# Patient Record
Sex: MALE | Race: BLACK OR AFRICAN AMERICAN | NOT HISPANIC OR LATINO | URBAN - METROPOLITAN AREA
[De-identification: names, ages, dates, MRNs, and addresses within clinical notes are randomized per-mention and may not be internally consistent; named-entity substitution may affect disease eponyms.]

---

## 2017-02-27 ENCOUNTER — EMERGENCY (EMERGENCY)
Facility: HOSPITAL | Age: 72
LOS: 0 days | Discharge: HOME | End: 2017-02-27
Attending: EMERGENCY MEDICINE
Payer: COMMERCIAL

## 2017-02-27 VITALS
HEART RATE: 69 BPM | RESPIRATION RATE: 18 BRPM | DIASTOLIC BLOOD PRESSURE: 95 MMHG | OXYGEN SATURATION: 99 % | TEMPERATURE: 98 F | SYSTOLIC BLOOD PRESSURE: 157 MMHG

## 2017-02-27 VITALS
OXYGEN SATURATION: 98 % | HEART RATE: 58 BPM | RESPIRATION RATE: 20 BRPM | DIASTOLIC BLOOD PRESSURE: 76 MMHG | TEMPERATURE: 98 F | SYSTOLIC BLOOD PRESSURE: 143 MMHG

## 2017-02-27 DIAGNOSIS — M54.2 CERVICALGIA: ICD-10-CM
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
## 2017-02-27 DIAGNOSIS — S16.1XXA STRAIN OF MUSCLE, FASCIA AND TENDON AT NECK LEVEL, INITIAL ENCOUNTER: ICD-10-CM
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
## 2017-02-27 PROCEDURE — 99284 EMERGENCY DEPT VISIT MOD MDM: CPT

## 2017-02-27 PROCEDURE — 72050 X-RAY EXAM NECK SPINE 4/5VWS: CPT | Mod: 26

## 2017-02-27 RX ORDER — ACETAMINOPHEN 500 MG
650 TABLET ORAL ONCE
Qty: 0 | Refills: 0 | Status: COMPLETED | OUTPATIENT
Start: 2017-02-27 | End: 2017-02-27

## 2017-02-27 RX ADMIN — Medication 650 MILLIGRAM(S): at 11:13
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
## 2017-02-27 NOTE — ED ADULT NURSE NOTE - CAS EDN DISCHARGE ASSESSMENT
Alert and oriented to person, place and time/Awake/No adverse reaction to first time med in ED/Dressing clean and dry

## 2017-02-27 NOTE — ED ADULT NURSE NOTE - CAS TRG GENERAL NORM CIRC DET
Strong peripheral pulses/Capillary refill less/equal to 2 seconds/No visible significant external bleeding/Bounding peripheral pulses

## 2018-01-01 ENCOUNTER — OUTPATIENT (OUTPATIENT)
Dept: OUTPATIENT SERVICES | Facility: HOSPITAL | Age: 73
LOS: 1 days | End: 2018-01-01
Payer: MEDICAID

## 2018-01-25 ENCOUNTER — INPATIENT (INPATIENT)
Facility: HOSPITAL | Age: 73
LOS: 5 days | Discharge: NURSING, REHAB OR LONG-TERM CARE | End: 2018-01-31
Attending: INTERNAL MEDICINE | Admitting: INTERNAL MEDICINE
Payer: MEDICARE

## 2018-01-25 VITALS
SYSTOLIC BLOOD PRESSURE: 169 MMHG | DIASTOLIC BLOOD PRESSURE: 102 MMHG | RESPIRATION RATE: 16 BRPM | OXYGEN SATURATION: 100 % | TEMPERATURE: 98 F | HEART RATE: 64 BPM

## 2018-01-25 LAB
ALBUMIN SERPL ELPH-MCNC: 4.1 G/DL — SIGNIFICANT CHANGE UP (ref 3.3–5)
ALP SERPL-CCNC: 71 U/L — SIGNIFICANT CHANGE UP (ref 40–120)
ALT FLD-CCNC: 15 U/L — SIGNIFICANT CHANGE UP (ref 4–41)
APTT BLD: 28.7 SEC — SIGNIFICANT CHANGE UP (ref 27.5–37.4)
AST SERPL-CCNC: 24 U/L — SIGNIFICANT CHANGE UP (ref 4–40)
BASOPHILS # BLD AUTO: 0.06 K/UL — SIGNIFICANT CHANGE UP (ref 0–0.2)
BASOPHILS NFR BLD AUTO: 0.4 % — SIGNIFICANT CHANGE UP (ref 0–2)
BASOPHILS NFR SPEC: 0 % — SIGNIFICANT CHANGE UP (ref 0–2)
BILIRUB SERPL-MCNC: 0.3 MG/DL — SIGNIFICANT CHANGE UP (ref 0.2–1.2)
BUN SERPL-MCNC: 12 MG/DL — SIGNIFICANT CHANGE UP (ref 7–23)
CALCIUM SERPL-MCNC: 9.6 MG/DL — SIGNIFICANT CHANGE UP (ref 8.4–10.5)
CHLORIDE SERPL-SCNC: 101 MMOL/L — SIGNIFICANT CHANGE UP (ref 98–107)
CO2 SERPL-SCNC: 26 MMOL/L — SIGNIFICANT CHANGE UP (ref 22–31)
CREAT SERPL-MCNC: 1.16 MG/DL — SIGNIFICANT CHANGE UP (ref 0.5–1.3)
EOSINOPHIL # BLD AUTO: 0.03 K/UL — SIGNIFICANT CHANGE UP (ref 0–0.5)
EOSINOPHIL NFR BLD AUTO: 0.2 % — SIGNIFICANT CHANGE UP (ref 0–6)
EOSINOPHIL NFR FLD: 0 % — SIGNIFICANT CHANGE UP (ref 0–6)
GIANT PLATELETS BLD QL SMEAR: PRESENT — SIGNIFICANT CHANGE UP
GLUCOSE SERPL-MCNC: 101 MG/DL — HIGH (ref 70–99)
HCT VFR BLD CALC: 45.9 % — SIGNIFICANT CHANGE UP (ref 39–50)
HGB BLD-MCNC: 15 G/DL — SIGNIFICANT CHANGE UP (ref 13–17)
IMM GRANULOCYTES # BLD AUTO: 0.05 # — SIGNIFICANT CHANGE UP
IMM GRANULOCYTES NFR BLD AUTO: 0.3 % — SIGNIFICANT CHANGE UP (ref 0–1.5)
INR BLD: 0.95 — SIGNIFICANT CHANGE UP (ref 0.88–1.17)
LYMPHOCYTES # BLD AUTO: 60.3 % — HIGH (ref 13–44)
LYMPHOCYTES # BLD AUTO: 9.49 K/UL — HIGH (ref 1–3.3)
LYMPHOCYTES NFR SPEC AUTO: 44.6 % — HIGH (ref 13–44)
MANUAL SMEAR VERIFICATION: SIGNIFICANT CHANGE UP
MCHC RBC-ENTMCNC: 30.3 PG — SIGNIFICANT CHANGE UP (ref 27–34)
MCHC RBC-ENTMCNC: 32.7 % — SIGNIFICANT CHANGE UP (ref 32–36)
MCV RBC AUTO: 92.7 FL — SIGNIFICANT CHANGE UP (ref 80–100)
MONOCYTES # BLD AUTO: 0.43 K/UL — SIGNIFICANT CHANGE UP (ref 0–0.9)
MONOCYTES NFR BLD AUTO: 2.7 % — SIGNIFICANT CHANGE UP (ref 2–14)
MONOCYTES NFR BLD: 1.8 % — LOW (ref 2–9)
MORPHOLOGY BLD-IMP: NORMAL — SIGNIFICANT CHANGE UP
NEUTROPHIL AB SER-ACNC: 40.2 % — LOW (ref 43–77)
NEUTROPHILS # BLD AUTO: 5.69 K/UL — SIGNIFICANT CHANGE UP (ref 1.8–7.4)
NEUTROPHILS NFR BLD AUTO: 36.1 % — LOW (ref 43–77)
NRBC # FLD: 0 — SIGNIFICANT CHANGE UP
PLATELET # BLD AUTO: 328 K/UL — SIGNIFICANT CHANGE UP (ref 150–400)
PLATELET COUNT - ESTIMATE: NORMAL — SIGNIFICANT CHANGE UP
PMV BLD: 10.2 FL — SIGNIFICANT CHANGE UP (ref 7–13)
POTASSIUM SERPL-MCNC: 3.9 MMOL/L — SIGNIFICANT CHANGE UP (ref 3.5–5.3)
POTASSIUM SERPL-SCNC: 3.9 MMOL/L — SIGNIFICANT CHANGE UP (ref 3.5–5.3)
PROT SERPL-MCNC: 7.7 G/DL — SIGNIFICANT CHANGE UP (ref 6–8.3)
PROTHROM AB SERPL-ACNC: 10.9 SEC — SIGNIFICANT CHANGE UP (ref 9.8–13.1)
RBC # BLD: 4.95 M/UL — SIGNIFICANT CHANGE UP (ref 4.2–5.8)
RBC # FLD: 13.9 % — SIGNIFICANT CHANGE UP (ref 10.3–14.5)
SMUDGE CELLS # BLD: PRESENT — SIGNIFICANT CHANGE UP
SODIUM SERPL-SCNC: 143 MMOL/L — SIGNIFICANT CHANGE UP (ref 135–145)
VARIANT LYMPHS # BLD: 13.4 % — SIGNIFICANT CHANGE UP
WBC # BLD: 15.75 K/UL — HIGH (ref 3.8–10.5)
WBC # FLD AUTO: 15.75 K/UL — HIGH (ref 3.8–10.5)

## 2018-01-25 PROCEDURE — 72170 X-RAY EXAM OF PELVIS: CPT | Mod: 26

## 2018-01-25 PROCEDURE — 70450 CT HEAD/BRAIN W/O DYE: CPT | Mod: 26

## 2018-01-25 PROCEDURE — 71045 X-RAY EXAM CHEST 1 VIEW: CPT | Mod: 26

## 2018-01-25 NOTE — ED ADULT TRIAGE NOTE - CHIEF COMPLAINT QUOTE
Pt creole speaking,  As per friend who lives with patient, patient has had multiple falls, 3 being today, as per friend pt has been falling and complaining of knee pain, pt at baseline mental status, talks slightly slow for many years, no change in mental status, denies blood thinner use, PMH htn, high chol, denies chest pain, sob, blurred vision, dizziness, headaches, or weakness

## 2018-01-25 NOTE — ED PROVIDER NOTE - OBJECTIVE STATEMENT
72M, pmhx HTN HLD presents to ED with chief complaint of weakness and multiple falls today. Creole speaking,  # [id number] used for interaction. Patient lives at home with a male friend. Hx obtained from patient and friend. Patient has had weakness and decreased appetite since yesterday, and had 3 falls at home today. Patient states that he fell each time because of weakness, and hit his head during one of the falls. Denies any preceding symptoms, and denies any LOC, dizziness, blurry vision, fevers or chills, nausea or vomiting, headache, chest pain, shortness of breath, abdominal pain, diarrhea or constipation, dysuria, neck or back pain, joint pain. No prior hx of falls. Endorses few days of cough. Per friends, mental status at baseline, normal speech at baseline. Patient is A+Ox4. Patient non ambulatory currently due to weakness. No meds, allergies, tobacco, ethanol, or drug use. 72M, pmhx HTN HLD presents to ED with chief complaint of weakness and multiple falls today. Creole speaking,  # [id number] used for interaction. Patient lives at home with a male friend. Hx obtained from patient and friend. Patient has had weakness and decreased appetite since yesterday, and had 3 falls at home today. Patient states that he fell each time because of weakness, and hit his head during one of the falls. Denies any preceding symptoms, and denies any LOC, dizziness, blurry vision, fevers or chills, nausea or vomiting, headache, chest pain, shortness of breath, abdominal pain, diarrhea or constipation, dysuria, neck or back pain, joint pain. No prior hx of falls. Endorses few days of cough. Per friends, mental status at baseline, normal speech at baseline. Patient is A+Ox4. Patient non ambulatory currently due to weakness. No meds, allergies, tobacco, ethanol, or drug use.  Niece (health proxy) Tejal - [phone]

## 2018-01-25 NOTE — ED PROVIDER NOTE - PROGRESS NOTE DETAILS
Neuro paged x2 regarding patient, awaiting callback  Roverto Day MD, PGY1 Neurology to see patient  Roverto Day MD, PGY1

## 2018-01-25 NOTE — ED ADULT NURSE NOTE - ED STAT RN HANDOFF DETAILS
pt endorsed to joanne alexandra. nad noted. attempting to reach tele pa regarding bp, no answer orders yet. nsr on monitor. safety maintained

## 2018-01-25 NOTE — ED ADULT NURSE NOTE - OBJECTIVE STATEMENT
Alert and oriented x 4. Pt speaks Creole.  used. # [id number]. Pt states: " I feel weak and I fell 3 times today. I don't know why." Pt friend states: ": The first time he fell I helped him up the next time I found him on the floor." Pt denies LOC. Pt did hit head on glass side table. Pt denies chest pain, shortness of breath, nausea, vomiting or dizziness. IV 20g to right AC. Labs drawn. VSS except BP. MD at bedside and aware. Upon assessment pt has right arm and leg weakness and  is slighter less then left side. Pt usually walks. Friends at bedside. Will continue to monitor. RN Facilitator.

## 2018-01-25 NOTE — ED PROVIDER NOTE - ATTENDING CONTRIBUTION TO CARE
72M, pmhx HTN HLD presents to ED with chief complaint of weakness and multiple falls today. Creole speaking,  # [id number] used for interaction. Patient lives at home with a male friend. Hx obtained from patient and friend. Patient has had weakness and decreased appetite since yesterday, and had 3 falls at home today. Patient states that he fell each time because of weakness, and hit his head during one of the falls. Denies any preceding symptoms, and denies any LOC, dizziness, blurry vision, fevers or chills, nausea or vomiting, headache, chest pain, shortness of breath, abdominal pain, diarrhea or constipation, dysuria, neck or back pain, joint pain. No prior hx of falls. Endorses few days of cough. Per friends, mental status at baseline, normal speech at baseline. Patient is A+Ox4. Patient non ambulatory currently due to weakness. No meds, allergies, tobacco, ethanol, or drug use. On exam: VSS, afebrile, lungs clear heart sounds normal, abdo soft non tender, of note: R arm and leg weakness 4/5, left side normal. Likely recent CVA. Plan: labs ua ekg CXR CT head and medical admission

## 2018-01-25 NOTE — ED PROVIDER NOTE - PHYSICAL EXAMINATION
A+Ox4  CN 2-12 grossly intact, no c-spine, spinal tenderness  Weakness to right upper and lower extremity: decreased  strength RUE, decreased RUE flex/extension. RUE drop.  RLE: decreased strength, no flex/extension at hip, unable to lift leg off bed, +leg drop

## 2018-01-26 DIAGNOSIS — I10 ESSENTIAL (PRIMARY) HYPERTENSION: ICD-10-CM

## 2018-01-26 DIAGNOSIS — R53.1 WEAKNESS: ICD-10-CM

## 2018-01-26 DIAGNOSIS — E78.5 HYPERLIPIDEMIA, UNSPECIFIED: ICD-10-CM

## 2018-01-26 DIAGNOSIS — Z98.49 CATARACT EXTRACTION STATUS, UNSPECIFIED EYE: Chronic | ICD-10-CM

## 2018-01-26 DIAGNOSIS — I63.9 CEREBRAL INFARCTION, UNSPECIFIED: ICD-10-CM

## 2018-01-26 LAB
ALBUMIN SERPL ELPH-MCNC: 4.1 G/DL — SIGNIFICANT CHANGE UP (ref 3.3–5)
ALP SERPL-CCNC: 74 U/L — SIGNIFICANT CHANGE UP (ref 40–120)
ALT FLD-CCNC: 13 U/L — SIGNIFICANT CHANGE UP (ref 4–41)
APPEARANCE UR: CLEAR — SIGNIFICANT CHANGE UP
AST SERPL-CCNC: 23 U/L — SIGNIFICANT CHANGE UP (ref 4–40)
BACTERIA # UR AUTO: SIGNIFICANT CHANGE UP
BASOPHILS # BLD AUTO: 0.05 K/UL — SIGNIFICANT CHANGE UP (ref 0–0.2)
BASOPHILS NFR BLD AUTO: 0.4 % — SIGNIFICANT CHANGE UP (ref 0–2)
BILIRUB SERPL-MCNC: 0.4 MG/DL — SIGNIFICANT CHANGE UP (ref 0.2–1.2)
BILIRUB UR-MCNC: NEGATIVE — SIGNIFICANT CHANGE UP
BLOOD UR QL VISUAL: HIGH
BUN SERPL-MCNC: 14 MG/DL — SIGNIFICANT CHANGE UP (ref 7–23)
CALCIUM SERPL-MCNC: 9.3 MG/DL — SIGNIFICANT CHANGE UP (ref 8.4–10.5)
CHLORIDE SERPL-SCNC: 100 MMOL/L — SIGNIFICANT CHANGE UP (ref 98–107)
CHOLEST SERPL-MCNC: 232 MG/DL — HIGH (ref 120–199)
CO2 SERPL-SCNC: 25 MMOL/L — SIGNIFICANT CHANGE UP (ref 22–31)
COLOR SPEC: YELLOW — SIGNIFICANT CHANGE UP
CREAT SERPL-MCNC: 1.07 MG/DL — SIGNIFICANT CHANGE UP (ref 0.5–1.3)
EOSINOPHIL # BLD AUTO: 0.02 K/UL — SIGNIFICANT CHANGE UP (ref 0–0.5)
EOSINOPHIL NFR BLD AUTO: 0.1 % — SIGNIFICANT CHANGE UP (ref 0–6)
GLUCOSE SERPL-MCNC: 87 MG/DL — SIGNIFICANT CHANGE UP (ref 70–99)
GLUCOSE UR-MCNC: NEGATIVE — SIGNIFICANT CHANGE UP
HBA1C BLD-MCNC: 6 % — HIGH (ref 4–5.6)
HCT VFR BLD CALC: 47.1 % — SIGNIFICANT CHANGE UP (ref 39–50)
HDLC SERPL-MCNC: 53 MG/DL — SIGNIFICANT CHANGE UP (ref 35–55)
HGB BLD-MCNC: 15.7 G/DL — SIGNIFICANT CHANGE UP (ref 13–17)
IMM GRANULOCYTES # BLD AUTO: 0.04 # — SIGNIFICANT CHANGE UP
IMM GRANULOCYTES NFR BLD AUTO: 0.3 % — SIGNIFICANT CHANGE UP (ref 0–1.5)
KETONES UR-MCNC: HIGH
LEUKOCYTE ESTERASE UR-ACNC: SIGNIFICANT CHANGE UP
LIPID PNL WITH DIRECT LDL SERPL: 172 MG/DL — SIGNIFICANT CHANGE UP
LYMPHOCYTES # BLD AUTO: 52 % — HIGH (ref 13–44)
LYMPHOCYTES # BLD AUTO: 7.25 K/UL — HIGH (ref 1–3.3)
MAGNESIUM SERPL-MCNC: 2.2 MG/DL — SIGNIFICANT CHANGE UP (ref 1.6–2.6)
MCHC RBC-ENTMCNC: 31.1 PG — SIGNIFICANT CHANGE UP (ref 27–34)
MCHC RBC-ENTMCNC: 33.3 % — SIGNIFICANT CHANGE UP (ref 32–36)
MCV RBC AUTO: 93.3 FL — SIGNIFICANT CHANGE UP (ref 80–100)
MONOCYTES # BLD AUTO: 0.52 K/UL — SIGNIFICANT CHANGE UP (ref 0–0.9)
MONOCYTES NFR BLD AUTO: 3.7 % — SIGNIFICANT CHANGE UP (ref 2–14)
MUCOUS THREADS # UR AUTO: SIGNIFICANT CHANGE UP
NEUTROPHILS # BLD AUTO: 6.07 K/UL — SIGNIFICANT CHANGE UP (ref 1.8–7.4)
NEUTROPHILS NFR BLD AUTO: 43.5 % — SIGNIFICANT CHANGE UP (ref 43–77)
NITRITE UR-MCNC: NEGATIVE — SIGNIFICANT CHANGE UP
NON-SQ EPI CELLS # UR AUTO: <1 — SIGNIFICANT CHANGE UP
NRBC # FLD: 0 — SIGNIFICANT CHANGE UP
PH UR: 6.5 — SIGNIFICANT CHANGE UP (ref 4.6–8)
PHOSPHATE SERPL-MCNC: 3.2 MG/DL — SIGNIFICANT CHANGE UP (ref 2.5–4.5)
PLATELET # BLD AUTO: 304 K/UL — SIGNIFICANT CHANGE UP (ref 150–400)
PMV BLD: 10.2 FL — SIGNIFICANT CHANGE UP (ref 7–13)
POTASSIUM SERPL-MCNC: 3.7 MMOL/L — SIGNIFICANT CHANGE UP (ref 3.5–5.3)
POTASSIUM SERPL-SCNC: 3.7 MMOL/L — SIGNIFICANT CHANGE UP (ref 3.5–5.3)
PROT SERPL-MCNC: 7.8 G/DL — SIGNIFICANT CHANGE UP (ref 6–8.3)
PROT UR-MCNC: 30 MG/DL — HIGH
RBC # BLD: 5.05 M/UL — SIGNIFICANT CHANGE UP (ref 4.2–5.8)
RBC # FLD: 13.8 % — SIGNIFICANT CHANGE UP (ref 10.3–14.5)
RBC CASTS # UR COMP ASSIST: >50 — HIGH (ref 0–?)
SODIUM SERPL-SCNC: 142 MMOL/L — SIGNIFICANT CHANGE UP (ref 135–145)
SP GR SPEC: 1.03 — SIGNIFICANT CHANGE UP (ref 1–1.04)
SQUAMOUS # UR AUTO: SIGNIFICANT CHANGE UP
TRIGL SERPL-MCNC: 78 MG/DL — SIGNIFICANT CHANGE UP (ref 10–149)
TSH SERPL-MCNC: 0.61 UIU/ML — SIGNIFICANT CHANGE UP (ref 0.27–4.2)
UROBILINOGEN FLD QL: 1 MG/DL — SIGNIFICANT CHANGE UP
WBC # BLD: 13.95 K/UL — HIGH (ref 3.8–10.5)
WBC # FLD AUTO: 13.95 K/UL — HIGH (ref 3.8–10.5)
WBC UR QL: HIGH (ref 0–?)

## 2018-01-26 PROCEDURE — 73610 X-RAY EXAM OF ANKLE: CPT | Mod: 26,RT

## 2018-01-26 PROCEDURE — 99223 1ST HOSP IP/OBS HIGH 75: CPT | Mod: GC

## 2018-01-26 PROCEDURE — 70544 MR ANGIOGRAPHY HEAD W/O DYE: CPT | Mod: 26,59

## 2018-01-26 PROCEDURE — 99223 1ST HOSP IP/OBS HIGH 75: CPT | Mod: AI

## 2018-01-26 PROCEDURE — 73630 X-RAY EXAM OF FOOT: CPT | Mod: 26,RT

## 2018-01-26 PROCEDURE — 73600 X-RAY EXAM OF ANKLE: CPT | Mod: 26,RT

## 2018-01-26 PROCEDURE — 70549 MR ANGIOGRAPH NECK W/O&W/DYE: CPT | Mod: 26

## 2018-01-26 PROCEDURE — 70553 MRI BRAIN STEM W/O & W/DYE: CPT | Mod: 26

## 2018-01-26 RX ORDER — ASPIRIN/CALCIUM CARB/MAGNESIUM 324 MG
81 TABLET ORAL DAILY
Qty: 0 | Refills: 0 | Status: DISCONTINUED | OUTPATIENT
Start: 2018-01-26 | End: 2018-01-31

## 2018-01-26 RX ORDER — ATORVASTATIN CALCIUM 80 MG/1
80 TABLET, FILM COATED ORAL AT BEDTIME
Qty: 0 | Refills: 0 | Status: DISCONTINUED | OUTPATIENT
Start: 2018-01-26 | End: 2018-01-31

## 2018-01-26 RX ADMIN — ATORVASTATIN CALCIUM 80 MILLIGRAM(S): 80 TABLET, FILM COATED ORAL at 22:11

## 2018-01-26 RX ADMIN — Medication 81 MILLIGRAM(S): at 13:00

## 2018-01-26 NOTE — PHYSICAL THERAPY INITIAL EVALUATION ADULT - LEVEL OF CONSCIOUSNESS, REHAB EVAL
alert Purse String (Simple) Text: Given the location of the defect and the characteristics of the surrounding skin a pursestring closure was deemed most appropriate.  Undermining was performed circumfirentially around the surgical defect.  A purstring suture was then placed and tightened.

## 2018-01-26 NOTE — OCCUPATIONAL THERAPY INITIAL EVALUATION ADULT - RANGE OF MOTION EXAMINATION, UPPER EXTREMITY
Left UE Active ROM was WFL (within functional limits)/Right UE Active Assistive ROM was WFL  (within functional limits)

## 2018-01-26 NOTE — CONSULT NOTE ADULT - ATTENDING COMMENTS
Patient seen and examined with neurology team and above note reviewed and I agree with assessment and plan as outlined. Patient admitted for acute onset of right sided weakness and poor balance and multiple falls as a result. His exam shows right leg> arm weakness and he cannot stand up unassisted. His MRI brain revealed an acute left NURA infarct and MRA showed mild bilateral MCA bifurcation stenosis and M2 segment as well otherwise normal. He will be placed on ASA 81 mg daily and high dose statin and coQ 10 100mg daily. He is awaiting LEATHA with bubble study and telemonitoring. Continue to optimize vascular risk factors, Check HBA1c and lipid profile.   All questions answered with brother at bedside. Continue medical management and supportive care.

## 2018-01-26 NOTE — H&P ADULT - MUSCULOSKELETAL
details… detailed exam no joint swelling/no joint erythema/no joint warmth/no calf tenderness/decreased ROM

## 2018-01-26 NOTE — H&P ADULT - PROBLEM SELECTOR PLAN 2
Xray of the R ankle/foot to r/o fracture  Ortho consult to r/o possible ligament tear  PT consult for gait/balance exercises Xray of the R ankle/foot to r/o fracture, Check Orthostatics, PT consult for gait assessment

## 2018-01-26 NOTE — CONSULT NOTE ADULT - SUBJECTIVE AND OBJECTIVE BOX
HPI: 71 yo male who presents to ED with falls. This morning he became weak on his right side and had multiple falls. No numbness. No visual changes. No speech issues. His righ arm and leg are much weaker now than this morning. NIHSS 6 MRS0          MEDICATIONS  (STANDING):    MEDICATIONS  (PRN):    PAST MEDICAL & SURGICAL HISTORY:  No pertinent past medical history  No significant past surgical history    FAMILY HISTORY:    Allergies    No Known Allergies    Intolerances        SHx - No smoking, No ETOH, No drug abuse      Review of Systems:  CONSTITUTIONAL:  No weight loss, fever, chills, weakness or fatigue.  HEENT:  Eyes:  No visual loss, blurred vision, double vision or yellow sclerae. Ears, Nose, Throat:  No hearing loss, sneezing, congestion, runny nose or sore throat.  SKIN:  No rash or itching.  CARDIOVASCULAR:  No chest pain, chest pressure or chest discomfort. No palpitations or edema.  RESPIRATORY:  No shortness of breath, cough or sputum.  GASTROINTESTINAL:  No anorexia, nausea, vomiting or diarrhea. No abdominal pain or blood.  GENITOURINARY:  NO Burning on urination.   NEUROLOGICAL: See HPI  MUSCULOSKELETAL:  No muscle, back pain, joint pain or stiffness.  HEMATOLOGIC:  No anemia, bleeding or bruising.  LYMPHATICS:  No enlarged nodes. No history of splenectomy.  PSYCHIATRIC:  No history of depression or anxiety.  ENDOCRINOLOGIC:  No reports of sweating, cold or heat intolerance. No polyuria or polydipsia.  ALLERGIES:  No history of asthma, hives, eczema or rhinitis.        Vital Signs Last 24 Hrs  T(C): 37.1 (26 Jan 2018 02:19), Max: 37.1 (26 Jan 2018 02:19)  T(F): 98.7 (26 Jan 2018 02:19), Max: 98.7 (26 Jan 2018 02:19)  HR: 66 (26 Jan 2018 02:19) (64 - 78)  BP: 155/91 (26 Jan 2018 02:19) (155/91 - 176/100)  BP(mean): --  RR: 15 (26 Jan 2018 02:19) (15 - 18)  SpO2: 96% (26 Jan 2018 02:19) (96% - 100%)    General Exam:   General appearance: No acute distress                   Neurological Exam:  Mental Status: Orientated to self, date and place.  Attention intact.  No dysarthria, aphasia or neglect.  Knowledge intact.  Registration intact.  Short and long term memory grossly intact.      Cranial Nerves: CN I - not tested.  PERRL, EOMI, VFF, no nystagmus or diplopia.  No APD.  Fundi not visualized bilaterally.  CN V1-3 intact to light touch and pinprick.  Right facial droop.  Hearing intact to finger rub bilaterally.  Tongue, uvula and palate midline.  Sternocleidomastoid and Trapezius intact bilaterally.    Motor:   Tone: normal.                  Strength:     [] Upper extremity                      Delt       Bicep    Tricep                                                  R         3/5        3/5        4/5       4/5                                               L          5/5 5/5 5/5 5/5  [] Lower extremity                       HF          KE          KF        DF         PF                                               R        3/5        3/5        4/5       5/5       5/5                                               L         5/5 5/5 5/5 5/5 5/5                  Dysmetria: None to finger-nose-finger or heel-shin-heel  No truncal ataxia.    Tremor: No resting, postural or action tremor.  No myoclonus.    Sensation: intact to light touch, pinprick, vibration and proprioception    Deep Tendon Reflexes: 1+ bilateral biceps, triceps, brachioradialis, knee and ankle  Toes flexor bilaterally    Gait: normal and stable.      Other:    01-25    143  |  101  |  12  ----------------------------<  101<H>  3.9   |  26  |  1.16    Ca    9.6      25 Jan 2018 21:30    TPro  7.7  /  Alb  4.1  /  TBili  0.3  /  DBili  x   /  AST  24  /  ALT  15  /  AlkPhos  71 01-25 01-25    143  |  101  |  12  ----------------------------<  101<H>  3.9   |  26  |  1.16    Ca    9.6      25 Jan 2018 21:30    TPro  7.7  /  Alb  4.1  /  TBili  0.3  /  DBili  x   /  AST  24  /  ALT  15  /  AlkPhos  71  01-25                          15.0   15.75 )-----------( 328      ( 25 Jan 2018 21:30 )             45.9       Radiology    CT    < from: CT Head No Cont (01.25.18 @ 23:22) >  No CT evidence of acute intracranial hemorrhage, brain edema, mass effect   or acute territorial infarct. Follow-up MRI can be obtained as clinically   warranted.     < end of copied text >

## 2018-01-26 NOTE — PHYSICAL THERAPY INITIAL EVALUATION ADULT - CRITERIA FOR SKILLED THERAPEUTIC INTERVENTIONS
anticipated discharge recommendation/predicted duration of therapy intervention/risk reduction/prevention/rehab potential/therapy frequency/impairments found/functional limitations in following categories

## 2018-01-26 NOTE — H&P ADULT - NEGATIVE ENMT SYMPTOMS
no sinus symptoms/no hearing difficulty/no nasal congestion/no throat pain/no nose bleeds/no ear pain/no tinnitus

## 2018-01-26 NOTE — H&P ADULT - NSHPSOCIALHISTORY_GEN_ALL_CORE
71 yo M retired, never had a job, lives alone in own residence. Pt denies using a cane/walker and denies smoking, alcohol, or drug use. 73 yo M, never had a job, lives alone in own residence per . Pt denies using a cane/walker and denies smoking, alcohol, or drug use.

## 2018-01-26 NOTE — PHYSICAL THERAPY INITIAL EVALUATION ADULT - ADDITIONAL COMMENTS
Pt. was left supine in bed, NAD, call bell within reach, friend present. RN made aware of pt. status.

## 2018-01-26 NOTE — H&P ADULT - ASSESSMENT
3 yo Male with a PMHx of HTN, HLD presents to the ED complaining of R foot weakness that began a day ago admitted to telemetry to r/o stroke. 71 yo Male with a PMHx of HTN, HLD presents to the ED complaining of R foot weakness that began a day ago, sustained 4 falls, 2 with head trauma, admitted to telemetry to r/o stroke.

## 2018-01-26 NOTE — OCCUPATIONAL THERAPY INITIAL EVALUATION ADULT - PERTINENT HX OF CURRENT PROBLEM, REHAB EVAL
71 yo Male with a PMHx of HTN, HLD presents to the ED complaining of R foot weakness that began a day ago, sustained 4 falls, 2 with head trauma, admitted to telemetry to r/o stroke.

## 2018-01-26 NOTE — H&P ADULT - NEGATIVE MUSCULOSKELETAL SYMPTOMS
no back pain/no leg pain R/no stiffness/no neck pain/no arm pain L/no arm pain R/no leg pain L/no arthritis/no joint swelling

## 2018-01-26 NOTE — H&P ADULT - PROBLEM SELECTOR PLAN 1
admit patient to telemetry   monitor vital signs, serial EKGS, cardiac enzymes, echocardiogram  CT head no contrast, MRI of the brain, neuro c/s  begin patient on aspirin and plavix Admit patient to telemetry, monitor for arrhythmia, monitor vital signs, neuro checks, check echocardiogram, MRI/A of the brain & neck, begin patient on aspirin and atorvastatin, F/U MD note

## 2018-01-26 NOTE — H&P ADULT - NEGATIVE GASTROINTESTINAL SYMPTOMS
no vomiting/no nausea/no diarrhea/no melena/no hematochezia/no change in bowel habits/no constipation/no abdominal pain

## 2018-01-26 NOTE — H&P ADULT - HISTORY OF PRESENT ILLNESS
73 yo Male with a PMHx of HTN, HLD presents to the ED complaining of R foot/ankle weakness that began a day ago. Pt is a poor historian and speaks limited english with a native language in Creole. An  was used (#[id number]) to speak with patient and friend to gain collateral information. Pt explained that beginning yesterday, he had right foot/ankle pain that prevented him from walking his usual 2-3 blocks daily due to difficulty walking on his R foot because it felt "painful." Pt mentioned that the R foot "is so weak," he couldn't stand up. In addition, pt mentioned that he fell 4 times and only 2/4 times he fell and hit his head on a "mirror." Pt reported that after he fell those two times, he could not get up himself. Pt denied any other pain/weakness around his body besides his right foot/ankle. Pt  mentioned he had no appetite to eat anything in addition to the weakness he felt in his right foot/ankle that began yesterday. Pt denied taking any medication to relieve the right foot/ankle weakness or radiation of weakness. Lastly, pt mentioned he feels "out of breath" upon exertion when he has to do chores around the house or during his 2-3 block daily walk. Pt mentioned that this "has been going on for a while," and the SOB goes away at rest. As per collateral information from friend, he mentioned that the patient had this weakness around the whole body and not just on the R ankle/foot. He also mentioned that the weakness has been occurring for a "few" days know and not just starting yesterday. He denied seeing the patient have any LOC, syncope, or generalized tremors when he saw him fall. He also mentioned that when the pt fell, the pt would be unable to get up on his own, and he would have to pull him up in order for him to get up from the floor. Pt denies: fever, chills, cough, wheezing, changes in weight, changes in bowl movements, diaphoresis, palpitations, syncope, chest pain, headaches, lightheadedness/dizziness, LOC, abdominal pain, N/V/D, constipation, hematuria, hematochezia, melena, night sweats, paroxysmal nocturnal orthopnea, LE edema, tremors, changes in vision, blurry vision, diplopia, nasal congestion, sore throat, tinnitus, muscle/joint pain besides foot/ankle pain. 73 yo Male with a PMHx of HTN, HLD presents to the ED complaining of R foot/ankle weakness that began a day ago. Pt is a poor historian and speaks limited english with a native language in Creole. An  was used (#[id number]) to speak with patient and friend to gain collateral information. Pt explained that beginning yesterday, he had right foot/ankle pain that prevented him from walking his usual 2-3 blocks daily due to difficulty walking on his R foot because it felt "painful." Pt mentioned that the R foot "is so weak," he couldn't stand up. In addition, pt mentioned that he fell 4 times and only 2/4 times he fell and hit his head on a "mirror." Pt reported that after he fell those two times, he could not get up himself. Pt denied any other pain/weakness around his body besides his right foot/ankle. Pt  mentioned he had no appetite to eat anything in addition to the weakness he felt in his right foot/ankle that began yesterday. Pt denied taking any medication to relieve the right foot/ankle pain or radiation of weakness. Lastly, pt mentioned he feels "out of breath" upon exertion when he has to do chores around the house or during his 2-3 block daily walk. Pt mentioned that this "has been going on for a while," and the SOB goes away at rest. As per collateral information from friend, he mentioned that the patient had this weakness around the whole body and not just on the R ankle/foot. He also mentioned that the weakness has been occurring for a "few" days know and not just starting yesterday. He denied seeing the patient have any LOC, syncope, or generalized tremors when he saw him fall. He also mentioned that when the pt fell, the pt would be unable to get up on his own, and he would have to pull him up in order for him to get up from the floor. Pt denies: fever, chills, cough, wheezing, changes in weight, changes in bowl movements, diaphoresis, palpitations, syncope, chest pain, headaches, lightheadedness/dizziness, LOC, abdominal pain, N/V/D, constipation, hematuria, hematochezia, melena, night sweats, paroxysmal nocturnal orthopnea, LE edema, tremors, changes in vision, blurry vision, diplopia, nasal congestion, sore throat, tinnitus, muscle/joint pain besides foot/ankle pain.

## 2018-01-26 NOTE — H&P ADULT - ATTENDING COMMENTS
72 y.o. Creole speaking Male w/ hx HTN, hyperlipidemia not on meds p/w 1 day of RLE weakness c/b multiple Falls to be admitted for CVA w/u. Will check MRI/MRA brain and neck, TTE, and monitor on tele. Start ASA/atorvastatin. Permissive HTN for the first 24 hours. Neurology consult. PT/OT consult. Check XR of ankle to r/o fractures. Patient also with unexplained leukocytosis; UA, CXR negative for infection. Will trend WBCs.

## 2018-01-26 NOTE — H&P ADULT - NEUROLOGICAL DETAILS
R upper/lower extremities/responds to verbal commands/sensation intact/alert and oriented x 3/strength decreased/no spontaneous movement

## 2018-01-26 NOTE — OCCUPATIONAL THERAPY INITIAL EVALUATION ADULT - PLANNED THERAPY INTERVENTIONS, OT EVAL
neuromuscular re-education/cognitive, visual perceptual/ADL retraining/bed mobility training/strengthening/transfer training/balance training/motor coordination training

## 2018-01-26 NOTE — H&P ADULT - PROBLEM SELECTOR PLAN 3
begin patient on antihypertensive medication and follow up with pcp once discharged consider beginning patient on antihypertensive medication and follow up with pcp once discharged

## 2018-01-26 NOTE — H&P ADULT - RS GEN PE MLT RESP DETAILS PC
breath sounds equal/clear to auscultation bilaterally/respirations non-labored/good air movement/airway patent

## 2018-01-26 NOTE — H&P ADULT - NEGATIVE OPHTHALMOLOGIC SYMPTOMS
no irritation R/no pain R/no irritation L/no loss of vision L/no loss of vision R/no diplopia/no pain L

## 2018-01-26 NOTE — PHYSICAL THERAPY INITIAL EVALUATION ADULT - PERTINENT HX OF CURRENT PROBLEM, REHAB EVAL
Pt. admitted for right foot/ankle weakness. Also had multiple falls secondary to weakness. PMH of HTN, HLD. X-rays (-) for fractures. MR brain revealed left NURA territory acute vs subacute infarction.

## 2018-01-26 NOTE — PHYSICAL THERAPY INITIAL EVALUATION ADULT - GENERAL OBSERVATIONS, REHAB EVAL
Patient received supine in bed, NAD, +tele, friend present. Patient agreed to EVALUATION from Physical Therapist.

## 2018-01-27 DIAGNOSIS — Z29.9 ENCOUNTER FOR PROPHYLACTIC MEASURES, UNSPECIFIED: ICD-10-CM

## 2018-01-27 DIAGNOSIS — D72.829 ELEVATED WHITE BLOOD CELL COUNT, UNSPECIFIED: ICD-10-CM

## 2018-01-27 DIAGNOSIS — I63.9 CEREBRAL INFARCTION, UNSPECIFIED: ICD-10-CM

## 2018-01-27 DIAGNOSIS — I10 ESSENTIAL (PRIMARY) HYPERTENSION: ICD-10-CM

## 2018-01-27 LAB
BACTERIA UR CULT: SIGNIFICANT CHANGE UP
BUN SERPL-MCNC: 20 MG/DL — SIGNIFICANT CHANGE UP (ref 7–23)
CALCIUM SERPL-MCNC: 9.1 MG/DL — SIGNIFICANT CHANGE UP (ref 8.4–10.5)
CHLORIDE SERPL-SCNC: 97 MMOL/L — LOW (ref 98–107)
CO2 SERPL-SCNC: 25 MMOL/L — SIGNIFICANT CHANGE UP (ref 22–31)
CREAT SERPL-MCNC: 1.06 MG/DL — SIGNIFICANT CHANGE UP (ref 0.5–1.3)
GLUCOSE SERPL-MCNC: 100 MG/DL — HIGH (ref 70–99)
HCT VFR BLD CALC: 46.1 % — SIGNIFICANT CHANGE UP (ref 39–50)
HGB BLD-MCNC: 15.4 G/DL — SIGNIFICANT CHANGE UP (ref 13–17)
MAGNESIUM SERPL-MCNC: 2.1 MG/DL — SIGNIFICANT CHANGE UP (ref 1.6–2.6)
MCHC RBC-ENTMCNC: 31.2 PG — SIGNIFICANT CHANGE UP (ref 27–34)
MCHC RBC-ENTMCNC: 33.4 % — SIGNIFICANT CHANGE UP (ref 32–36)
MCV RBC AUTO: 93.5 FL — SIGNIFICANT CHANGE UP (ref 80–100)
NRBC # FLD: 0 — SIGNIFICANT CHANGE UP
PLATELET # BLD AUTO: 285 K/UL — SIGNIFICANT CHANGE UP (ref 150–400)
PMV BLD: 10.5 FL — SIGNIFICANT CHANGE UP (ref 7–13)
POTASSIUM SERPL-MCNC: 3.8 MMOL/L — SIGNIFICANT CHANGE UP (ref 3.5–5.3)
POTASSIUM SERPL-SCNC: 3.8 MMOL/L — SIGNIFICANT CHANGE UP (ref 3.5–5.3)
RBC # BLD: 4.93 M/UL — SIGNIFICANT CHANGE UP (ref 4.2–5.8)
RBC # FLD: 14 % — SIGNIFICANT CHANGE UP (ref 10.3–14.5)
SODIUM SERPL-SCNC: 137 MMOL/L — SIGNIFICANT CHANGE UP (ref 135–145)
SPECIMEN SOURCE: SIGNIFICANT CHANGE UP
WBC # BLD: 17.8 K/UL — HIGH (ref 3.8–10.5)
WBC # FLD AUTO: 17.8 K/UL — HIGH (ref 3.8–10.5)

## 2018-01-27 PROCEDURE — 70450 CT HEAD/BRAIN W/O DYE: CPT | Mod: 26

## 2018-01-27 PROCEDURE — 99233 SBSQ HOSP IP/OBS HIGH 50: CPT

## 2018-01-27 RX ADMIN — Medication 81 MILLIGRAM(S): at 12:55

## 2018-01-27 RX ADMIN — ATORVASTATIN CALCIUM 80 MILLIGRAM(S): 80 TABLET, FILM COATED ORAL at 21:28

## 2018-01-27 NOTE — SWALLOW BEDSIDE ASSESSMENT ADULT - PHARYNGEAL PHASE
Within functional limits Delayed pharyngeal swallow/Throat clear post oral intake/Wet vocal quality post oral intake

## 2018-01-27 NOTE — SWALLOW BEDSIDE ASSESSMENT ADULT - ORAL PHASE
Within functional limits Delayed AP transport; Lingual residue (cleared with liquid wash) Decreased bolus control

## 2018-01-27 NOTE — PROGRESS NOTE ADULT - SUBJECTIVE AND OBJECTIVE BOX
Patient is a 72y old  Male who presents with a chief complaint of R foot weakness x 1 day (2018 08:31)      SUBJECTIVE / OVERNIGHT EVENTS: Pt denies CP or SOB. + Weakness of right side. Poor historian given expressive aphasia. Per nursing, pt has worsening Right sided hemiparesis    MEDICATIONS  (STANDING):  aspirin enteric coated 81 milliGRAM(s) Oral daily  atorvastatin 80 milliGRAM(s) Oral at bedtime    MEDICATIONS  (PRN):      T(C): 36.2 (18 @ 06:30), Max: 36.8 (18 @ 22:04)  HR: 59 (18 @ 06:30) (59 - 68)  BP: 150/92 (18 @ 06:30) (150/92 - 181/105)  RR: 18 (18 @ 06:30) (18 - 18)  SpO2: 99% (18 @ 06:30) (98% - 99%)  CAPILLARY BLOOD GLUCOSE        I&O's Summary      PHYSICAL EXAM:  GENERAL: NAD,   HEAD:  Normocephalic  EYES: EOMI,  conjunctiva and sclera clear  NECK: Supple,   CHEST/LUNG: Clear to auscultation bilaterally; No wheeze  HEART:  s1 s2 + Regular rate and rhythm;   ABDOMEN: Soft, Nontender, Nondistended; Bowel sounds present  EXTREMITIES:   No clubbing, cyanosis  NEURO: AAOx3, 1/5 RUE and RLE      LABS:                        15.4   17.80 )-----------( 285      ( 2018 06:30 )             46.1         137  |  97<L>  |  20  ----------------------------<  100<H>  3.8   |  25  |  1.06    Ca    9.1      2018 06:30  Phos  3.2       Mg     2.1         TPro  7.8  /  Alb  4.1  /  TBili  0.4  /  DBili  x   /  AST  23  /  ALT  13  /  AlkPhos  74      PT/INR - ( 2018 21:30 )   PT: 10.9 SEC;   INR: 0.95          PTT - ( 2018 21:30 )  PTT:28.7 SEC      Urinalysis Basic - ( 2018 01:02 )    Color: YELLOW / Appearance: CLEAR / S.027 / pH: 6.5  Gluc: NEGATIVE / Ketone: MODERATE  / Bili: NEGATIVE / Urobili: 1 mg/dL   Blood: MODERATE / Protein: 30 mg/dL / Nitrite: NEGATIVE   Leuk Esterase: TRACE / RBC: >50 / WBC 5-10   Sq Epi: OCC / Non Sq Epi: x / Bacteria: FEW        Consultant(s) Notes Reviewed:  Neurology

## 2018-01-27 NOTE — PROGRESS NOTE ADULT - ASSESSMENT
73 yo Male with a PMHx of HTN, HLD presents to the ED complaining of R foot weakness that began a day ago, sustained 4 falls, 2 with head trauma, admitted to telemetry to r/o stroke.

## 2018-01-27 NOTE — SWALLOW BEDSIDE ASSESSMENT ADULT - SWALLOW EVAL: DIAGNOSIS
The patient demonstrates a mild oropharyngeal dysphagia characterized by reduced labial and lingual strength resulting in anterior loss of bolus from right side of mouth with thin liquid, slow mastication and delayed ap transport for regular solids. Adequate oral containment, bolus collection, and ap transport noted for puree, mechanical soft, and nectar-thick liquids. Pharyngeal stage is characterized by suspected delay in pharyngeal trigger with palpable hyolaryngeal excursion; wet vocal quality and throat clear noted post swallow for thin liquids suggesting laryngeal penetration vs aspiration. No overt clinical s/s noted for puree, mechanical soft, regular solid and nectar-thick liquid.

## 2018-01-27 NOTE — PROGRESS NOTE ADULT - PROBLEM SELECTOR PLAN 1
acute L NURA CVA  Pt with worsening Right sided hemiparesis, per Neuro note 1/27, pt was 3/5 RLE/RUE. Pt is currently unable to move arm, will order CT head to r/o hemorrhagic conversion  c/w statin   f/u TTE and PT  Hba1c is 6

## 2018-01-27 NOTE — SWALLOW BEDSIDE ASSESSMENT ADULT - ASR SWALLOW ASPIRATION MONITOR
position upright (90Y)/fever/throat clearing/change of breathing pattern/gurgly voice/oral hygiene/pneumonia/cough/upper respiratory infection

## 2018-01-27 NOTE — SWALLOW BEDSIDE ASSESSMENT ADULT - SWALLOW EVAL: RECOMMENDED FEEDING/EATING TECHNIQUES
maintain upright posture during/after eating for 30 mins/oral hygiene/allow for swallow between intakes/crush medication (when feasible)/no straws/small sips/bites/position upright (90 degrees)

## 2018-01-27 NOTE — SWALLOW BEDSIDE ASSESSMENT ADULT - COMMENTS
Patient is a 73 y/o Creole speaking male who presented with right side weakness and multiple falls; admitted to telemetry for r/o CVA.     CT chest 1/25/18 - Clear lungs  MRI Brain 1/26/18 - Left NURA territory acute versus acute/subacute infarction involving the cingulate gyrus and corpus callosum.     The patient was received awake, alert and sitting upright in bed; able to follow gestural cues to perform simple commands for the purpose of this assessment. Low vocal quality perceived upon sparse speech output. Occasional throat clear noted at baseline.

## 2018-01-28 LAB
BUN SERPL-MCNC: 15 MG/DL — SIGNIFICANT CHANGE UP (ref 7–23)
CALCIUM SERPL-MCNC: 8.8 MG/DL — SIGNIFICANT CHANGE UP (ref 8.4–10.5)
CHLORIDE SERPL-SCNC: 99 MMOL/L — SIGNIFICANT CHANGE UP (ref 98–107)
CO2 SERPL-SCNC: 26 MMOL/L — SIGNIFICANT CHANGE UP (ref 22–31)
CREAT SERPL-MCNC: 1.01 MG/DL — SIGNIFICANT CHANGE UP (ref 0.5–1.3)
GLUCOSE SERPL-MCNC: 100 MG/DL — HIGH (ref 70–99)
HCT VFR BLD CALC: 45.5 % — SIGNIFICANT CHANGE UP (ref 39–50)
HGB BLD-MCNC: 14.8 G/DL — SIGNIFICANT CHANGE UP (ref 13–17)
MAGNESIUM SERPL-MCNC: 2.1 MG/DL — SIGNIFICANT CHANGE UP (ref 1.6–2.6)
MCHC RBC-ENTMCNC: 30.5 PG — SIGNIFICANT CHANGE UP (ref 27–34)
MCHC RBC-ENTMCNC: 32.5 % — SIGNIFICANT CHANGE UP (ref 32–36)
MCV RBC AUTO: 93.8 FL — SIGNIFICANT CHANGE UP (ref 80–100)
NRBC # FLD: 0 — SIGNIFICANT CHANGE UP
PLATELET # BLD AUTO: 243 K/UL — SIGNIFICANT CHANGE UP (ref 150–400)
PMV BLD: 10.6 FL — SIGNIFICANT CHANGE UP (ref 7–13)
POTASSIUM SERPL-MCNC: 3.9 MMOL/L — SIGNIFICANT CHANGE UP (ref 3.5–5.3)
POTASSIUM SERPL-SCNC: 3.9 MMOL/L — SIGNIFICANT CHANGE UP (ref 3.5–5.3)
RBC # BLD: 4.85 M/UL — SIGNIFICANT CHANGE UP (ref 4.2–5.8)
RBC # FLD: 14.3 % — SIGNIFICANT CHANGE UP (ref 10.3–14.5)
SODIUM SERPL-SCNC: 139 MMOL/L — SIGNIFICANT CHANGE UP (ref 135–145)
SPECIMEN SOURCE: SIGNIFICANT CHANGE UP
SPECIMEN SOURCE: SIGNIFICANT CHANGE UP
WBC # BLD: 17.73 K/UL — HIGH (ref 3.8–10.5)
WBC # FLD AUTO: 17.73 K/UL — HIGH (ref 3.8–10.5)

## 2018-01-28 PROCEDURE — 71045 X-RAY EXAM CHEST 1 VIEW: CPT | Mod: 26

## 2018-01-28 PROCEDURE — 99233 SBSQ HOSP IP/OBS HIGH 50: CPT

## 2018-01-28 RX ORDER — LISINOPRIL 2.5 MG/1
5 TABLET ORAL DAILY
Qty: 0 | Refills: 0 | Status: DISCONTINUED | OUTPATIENT
Start: 2018-01-28 | End: 2018-01-29

## 2018-01-28 RX ORDER — ENOXAPARIN SODIUM 100 MG/ML
40 INJECTION SUBCUTANEOUS DAILY
Qty: 0 | Refills: 0 | Status: DISCONTINUED | OUTPATIENT
Start: 2018-01-28 | End: 2018-01-31

## 2018-01-28 RX ADMIN — LISINOPRIL 5 MILLIGRAM(S): 2.5 TABLET ORAL at 17:42

## 2018-01-28 RX ADMIN — ATORVASTATIN CALCIUM 80 MILLIGRAM(S): 80 TABLET, FILM COATED ORAL at 21:49

## 2018-01-28 RX ADMIN — Medication 81 MILLIGRAM(S): at 13:17

## 2018-01-28 RX ADMIN — ENOXAPARIN SODIUM 40 MILLIGRAM(S): 100 INJECTION SUBCUTANEOUS at 17:43

## 2018-01-28 NOTE — PROGRESS NOTE ADULT - SUBJECTIVE AND OBJECTIVE BOX
Patient is a 72y old  Male who presents with a chief complaint of R foot weakness x 1 day (26 Jan 2018 08:31)    SUBJECTIVE / OVERNIGHT EVENTS: Patient seen and examined. Unable to obtain ROS due to dysarthria associated with stroke however family member at bedside states no new complaints.     MEDICATIONS  (STANDING):  aspirin enteric coated 81 milliGRAM(s) Oral daily  atorvastatin 80 milliGRAM(s) Oral at bedtime  enoxaparin Injectable 40 milliGRAM(s) SubCutaneous daily  lisinopril 5 milliGRAM(s) Oral daily    PHYSICAL EXAM:  Vital Signs Last 24 Hrs  T(C): 36.8 (28 Jan 2018 06:27), Max: 36.8 (28 Jan 2018 06:27)  T(F): 98.3 (28 Jan 2018 06:27), Max: 98.3 (28 Jan 2018 06:27)  HR: 66 (28 Jan 2018 06:27) (63 - 71)  BP: 144/65 (28 Jan 2018 06:27) (125/82 - 144/65)  BP(mean): --  RR: 18 (28 Jan 2018 06:27) (17 - 18)  SpO2: 97% (28 Jan 2018 06:27) (96% - 97%)    GENERAL: NAD, well-developed  NECK: Supple, No JVD  CHEST/LUNG: Clear to auscultation bilaterally; No wheeze  HEART: Regular rate and rhythm; No murmurs, rubs, or gallops  ABDOMEN: Soft, Nontender, Nondistended; Bowel sounds present  EXTREMITIES:  2+ Peripheral Pulses, No clubbing, cyanosis, or edema  PSYCH: AAOx3  NEUROLOGY: RUE and RLL with 0/5 strength, sensation to light touch intact  SKIN: No rashes or lesions    LABS:  CAPILLARY BLOOD GLUCOSE                          14.8   17.73 )-----------( 243      ( 28 Jan 2018 05:32 )             45.5     01-28    139  |  99  |  15  ----------------------------<  100<H>  3.9   |  26  |  1.01    Ca    8.8      28 Jan 2018 05:32  Mg     2.1     01-28    RADIOLOGY & ADDITIONAL TESTS:    Imaging Personally Reviewed:  MRI- left NURA stroke     Consultant(s) Notes Reviewed:      Care Discussed with Consultants/Other Providers: Neuro

## 2018-01-28 NOTE — PROGRESS NOTE ADULT - PROBLEM SELECTOR PLAN 1
Due to acute L NURA CVA; Repeat CT head reviewed, no hemorrhagic conversion of stroke  - c/w statin and ASA  - f/u TTE and PT eval  - Hba1c is 6  - Speech and swallow eval appreciated on dysphagia diet

## 2018-01-28 NOTE — PROGRESS NOTE ADULT - ASSESSMENT
71 yo Male with a PMHx of HTN, HLD presents to the ED complaining of R foot weakness that began a day ago, sustained 4 falls, 2 with head trauma, admitted to telemetry with acute left NURA CVA.

## 2018-01-29 ENCOUNTER — TRANSCRIPTION ENCOUNTER (OUTPATIENT)
Age: 73
End: 2018-01-29

## 2018-01-29 DIAGNOSIS — R69 ILLNESS, UNSPECIFIED: ICD-10-CM

## 2018-01-29 LAB
BUN SERPL-MCNC: 20 MG/DL — SIGNIFICANT CHANGE UP (ref 7–23)
CALCIUM SERPL-MCNC: 8.9 MG/DL — SIGNIFICANT CHANGE UP (ref 8.4–10.5)
CHLORIDE SERPL-SCNC: 101 MMOL/L — SIGNIFICANT CHANGE UP (ref 98–107)
CO2 SERPL-SCNC: 25 MMOL/L — SIGNIFICANT CHANGE UP (ref 22–31)
CREAT SERPL-MCNC: 1.03 MG/DL — SIGNIFICANT CHANGE UP (ref 0.5–1.3)
GLUCOSE SERPL-MCNC: 101 MG/DL — HIGH (ref 70–99)
HCT VFR BLD CALC: 45.3 % — SIGNIFICANT CHANGE UP (ref 39–50)
HGB BLD-MCNC: 15 G/DL — SIGNIFICANT CHANGE UP (ref 13–17)
MAGNESIUM SERPL-MCNC: 2.2 MG/DL — SIGNIFICANT CHANGE UP (ref 1.6–2.6)
MCHC RBC-ENTMCNC: 31 PG — SIGNIFICANT CHANGE UP (ref 27–34)
MCHC RBC-ENTMCNC: 33.1 % — SIGNIFICANT CHANGE UP (ref 32–36)
MCV RBC AUTO: 93.6 FL — SIGNIFICANT CHANGE UP (ref 80–100)
NRBC # FLD: 0 — SIGNIFICANT CHANGE UP
PLATELET # BLD AUTO: 226 K/UL — SIGNIFICANT CHANGE UP (ref 150–400)
PMV BLD: 10.5 FL — SIGNIFICANT CHANGE UP (ref 7–13)
POTASSIUM SERPL-MCNC: 4.2 MMOL/L — SIGNIFICANT CHANGE UP (ref 3.5–5.3)
POTASSIUM SERPL-SCNC: 4.2 MMOL/L — SIGNIFICANT CHANGE UP (ref 3.5–5.3)
RBC # BLD: 4.84 M/UL — SIGNIFICANT CHANGE UP (ref 4.2–5.8)
RBC # FLD: 14.1 % — SIGNIFICANT CHANGE UP (ref 10.3–14.5)
SODIUM SERPL-SCNC: 139 MMOL/L — SIGNIFICANT CHANGE UP (ref 135–145)
WBC # BLD: 16.59 K/UL — HIGH (ref 3.8–10.5)
WBC # FLD AUTO: 16.59 K/UL — HIGH (ref 3.8–10.5)

## 2018-01-29 PROCEDURE — 93306 TTE W/DOPPLER COMPLETE: CPT | Mod: 26

## 2018-01-29 PROCEDURE — 99233 SBSQ HOSP IP/OBS HIGH 50: CPT

## 2018-01-29 PROCEDURE — 99222 1ST HOSP IP/OBS MODERATE 55: CPT | Mod: GC

## 2018-01-29 RX ORDER — CLOPIDOGREL BISULFATE 75 MG/1
75 TABLET, FILM COATED ORAL DAILY
Qty: 0 | Refills: 0 | Status: DISCONTINUED | OUTPATIENT
Start: 2018-01-29 | End: 2018-01-31

## 2018-01-29 RX ORDER — SODIUM CHLORIDE 9 MG/ML
1000 INJECTION INTRAMUSCULAR; INTRAVENOUS; SUBCUTANEOUS
Qty: 0 | Refills: 0 | Status: DISCONTINUED | OUTPATIENT
Start: 2018-01-29 | End: 2018-01-31

## 2018-01-29 RX ADMIN — ENOXAPARIN SODIUM 40 MILLIGRAM(S): 100 INJECTION SUBCUTANEOUS at 17:34

## 2018-01-29 RX ADMIN — Medication 81 MILLIGRAM(S): at 17:39

## 2018-01-29 RX ADMIN — ATORVASTATIN CALCIUM 80 MILLIGRAM(S): 80 TABLET, FILM COATED ORAL at 21:11

## 2018-01-29 RX ADMIN — LISINOPRIL 5 MILLIGRAM(S): 2.5 TABLET ORAL at 05:05

## 2018-01-29 RX ADMIN — CLOPIDOGREL BISULFATE 75 MILLIGRAM(S): 75 TABLET, FILM COATED ORAL at 17:39

## 2018-01-29 RX ADMIN — SODIUM CHLORIDE 50 MILLILITER(S): 9 INJECTION INTRAMUSCULAR; INTRAVENOUS; SUBCUTANEOUS at 17:33

## 2018-01-29 NOTE — DISCHARGE NOTE ADULT - COMMUNITY RESOURCES
[facility] [address] [place] [phone] ([phone])  1:00pm  via [facility], trip# [id number] ([phone])

## 2018-01-29 NOTE — DISCHARGE NOTE ADULT - ADDITIONAL INSTRUCTIONS
Follow up with Hematologist, Neurologist  and PMD within one week of discharge. Call for appointment. Return to ED for any concerning symptoms. Continue medications as prescribed. Low salt, low fat, low cholesterol diet. Followup with Hematology clinic - [phone] or [phone] call for appointment.

## 2018-01-29 NOTE — PROGRESS NOTE ADULT - PROBLEM SELECTOR PLAN 1
Due to acute L NURA CVA; Repeat CT head reviewed, no hemorrhagic conversion of stroke  - c/w statin and ASA  - f/u TTE  - PT eval-rehab  - Hba1c is 6 LDL-172 on statin  - Speech and swallow eval appreciated mechanical soft with nectar thick

## 2018-01-29 NOTE — PROGRESS NOTE ADULT - ASSESSMENT
71 yo male with right hemiplegia found to have left NRUA territory infarct s/t intracranial atherosclerosis of the proximal A2 segment. with worsening of exam possibly s/t over-management of blood pressure.    Start plavix 75mg daily in addition to aspirin for 3 months per DUSTIN.   Stop lisinopril and allow hypertension treating above 170mmHg SBP.  Start IVF at 75mh/hr  Continue Lipitor 80mg qhs  TTE  Appreciate PT recc's 71 yo male with right hemiplegia found to have left NURA territory infarct s/t intracranial atherosclerosis of the proximal A2 segment. with worsening of exam-reason unclear    Start plavix 75mg daily in addition to aspirin for 3 months per AGNES.   Stop lisinopril and allow hypertension treating above 180mmHg SBP.  Start IVF at 75mh/hr  Continue Lipitor 80mg qhs  TTE  Appreciate PT recc's

## 2018-01-29 NOTE — DISCHARGE NOTE ADULT - HOSPITAL COURSE
71 yo Male with a PMHx of HTN, HLD presents to the ED complaining of R foot weakness that began a day ago, sustained 4 falls, 2 with head trauma, admitted to telemetry with acute left NURA CVA. MRI+acute/subacute NURA CVA MRA- B/L M2 stenosis  Acute CVA (cerebrovascular accident).  Due to acute L NURA CVA; Repeat CT head reviewed, no hemorrhagic conversion of stroke due to ICA  - c/w statin and ASA  - TTE-normal EF no obvious source of embolus  - PT eval-rehab  - Hba1c is 6 LDL-172 on statin  - Speech and swallow eval appreciated mechanical soft with nectar thick  -PMR-LIBERTAD. Uncontrolled hypertension.   -due to worsening on exam 's off antihypertensive and IVF.    Leukocytosis, unspecified type. Likely lymphocytosis on pheripheral smear w/ smudge cells concern for CLL heme consult-reviewed Peripheral smear reviewed: lymphocytosis, mature-normal looking red cells, platelets. No blasts, immature cells. Could be reactive secondary to stroke vs smoking. 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 Asymptomatic from leukocytosis, can be monitored and result to be followed up outpatient.  - UA neg, CXR neg  - BCx NTD  -off ABx.   Followup with Hematology clinic - [phone] or [phone] call for appointment  1/31- As per attending, patient stable for discharge.

## 2018-01-29 NOTE — CONSULT NOTE ADULT - SUBJECTIVE AND OBJECTIVE BOX
HPI:  71 yo Male with a PMHx of HTN, HLD presents to the ED complaining of R foot/ankle weakness that began a day ago. Pt is a poor historian and speaks limited english with a native language in Creole. Pt explained that beginning yesterday, he had right foot/ankle pain that prevented him from walking his usual 2-3 blocks daily due to difficulty walking on his R foot because it felt "painful." Pt mentioned that the R foot "is so weak," he couldn't stand up. In addition, pt mentioned that he fell 4 times and only 2/4 times he fell and hit his head on a "mirror." Pt reported that after he fell those two times, he could not get up himself. MRI- Left NURA territory acute versus acute/subacute infarction involving the cingulate gyrus and corpus callosum. Mildmicrovascular ischemic change.  Exam worsened while in hospital. + permissive HTN as per Neurology. On Asa/Statin.     REVIEW OF SYSTEMS: No chest pain, shortness of breath, nausea, vomiting or diarhea.      PAST MEDICAL & SURGICAL HISTORY  Hyperlipidemia  Hypertension  No pertinent past medical history  History of cataract surgery  No significant past surgical history      SOCIAL HISTORY  Smoking - Denied, EtOH - Denied, Drugs - Denied    FUNCTIONAL HISTORY:   Lives with spouse   Independent PTA with AD    CURRENT FUNCTIONAL STATUS: mod a       FAMILY HISTORY   No pertinent family history in first degree relatives      RECENT LABS/IMAGING  CBC Full  -  ( 29 Jan 2018 06:10 )  WBC Count : 16.59 K/uL  Hemoglobin : 15.0 g/dL  Hematocrit : 45.3 %  Platelet Count - Automated : 226 K/uL  Mean Cell Volume : 93.6 fL  Mean Cell Hemoglobin : 31.0 pg  Mean Cell Hemoglobin Concentration : 33.1 %  Auto Neutrophil # : x  Auto Lymphocyte # : x  Auto Monocyte # : x  Auto Eosinophil # : x  Auto Basophil # : x  Auto Neutrophil % : x  Auto Lymphocyte % : x  Auto Monocyte % : x  Auto Eosinophil % : x  Auto Basophil % : x    01-29    139  |  101  |  20  ----------------------------<  101<H>  4.2   |  25  |  1.03    Ca    8.9      29 Jan 2018 06:10  Mg     2.2     01-29          VITALS  T(C): 36.9 (01-29-18 @ 05:04), Max: 36.9 (01-29-18 @ 05:04)  HR: 74 (01-29-18 @ 05:04) (74 - 84)  BP: 134/92 (01-29-18 @ 05:04) (120/81 - 134/92)  RR: 16 (01-29-18 @ 05:04) (16 - 18)  SpO2: 100% (01-29-18 @ 05:04) (96% - 100%)  Wt(kg): --    ALLERGIES  No Known Allergies      MEDICATIONS   aspirin enteric coated 81 milliGRAM(s) Oral daily  atorvastatin 80 milliGRAM(s) Oral at bedtime  clopidogrel Tablet 75 milliGRAM(s) Oral daily  enoxaparin Injectable 40 milliGRAM(s) SubCutaneous daily  sodium chloride 0.9%. 1000 milliLiter(s) IV Continuous <Continuous>      ----------------------------------------------------------------------------------------  PHYSICAL EXAM  Constitutional - NAD, Comfortable  HEENT - NCAT, EOMI  Neck - Supple, No limited ROM  Chest - CTA bilaterally, No wheeze, No rhonchi, No crackles  Cardiovascular - RRR, S1S2, No murmurs  Abdomen - BS+, Soft, NTND  Extremities - No C/C/E, No calf tenderness   Neurologic Exam -                    Cognitive - Awake, Alert, AAO to self, place, date, year, situation     Communication - Fluent, No dysarthria, no aphasia     Cranial Nerves - CN 2-12 intact     Motor - No focal deficits                       Sensory - Intact to LT     Reflexes - DTR Intact, No primitive reflexive     Balance - WNL Static  Psychiatric - Mood stable, Affect WNL HPI:  73 yo Male with a PMHx of HTN, HLD presents to the ED complaining of R foot/ankle weakness that began a day ago. Pt is a poor historian and speaks limited english with a native language in Creole. Pt explained that beginning yesterday, he had right foot/ankle pain that prevented him from walking his usual 2-3 blocks daily due to difficulty walking on his R foot because it felt "painful." Pt mentioned that the R foot "is so weak," he couldn't stand up. In addition, pt mentioned that he fell 4 times and only 2/4 times he fell and hit his head on a "mirror." Pt reported that after he fell those two times, he could not get up himself. MRI- Left NURA territory acute versus acute/subacute infarction involving the cingulate gyrus and corpus callosum. Mildmicrovascular ischemic change.  Exam worsened while in hospital. + permissive HTN as per Neurology. On Asa/Statin.     REVIEW OF SYSTEMS: No chest pain, shortness of breath, nausea, vomiting or diarhea.      PAST MEDICAL & SURGICAL HISTORY  Hyperlipidemia  Hypertension  No pertinent past medical history  History of cataract surgery  No significant past surgical history      SOCIAL HISTORY  Smoking - Denied, EtOH - Denied, Drugs - Denied    FUNCTIONAL HISTORY:   Lives with spouse   Independent PTA with AD    CURRENT FUNCTIONAL STATUS: mod a       FAMILY HISTORY   No pertinent family history in first degree relatives      RECENT LABS/IMAGING  CBC Full  -  ( 29 Jan 2018 06:10 )  WBC Count : 16.59 K/uL  Hemoglobin : 15.0 g/dL  Hematocrit : 45.3 %  Platelet Count - Automated : 226 K/uL  Mean Cell Volume : 93.6 fL  Mean Cell Hemoglobin : 31.0 pg  Mean Cell Hemoglobin Concentration : 33.1 %  Auto Neutrophil # : x  Auto Lymphocyte # : x  Auto Monocyte # : x  Auto Eosinophil # : x  Auto Basophil # : x  Auto Neutrophil % : x  Auto Lymphocyte % : x  Auto Monocyte % : x  Auto Eosinophil % : x  Auto Basophil % : x    01-29    139  |  101  |  20  ----------------------------<  101<H>  4.2   |  25  |  1.03    Ca    8.9      29 Jan 2018 06:10  Mg     2.2     01-29          VITALS  T(C): 36.9 (01-29-18 @ 05:04), Max: 36.9 (01-29-18 @ 05:04)  HR: 74 (01-29-18 @ 05:04) (74 - 84)  BP: 134/92 (01-29-18 @ 05:04) (120/81 - 134/92)  RR: 16 (01-29-18 @ 05:04) (16 - 18)  SpO2: 100% (01-29-18 @ 05:04) (96% - 100%)  Wt(kg): --    ALLERGIES  No Known Allergies      MEDICATIONS   aspirin enteric coated 81 milliGRAM(s) Oral daily  atorvastatin 80 milliGRAM(s) Oral at bedtime  clopidogrel Tablet 75 milliGRAM(s) Oral daily  enoxaparin Injectable 40 milliGRAM(s) SubCutaneous daily  sodium chloride 0.9%. 1000 milliLiter(s) IV Continuous <Continuous>      ----------------------------------------------------------------------------------------  PHYSICAL EXAM  Constitutional - NAD, Comfortable  HEENT - NCAT, EOMI  Neck - Supple, No limited ROM  Chest - CTA bilaterally, No wheeze, No rhonchi, No crackles  Cardiovascular - RRR, S1S2, No murmurs  Abdomen - BS+, Soft, NTND  Extremities - No C/C/E, No calf tenderness   Neurologic Exam -                       Communication - expressive  aphasia     Cranial Nerves - CN 2-12 intact     Motor - RUE 0/5, moves all else antigravity but not cooperative with exam.       Sensory - Intact to LT     Reflexes - DTR Intact, No primitive reflexive     Balance - poor balance   Psychiatric - lethargic

## 2018-01-29 NOTE — PROGRESS NOTE ADULT - ASSESSMENT
73 yo Male with a PMHx of HTN, HLD presents to the ED complaining of R foot weakness that began a day ago, sustained 4 falls, 2 with head trauma, admitted to telemetry with acute left NURA CVA. MRI+acute/subacute NURA CVA MRA- B/L M2 stenosis

## 2018-01-29 NOTE — PROGRESS NOTE ADULT - SUBJECTIVE AND OBJECTIVE BOX
Patient is a 72y old  Male who presents with a chief complaint of R foot weakness x 1 day (29 Jan 2018 08:27)      SUBJECTIVE / OVERNIGHT EVENTS: Pt in NAD tele SR no acute events. afebrile review of systems difficulty to obtain due to expressive aphasia    MEDICATIONS  (STANDING):  aspirin enteric coated 81 milliGRAM(s) Oral daily  atorvastatin 80 milliGRAM(s) Oral at bedtime  enoxaparin Injectable 40 milliGRAM(s) SubCutaneous daily  lisinopril 5 milliGRAM(s) Oral daily    MEDICATIONS  (PRN):        CAPILLARY BLOOD GLUCOSE        I&O's Summary      T(C): 36.9 (01-29-18 @ 05:04), Max: 36.9 (01-29-18 @ 05:04)  HR: 74 (01-29-18 @ 05:04) (74 - 84)  BP: 134/92 (01-29-18 @ 05:04) (120/81 - 134/92)  RR: 16 (01-29-18 @ 05:04) (16 - 18)  SpO2: 100% (01-29-18 @ 05:04) (96% - 100%)    PHYSICAL EXAM:  GENERAL: NAD, well-developed  HEAD:  Atraumatic, Normocephalic  EYES: conjunctiva and sclera clear  NECK: Supple, No JVD  CHEST/LUNG: Clear to auscultation bilaterally; No wheeze  HEART: Regular rate and rhythm; No murmurs, rubs, or gallops  ABDOMEN: Soft, Nontender, Nondistended; Bowel sounds present  EXTREMITIES:  2+ Peripheral Pulses, No clubbing, cyanosis, or edema  PSYCH: AOX1 himself   NEUROLOGY: Rt hemeiparesis Arm 0/5 >Leg 2/5 expressive aphasia/dsyarthria  SKIN: No rashes or lesions    LABS:                        15.0   16.59 )-----------( 226      ( 29 Jan 2018 06:10 )             45.3     01-29    139  |  101  |  20  ----------------------------<  101<H>  4.2   |  25  |  1.03    Ca    8.9      29 Jan 2018 06:10  Mg     2.2     01-29                  RADIOLOGY & ADDITIONAL TESTS:    Imaging Personally Reviewed:< from: Xray Chest 1 View AP- PORTABLE-Urgent (01.28.18 @ 11:24) >  FINDINGS:     Cardiac silhouette normal in size. Lungs are clear. No pleural effusion   or pneumothorax.    IMPRESSION:   Clear lungs.    < end of copied text >      Consultant(s) Notes Reviewed:      Care Discussed with Consultants/Other Providers:

## 2018-01-29 NOTE — DISCHARGE NOTE ADULT - PATIENT PORTAL LINK FT
“You can access the Follow[employer] Patient Portal, offered by [facility], by registering with the following website: [email or web address]”

## 2018-01-29 NOTE — DISCHARGE NOTE ADULT - PLAN OF CARE
Followup with PMD and take all medications prescribed. Followup with PMD and take all medications prescribed. Low salt, low fat, low cholesterol diet Followup with PMD and take all medications prescribed. Low salt, low fat, low cholesterol diet   Followup with Hematology clinic - [phone] or [phone] call for appointment

## 2018-01-29 NOTE — DISCHARGE NOTE ADULT - CARE PLAN
Principal Discharge DX:	Acute CVA (cerebrovascular accident)  Goal:	Followup with PMD and take all medications prescribed.  Assessment and plan of treatment:	Followup with PMD and take all medications prescribed. Low salt, low fat, low cholesterol diet  Secondary Diagnosis:	Leukocytosis, unspecified type  Goal:	Followup with PMD and take all medications prescribed.  Assessment and plan of treatment:	Followup with PMD and take all medications prescribed. Low salt, low fat, low cholesterol diet   Followup with Hematology clinic - [phone] or [phone] call for appointment  Secondary Diagnosis:	Essential hypertension  Goal:	Followup with PMD and take all medications prescribed.  Assessment and plan of treatment:	Followup with PMD and take all medications prescribed. Low salt, low fat, low cholesterol diet

## 2018-01-29 NOTE — DISCHARGE NOTE ADULT - MEDICATION SUMMARY - MEDICATIONS TO TAKE
I will START or STAY ON the medications listed below when I get home from the hospital:    aspirin 81 mg oral delayed release tablet  -- 1 tab(s) by mouth once a day  -- Indication: For CVA    atorvastatin 80 mg oral tablet  -- 1 tab(s) by mouth once a day (at bedtime)  -- Indication: For HLD    clopidogrel 75 mg oral tablet  -- 1 tab(s) by mouth once a day stop after 90 days.  -- Indication: For CVA

## 2018-01-29 NOTE — PROGRESS NOTE ADULT - SUBJECTIVE AND OBJECTIVE BOX
Neurology Progress Note:  Subjective: States right side with slight improvement.     MEDICATIONS  (STANDING):  aspirin enteric coated 81 milliGRAM(s) Oral daily  atorvastatin 80 milliGRAM(s) Oral at bedtime  enoxaparin Injectable 40 milliGRAM(s) SubCutaneous daily  lisinopril 5 milliGRAM(s) Oral daily    MEDICATIONS  (PRN):        Allergies    No Known Allergies    Intolerances      Vital Signs Last 24 Hrs  T(C): 36.9 (29 Jan 2018 05:04), Max: 36.9 (29 Jan 2018 05:04)  T(F): 98.4 (29 Jan 2018 05:04), Max: 98.4 (29 Jan 2018 05:04)  HR: 74 (29 Jan 2018 05:04) (74 - 84)  BP: 134/92 (29 Jan 2018 05:04) (120/81 - 134/92)  BP(mean): --  RR: 16 (29 Jan 2018 05:04) (16 - 18)  SpO2: 100% (29 Jan 2018 05:04) (96% - 100%)    General Exam:   General appearance: No acute distress                   Neurological Exam:  Mental Status: Patient with echolalia with questions asked by examiner, but was initially responding to questions, [facility], month, year.    Cranial Nerves: EOMI, blink to threat bilaterally mild to moderate right facial droop.  Sternocleidomastoid and Trapezius intact bilaterally.    Motor:   Tone: normal.                  Strength:     [] Upper extremity                      Delt       Bicep    Tricep                                                  R         no movement                                                L          5/5        5/5        5/5       5/5  [] Lower extremity                       HF          KE          KF        DF         PF                                               R        no movement                                               L         5/5        5/5       5/5       5/5        5/5                  Dysmetria: None   No truncal ataxia.    Tremor: No resting, postural or action tremor.  No myoclonus.    Sensation: unclear, not grimacing to noxious     Deep Tendon Reflexes: 1+ bilateral biceps, triceps, brachioradialis, knee and ankle  Toes flexor bilaterally    Gait: normal and stable.      Other:    01-29    139  |  101  |  20  ----------------------------<  101<H>  4.2   |  25  |  1.03    Ca    8.9      29 Jan 2018 06:10  Mg     2.2     01-29                          15.0   16.59 )-----------( 226      ( 29 Jan 2018 06:10 )             45.3       Radiology    < from: MR Head No Cont (01.26.18 @ 12:16) >  Left NURA territory acute versus acute/subacute infarction involving the   cingulate gyrus and corpus callosum.  Mildmicrovascular ischemic change.  Intracranial MR angiography demonstrates mild narrowing along the   bilateral MCA bifurcation and proximal M2 segments.  Extracranial MR angiography demonstrates no flow-limiting stenosis by   NASCET criteria.    < end of copied text >        CT    < from: CT Head No Cont (01.25.18 @ 23:22) >  No CT evidence of acute intracranial hemorrhage, brain edema, mass effect   or acute territorial infarct. Follow-up MRI can be obtained as clinically   warranted.     < end of copied text >

## 2018-01-30 LAB
ANISOCYTOSIS BLD QL: SLIGHT — SIGNIFICANT CHANGE UP
ANISOCYTOSIS BLD QL: SLIGHT — SIGNIFICANT CHANGE UP
BASOPHILS # BLD AUTO: 0.13 K/UL — SIGNIFICANT CHANGE UP (ref 0–0.2)
BASOPHILS NFR BLD AUTO: 0.7 % — SIGNIFICANT CHANGE UP (ref 0–2)
BASOPHILS NFR SPEC: 0.9 % — SIGNIFICANT CHANGE UP (ref 0–2)
BASOPHILS NFR SPEC: 0.9 % — SIGNIFICANT CHANGE UP (ref 0–2)
BUN SERPL-MCNC: 18 MG/DL — SIGNIFICANT CHANGE UP (ref 7–23)
CALCIUM SERPL-MCNC: 8.4 MG/DL — SIGNIFICANT CHANGE UP (ref 8.4–10.5)
CHLORIDE SERPL-SCNC: 101 MMOL/L — SIGNIFICANT CHANGE UP (ref 98–107)
CO2 SERPL-SCNC: 26 MMOL/L — SIGNIFICANT CHANGE UP (ref 22–31)
CREAT SERPL-MCNC: 0.91 MG/DL — SIGNIFICANT CHANGE UP (ref 0.5–1.3)
EOSINOPHIL # BLD AUTO: 0.31 K/UL — SIGNIFICANT CHANGE UP (ref 0–0.5)
EOSINOPHIL NFR BLD AUTO: 1.7 % — SIGNIFICANT CHANGE UP (ref 0–6)
EOSINOPHIL NFR FLD: 0.9 % — SIGNIFICANT CHANGE UP (ref 0–6)
EOSINOPHIL NFR FLD: 0.9 % — SIGNIFICANT CHANGE UP (ref 0–6)
GLUCOSE SERPL-MCNC: 91 MG/DL — SIGNIFICANT CHANGE UP (ref 70–99)
HCT VFR BLD CALC: 42.7 % — SIGNIFICANT CHANGE UP (ref 39–50)
HCT VFR BLD CALC: 42.7 % — SIGNIFICANT CHANGE UP (ref 39–50)
HGB BLD-MCNC: 14.4 G/DL — SIGNIFICANT CHANGE UP (ref 13–17)
HGB BLD-MCNC: 14.4 G/DL — SIGNIFICANT CHANGE UP (ref 13–17)
IMM GRANULOCYTES # BLD AUTO: 0.05 # — SIGNIFICANT CHANGE UP
IMM GRANULOCYTES NFR BLD AUTO: 0.3 % — SIGNIFICANT CHANGE UP (ref 0–1.5)
LYMPHOCYTES # BLD AUTO: 11.83 K/UL — HIGH (ref 1–3.3)
LYMPHOCYTES # BLD AUTO: 66 % — HIGH (ref 13–44)
LYMPHOCYTES NFR SPEC AUTO: 64.5 % — HIGH (ref 13–44)
LYMPHOCYTES NFR SPEC AUTO: 64.5 % — HIGH (ref 13–44)
MACROCYTES BLD QL: SLIGHT — SIGNIFICANT CHANGE UP
MACROCYTES BLD QL: SLIGHT — SIGNIFICANT CHANGE UP
MCHC RBC-ENTMCNC: 31.6 PG — SIGNIFICANT CHANGE UP (ref 27–34)
MCHC RBC-ENTMCNC: 31.6 PG — SIGNIFICANT CHANGE UP (ref 27–34)
MCHC RBC-ENTMCNC: 33.7 % — SIGNIFICANT CHANGE UP (ref 32–36)
MCHC RBC-ENTMCNC: 33.7 % — SIGNIFICANT CHANGE UP (ref 32–36)
MCV RBC AUTO: 93.8 FL — SIGNIFICANT CHANGE UP (ref 80–100)
MCV RBC AUTO: 93.8 FL — SIGNIFICANT CHANGE UP (ref 80–100)
MONOCYTES # BLD AUTO: 0.45 K/UL — SIGNIFICANT CHANGE UP (ref 0–0.9)
MONOCYTES NFR BLD AUTO: 2.5 % — SIGNIFICANT CHANGE UP (ref 2–14)
MONOCYTES NFR BLD: 2.8 % — SIGNIFICANT CHANGE UP (ref 2–9)
MONOCYTES NFR BLD: 2.8 % — SIGNIFICANT CHANGE UP (ref 2–9)
NEUTROPHIL AB SER-ACNC: 27.1 % — LOW (ref 43–77)
NEUTROPHIL AB SER-ACNC: 27.1 % — LOW (ref 43–77)
NEUTROPHILS # BLD AUTO: 5.16 K/UL — SIGNIFICANT CHANGE UP (ref 1.8–7.4)
NEUTROPHILS NFR BLD AUTO: 28.8 % — LOW (ref 43–77)
NRBC # FLD: 0.02 — SIGNIFICANT CHANGE UP
NRBC # FLD: 0.02 — SIGNIFICANT CHANGE UP
PLATELET # BLD AUTO: 208 K/UL — SIGNIFICANT CHANGE UP (ref 150–400)
PLATELET # BLD AUTO: 208 K/UL — SIGNIFICANT CHANGE UP (ref 150–400)
PLATELET COUNT - ESTIMATE: NORMAL — SIGNIFICANT CHANGE UP
PLATELET COUNT - ESTIMATE: NORMAL — SIGNIFICANT CHANGE UP
PMV BLD: 10.4 FL — SIGNIFICANT CHANGE UP (ref 7–13)
PMV BLD: 10.4 FL — SIGNIFICANT CHANGE UP (ref 7–13)
POTASSIUM SERPL-MCNC: 4.1 MMOL/L — SIGNIFICANT CHANGE UP (ref 3.5–5.3)
POTASSIUM SERPL-SCNC: 4.1 MMOL/L — SIGNIFICANT CHANGE UP (ref 3.5–5.3)
RBC # BLD: 4.55 M/UL — SIGNIFICANT CHANGE UP (ref 4.2–5.8)
RBC # BLD: 4.55 M/UL — SIGNIFICANT CHANGE UP (ref 4.2–5.8)
RBC # FLD: 14.4 % — SIGNIFICANT CHANGE UP (ref 10.3–14.5)
RBC # FLD: 14.4 % — SIGNIFICANT CHANGE UP (ref 10.3–14.5)
SMUDGE CELLS # BLD: PRESENT — SIGNIFICANT CHANGE UP
SMUDGE CELLS # BLD: PRESENT — SIGNIFICANT CHANGE UP
SODIUM SERPL-SCNC: 139 MMOL/L — SIGNIFICANT CHANGE UP (ref 135–145)
VARIANT LYMPHS # BLD: 3.8 % — SIGNIFICANT CHANGE UP
VARIANT LYMPHS # BLD: 3.8 % — SIGNIFICANT CHANGE UP
WBC # BLD: 17.6 K/UL — HIGH (ref 3.8–10.5)
WBC # BLD: 17.6 K/UL — HIGH (ref 3.8–10.5)
WBC # FLD AUTO: 17.6 K/UL — HIGH (ref 3.8–10.5)
WBC # FLD AUTO: 17.6 K/UL — HIGH (ref 3.8–10.5)

## 2018-01-30 PROCEDURE — 99232 SBSQ HOSP IP/OBS MODERATE 35: CPT | Mod: GC

## 2018-01-30 PROCEDURE — 99233 SBSQ HOSP IP/OBS HIGH 50: CPT

## 2018-01-30 PROCEDURE — 99222 1ST HOSP IP/OBS MODERATE 55: CPT | Mod: GC

## 2018-01-30 RX ADMIN — SODIUM CHLORIDE 50 MILLILITER(S): 9 INJECTION INTRAMUSCULAR; INTRAVENOUS; SUBCUTANEOUS at 14:09

## 2018-01-30 RX ADMIN — SODIUM CHLORIDE 50 MILLILITER(S): 9 INJECTION INTRAMUSCULAR; INTRAVENOUS; SUBCUTANEOUS at 21:19

## 2018-01-30 RX ADMIN — CLOPIDOGREL BISULFATE 75 MILLIGRAM(S): 75 TABLET, FILM COATED ORAL at 14:10

## 2018-01-30 RX ADMIN — Medication 81 MILLIGRAM(S): at 14:09

## 2018-01-30 RX ADMIN — ENOXAPARIN SODIUM 40 MILLIGRAM(S): 100 INJECTION SUBCUTANEOUS at 14:10

## 2018-01-30 RX ADMIN — ATORVASTATIN CALCIUM 80 MILLIGRAM(S): 80 TABLET, FILM COATED ORAL at 21:19

## 2018-01-30 NOTE — PROGRESS NOTE ADULT - ASSESSMENT
Hemiplegia, aphasia  1. PT- bed mobility,transfers, gait and balance training  2. OT- ADL'S  3. CVA-Asa/Statin  4. Patient would benefit from subacute rehab

## 2018-01-30 NOTE — CONSULT NOTE ADULT - PROBLEM SELECTOR RECOMMENDATION 9
Peripheral smear reviewed:  Unclear about the baseline  A today  No lymphadenopathy, organomegaly on examination.  Could be reactive secondary to stroke vs smoking. 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  Will recc sending for peripheral blood flow cytometry and LDH.  Will follow the result. Asymptomatic from leukocytosis, can be monitored and result to be followed up outpatient.    D/W team  Please page at [phone] with questions or concerns. Peripheral smear reviewed: lymphocytosis, mature-normal looking red cells, platelets. No blasts, immature cells.  Unclear about the baseline  A today  No lymphadenopathy, organomegaly on examination.  Could be reactive secondary to stroke vs smoking. 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  Will recc sending for peripheral blood flow cytometry and LDH.  Will follow the result. Asymptomatic from leukocytosis, can be monitored and result to be followed up outpatient.    D/W team  Please page at [phone] with questions or concerns.

## 2018-01-30 NOTE — PROGRESS NOTE ADULT - PROBLEM SELECTOR PLAN 1
Due to acute L NURA CVA; Repeat CT head reviewed, no hemorrhagic conversion of stroke  - c/w statin and ASA  - TTE-normal EF no obvious source of embolus  - PT eval-rehab  - Hba1c is 6 LDL-172 on statin  - Speech and swallow eval appreciated mechanical soft with nectar thick  -neuro f/u

## 2018-01-30 NOTE — CONSULT NOTE ADULT - ASSESSMENT
73 yo male with right sided weakness in setting of no cortical deficits. 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    Permissive HTN x 24hrs goal /110  MRI brain w/o cont  MRA brain w/o cont   MRA neck w/ cont  HgA1c  Lipid profile   ASA 81mg,   Lipitor 80mg qhs  TTE  Telemetry monitoring   Aggresive risk factor control
Mr. Hollis, is a 71 y/o M, admitted with right foot numbness, noted to have left infarct,  Hematology consulted for incidental finding of leukocytosis with lymphocytosis. No baseline labs available.
Hemiplegia, aphasia  1. PT- bed mobility,transfers, gait and balance training  2. OT- ADL'S  3. CVA-Asa/Statin  4. Patient would benefit from acute rehab, needs a multidisciplinary team including PT, OT and speech. Can tolerate 3 hours of therapy a day.  Will follow.

## 2018-01-30 NOTE — PROGRESS NOTE ADULT - SUBJECTIVE AND OBJECTIVE BOX
HPI:  71 yo Male with a PMHx of HTN, HLD presents to the ED complaining of R foot/ankle weakness that began a day ago. Pt is a poor historian and speaks limited english with a native language in Creole. Pt explained that beginning yesterday, he had right foot/ankle pain that prevented him from walking his usual 2-3 blocks daily due to difficulty walking on his R foot because it felt "painful." Pt mentioned that the R foot "is so weak," he couldn't stand up. In addition, pt mentioned that he fell 4 times and only 2/4 times he fell and hit his head on a "mirror." Pt reported that after he fell those two times, he could not get up himself. MRI- Left NURA territory acute versus acute/subacute infarction involving the cingulate gyrus and corpus callosum. Mildmicrovascular ischemic change.  Exam worsened while in hospital. + permissive HTN as per Neurology. On Asa/Statin. Repeat CT negative for bleed. Heme consulted for w/u of leukocytosis,     REVIEW OF SYSTEMS: No chest pain, shortness of breath, nausea, vomiting or diarhea.    Function: max a *2 for bed mobility. PArticipates with PT                            14.4   17.60 )-----------( 208      ( 30 Jan 2018 06:15 )             42.7   ICU Vital Signs Last 24 Hrs  T(C): 36.9 (30 Jan 2018 13:42), Max: 36.9 (30 Jan 2018 13:42)  T(F): 98.5 (30 Jan 2018 13:42), Max: 98.5 (30 Jan 2018 13:42)  HR: 80 (30 Jan 2018 13:42) (64 - 80)  BP: 121/82 (30 Jan 2018 13:42) (121/82 - 133/82)  BP(mean): --  ABP: --  ABP(mean): --  RR: 16 (30 Jan 2018 13:42) (16 - 17)  SpO2: 96% (30 Jan 2018 13:42) (96% - 97%)      ----------------------------------------------------------------------------------------  PHYSICAL EXAM  Constitutional - NAD, Comfortable  HEENT - NCAT, EOMI  Neck - Supple, No limited ROM  Chest - CTA bilaterally, No wheeze, No rhonchi, No crackles  Cardiovascular - RRR, S1S2, No murmurs  Abdomen - BS+, Soft, NTND  Extremities - No C/C/E, No calf tenderness   Neurologic Exam -                       Communication - expressive  aphasia     Cranial Nerves - CN 2-12 intact     Motor - RUE 0/5, moves all else antigravity but not cooperative with exam.       Sensory - Intact to LT     Reflexes - DTR Intact, No primitive reflexive     Balance - poor balance   Psychiatric - stable

## 2018-01-30 NOTE — PROGRESS NOTE ADULT - SUBJECTIVE AND OBJECTIVE BOX
Patient is a 72y old  Male who presents with a chief complaint of R foot weakness x 1 day (29 Jan 2018 08:27)      SUBJECTIVE / OVERNIGHT EVENTS: Pt antihypertensive regimen stopped due to worsening exam and started on IVF as per neuro. review of systems unobtainable. Tele no acute events    MEDICATIONS  (STANDING):  aspirin enteric coated 81 milliGRAM(s) Oral daily  atorvastatin 80 milliGRAM(s) Oral at bedtime  clopidogrel Tablet 75 milliGRAM(s) Oral daily  enoxaparin Injectable 40 milliGRAM(s) SubCutaneous daily  sodium chloride 0.9%. 1000 milliLiter(s) (50 mL/Hr) IV Continuous <Continuous>    MEDICATIONS  (PRN):        CAPILLARY BLOOD GLUCOSE        I&O's Summary      T(C): 36.5 (01-30-18 @ 05:23), Max: 36.8 (01-29-18 @ 21:11)  HR: 74 (01-30-18 @ 05:23) (64 - 74)  BP: 132/81 (01-30-18 @ 05:23) (132/81 - 133/82)  RR: 17 (01-30-18 @ 05:23) (17 - 17)  SpO2: 97% (01-30-18 @ 05:23) (97% - 97%)    PHYSICAL EXAM:  GENERAL: NAD, well-developed  HEAD:  Atraumatic, Normocephalic  EYES: conjunctiva and sclera clear  NECK: Supple, No JVD  CHEST/LUNG: Clear to auscultation bilaterally; No wheeze  HEART: Regular rate and rhythm; No murmurs, rubs, or gallops  ABDOMEN: Soft, Nontender, Nondistended; Bowel sounds present  EXTREMITIES:  2+ Peripheral Pulses, No clubbing, cyanosis, or edema  PSYCH: AOX1 himself   NEUROLOGY: Rt hemeiparesis Arm/leg 0/5 expressive aphasia/dsyarthria  SKIN: No rashes or lesions  LABS:                        14.4   17.60 )-----------( 208      ( 30 Jan 2018 06:15 )             42.7     01-30    139  |  101  |  18  ----------------------------<  91  4.1   |  26  |  0.91  < from: TTE with Doppler (w/Cont) (01.29.18 @ 13:49) >  PROCEDURE: Transthoracic echocardiogram with 2-D, M-Mode  and complete spectral and color flow Doppler.  Verbal consent was obtained for injection of echo contrast.  Following  intravenous injection of contrast, harmonic  imaging was performed.lot [id number]  INDICATION: Transient cerebral ischemic attack, unspecified  (G45.9)  ------------------------------------------------------------------------  OBSERVATIONS:  Mitral Valve: Mitral annular calcification, otherwise  normal mitral valve. Minimal mitral regurgitation.  Aortic Root: Normal aortic root.  Aortic Valve: Aortic valve leaflet morphology not well  visualized.  Left Atrium: Normal left atrium.  Left Ventricle: Endocardium not well visualized; grossly  normal left ventricular systolic function.  Endocardial  visualization enhanced with intravenous injection of echo  contrast (Definity).  Right Heart: Normal right atrium. Unable to accurately  evaluate right ventricular size or systolic function.  Tricuspid valve not well visualized. Pulmonic valve not  well visualized.  Pericardium/PleuraNormal pericardium with no pericardial  effusion.  ------------------------------------------------------------------------  CONCLUSIONS:  1. Mitral annular calcification, otherwise normal mitral  valve. Minimal mitral regurgitation.  2. Endocardium not well visualized; grossly normal left  ventricular systolic function.  Endocardial visualization  enhanced with intravenous injection of echo contrast  (Definity).  3. Unable to accurately evaluate right ventricular size or  systolic function.  No obvious cardiac source of embolus was identified on this  transthoracic study.  If clinical suspicion is high,  consider LEATHA for further evaluation.    < end of copied text >    Ca    8.4      30 Jan 2018 06:15  Mg     2.2     01-29                  RADIOLOGY & ADDITIONAL TESTS:    Imaging Personally Reviewed:    Consultant(s) Notes Reviewed:      Care Discussed with Consultants/Other Providers:

## 2018-01-30 NOTE — CONSULT NOTE ADULT - SUBJECTIVE AND OBJECTIVE BOX
HPI:    71 yo Male with a PMHx of HTN, HLD presents to the ED complaining of R foot/ankle weakness that began a day ago. Pt explained that beginning yesterday, he had right foot/ankle pain that prevented him from walking his usual 2-3 blocks daily due to difficulty walking on his R foot because it felt "painful." Pt mentioned that the R foot "is so weak," he couldn't stand up. In addition, pt mentioned that he fell 4 times and only 2/4 times he fell and hit his head on a "mirror." Pt reported that after he fell those two times, he could not get up himself. Pt denied any other pain/weakness around his body besides his right foot/ankle. Pt  mentioned he had no appetite to eat anything in addition to the weakness he felt in his right foot/ankle that began yesterday. Pt denied taking any medication to relieve the right foot/ankle pain or radiation of weakness. Lastly, pt mentioned he feels "out of breath" upon exertion when he has to do chores around the house or during his 2-3 block daily walk. Pt mentioned that this "has been going on for a while," and the SOB goes away at rest. As per collateral information from friend, he mentioned that the patient had this weakness around the whole body and not just on the R ankle/foot. He also mentioned that the weakness has been occurring for a "few" days know and not just starting yesterday. He denied seeing the patient have any LOC, syncope, or generalized tremors when he saw him fall. He also mentioned that when the pt fell, the pt would be unable to get up on his own, and he would have to pull him up in order for him to get up from the floor. Pt denies: fever, chills, cough, wheezing, changes in weight, changes in bowl movements, diaphoresis, palpitations, syncope, chest pain, headaches, lightheadedness/dizziness, LOC, abdominal pain, N/V/D, constipation, hematuria, hematochezia, melena, night sweats, paroxysmal nocturnal orthopnea, LE edema, tremors, changes in vision, blurry vision, diplopia, nasal congestion, sore throat, tinnitus, muscle/joint pain besides foot/ankle pain.     patient was noted to have left sided infarct for which neurology is following. Heme consulted for incidental finding of leukocytosis/ lymphocytosis in CBC.   Patient denies lymphadenopathy, fever, chills, chest pain, sob, abdominal pain, palpitation. Bowel and bladder habits are normal.  No easy bleeding or bruising.      PAST MEDICAL & SURGICAL HISTORY:  Hyperlipidemia  Hypertension  History of cataract surgery    FAMILY HISTORY:  No pertinent family history in first degree relatives    Social History  MEDICATIONS  (STANDING):  aspirin enteric coated 81 milliGRAM(s) Oral daily  atorvastatin 80 milliGRAM(s) Oral at bedtime  clopidogrel Tablet 75 milliGRAM(s) Oral daily  enoxaparin Injectable 40 milliGRAM(s) SubCutaneous daily  sodium chloride 0.9%. 1000 milliLiter(s) (50 mL/Hr) IV Continuous <Continuous>    MEDICATIONS  (PRN):    No Known Allergies    REVIEW OF SYSTEMS    10 points ROS is negative except for the ones in HPI.    Vital Signs Last 24 Hrs  T(C): 36.5 (30 Jan 2018 05:23), Max: 36.8 (29 Jan 2018 21:11)  T(F): 97.7 (30 Jan 2018 05:23), Max: 98.2 (29 Jan 2018 21:11)  HR: 74 (30 Jan 2018 05:23) (64 - 74)  BP: 132/81 (30 Jan 2018 05:23) (132/81 - 133/82)  BP(mean): --  RR: 17 (30 Jan 2018 05:23) (17 - 17)  SpO2: 97% (30 Jan 2018 05:23) (97% - 97%)    Physical Examination  Constitutional: Alert, oriented X3  Eyes: Normal  ENMT: normal  Neck: No lymphadneopathy  Respiratory: b/l clear to auscultation  Cardiovascular: S1,S2,M0  Abdomen: Soft, non tender, BS present  Gastrointestinal: soft, non tender, BS present  Extremities: soft, no edema  Neurological: intact  Skin: no petechiae  Musculoskeletal: normal  Psychiatric: normal                            14.4   17.60 )-----------( 208      ( 30 Jan 2018 06:15 )             42.7     01-30    139  |  101  |  18  ----------------------------<  91  4.1   |  26  |  0.91    Ca    8.4      30 Jan 2018 06:15  Mg     2.2     01-29        Radiology  Assessment and Plan

## 2018-01-31 VITALS
SYSTOLIC BLOOD PRESSURE: 125 MMHG | OXYGEN SATURATION: 99 % | RESPIRATION RATE: 16 BRPM | TEMPERATURE: 98 F | DIASTOLIC BLOOD PRESSURE: 85 MMHG | HEART RATE: 75 BPM

## 2018-01-31 LAB
BASOPHILS # BLD AUTO: 0.09 K/UL — SIGNIFICANT CHANGE UP (ref 0–0.2)
BASOPHILS NFR BLD AUTO: 0.5 % — SIGNIFICANT CHANGE UP (ref 0–2)
BUN SERPL-MCNC: 24 MG/DL — HIGH (ref 7–23)
CALCIUM SERPL-MCNC: 8.4 MG/DL — SIGNIFICANT CHANGE UP (ref 8.4–10.5)
CHLORIDE SERPL-SCNC: 106 MMOL/L — SIGNIFICANT CHANGE UP (ref 98–107)
CO2 SERPL-SCNC: 21 MMOL/L — LOW (ref 22–31)
CREAT SERPL-MCNC: 0.93 MG/DL — SIGNIFICANT CHANGE UP (ref 0.5–1.3)
EOSINOPHIL # BLD AUTO: 0.28 K/UL — SIGNIFICANT CHANGE UP (ref 0–0.5)
EOSINOPHIL NFR BLD AUTO: 1.7 % — SIGNIFICANT CHANGE UP (ref 0–6)
GLUCOSE SERPL-MCNC: 90 MG/DL — SIGNIFICANT CHANGE UP (ref 70–99)
HCT VFR BLD CALC: 45.3 % — SIGNIFICANT CHANGE UP (ref 39–50)
HGB BLD-MCNC: 14.5 G/DL — SIGNIFICANT CHANGE UP (ref 13–17)
IMM GRANULOCYTES # BLD AUTO: 0.07 # — SIGNIFICANT CHANGE UP
IMM GRANULOCYTES NFR BLD AUTO: 0.4 % — SIGNIFICANT CHANGE UP (ref 0–1.5)
LDH SERPL L TO P-CCNC: 266 U/L — HIGH (ref 135–225)
LYMPHOCYTES # BLD AUTO: 10.51 K/UL — HIGH (ref 1–3.3)
LYMPHOCYTES # BLD AUTO: 63.3 % — HIGH (ref 13–44)
MAGNESIUM SERPL-MCNC: 2.2 MG/DL — SIGNIFICANT CHANGE UP (ref 1.6–2.6)
MCHC RBC-ENTMCNC: 30.3 PG — SIGNIFICANT CHANGE UP (ref 27–34)
MCHC RBC-ENTMCNC: 32 % — SIGNIFICANT CHANGE UP (ref 32–36)
MCV RBC AUTO: 94.6 FL — SIGNIFICANT CHANGE UP (ref 80–100)
MONOCYTES # BLD AUTO: 0.49 K/UL — SIGNIFICANT CHANGE UP (ref 0–0.9)
MONOCYTES NFR BLD AUTO: 3 % — SIGNIFICANT CHANGE UP (ref 2–14)
NEUTROPHILS # BLD AUTO: 5.17 K/UL — SIGNIFICANT CHANGE UP (ref 1.8–7.4)
NEUTROPHILS NFR BLD AUTO: 31.1 % — LOW (ref 43–77)
NRBC # FLD: 0 — SIGNIFICANT CHANGE UP
PHOSPHATE SERPL-MCNC: 2.8 MG/DL — SIGNIFICANT CHANGE UP (ref 2.5–4.5)
PLATELET # BLD AUTO: 221 K/UL — SIGNIFICANT CHANGE UP (ref 150–400)
PMV BLD: 10.3 FL — SIGNIFICANT CHANGE UP (ref 7–13)
POTASSIUM SERPL-MCNC: 4.7 MMOL/L — SIGNIFICANT CHANGE UP (ref 3.5–5.3)
POTASSIUM SERPL-SCNC: 4.7 MMOL/L — SIGNIFICANT CHANGE UP (ref 3.5–5.3)
RBC # BLD: 4.79 M/UL — SIGNIFICANT CHANGE UP (ref 4.2–5.8)
RBC # FLD: 14.4 % — SIGNIFICANT CHANGE UP (ref 10.3–14.5)
SODIUM SERPL-SCNC: 139 MMOL/L — SIGNIFICANT CHANGE UP (ref 135–145)
WBC # BLD: 16.61 K/UL — HIGH (ref 3.8–10.5)
WBC # FLD AUTO: 16.61 K/UL — HIGH (ref 3.8–10.5)

## 2018-01-31 PROCEDURE — 99232 SBSQ HOSP IP/OBS MODERATE 35: CPT | Mod: GC

## 2018-01-31 PROCEDURE — 88189 FLOWCYTOMETRY/READ 16 & >: CPT

## 2018-01-31 PROCEDURE — 99239 HOSP IP/OBS DSCHRG MGMT >30: CPT

## 2018-01-31 RX ORDER — ASPIRIN/CALCIUM CARB/MAGNESIUM 324 MG
1 TABLET ORAL
Qty: 0 | Refills: 0 | COMMUNITY
Start: 2018-01-31

## 2018-01-31 RX ORDER — ATORVASTATIN CALCIUM 80 MG/1
1 TABLET, FILM COATED ORAL
Qty: 0 | Refills: 0 | DISCHARGE
Start: 2018-01-31

## 2018-01-31 RX ORDER — CLOPIDOGREL BISULFATE 75 MG/1
1 TABLET, FILM COATED ORAL
Qty: 0 | Refills: 0 | COMMUNITY
Start: 2018-01-31

## 2018-01-31 RX ADMIN — ENOXAPARIN SODIUM 40 MILLIGRAM(S): 100 INJECTION SUBCUTANEOUS at 12:49

## 2018-01-31 RX ADMIN — CLOPIDOGREL BISULFATE 75 MILLIGRAM(S): 75 TABLET, FILM COATED ORAL at 12:49

## 2018-01-31 RX ADMIN — Medication 81 MILLIGRAM(S): at 12:49

## 2018-01-31 NOTE — PROGRESS NOTE ADULT - PROBLEM SELECTOR PROBLEM 2
Uncontrolled hypertension

## 2018-01-31 NOTE — PROGRESS NOTE ADULT - PROBLEM SELECTOR PLAN 1
Due to acute L NURA CVA; Repeat CT head reviewed, no hemorrhagic conversion of stroke  - c/w statin and ASA  - TTE-normal EF no obvious source of embolus  - PT eval-rehab  - Hba1c is 6 LDL-172 on statin  - Speech and swallow eval appreciated mechanical soft with nectar thick  -neuro f/u  -PMR-LIBERTAD Due to acute L NURA CVA; Repeat CT head reviewed, no hemorrhagic conversion of strokem due to ICA  - c/w statin and ASA  - TTE-normal EF no obvious source of embolus  - PT eval-rehab  - Hba1c is 6 LDL-172 on statin  - Speech and swallow eval appreciated mechanical soft with nectar thick  -PMR-LIBERTAD

## 2018-01-31 NOTE — PROGRESS NOTE ADULT - PROBLEM SELECTOR PROBLEM 1
Acute CVA (cerebrovascular accident)

## 2018-01-31 NOTE — PROGRESS NOTE ADULT - PROVIDER SPECIALTY LIST ADULT
Hospitalist
Internal Medicine
Internal Medicine
Neurology
Rehab Medicine
Rehab Medicine
Hospitalist
Internal Medicine

## 2018-01-31 NOTE — PROGRESS NOTE ADULT - PROBLEM SELECTOR PLAN 2
-due to worsening on exam 's off antihypertensive and IVF
--140 on lisinopril  -monitor BP
-due to worsening on exam 's off antihypertensive and IVF
BP elevated, permissive HTN till AM  consider CCB In the AM  c/w BP trending
 this AM; now > 48 hours since stroke onset. Spoke with neuro, goal -160  - Will start Lisinopril 5mg daily today  - Can consider adding Amlodipine 5mg as well

## 2018-01-31 NOTE — PROGRESS NOTE ADULT - SUBJECTIVE AND OBJECTIVE BOX
Pt seen at 11am     HPI:  71 yo Male with a PMHx of HTN, HLD presents to the ED complaining of R foot/ankle weakness that began a day ago. Pt is a poor historian and speaks limited english with a native language in Creole. Pt explained that beginning yesterday, he had right foot/ankle pain that prevented him from walking his usual 2-3 blocks daily due to difficulty walking on his R foot because it felt "painful." Pt mentioned that the R foot "is so weak," he couldn't stand up. In addition, pt mentioned that he fell 4 times and only 2/4 times he fell and hit his head on a "mirror." Pt reported that after he fell those two times, he could not get up himself. MRI- Left NURA territory acute versus acute/subacute infarction involving the cingulate gyrus and corpus callosum. Mildmicrovascular ischemic change.  Exam worsened while in hospital. + permissive HTN as per Neurology. On Asa/Statin. Repeat CT negative for bleed. Heme consulted for w/u of leukocytosis.   Family would like to know why reccommendation changed to LIBERTAD.      REVIEW OF SYSTEMS: No chest pain, shortness of breath, nausea, vomiting or diarhea.    Function: max a *2 for bed mobility.                MEDICATIONS  (STANDING):  aspirin enteric coated 81 milliGRAM(s) Oral daily  atorvastatin 80 milliGRAM(s) Oral at bedtime  clopidogrel Tablet 75 milliGRAM(s) Oral daily  enoxaparin Injectable 40 milliGRAM(s) SubCutaneous daily                          14.5   16.61 )-----------( 221      ( 31 Jan 2018 05:30 )             45.3     Vital Signs Last 24 Hrs  T(C): 36.7 (31 Jan 2018 14:12), Max: 36.8 (30 Jan 2018 21:17)  T(F): 98 (31 Jan 2018 14:12), Max: 98.2 (30 Jan 2018 21:17)  HR: 75 (31 Jan 2018 14:12) (74 - 85)  BP: 125/85 (31 Jan 2018 14:12) (123/77 - 129/86)  BP(mean): --  RR: 16 (31 Jan 2018 14:12) (16 - 17)  SpO2: 99% (31 Jan 2018 14:12) (98% - 99%)    ----------------------------------------------------------------------------------------  PHYSICAL EXAM  Constitutional - NAD, Comfortable  HEENT - NCAT, EOMI  Neck - Supple, No limited ROM  Extremities - No C/C/E, No calf tenderness   Neurologic Exam -                       Communication - expressive  aphasia     Cranial Nerves - CN 2-12 intact     Motor - RUE 0/5, moves all else antigravity but not cooperative with exam.       Sensory - Intact to LT     Reflexes - DTR Intact, No primitive reflexive     Balance - poor balance   Psychiatric - stable

## 2018-01-31 NOTE — PROGRESS NOTE ADULT - PROBLEM SELECTOR PLAN 3
Likely related to stress and fall  - UA neg, CXR neg  - BCx in lab  - Observe off Abx, given normal BP and no fever  - If clinical decompensation, empiric Abx  - Monitor CBC
Likely lymphocytosis on pheripheral smear w/ smudge cells concern for CLL heme consult  - UA neg, CXR neg  - BCx NTD  -off ABx
Likely lymphocytosis on pheripheral smear w/ smudge cells concern for CLL heme consult-reviewed Peripheral smear reviewed: lymphocytosis, mature-normal looking red cells, platelets. No blasts, immature cells. Could be reactive secondary to stroke vs smoking. 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 Asymptomatic from leukocytosis, can be monitored and result to be followed up outpatient.  - UA neg, CXR neg  - BCx NTD  -off ABx
Likely related to stress and fall  - UA neg, CXR neg  - BCx NTD  -off ABx
Unclear etiology,  UA neg, CXR neg  Will order BCx   Observe off Abx, given normal BP and no fever   if clinical decompensation, empiric Abx

## 2018-01-31 NOTE — PROGRESS NOTE ADULT - SUBJECTIVE AND OBJECTIVE BOX
Patient is a 72y old  Male who presents with a chief complaint of R foot weakness x 1 day (29 Jan 2018 08:27)      SUBJECTIVE / OVERNIGHT EVENTS:    MEDICATIONS  (STANDING):  aspirin enteric coated 81 milliGRAM(s) Oral daily  atorvastatin 80 milliGRAM(s) Oral at bedtime  clopidogrel Tablet 75 milliGRAM(s) Oral daily  enoxaparin Injectable 40 milliGRAM(s) SubCutaneous daily  sodium chloride 0.9%. 1000 milliLiter(s) (50 mL/Hr) IV Continuous <Continuous>    MEDICATIONS  (PRN):        CAPILLARY BLOOD GLUCOSE        I&O's Summary      T(C): 36.6 (01-31-18 @ 05:14), Max: 36.9 (01-30-18 @ 13:42)  HR: 74 (01-31-18 @ 05:14) (74 - 85)  BP: 129/86 (01-31-18 @ 05:14) (121/82 - 129/86)  RR: 16 (01-31-18 @ 05:14) (16 - 17)  SpO2: 98% (01-31-18 @ 05:14) (96% - 99%)    PHYSICAL EXAM:  GENERAL: NAD, well-developed  HEAD:  Atraumatic, Normocephalic  EYES: conjunctiva and sclera clear  NECK: Supple, No JVD  CHEST/LUNG: Clear to auscultation bilaterally; No wheeze  HEART: Regular rate and rhythm; No murmurs, rubs, or gallops  ABDOMEN: Soft, Nontender, Nondistended; Bowel sounds present  EXTREMITIES:  2+ Peripheral Pulses, No clubbing, cyanosis, or edema  PSYCH: AOX1 himself   NEUROLOGY: Rt hemeiparesis Arm/leg 0/5 expressive aphasia/dsyarthria    LABS:                        14.5   16.61 )-----------( 221      ( 31 Jan 2018 05:30 )             45.3     01-31    139  |  106  |  24<H>  ----------------------------<  90  4.7   |  21<L>  |  0.93    Ca    8.4      31 Jan 2018 05:30  Phos  2.8     01-31  Mg     2.2     01-31                  RADIOLOGY & ADDITIONAL TESTS:    Imaging Personally Reviewed:    Consultant(s) Notes Reviewed:      Care Discussed with Consultants/Other Providers: Patient is a 72y old  Male who presents with a chief complaint of R foot weakness x 1 day (29 Jan 2018 08:27)      SUBJECTIVE / OVERNIGHT EVENTS: Pt in NAD tele no acute events    MEDICATIONS  (STANDING):  aspirin enteric coated 81 milliGRAM(s) Oral daily  atorvastatin 80 milliGRAM(s) Oral at bedtime  clopidogrel Tablet 75 milliGRAM(s) Oral daily  enoxaparin Injectable 40 milliGRAM(s) SubCutaneous daily  sodium chloride 0.9%. 1000 milliLiter(s) (50 mL/Hr) IV Continuous <Continuous>    MEDICATIONS  (PRN):        CAPILLARY BLOOD GLUCOSE        I&O's Summary      T(C): 36.6 (01-31-18 @ 05:14), Max: 36.9 (01-30-18 @ 13:42)  HR: 74 (01-31-18 @ 05:14) (74 - 85)  BP: 129/86 (01-31-18 @ 05:14) (121/82 - 129/86)  RR: 16 (01-31-18 @ 05:14) (16 - 17)  SpO2: 98% (01-31-18 @ 05:14) (96% - 99%)    PHYSICAL EXAM:  GENERAL: NAD, well-developed  HEAD:  Atraumatic, Normocephalic  EYES: conjunctiva and sclera clear  NECK: Supple, No JVD  CHEST/LUNG: Clear to auscultation bilaterally; No wheeze  HEART: Regular rate and rhythm; No murmurs, rubs, or gallops  ABDOMEN: Soft, Nontender, Nondistended; Bowel sounds present  EXTREMITIES:  2+ Peripheral Pulses, No clubbing, cyanosis, or edema  PSYCH: AOX1 himself   NEUROLOGY: Rt hemeiparesis Arm/leg 0/5 expressive aphasia/dsyarthria    LABS:                        14.5   16.61 )-----------( 221      ( 31 Jan 2018 05:30 )             45.3     01-31    139  |  106  |  24<H>  ----------------------------<  90  4.7   |  21<L>  |  0.93    Ca    8.4      31 Jan 2018 05:30  Phos  2.8     01-31  Mg     2.2     01-31                  RADIOLOGY & ADDITIONAL TESTS:    Imaging Personally Reviewed:    Consultant(s) Notes Reviewed:      Care Discussed with Consultants/Other Providers:

## 2018-01-31 NOTE — PROGRESS NOTE ADULT - ASSESSMENT
Hemiplegia, aphasia  1. PT- bed mobility,transfers, gait and balance training  2. OT- ADL'S  3. CVA-Asa/Statin  4. Patient would benefit from subacute rehab, explained to family yhat this is due to lethargy/having extensive needs and requiring a longer rehab course than acute rehab

## 2018-01-31 NOTE — PROGRESS NOTE ADULT - PROBLEM SELECTOR PLAN 4
DVT ppx- Lovenox
Hold Heparinoids given worsening neuro Sx  SCD for DVT PPx

## 2018-02-01 LAB
BACTERIA BLD CULT: SIGNIFICANT CHANGE UP
BACTERIA BLD CULT: SIGNIFICANT CHANGE UP

## 2018-02-01 PROCEDURE — G9001: CPT

## 2018-02-02 PROBLEM — Z00.00 ENCOUNTER FOR PREVENTIVE HEALTH EXAMINATION: Status: ACTIVE | Noted: 2018-02-02

## 2018-02-06 ENCOUNTER — OUTPATIENT (OUTPATIENT)
Dept: OUTPATIENT SERVICES | Facility: HOSPITAL | Age: 73
LOS: 1 days | Discharge: HOME | End: 2018-02-06

## 2018-02-06 DIAGNOSIS — D72.829 ELEVATED WHITE BLOOD CELL COUNT, UNSPECIFIED: ICD-10-CM

## 2018-02-06 DIAGNOSIS — Z98.49 CATARACT EXTRACTION STATUS, UNSPECIFIED EYE: Chronic | ICD-10-CM

## 2018-02-12 ENCOUNTER — RESULT REVIEW (OUTPATIENT)
Age: 73
End: 2018-02-12

## 2018-02-12 ENCOUNTER — APPOINTMENT (OUTPATIENT)
Dept: HEMATOLOGY ONCOLOGY | Facility: CLINIC | Age: 73
End: 2018-02-12
Payer: MEDICARE

## 2018-02-12 VITALS
OXYGEN SATURATION: 97 % | HEART RATE: 73 BPM | RESPIRATION RATE: 16 BRPM | DIASTOLIC BLOOD PRESSURE: 80 MMHG | TEMPERATURE: 97.9 F | SYSTOLIC BLOOD PRESSURE: 125 MMHG

## 2018-02-12 LAB
BASOPHILS # BLD AUTO: 0.2 K/UL — SIGNIFICANT CHANGE UP (ref 0–0.2)
EOSINOPHIL # BLD AUTO: 0.5 K/UL — SIGNIFICANT CHANGE UP (ref 0–0.5)
EOSINOPHIL NFR BLD AUTO: 3 % — SIGNIFICANT CHANGE UP (ref 0–6)
HCT VFR BLD CALC: 44.2 % — SIGNIFICANT CHANGE UP (ref 39–50)
HGB BLD-MCNC: 15.1 G/DL — SIGNIFICANT CHANGE UP (ref 13–17)
LYMPHOCYTES # BLD AUTO: 53 % — HIGH (ref 13–44)
LYMPHOCYTES # BLD AUTO: 7.6 K/UL — HIGH (ref 1–3.3)
MCHC RBC-ENTMCNC: 31.9 PG — SIGNIFICANT CHANGE UP (ref 27–34)
MCHC RBC-ENTMCNC: 34.1 G/DL — SIGNIFICANT CHANGE UP (ref 32–36)
MCV RBC AUTO: 93.6 FL — SIGNIFICANT CHANGE UP (ref 80–100)
MONOCYTES # BLD AUTO: 0.4 K/UL — SIGNIFICANT CHANGE UP (ref 0–0.9)
MONOCYTES NFR BLD AUTO: 5 % — SIGNIFICANT CHANGE UP (ref 2–14)
NEUTROPHILS # BLD AUTO: 5.6 K/UL — SIGNIFICANT CHANGE UP (ref 1.8–7.4)
NEUTROPHILS NFR BLD AUTO: 36 % — LOW (ref 43–77)
PLAT MORPH BLD: NORMAL — SIGNIFICANT CHANGE UP
PLATELET # BLD AUTO: 403 K/UL — HIGH (ref 150–400)
RBC # BLD: 4.72 M/UL — SIGNIFICANT CHANGE UP (ref 4.2–5.8)
RBC # FLD: 12.6 % — SIGNIFICANT CHANGE UP (ref 10.3–14.5)
RBC BLD AUTO: SIGNIFICANT CHANGE UP
SMUDGE CELLS # BLD: PRESENT — SIGNIFICANT CHANGE UP
VARIANT LYMPHS # BLD: 3 % — SIGNIFICANT CHANGE UP (ref 0–6)
WBC # BLD: 14.4 K/UL — HIGH (ref 3.8–10.5)
WBC # FLD AUTO: 14.4 K/UL — HIGH (ref 3.8–10.5)

## 2018-02-12 PROCEDURE — 99215 OFFICE O/P EST HI 40 MIN: CPT

## 2018-02-12 RX ORDER — ATORVASTATIN CALCIUM 80 MG/1
80 TABLET, FILM COATED ORAL
Refills: 0 | Status: ACTIVE | COMMUNITY

## 2018-02-14 ENCOUNTER — APPOINTMENT (OUTPATIENT)
Dept: NEUROLOGY | Facility: CLINIC | Age: 73
End: 2018-02-14
Payer: MEDICARE

## 2018-02-14 VITALS — HEART RATE: 76 BPM | SYSTOLIC BLOOD PRESSURE: 129 MMHG | DIASTOLIC BLOOD PRESSURE: 77 MMHG

## 2018-02-14 PROCEDURE — 99215 OFFICE O/P EST HI 40 MIN: CPT

## 2018-02-20 ENCOUNTER — FORM ENCOUNTER (OUTPATIENT)
Age: 73
End: 2018-02-20

## 2018-02-21 ENCOUNTER — APPOINTMENT (OUTPATIENT)
Dept: CT IMAGING | Facility: IMAGING CENTER | Age: 73
End: 2018-02-21
Payer: MEDICARE

## 2018-02-21 ENCOUNTER — OUTPATIENT (OUTPATIENT)
Dept: OUTPATIENT SERVICES | Facility: HOSPITAL | Age: 73
LOS: 1 days | End: 2018-02-21
Payer: MEDICARE

## 2018-02-21 DIAGNOSIS — D72.829 ELEVATED WHITE BLOOD CELL COUNT, UNSPECIFIED: ICD-10-CM

## 2018-02-21 DIAGNOSIS — Z98.49 CATARACT EXTRACTION STATUS, UNSPECIFIED EYE: Chronic | ICD-10-CM

## 2018-02-21 PROCEDURE — 71260 CT THORAX DX C+: CPT | Mod: 26

## 2018-02-21 PROCEDURE — 74177 CT ABD & PELVIS W/CONTRAST: CPT

## 2018-02-21 PROCEDURE — 74177 CT ABD & PELVIS W/CONTRAST: CPT | Mod: 26

## 2018-02-21 PROCEDURE — 71260 CT THORAX DX C+: CPT

## 2018-02-21 PROCEDURE — 82565 ASSAY OF CREATININE: CPT

## 2018-02-22 ENCOUNTER — INPATIENT (INPATIENT)
Facility: HOSPITAL | Age: 73
LOS: 4 days | Discharge: NURSING, REHAB OR LONG-TERM CARE | End: 2018-02-27
Attending: INTERNAL MEDICINE | Admitting: INTERNAL MEDICINE
Payer: MEDICARE

## 2018-02-22 VITALS
WEIGHT: 149.91 LBS | RESPIRATION RATE: 15 BRPM | SYSTOLIC BLOOD PRESSURE: 140 MMHG | OXYGEN SATURATION: 98 % | HEART RATE: 74 BPM | DIASTOLIC BLOOD PRESSURE: 70 MMHG | TEMPERATURE: 98 F

## 2018-02-22 DIAGNOSIS — Z98.49 CATARACT EXTRACTION STATUS, UNSPECIFIED EYE: Chronic | ICD-10-CM

## 2018-02-22 LAB
ALBUMIN SERPL ELPH-MCNC: 3.6 G/DL — SIGNIFICANT CHANGE UP (ref 3.3–5)
ALP SERPL-CCNC: 76 U/L — SIGNIFICANT CHANGE UP (ref 40–120)
ALT FLD-CCNC: 13 U/L — SIGNIFICANT CHANGE UP (ref 4–41)
APTT BLD: 29.6 SEC — SIGNIFICANT CHANGE UP (ref 27.5–37.4)
AST SERPL-CCNC: 18 U/L — SIGNIFICANT CHANGE UP (ref 4–40)
BASOPHILS # BLD AUTO: 0.09 K/UL — SIGNIFICANT CHANGE UP (ref 0–0.2)
BASOPHILS NFR BLD AUTO: 0.6 % — SIGNIFICANT CHANGE UP (ref 0–2)
BASOPHILS NFR SPEC: 0 % — SIGNIFICANT CHANGE UP (ref 0–2)
BILIRUB SERPL-MCNC: 0.3 MG/DL — SIGNIFICANT CHANGE UP (ref 0.2–1.2)
BUN SERPL-MCNC: 19 MG/DL — SIGNIFICANT CHANGE UP (ref 7–23)
CALCIUM SERPL-MCNC: 9.1 MG/DL — SIGNIFICANT CHANGE UP (ref 8.4–10.5)
CHLORIDE SERPL-SCNC: 101 MMOL/L — SIGNIFICANT CHANGE UP (ref 98–107)
CK MB BLD-MCNC: 1 NG/ML — SIGNIFICANT CHANGE UP (ref 1–6.6)
CK SERPL-CCNC: 45 U/L — SIGNIFICANT CHANGE UP (ref 30–200)
CO2 SERPL-SCNC: 27 MMOL/L — SIGNIFICANT CHANGE UP (ref 22–31)
CREAT SERPL-MCNC: 0.88 MG/DL — SIGNIFICANT CHANGE UP (ref 0.5–1.3)
EOSINOPHIL # BLD AUTO: 0.17 K/UL — SIGNIFICANT CHANGE UP (ref 0–0.5)
EOSINOPHIL NFR BLD AUTO: 1.2 % — SIGNIFICANT CHANGE UP (ref 0–6)
EOSINOPHIL NFR FLD: 1.8 % — SIGNIFICANT CHANGE UP (ref 0–6)
GIANT PLATELETS BLD QL SMEAR: PRESENT — SIGNIFICANT CHANGE UP
GLUCOSE SERPL-MCNC: 114 MG/DL — HIGH (ref 70–99)
HCT VFR BLD CALC: 43.9 % — SIGNIFICANT CHANGE UP (ref 39–50)
HGB BLD-MCNC: 14.7 G/DL — SIGNIFICANT CHANGE UP (ref 13–17)
IMM GRANULOCYTES # BLD AUTO: 0.06 # — SIGNIFICANT CHANGE UP
IMM GRANULOCYTES NFR BLD AUTO: 0.4 % — SIGNIFICANT CHANGE UP (ref 0–1.5)
INR BLD: 1 — SIGNIFICANT CHANGE UP (ref 0.88–1.17)
LYMPHOCYTES # BLD AUTO: 57.1 % — HIGH (ref 13–44)
LYMPHOCYTES # BLD AUTO: 8.19 K/UL — HIGH (ref 1–3.3)
LYMPHOCYTES NFR SPEC AUTO: 50.4 % — HIGH (ref 13–44)
MACROCYTES BLD QL: SLIGHT — SIGNIFICANT CHANGE UP
MCHC RBC-ENTMCNC: 31.1 PG — SIGNIFICANT CHANGE UP (ref 27–34)
MCHC RBC-ENTMCNC: 33.5 % — SIGNIFICANT CHANGE UP (ref 32–36)
MCV RBC AUTO: 93 FL — SIGNIFICANT CHANGE UP (ref 80–100)
MONOCYTES # BLD AUTO: 0.55 K/UL — SIGNIFICANT CHANGE UP (ref 0–0.9)
MONOCYTES NFR BLD AUTO: 3.8 % — SIGNIFICANT CHANGE UP (ref 2–14)
MONOCYTES NFR BLD: 0.9 % — LOW (ref 2–9)
NEUTROPHIL AB SER-ACNC: 40.7 % — LOW (ref 43–77)
NEUTROPHILS # BLD AUTO: 5.29 K/UL — SIGNIFICANT CHANGE UP (ref 1.8–7.4)
NEUTROPHILS NFR BLD AUTO: 36.9 % — LOW (ref 43–77)
NRBC # FLD: 0 — SIGNIFICANT CHANGE UP
NT-PROBNP SERPL-SCNC: 19.48 PG/ML — SIGNIFICANT CHANGE UP
PLATELET # BLD AUTO: 350 K/UL — SIGNIFICANT CHANGE UP (ref 150–400)
PLATELET COUNT - ESTIMATE: NORMAL — SIGNIFICANT CHANGE UP
PMV BLD: 9.4 FL — SIGNIFICANT CHANGE UP (ref 7–13)
POTASSIUM SERPL-MCNC: 4.3 MMOL/L — SIGNIFICANT CHANGE UP (ref 3.5–5.3)
POTASSIUM SERPL-SCNC: 4.3 MMOL/L — SIGNIFICANT CHANGE UP (ref 3.5–5.3)
PROT SERPL-MCNC: 7.4 G/DL — SIGNIFICANT CHANGE UP (ref 6–8.3)
PROTHROM AB SERPL-ACNC: 11.5 SEC — SIGNIFICANT CHANGE UP (ref 9.8–13.1)
RBC # BLD: 4.72 M/UL — SIGNIFICANT CHANGE UP (ref 4.2–5.8)
RBC # FLD: 13.8 % — SIGNIFICANT CHANGE UP (ref 10.3–14.5)
SMUDGE CELLS # BLD: PRESENT — SIGNIFICANT CHANGE UP
SODIUM SERPL-SCNC: 141 MMOL/L — SIGNIFICANT CHANGE UP (ref 135–145)
TROPONIN T SERPL-MCNC: < 0.06 NG/ML — SIGNIFICANT CHANGE UP (ref 0–0.06)
VARIANT LYMPHS # BLD: 6.2 % — SIGNIFICANT CHANGE UP
WBC # BLD: 14.35 K/UL — HIGH (ref 3.8–10.5)
WBC # FLD AUTO: 14.35 K/UL — HIGH (ref 3.8–10.5)

## 2018-02-22 PROCEDURE — 93970 EXTREMITY STUDY: CPT | Mod: 26

## 2018-02-22 PROCEDURE — 71275 CT ANGIOGRAPHY CHEST: CPT | Mod: 26

## 2018-02-22 RX ORDER — SODIUM CHLORIDE 9 MG/ML
1000 INJECTION INTRAMUSCULAR; INTRAVENOUS; SUBCUTANEOUS ONCE
Qty: 0 | Refills: 0 | Status: COMPLETED | OUTPATIENT
Start: 2018-02-22 | End: 2018-02-22

## 2018-02-22 RX ADMIN — SODIUM CHLORIDE 2000 MILLILITER(S): 9 INJECTION INTRAMUSCULAR; INTRAVENOUS; SUBCUTANEOUS at 18:55

## 2018-02-22 NOTE — ED ADULT NURSE NOTE - OBJECTIVE STATEMENT
Pt. A&Ox2, brought in by EMS from [facility] for abnormal CT scan indicating PE. Pt. asymptomatic- no sob, cp or dizziness. h/o CVA with right sided hemiparesis. #20g IV placed in right FA, labs sent. Skin intact. MD at bedside for eval. VS done as charted, breathing well on RA. Will continue to monitor.

## 2018-02-22 NOTE — ED PROVIDER NOTE - ATTENDING CONTRIBUTION TO CARE
[place]  pt sent in for ?PE  pt had CT yesterday  suspicious for b/l LL PE but radiology feels CTA needed to be definite   pt s/p CVA  so history limited  IS awake and alert in no visible distress  clear lungs  card S12S2 abd soft  no LE edema  weakness RA RL s/P CVA   mild edema Rt hand  not extending into arm  Plan LE duplex if negative  will CTA

## 2018-02-22 NOTE — ED PROVIDER NOTE - OBJECTIVE STATEMENT
72M PMH HTN, HLD, CVA 1/2018 72M PMH HTN, HLD, CVA 1/2018 with residual expressive aphasia in rehab presenting for abnormal CT scan. Pt had CT C/A/P yesterday for evaluation of leukocytosis and was found to have concern for b/l lower lobe PEs. Pt was sent to ED for further evaluation. Pt is unable to speak, but appears comfortable

## 2018-02-22 NOTE — ED PROVIDER NOTE - MEDICAL DECISION MAKING DETAILS
72M s/p stroke with residual expressive aphasia, RUE RLE weakness presenting from [facility] with concern for b/l lower lobe PEs on CT with IV contrast yesterday. I had an extensive discussion with radiology and after review of CT from yesterday that though the scan is highly suggestive, they cannot definitively diagnose pulmonary emboli. Will check LE dopplers and if negative pursue CT-A. Pt appears asymptomatic presently. Will check CBC CMP Cardiac enzymes and EKG.

## 2018-02-22 NOTE — ED ADULT TRIAGE NOTE - CHIEF COMPLAINT QUOTE
Sent by [facility] for abnormal CT scan indicating Pulmonary Embolism.  Patient is asymptomatic, denies any SOB or CP. PMH: HLD, CVA right side hemiparesis, HTN,

## 2018-02-22 NOTE — ED ADULT NURSE NOTE - CHIEF COMPLAINT QUOTE
Sent by [facility] for abnormal CT scan indicating Pulmonary Embolism.  Patient is asymptomatic, denies any SOB or CP. PMH: HLD, CVA right side hemiparesis, HTN,

## 2018-02-23 DIAGNOSIS — I26.99 OTHER PULMONARY EMBOLISM WITHOUT ACUTE COR PULMONALE: ICD-10-CM

## 2018-02-23 DIAGNOSIS — I10 ESSENTIAL (PRIMARY) HYPERTENSION: ICD-10-CM

## 2018-02-23 DIAGNOSIS — D47.9 NEOPLASM OF UNCERTAIN BEHAVIOR OF LYMPHOID, HEMATOPOIETIC AND RELATED TISSUE, UNSPECIFIED: ICD-10-CM

## 2018-02-23 DIAGNOSIS — E78.5 HYPERLIPIDEMIA, UNSPECIFIED: ICD-10-CM

## 2018-02-23 PROCEDURE — 99223 1ST HOSP IP/OBS HIGH 75: CPT

## 2018-02-23 RX ORDER — ENOXAPARIN SODIUM 100 MG/ML
70 INJECTION SUBCUTANEOUS EVERY 12 HOURS
Qty: 0 | Refills: 0 | Status: DISCONTINUED | OUTPATIENT
Start: 2018-02-23 | End: 2018-02-27

## 2018-02-23 RX ORDER — ENOXAPARIN SODIUM 100 MG/ML
68 INJECTION SUBCUTANEOUS ONCE
Qty: 0 | Refills: 0 | Status: DISCONTINUED | OUTPATIENT
Start: 2018-02-23 | End: 2018-02-23

## 2018-02-23 RX ORDER — ENOXAPARIN SODIUM 100 MG/ML
40 INJECTION SUBCUTANEOUS EVERY 24 HOURS
Qty: 0 | Refills: 0 | Status: DISCONTINUED | OUTPATIENT
Start: 2018-02-23 | End: 2018-02-23

## 2018-02-23 RX ORDER — AMLODIPINE BESYLATE 2.5 MG/1
2.5 TABLET ORAL DAILY
Qty: 0 | Refills: 0 | Status: DISCONTINUED | OUTPATIENT
Start: 2018-02-23 | End: 2018-02-23

## 2018-02-23 RX ORDER — CLOPIDOGREL BISULFATE 75 MG/1
75 TABLET, FILM COATED ORAL DAILY
Qty: 0 | Refills: 0 | Status: DISCONTINUED | OUTPATIENT
Start: 2018-02-23 | End: 2018-02-23

## 2018-02-23 RX ORDER — ENOXAPARIN SODIUM 100 MG/ML
70 INJECTION SUBCUTANEOUS ONCE
Qty: 0 | Refills: 0 | Status: COMPLETED | OUTPATIENT
Start: 2018-02-23 | End: 2018-02-23

## 2018-02-23 RX ORDER — ATORVASTATIN CALCIUM 80 MG/1
80 TABLET, FILM COATED ORAL AT BEDTIME
Qty: 0 | Refills: 0 | Status: DISCONTINUED | OUTPATIENT
Start: 2018-02-23 | End: 2018-02-23

## 2018-02-23 RX ORDER — ASPIRIN/CALCIUM CARB/MAGNESIUM 324 MG
81 TABLET ORAL DAILY
Qty: 0 | Refills: 0 | Status: DISCONTINUED | OUTPATIENT
Start: 2018-02-23 | End: 2018-02-23

## 2018-02-23 RX ORDER — ATORVASTATIN CALCIUM 80 MG/1
80 TABLET, FILM COATED ORAL AT BEDTIME
Qty: 0 | Refills: 0 | Status: DISCONTINUED | OUTPATIENT
Start: 2018-02-23 | End: 2018-02-27

## 2018-02-23 RX ADMIN — ATORVASTATIN CALCIUM 80 MILLIGRAM(S): 80 TABLET, FILM COATED ORAL at 22:55

## 2018-02-23 RX ADMIN — Medication 81 MILLIGRAM(S): at 13:47

## 2018-02-23 RX ADMIN — ENOXAPARIN SODIUM 70 MILLIGRAM(S): 100 INJECTION SUBCUTANEOUS at 13:48

## 2018-02-23 RX ADMIN — CLOPIDOGREL BISULFATE 75 MILLIGRAM(S): 75 TABLET, FILM COATED ORAL at 13:48

## 2018-02-23 RX ADMIN — ENOXAPARIN SODIUM 70 MILLIGRAM(S): 100 INJECTION SUBCUTANEOUS at 01:52

## 2018-02-23 NOTE — CHART NOTE - NSCHARTNOTEFT_GEN_A_CORE
Spoke with neurology Re: need for DAPT while on full AC.  There is NO NEED for ASA/Plavix if patient is on full dose anticoagulation as per neurology.  Will follow up further hematology recommendations.

## 2018-02-23 NOTE — H&P ADULT - ASSESSMENT
72 M with h/o HTN, HLD, CVA with R hemiplegia and expressive aphasia, sent to ED for out pt CT concerning for PE.

## 2018-02-23 NOTE — CONSULT NOTE ADULT - SUBJECTIVE AND OBJECTIVE BOX
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 Patient currently not hypoxic, tachycardic. Patient has expressive aphasia and is unable to offer any history but denies complaints when questioned. Patient went for CTA here which showed b/l PE, and LE US which showed no DVT.     ED VS:  142/90  81  16  97% on RA  Pt given lovenox in ED (23 Feb 2018 05:27)    Allergies    No Known Allergies    Intolerances        MEDICATIONS  (STANDING):  aspirin  chewable 81 milliGRAM(s) Oral daily  atorvastatin 80 milliGRAM(s) Oral at bedtime  clopidogrel Tablet 75 milliGRAM(s) Oral daily  enoxaparin Injectable 70 milliGRAM(s) SubCutaneous every 12 hours    MEDICATIONS  (PRN):      PAST MEDICAL & SURGICAL HISTORY:  Hyperlipidemia  Hypertension  History of cataract surgery      FAMILY HISTORY:  No pertinent family history in first degree relatives      SOCIAL HISTORY: No EtOH, no tobacco    REVIEW OF SYSTEMS: unable to reliably obtain    Weight (kg): 68 (02-23 @ 00:41)    T(F): 98.3 (02-23-18 @ 05:21), Max: 98.9 (02-23-18 @ 03:08)  HR: 63 (02-23-18 @ 05:21)  BP: 145/88 (02-23-18 @ 05:21)  RR: 18 (02-23-18 @ 05:21)  SpO2: 96% (02-23-18 @ 05:21)  Wt(kg): --    GENERAL: NAD, well-developed,   HEAD:  Atraumatic, Normocephalic, loss of right nasolabial fold  EYES: EOMI, PERRLA, conjunctiva and sclera clear  NECK: Supple, No JVD  CHEST/LUNG: Clear to auscultation bilaterally; No wheeze  HEART: Regular rate and rhythm; No murmurs, rubs, or gallops  ABDOMEN: Soft, Nontender, Nondistended; Bowel sounds present  EXTREMITIES:  2+ Peripheral Pulses, No clubbing, cyanosis, or edema  NEUROLOGY: right-sided paresis, 4/5 strenght LUE/LLE  SKIN: No rashes or lesions                          14.7   14.35 )-----------( 350      ( 22 Feb 2018 16:50 )             43.9       02-22    141  |  101  |  19  ----------------------------<  114<H>  4.3   |  27  |  0.88    Ca    9.1      22 Feb 2018 16:50    TPro  7.4  /  Alb  3.6  /  TBili  0.3  /  DBili  x   /  AST  18  /  ALT  13  /  AlkPhos  76  02-22

## 2018-02-23 NOTE — CONSULT NOTE ADULT - PROBLEM SELECTOR RECOMMENDATION 2
- c/w lovenox 1mg/kg q12h  - likely provoked given patients immobility after CVA, but does have newly diagnosed B-cell disorder which may be contributing to a hypercoagulable state - c/w lovenox 1mg/kg q12h  - likely provoked given patients immobility after CVA, but does have newly diagnosed B-cell disorder which may be contributing to a hypercoagulable state  - please consult neurology to discuss with DAPT is necessary while patient is on therapeutic anticoagulation and if it is okay to discontinue one antiplatelet agent to reduce bleeding risk

## 2018-02-23 NOTE — CONSULT NOTE ADULT - PROBLEM SELECTOR RECOMMENDATION 9
- CT chest/abdomen/pelvis with presents of subcentimeter axillary nodes but no other lymphadenopathy  - additional workup as outpatient, likely no indication for treatment at this time given no cytopenias, lymphadenopathy, or symptoms

## 2018-02-23 NOTE — CHART NOTE - NSCHARTNOTEFT_GEN_A_CORE
Spoke with neurology resident who states patient can be on therapeutic lovenox and dual antiplatelet can be stopped.

## 2018-02-23 NOTE — H&P ADULT - NSHPPHYSICALEXAM_GEN_ALL_CORE
T(C): 36.8 (02-23-18 @ 05:21), Max: 37.2 (02-23-18 @ 03:08)  HR: 63 (02-23-18 @ 05:21) (63 - 81)  BP: 145/88 (02-23-18 @ 05:21) (127/85 - 150/82)  RR: 18 (02-23-18 @ 05:21) (15 - 18)  SpO2: 96% (02-23-18 @ 05:21) (96% - 98%)    GENERAL: No acute distress, well-developed  HEAD:  Atraumatic, Normocephalic  ENT: EOMI, PERRLA, conjunctiva and sclera clear, Neck supple, No JVD, moist mucosa, no pharyngeal erythema, no tonsillar enlargement or exudate  CHEST/LUNG: Clear to auscultation bilaterally; No wheeze, equal breath sounds bilaterally   HEART: Regular rate and rhythm; No murmurs, rubs, or gallops  ABDOMEN: Soft, Nontender, Nondistended; Bowel sounds present, no organomegaly  EXTREMITIES:  2+ Peripheral Pulses, No clubbing, cyanosis, or edema  PSYCH: Pt  NEUROLOGY: R side hemiplegia, expressive aphasia, cranial nerves intact  SKIN: Normal color, No rashes or lesions

## 2018-02-23 NOTE — CONSULT NOTE ADULT - ASSESSMENT
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

## 2018-02-23 NOTE — H&P ADULT - NSHPLABSRESULTS_GEN_ALL_CORE
.  LABS:                         14.7   14.35 )-----------( 350      ( 22 Feb 2018 16:50 )             43.9     02-22    141  |  101  |  19  ----------------------------<  114<H>  4.3   |  27  |  0.88    Ca    9.1      22 Feb 2018 16:50    TPro  7.4  /  Alb  3.6  /  TBili  0.3  /  DBili  x   /  AST  18  /  ALT  13  /  AlkPhos  76  02-22    PT/INR - ( 22 Feb 2018 16:30 )   PT: 11.5 SEC;   INR: 1.00          PTT - ( 22 Feb 2018 16:30 )  PTT:29.6 SEC    CARDIAC MARKERS ( 22 Feb 2018 16:50 )  x     / < 0.06 ng/mL / 45 u/L / 1.00 ng/mL / x          EKG reviewed personally: Normal sinus rhythm 75 bpm       RADIOLOGY, EKG & ADDITIONAL TESTS: Reviewed.

## 2018-02-23 NOTE — H&P ADULT - HISTORY OF PRESENT ILLNESS
72 M with h/o HTN, HLD, CVA with R hemiplegia and expressive aphasia, sent to ED for out pt CT concerning for PE. Pt had CT done as part of workup for leukocytosis. CT incidentally showed b/l lower lobe PEs but results were inconclusive so pt sent to the ED for CT chest. Pt unable to speak but shakes head no when asked if any CP, has no complaints.     ED VS:  142/90  81  16  97% on RA  Pt given lovenox in ED

## 2018-02-23 NOTE — CHART NOTE - NSCHARTNOTEFT_GEN_A_CORE
Primary discussed that patient was recently diagnosed with a PE on CTA chest and that patient would be placed on Coumadin.     Pt was previously treated with Asa/Plavix for cerebrovascular lesions. If Mr. Hollis were to be started on full dose Lovenox or Coumadin for PE therapy, then please discontinue Asa/Plavix medical management.     Discussed with primary team and Vascular Neurologist Dr. R. Libman.

## 2018-02-24 LAB
BUN SERPL-MCNC: 12 MG/DL — SIGNIFICANT CHANGE UP (ref 7–23)
CALCIUM SERPL-MCNC: 8.9 MG/DL — SIGNIFICANT CHANGE UP (ref 8.4–10.5)
CHLORIDE SERPL-SCNC: 102 MMOL/L — SIGNIFICANT CHANGE UP (ref 98–107)
CO2 SERPL-SCNC: 26 MMOL/L — SIGNIFICANT CHANGE UP (ref 22–31)
CREAT SERPL-MCNC: 0.75 MG/DL — SIGNIFICANT CHANGE UP (ref 0.5–1.3)
GLUCOSE SERPL-MCNC: 91 MG/DL — SIGNIFICANT CHANGE UP (ref 70–99)
HCT VFR BLD CALC: 41.3 % — SIGNIFICANT CHANGE UP (ref 39–50)
HGB BLD-MCNC: 14 G/DL — SIGNIFICANT CHANGE UP (ref 13–17)
MAGNESIUM SERPL-MCNC: 2.1 MG/DL — SIGNIFICANT CHANGE UP (ref 1.6–2.6)
MCHC RBC-ENTMCNC: 31.3 PG — SIGNIFICANT CHANGE UP (ref 27–34)
MCHC RBC-ENTMCNC: 33.9 % — SIGNIFICANT CHANGE UP (ref 32–36)
MCV RBC AUTO: 92.4 FL — SIGNIFICANT CHANGE UP (ref 80–100)
NRBC # FLD: 0 — SIGNIFICANT CHANGE UP
PHOSPHATE SERPL-MCNC: 3.2 MG/DL — SIGNIFICANT CHANGE UP (ref 2.5–4.5)
PLATELET # BLD AUTO: 339 K/UL — SIGNIFICANT CHANGE UP (ref 150–400)
PMV BLD: 9.5 FL — SIGNIFICANT CHANGE UP (ref 7–13)
POTASSIUM SERPL-MCNC: 3.8 MMOL/L — SIGNIFICANT CHANGE UP (ref 3.5–5.3)
POTASSIUM SERPL-SCNC: 3.8 MMOL/L — SIGNIFICANT CHANGE UP (ref 3.5–5.3)
RBC # BLD: 4.47 M/UL — SIGNIFICANT CHANGE UP (ref 4.2–5.8)
RBC # FLD: 13.5 % — SIGNIFICANT CHANGE UP (ref 10.3–14.5)
SODIUM SERPL-SCNC: 141 MMOL/L — SIGNIFICANT CHANGE UP (ref 135–145)
WBC # BLD: 14.14 K/UL — HIGH (ref 3.8–10.5)
WBC # FLD AUTO: 14.14 K/UL — HIGH (ref 3.8–10.5)

## 2018-02-24 RX ADMIN — ENOXAPARIN SODIUM 70 MILLIGRAM(S): 100 INJECTION SUBCUTANEOUS at 12:05

## 2018-02-24 RX ADMIN — ENOXAPARIN SODIUM 70 MILLIGRAM(S): 100 INJECTION SUBCUTANEOUS at 01:07

## 2018-02-24 RX ADMIN — ATORVASTATIN CALCIUM 80 MILLIGRAM(S): 80 TABLET, FILM COATED ORAL at 22:40

## 2018-02-25 LAB
BUN SERPL-MCNC: 16 MG/DL — SIGNIFICANT CHANGE UP (ref 7–23)
CALCIUM SERPL-MCNC: 8.9 MG/DL — SIGNIFICANT CHANGE UP (ref 8.4–10.5)
CHLORIDE SERPL-SCNC: 96 MMOL/L — LOW (ref 98–107)
CO2 SERPL-SCNC: 28 MMOL/L — SIGNIFICANT CHANGE UP (ref 22–31)
CREAT SERPL-MCNC: 0.84 MG/DL — SIGNIFICANT CHANGE UP (ref 0.5–1.3)
GLUCOSE SERPL-MCNC: 91 MG/DL — SIGNIFICANT CHANGE UP (ref 70–99)
HCT VFR BLD CALC: 43.4 % — SIGNIFICANT CHANGE UP (ref 39–50)
HGB BLD-MCNC: 13.9 G/DL — SIGNIFICANT CHANGE UP (ref 13–17)
MAGNESIUM SERPL-MCNC: 2 MG/DL — SIGNIFICANT CHANGE UP (ref 1.6–2.6)
MCHC RBC-ENTMCNC: 29.7 PG — SIGNIFICANT CHANGE UP (ref 27–34)
MCHC RBC-ENTMCNC: 32 % — SIGNIFICANT CHANGE UP (ref 32–36)
MCV RBC AUTO: 92.7 FL — SIGNIFICANT CHANGE UP (ref 80–100)
NRBC # FLD: 0 — SIGNIFICANT CHANGE UP
PHOSPHATE SERPL-MCNC: 3.6 MG/DL — SIGNIFICANT CHANGE UP (ref 2.5–4.5)
PLATELET # BLD AUTO: 331 K/UL — SIGNIFICANT CHANGE UP (ref 150–400)
PMV BLD: 9.6 FL — SIGNIFICANT CHANGE UP (ref 7–13)
POTASSIUM SERPL-MCNC: 4.1 MMOL/L — SIGNIFICANT CHANGE UP (ref 3.5–5.3)
POTASSIUM SERPL-SCNC: 4.1 MMOL/L — SIGNIFICANT CHANGE UP (ref 3.5–5.3)
RBC # BLD: 4.68 M/UL — SIGNIFICANT CHANGE UP (ref 4.2–5.8)
RBC # FLD: 13.6 % — SIGNIFICANT CHANGE UP (ref 10.3–14.5)
SODIUM SERPL-SCNC: 137 MMOL/L — SIGNIFICANT CHANGE UP (ref 135–145)
WBC # BLD: 14.83 K/UL — HIGH (ref 3.8–10.5)
WBC # FLD AUTO: 14.83 K/UL — HIGH (ref 3.8–10.5)

## 2018-02-25 RX ADMIN — ENOXAPARIN SODIUM 70 MILLIGRAM(S): 100 INJECTION SUBCUTANEOUS at 15:44

## 2018-02-25 RX ADMIN — ENOXAPARIN SODIUM 70 MILLIGRAM(S): 100 INJECTION SUBCUTANEOUS at 01:34

## 2018-02-26 LAB
BUN SERPL-MCNC: 16 MG/DL — SIGNIFICANT CHANGE UP (ref 7–23)
CALCIUM SERPL-MCNC: 9.2 MG/DL — SIGNIFICANT CHANGE UP (ref 8.4–10.5)
CHLORIDE SERPL-SCNC: 101 MMOL/L — SIGNIFICANT CHANGE UP (ref 98–107)
CO2 SERPL-SCNC: 27 MMOL/L — SIGNIFICANT CHANGE UP (ref 22–31)
CREAT SERPL-MCNC: 0.82 MG/DL — SIGNIFICANT CHANGE UP (ref 0.5–1.3)
GLUCOSE SERPL-MCNC: 89 MG/DL — SIGNIFICANT CHANGE UP (ref 70–99)
HCT VFR BLD CALC: 45 % — SIGNIFICANT CHANGE UP (ref 39–50)
HGB BLD-MCNC: 14.7 G/DL — SIGNIFICANT CHANGE UP (ref 13–17)
MAGNESIUM SERPL-MCNC: 3.5 MG/DL — HIGH (ref 1.6–2.6)
MCHC RBC-ENTMCNC: 30.2 PG — SIGNIFICANT CHANGE UP (ref 27–34)
MCHC RBC-ENTMCNC: 32.7 % — SIGNIFICANT CHANGE UP (ref 32–36)
MCV RBC AUTO: 92.4 FL — SIGNIFICANT CHANGE UP (ref 80–100)
NRBC # FLD: 0 — SIGNIFICANT CHANGE UP
PHOSPHATE SERPL-MCNC: 3.7 MG/DL — SIGNIFICANT CHANGE UP (ref 2.5–4.5)
PLATELET # BLD AUTO: 351 K/UL — SIGNIFICANT CHANGE UP (ref 150–400)
PMV BLD: 9.8 FL — SIGNIFICANT CHANGE UP (ref 7–13)
POTASSIUM SERPL-MCNC: 3.7 MMOL/L — SIGNIFICANT CHANGE UP (ref 3.5–5.3)
POTASSIUM SERPL-SCNC: 3.7 MMOL/L — SIGNIFICANT CHANGE UP (ref 3.5–5.3)
RBC # BLD: 4.87 M/UL — SIGNIFICANT CHANGE UP (ref 4.2–5.8)
RBC # FLD: 13.5 % — SIGNIFICANT CHANGE UP (ref 10.3–14.5)
SODIUM SERPL-SCNC: 141 MMOL/L — SIGNIFICANT CHANGE UP (ref 135–145)
WBC # BLD: 15.33 K/UL — HIGH (ref 3.8–10.5)
WBC # FLD AUTO: 15.33 K/UL — HIGH (ref 3.8–10.5)

## 2018-02-26 PROCEDURE — 99233 SBSQ HOSP IP/OBS HIGH 50: CPT

## 2018-02-26 RX ADMIN — ENOXAPARIN SODIUM 70 MILLIGRAM(S): 100 INJECTION SUBCUTANEOUS at 12:57

## 2018-02-26 RX ADMIN — ENOXAPARIN SODIUM 70 MILLIGRAM(S): 100 INJECTION SUBCUTANEOUS at 00:24

## 2018-02-26 RX ADMIN — ATORVASTATIN CALCIUM 80 MILLIGRAM(S): 80 TABLET, FILM COATED ORAL at 21:38

## 2018-02-26 RX ADMIN — ATORVASTATIN CALCIUM 80 MILLIGRAM(S): 80 TABLET, FILM COATED ORAL at 00:24

## 2018-02-27 ENCOUNTER — TRANSCRIPTION ENCOUNTER (OUTPATIENT)
Age: 73
End: 2018-02-27

## 2018-02-27 VITALS
DIASTOLIC BLOOD PRESSURE: 98 MMHG | RESPIRATION RATE: 18 BRPM | HEART RATE: 81 BPM | TEMPERATURE: 97 F | OXYGEN SATURATION: 99 % | SYSTOLIC BLOOD PRESSURE: 146 MMHG

## 2018-02-27 LAB
BUN SERPL-MCNC: 16 MG/DL — SIGNIFICANT CHANGE UP (ref 7–23)
CALCIUM SERPL-MCNC: 9.5 MG/DL — SIGNIFICANT CHANGE UP (ref 8.4–10.5)
CHLORIDE SERPL-SCNC: 105 MMOL/L — SIGNIFICANT CHANGE UP (ref 98–107)
CO2 SERPL-SCNC: 26 MMOL/L — SIGNIFICANT CHANGE UP (ref 22–31)
CREAT SERPL-MCNC: 0.81 MG/DL — SIGNIFICANT CHANGE UP (ref 0.5–1.3)
GLUCOSE SERPL-MCNC: 105 MG/DL — HIGH (ref 70–99)
HCT VFR BLD CALC: 44.5 % — SIGNIFICANT CHANGE UP (ref 39–50)
HGB BLD-MCNC: 14.7 G/DL — SIGNIFICANT CHANGE UP (ref 13–17)
MAGNESIUM SERPL-MCNC: 2.1 MG/DL — SIGNIFICANT CHANGE UP (ref 1.6–2.6)
MCHC RBC-ENTMCNC: 30.2 PG — SIGNIFICANT CHANGE UP (ref 27–34)
MCHC RBC-ENTMCNC: 33 % — SIGNIFICANT CHANGE UP (ref 32–36)
MCV RBC AUTO: 91.6 FL — SIGNIFICANT CHANGE UP (ref 80–100)
NRBC # FLD: 0 — SIGNIFICANT CHANGE UP
PHOSPHATE SERPL-MCNC: 3.3 MG/DL — SIGNIFICANT CHANGE UP (ref 2.5–4.5)
PLATELET # BLD AUTO: 334 K/UL — SIGNIFICANT CHANGE UP (ref 150–400)
PMV BLD: 9.5 FL — SIGNIFICANT CHANGE UP (ref 7–13)
POTASSIUM SERPL-MCNC: 4.3 MMOL/L — SIGNIFICANT CHANGE UP (ref 3.5–5.3)
POTASSIUM SERPL-SCNC: 4.3 MMOL/L — SIGNIFICANT CHANGE UP (ref 3.5–5.3)
RBC # BLD: 4.86 M/UL — SIGNIFICANT CHANGE UP (ref 4.2–5.8)
RBC # FLD: 13.5 % — SIGNIFICANT CHANGE UP (ref 10.3–14.5)
SODIUM SERPL-SCNC: 144 MMOL/L — SIGNIFICANT CHANGE UP (ref 135–145)
WBC # BLD: 16.55 K/UL — HIGH (ref 3.8–10.5)
WBC # FLD AUTO: 16.55 K/UL — HIGH (ref 3.8–10.5)

## 2018-02-27 RX ORDER — ENOXAPARIN SODIUM 100 MG/ML
70 INJECTION SUBCUTANEOUS
Qty: 0 | Refills: 0 | COMMUNITY
Start: 2018-02-27

## 2018-02-27 RX ADMIN — ENOXAPARIN SODIUM 70 MILLIGRAM(S): 100 INJECTION SUBCUTANEOUS at 12:53

## 2018-02-27 RX ADMIN — ENOXAPARIN SODIUM 70 MILLIGRAM(S): 100 INJECTION SUBCUTANEOUS at 01:19

## 2018-02-27 NOTE — DISCHARGE NOTE ADULT - MEDICATION SUMMARY - MEDICATIONS TO TAKE
I will START or STAY ON the medications listed below when I get home from the hospital:    enoxaparin  -- 70 milligram(s) subcutaneous every 12 hours  -- Indication: For Other pulmonary embolism without acute cor pulmonale    atorvastatin 80 mg oral tablet  -- 1 tab(s) by mouth once a day (at bedtime)  -- Indication: For Hyperlipidemia, unspecified hyperlipidemia type

## 2018-02-27 NOTE — PROGRESS NOTE ADULT - PROVIDER SPECIALTY LIST ADULT
Internal Medicine
Pulmonology
Internal Medicine
Pulmonology

## 2018-02-27 NOTE — DISCHARGE NOTE ADULT - MEDICATION SUMMARY - MEDICATIONS TO STOP TAKING
I will STOP taking the medications listed below when I get home from the hospital:    aspirin 81 mg oral delayed release tablet  -- 1 tab(s) by mouth once a day    clopidogrel 75 mg oral tablet  -- 1 tab(s) by mouth once a day stop after 90 days.

## 2018-02-27 NOTE — PROGRESS NOTE ADULT - ASSESSMENT
72 M with h/o HTN, HLD, CVA with R hemiplegia and expressive aphasia, sent to ED for out pt CT concerning for PE.      Problem/Plan - 1:  ·   acute pulmonary embolism without acute cor pulmonale.  Plan: - Duplex negative   - Continue lovenox  -long erm a/c as per heme     Problem/Plan - 2:  ·  Essential hypertension. monitor      Problem/Plan - 3:   Hyperlipidemia, unspecified hyperlipidemia type.    Plan: - Continue statin    Hx of CVA - Neuro appreciated, antiplt stopped due to starting AC    Lymphoproliferative disease - per Onc, outpt w/u
Impression :   Pulmonary emboli  - hemodynamically stable  S/P CVA      Recommend :   Full dose Lovenox  Can place or oral agent now  Length of therapy may be life long if he remains sedentary and if there are any other risk factors for DVT/PE      Joon Thomas MD, [facility]  [facility]  Pulmonary Medicine  
72 M with h/o HTN, HLD, CVA with R hemiplegia and expressive aphasia, sent to ED for out pt CT concerning for PE.      Problem/Plan -   ·   acute pulmonary embolism without acute cor pulmonale.  Plan: - Duplex negative   - Continue lovenox  -long term a/c as per heme  change to PO (possibly NOAC if ok w/Heme) for d/c planning     Problem/Plan -   ·  Essential hypertension. monitor      Problem/Plan -    Hyperlipidemia, unspecified hyperlipidemia type.    Plan: - Continue statin    Hx of CVA - Neuro appreciated, antiplt stopped due to starting AC    Lymphoproliferative disease - per Onc, outpt w/u
72 M with h/o HTN, HLD, CVA with R hemiplegia and expressive aphasia, sent to ED for out pt CT concerning for PE.      Problem/Plan -   ·   acute pulmonary embolism without acute cor pulmonale.  Plan: - Duplex negative   - Continue lovenox  -long term a/c as per heme  likely change to po      Problem/Plan -   ·  Essential hypertension. monitor      Problem/Plan -    Hyperlipidemia, unspecified hyperlipidemia type.    Plan: - Continue statin    Hx of CVA - Neuro appreciated, antiplt stopped due to starting AC    Lymphoproliferative disease - per Onc, outpt w/u
72 M with h/o HTN, HLD, CVA with R hemiplegia and expressive aphasia, sent to ED for out pt CT concerning for PE.      Problem/Plan -   ·   acute pulmonary embolism without acute cor pulmonale.  Plan: - Duplex negative   - Continue lovenox upon discharge  -long term a/c as per heme     Problem/Plan -   ·  Essential hypertension. monitor      Problem/Plan -    Hyperlipidemia, unspecified hyperlipidemia type.    Plan: - Continue statin    Hx of CVA - Neuro appreciated, antiplt stopped due to starting AC    Lymphoproliferative disease - per Onc, outpt w/u     PT eval for d/c planning [facility]
72 M with h/o HTN, HLD, CVA with R hemiplegia and expressive aphasia, sent to ED for out pt CT concerning for PE.      Problem/Plan - 1:  ·  Problem: Other acute pulmonary embolism without acute cor pulmonale.  Plan: - Duplex negative   - Continue lovenox  - NOAC vs Coumadin per Heme/insurance     Problem/Plan - 2:  ·  Problem: Essential hypertension.  Plan: - BP controlled off meds     Problem/Plan - 3:  ·  Problem: Hyperlipidemia, unspecified hyperlipidemia type.  Plan: - Continue statin    Hx of CVA - Neuro appreciated, antiplt stopped due to starting AC    Lymphoproliferative disease - per Onc, outpt w/u

## 2018-02-27 NOTE — PROGRESS NOTE ADULT - SUBJECTIVE AND OBJECTIVE BOX
.  Pulmonary Medicine       Asked to see Mr. Hollis in consultation.     He is a 73 y/o man was living at home until 1/26/18 when he had a stroke (left sided CVA with a RHP). Was here then went to [facility] for Rehab. Apparently had an out patient CT which was concerning for a PE. History as per pt and his sister. No c/o chest pain or shortness of breath. No prior h/o DVT or PE. Denies h/o malignancy. No history of pulmonary disease and he never smoked.       Vital Signs Last 24 Hrs  T(C): 36.5 (24 Feb 2018 05:44), Max: 36.8 (23 Feb 2018 21:25)  T(F): 97.7 (24 Feb 2018 05:44), Max: 98.2 (23 Feb 2018 21:25)  HR: 61 (24 Feb 2018 05:44) (61 - 63)  BP: 149/86 (24 Feb 2018 05:44) (149/86 - 154/85)  BP(mean): --  RR: 18 (24 Feb 2018 05:44) (18 - 18)  SpO2: 96% (24 Feb 2018 05:44) (96% - 97%)      Physical examination   No evident distress   No jugular venous distension   Heart sounds were regular   Clear breath sounds   The abdomen was soft and not tender   No cyanosis   No dependent edema   No calf tenderness                             14.0   14.14 )-----------( 339      ( 24 Feb 2018 06:30 )             41.3       02-24    141  |  102  |  12  ----------------------------<  91  3.8   |  26  |  0.75    Ca    8.9      24 Feb 2018 06:30  Phos  3.2     02-24  Mg     2.1     02-24    TPro  7.4  /  Alb  3.6  /  TBili  0.3  /  DBili  x   /  AST  18  /  ALT  13  /  AlkPhos  76  02-22    CTPA showed segmental and subsegmental PE in both lower lobes    No DVT on U/S
CHIEF COMPLAINT:Patient is a 72y old  Male who presents with a chief complaint of PE (23 Feb 2018 05:27)    	        PAST MEDICAL & SURGICAL HISTORY:  Hyperlipidemia  Hypertension  History of cataract surgery          REVIEW OF SYSTEMS:  CONSTITUTIONAL: No fever, weight loss, or fatigue  EYES: No eye pain, visual disturbances, or discharge  NECK: No pain or stiffness  RESPIRATORY: No cough, wheezing, chills or hemoptysis; No Shortness of Breath  CARDIOVASCULAR: No chest pain, palpitations, passing out, dizziness, or leg swelling  GASTROINTESTINAL: No abdominal or epigastric pain. No nausea, vomiting, or hematemesis; No diarrhea or constipation. No melena or hematochezia.  GENITOURINARY: No dysuria, frequency, hematuria, or incontinence  NEUROLOGICAL: No headaches,  MUSCULOSKELETAL: No joint pain or swelling; No muscle, back, or extremity pain    Medications:  MEDICATIONS  (STANDING):  atorvastatin 80 milliGRAM(s) Oral at bedtime  enoxaparin Injectable 70 milliGRAM(s) SubCutaneous every 12 hours    MEDICATIONS  (PRN):    	    PHYSICAL EXAM:  T(C): 36.5 (02-24-18 @ 05:44), Max: 36.8 (02-23-18 @ 21:25)  HR: 61 (02-24-18 @ 05:44) (61 - 72)  BP: 149/86 (02-24-18 @ 05:44) (149/86 - 154/85)  RR: 18 (02-24-18 @ 05:44) (16 - 18)  SpO2: 96% (02-24-18 @ 05:44) (96% - 98%)  Wt(kg): --  I&O's Summary      Appearance: Normal	  HEENT:   Normal oral mucosa, PERRL, EOMI	  Lymphatic: No lymphadenopathy  Cardiovascular: Normal S1 S2, No JVD, No murmurs, No edema  Respiratory: Lungs clear to auscultation	  Psychiatry: A & O   Gastrointestinal:  Soft, Non-tender, + BS	  Skin: No rashes, No ecchymoses, No cyanosis	  Neurologic: Non-focal  Extremities: Normal range of motion, No clubbing, cyanosis or edema  Vascular: Peripheral pulses palpable     TELEMETRY: 	    ECG:  	  RADIOLOGY:  OTHER: 	  	  LABS:	 	    CARDIAC MARKERS:  CARDIAC MARKERS ( 22 Feb 2018 16:50 )  x     / < 0.06 ng/mL / 45 u/L / 1.00 ng/mL / x                                    14.0   14.14 )-----------( 339      ( 24 Feb 2018 06:30 )             41.3     02-24    141  |  102  |  12  ----------------------------<  91  3.8   |  26  |  0.75    Ca    8.9      24 Feb 2018 06:30  Phos  3.2     02-24  Mg     2.1     02-24    TPro  7.4  /  Alb  3.6  /  TBili  0.3  /  DBili  x   /  AST  18  /  ALT  13  /  AlkPhos  76  02-22    proBNP:   Lipid Profile:   HgA1c:   TSH:
CHIEF COMPLAINT:Patient is a 72y old  Male who presents with a chief complaint of PE (23 Feb 2018 05:27)    	        PAST MEDICAL & SURGICAL HISTORY:  Hyperlipidemia  Hypertension  History of cataract surgery          REVIEW OF SYSTEMS:  CONSTITUTIONAL: No fever, weight loss, or fatigue  EYES: No eye pain, visual disturbances, or discharge  NECK: No pain or stiffness  RESPIRATORY: No cough, wheezing, chills or hemoptysis; No Shortness of Breath  CARDIOVASCULAR: No chest pain, palpitations, passing out, dizziness, or leg swelling  GASTROINTESTINAL: No abdominal or epigastric pain. No nausea, vomiting, or hematemesis; No diarrhea or constipation. No melena or hematochezia.  GENITOURINARY: No dysuria, frequency, hematuria, or incontinence  NEUROLOGICAL: No headaches, memory loss, loss of strength, numbness, or tremors  SKIN: No itching, burning, rashes, or lesions   LYMPH Nodes: No enlarged glands  ENDOCRINE: No heat or cold intolerance; No hair loss  MUSCULOSKELETAL: No joint pain or swelling; No muscle, back, or extremity pain    Medications:  MEDICATIONS  (STANDING):  atorvastatin 80 milliGRAM(s) Oral at bedtime  enoxaparin Injectable 70 milliGRAM(s) SubCutaneous every 12 hours    MEDICATIONS  (PRN):    	    PHYSICAL EXAM:  T(C): 36.9 (02-25-18 @ 06:20), Max: 36.9 (02-24-18 @ 21:42)  HR: 71 (02-25-18 @ 06:20) (60 - 77)  BP: 123/86 (02-25-18 @ 06:20) (123/86 - 154/87)  RR: 18 (02-25-18 @ 06:20) (17 - 18)  SpO2: 97% (02-25-18 @ 06:20) (97% - 100%)  Wt(kg): --  I&O's Summary      Appearance: Normal	  HEENT:   Normal oral mucosa, PERRL, EOMI	  Lymphatic: No lymphadenopathy  Cardiovascular: Normal S1 S2, No JVD, No murmurs, No edema  Respiratory: Lungs clear to auscultation	  Psychiatry: A & O x 3, Mood & affect appropriate  Gastrointestinal:  Soft, Non-tender, + BS	  Skin: No rashes, No ecchymoses, No cyanosis	  Neurologic: Non-focal  Extremities: Normal range of motion, No clubbing, cyanosis or edema  Vascular: Peripheral pulses palpable 2+ bilaterally    TELEMETRY: 	    ECG:  	  RADIOLOGY:  OTHER: 	  	  LABS:	 	    CARDIAC MARKERS:                                14.0   14.14 )-----------( 339      ( 24 Feb 2018 06:30 )             41.3     02-24    141  |  102  |  12  ----------------------------<  91  3.8   |  26  |  0.75    Ca    8.9      24 Feb 2018 06:30  Phos  3.2     02-24  Mg     2.1     02-24      proBNP:   Lipid Profile:   HgA1c:   TSH:
CHIEF COMPLAINT:Patient is a 72y old  Male who presents with a chief complaint of PE (23 Feb 2018 05:27)    Allergies:  No Known Allergies      PAST MEDICAL & SURGICAL HISTORY:  Hyperlipidemia  Hypertension  History of cataract surgery      FAMILY HISTORY:  No pertinent family history in first degree relatives      REVIEW OF SYSTEMS:  UTO, appears comfortable    Medications:  MEDICATIONS  (STANDING):  atorvastatin 80 milliGRAM(s) Oral at bedtime  enoxaparin Injectable 70 milliGRAM(s) SubCutaneous every 12 hours    MEDICATIONS  (PRN):    	    PHYSICAL EXAM:  T(C): 36.4 (02-23-18 @ 13:35), Max: 37.2 (02-23-18 @ 03:08)  HR: 72 (02-23-18 @ 13:35) (63 - 81)  BP: 153/85 (02-23-18 @ 13:35) (127/85 - 153/85)  RR: 16 (02-23-18 @ 13:35) (16 - 18)  SpO2: 98% (02-23-18 @ 13:35) (96% - 98%)  Wt(kg): --  I&O's Summary      Appearance: Normal	  HEENT:   NCAT, PERRL, EOMI	  Lymphatic: No lymphadenopathy  Cardiovascular: Normal S1 S2, RRR  Respiratory: Lungs clear to auscultation BL  Psychiatry: A & O x 3, Mood & affect appropriate  Gastrointestinal:  Soft, Non-tender, + BS  Skin: No rashes, No ecchymoses, No cyanosis	  Neurologic: R side hemiplegia, expressive aphasia, cranial nerves intact  Extremities: Normal range of motion, No clubbing, cyanosis or edema    	  LABS:	 	    CARDIAC MARKERS:  CARDIAC MARKERS ( 22 Feb 2018 16:50 )  x     / < 0.06 ng/mL / 45 u/L / 1.00 ng/mL / x                                    14.7   14.35 )-----------( 350      ( 22 Feb 2018 16:50 )             43.9     02-22    141  |  101  |  19  ----------------------------<  114<H>  4.3   |  27  |  0.88    Ca    9.1      22 Feb 2018 16:50    TPro  7.4  /  Alb  3.6  /  TBili  0.3  /  DBili  x   /  AST  18  /  ALT  13  /  AlkPhos  76  02-22    proBNP:   Lipid Profile:   HgA1c:   TSH:
CHIEF COMPLAINT:Patient is a 72y old  Male who presents with a chief complaint of PE (23 Feb 2018 05:27)    no new complaints    PAST MEDICAL & SURGICAL HISTORY:  Hyperlipidemia  Hypertension  History of cataract surgery          REVIEW OF SYSTEMS:  CONSTITUTIONAL: No fever, weight loss, or fatigue  EYES: No eye pain, visual disturbances, or discharge  NECK: No pain or stiffness  RESPIRATORY: No cough, wheezing, chills or hemoptysis; No Shortness of Breath  CARDIOVASCULAR: No chest pain, palpitations, passing out, dizziness, or leg swelling  GASTROINTESTINAL: No abdominal or epigastric pain. No nausea, vomiting, or hematemesis; No diarrhea or constipation. No melena or hematochezia.  GENITOURINARY: No dysuria, frequency, hematuria, or incontinence  NEUROLOGICAL: No headaches, memory loss, loss of strength, numbness, or tremors  SKIN: No itching, burning, rashes, or lesions   LYMPH Nodes: No enlarged glands  ENDOCRINE: No heat or cold intolerance; No hair loss  MUSCULOSKELETAL: No joint pain or swelling; No muscle, back, or extremity pain      Medications:  MEDICATIONS  (STANDING):  atorvastatin 80 milliGRAM(s) Oral at bedtime  enoxaparin Injectable 70 milliGRAM(s) SubCutaneous every 12 hours    MEDICATIONS  (PRN):    	    PHYSICAL EXAM:  T(C): 36.3 (02-27-18 @ 12:27), Max: 36.7 (02-26-18 @ 21:10)  HR: 82 (02-27-18 @ 12:27) (77 - 92)  BP: 140/94 (02-27-18 @ 12:27) (130/96 - 140/94)  RR: 18 (02-27-18 @ 12:27) (17 - 18)  SpO2: 95% (02-27-18 @ 12:27) (95% - 98%)  Wt(kg): --  I&O's Summary  Appearance: Normal	  HEENT:   Normal oral mucosa, PERRL, EOMI	  Lymphatic: No lymphadenopathy  Cardiovascular: Normal S1 S2, No JVD, No murmurs, No edema  Respiratory: Lungs clear to auscultation	  Psychiatry: A & O x 3, Mood & affect appropriate  Gastrointestinal:  Soft, Non-tender, + BS	  Skin: No rashes, No ecchymoses, No cyanosis	  Neurologic: R side hemiplegia, expressive aphasia, cranial nerves intact  Extremities: Normal range of motion, No clubbing, cyanosis or edema  Vascular: Peripheral pulses palpable 2+ bilaterally    LABS:	 	    CARDIAC MARKERS:                                14.7   16.55 )-----------( 334      ( 27 Feb 2018 05:25 )             44.5     02-27    144  |  105  |  16  ----------------------------<  105<H>  4.3   |  26  |  0.81    Ca    9.5      27 Feb 2018 05:25  Phos  3.3     02-27  Mg     2.1     02-27      proBNP:   Lipid Profile:   HgA1c:   TSH:
CHIEF COMPLAINT:Patient is a 72y old  Male who presents with a chief complaint of PE (23 Feb 2018 05:27)    no new complaints    PAST MEDICAL & SURGICAL HISTORY:  Hyperlipidemia  Hypertension  History of cataract surgery          REVIEW OF SYSTEMS:  CONSTITUTIONAL: No fever, weight loss, or fatigue  EYES: No eye pain, visual disturbances, or discharge  NECK: No pain or stiffness  RESPIRATORY: No cough, wheezing, chills or hemoptysis; No Shortness of Breath  CARDIOVASCULAR: No chest pain, palpitations, passing out, dizziness, or leg swelling  GASTROINTESTINAL: No abdominal or epigastric pain. No nausea, vomiting, or hematemesis; No diarrhea or constipation. No melena or hematochezia.  GENITOURINARY: No dysuria, frequency, hematuria, or incontinence  NEUROLOGICAL: No headaches, memory loss, loss of strength, numbness, or tremors  SKIN: No itching, burning, rashes, or lesions   LYMPH Nodes: No enlarged glands  ENDOCRINE: No heat or cold intolerance; No hair loss  MUSCULOSKELETAL: No joint pain or swelling; No muscle, back, or extremity pain      Medications:  MEDICATIONS  (STANDING):  atorvastatin 80 milliGRAM(s) Oral at bedtime  enoxaparin Injectable 70 milliGRAM(s) SubCutaneous every 12 hours    MEDICATIONS  (PRN):    	    PHYSICAL EXAM:  T(C): 36.8 (02-26-18 @ 06:00), Max: 37.1 (02-25-18 @ 13:31)  HR: 76 (02-26-18 @ 06:00) (75 - 86)  BP: 137/94 (02-26-18 @ 06:00) (123/85 - 137/94)  RR: 18 (02-26-18 @ 06:00) (17 - 18)  SpO2: 97% (02-26-18 @ 06:00) (95% - 97%)  Wt(kg): --  I&O's Summary    Appearance: Normal	  HEENT:   Normal oral mucosa, PERRL, EOMI	  Lymphatic: No lymphadenopathy  Cardiovascular: Normal S1 S2, No JVD, No murmurs, No edema  Respiratory: Lungs clear to auscultation	  Psychiatry: A & O x 3, Mood & affect appropriate  Gastrointestinal:  Soft, Non-tender, + BS	  Skin: No rashes, No ecchymoses, No cyanosis	  Neurologic: Non-focal  Extremities: Normal range of motion, No clubbing, cyanosis or edema  Vascular: Peripheral pulses palpable 2+ bilaterally    LABS:	 	    CARDIAC MARKERS:                                14.7   15.33 )-----------( 351      ( 26 Feb 2018 05:25 )             45.0     02-26    141  |  101  |  16  ----------------------------<  89  3.7   |  27  |  0.82    Ca    9.2      26 Feb 2018 05:35  Phos  3.7     02-26  Mg     3.5     02-26      proBNP:   Lipid Profile:   HgA1c:   TSH:
NAD. Awake      T(C): 36.8 (26 Feb 2018 06:00), Max: 37.1 (25 Feb 2018 13:31)  T(F): 98.3 (26 Feb 2018 06:00), Max: 98.7 (25 Feb 2018 13:31)  HR: 76 (26 Feb 2018 06:00) (75 - 86)  BP: 137/94 (26 Feb 2018 06:00) (123/85 - 137/94)  SpO2: 97% (26 Feb 2018 06:00)       Lungs- clear  Heart- Reg  Abd-  non tender  Ext- no edema  RR sided paresis with contracture    HOSPITAL MEDICATIONS:  MEDICATIONS  (STANDING):  atorvastatin 80 milliGRAM(s) Oral at bedtime  enoxaparin Injectable 70 milliGRAM(s) SubCutaneous every 12 hours    MEDICATIONS  (PRN):      LABS:                        14.7   15.33 )-----------( 351      ( 26 Feb 2018 05:25 )             45.0     Hgb Trend: 14.7<--, 13.9<--, 14.0<--, 14.7<--  02-26    141  |  101  |  16  ----------------------------<  89  3.7   |  27  |  0.82    Ca    9.2      26 Feb 2018 05:35  Phos  3.7     02-26  Mg     3.5     02-26      IMP;  PE in sedentary pt post CVA; hemodynamically stable    PLAN: Initiate NOAC  tx (eliquis, xarelto)  F/u WBC #    Jonathan Matos MD [facility]  [phone]            MICROBIOLOGY:     RADIOLOGY:  [ ] Reviewed and interpreted by me    PULMONARY FUNCTION TESTS:    EKG:

## 2018-02-27 NOTE — DISCHARGE NOTE ADULT - PATIENT PORTAL LINK FT
You can access the [facility] Patient Portal, offered by [facility], by registering with the following website: [email or web address]

## 2018-02-27 NOTE — DISCHARGE NOTE ADULT - CARE PLAN
Principal Discharge DX:	Pulmonary embolism  Goal:	continue anticoagulation  Assessment and plan of treatment:	Please follow with your medical doctors upon arrival to rehab.  Please continue lovenox 70mg q 12 hours.  Secondary Diagnosis:	Lymphoproliferative disease  Goal:	remain stable  Assessment and plan of treatment:	- CT chest/abdomen/pelvis with presents of subcentimeter axillary nodes but no other lymphadenopathy  - additional workup as outpatient, likely no indication for treatment at this time given no cytopenias, lymphadenopathy, or symptoms. Please follow up at [facility] [phone].  Please call to make an appointment  Secondary Diagnosis:	Hyperlipidemia, unspecified hyperlipidemia type  Goal:	continue current regimen  Assessment and plan of treatment:	Please follow up with the medical doctors upon arrival to rehab  Secondary Diagnosis:	Essential hypertension  Goal:	maintain adequate blood pressure control  Assessment and plan of treatment:	Please check your blood pressure prior to taking your blood pressure medications.  Please continue current medications as prescribed.

## 2018-02-27 NOTE — DISCHARGE NOTE ADULT - PLAN OF CARE
continue anticoagulation Please follow with your medical doctors upon arrival to rehab.  Please continue lovenox 70mg q 12 hours. remain stable - CT chest/abdomen/pelvis with presents of subcentimeter axillary nodes but no other lymphadenopathy  - additional workup as outpatient, likely no indication for treatment at this time given no cytopenias, lymphadenopathy, or symptoms. Please follow up at [facility] [phone].  Please call to make an appointment continue current regimen Please follow up with the medical doctors upon arrival to rehab maintain adequate blood pressure control Please check your blood pressure prior to taking your blood pressure medications.  Please continue current medications as prescribed.

## 2018-02-27 NOTE — DISCHARGE NOTE ADULT - HOSPITAL COURSE
72 M with h/o HTN, HLD, CVA with R hemiplegia and expressive aphasia, sent to ED for out pt CT concerning for PE.       Other pulmonary embolism without acute cor pulmonale  -CTA:Pulmonary thromboembolism involving the segmental and   subsegmental branches of both lower lobes.   - c/w lovenox 1mg/kg q12h  - likely provoked given patients immobility after CVA, but does have newly diagnosed B-cell disorder which may be contributing to a hypercoagulable state  -B/L LE US: neg for DVT   -Neurology following Pt was previously treated with Asa/Plavix for cerebrovascular lesions. If Mr. Hollis were to be started on full dose Lovenox or Coumadin for PE therapy, then please discontinue Asa/Plavix medical management.     Lymphoproliferative disease.  -Heme/Onc following  - CT chest/abdomen/pelvis with presents of subcentimeter axillary nodes but no other lymphadenopathy  - additional workup as outpatient, likely no indication for treatment at this time given no cytopenias, lymphadenopathy, or symptoms.    Essential hypertension.   - Monitor BP  - Continue home meds in am if needed.     Hyperlipidemia   - Continue statin.     Dispo: Rehab

## 2018-03-04 NOTE — DISCHARGE NOTE ADULT - MEDICATION SUMMARY - MEDICATIONS TO CHANGE
Patient presents with blood in the urine for the past week. Denies dysuria, frequency.    I will SWITCH the dose or number of times a day I take the medications listed below when I get home from the hospital:  None

## 2018-04-16 ENCOUNTER — APPOINTMENT (OUTPATIENT)
Dept: NEUROLOGY | Facility: CLINIC | Age: 73
End: 2018-04-16
Payer: MEDICARE

## 2018-04-16 VITALS
HEART RATE: 87 BPM | BODY MASS INDEX: 27.31 KG/M2 | DIASTOLIC BLOOD PRESSURE: 80 MMHG | HEIGHT: 64 IN | WEIGHT: 160 LBS | SYSTOLIC BLOOD PRESSURE: 154 MMHG

## 2018-04-16 PROCEDURE — 99215 OFFICE O/P EST HI 40 MIN: CPT

## 2018-04-16 RX ORDER — ASPIRIN 81 MG/1
81 TABLET ORAL
Refills: 0 | Status: DISCONTINUED | COMMUNITY
End: 2018-04-16

## 2018-04-16 RX ORDER — CLOPIDOGREL 75 MG/1
75 TABLET, FILM COATED ORAL
Refills: 0 | Status: DISCONTINUED | COMMUNITY
End: 2018-04-16

## 2018-05-08 ENCOUNTER — OUTPATIENT (OUTPATIENT)
Dept: OUTPATIENT SERVICES | Facility: HOSPITAL | Age: 73
LOS: 1 days | Discharge: HOME | End: 2018-05-08

## 2018-05-08 DIAGNOSIS — D72.829 ELEVATED WHITE BLOOD CELL COUNT, UNSPECIFIED: ICD-10-CM

## 2018-05-08 DIAGNOSIS — Z98.49 CATARACT EXTRACTION STATUS, UNSPECIFIED EYE: Chronic | ICD-10-CM

## 2018-05-14 ENCOUNTER — RESULT REVIEW (OUTPATIENT)
Age: 73
End: 2018-05-14

## 2018-05-14 ENCOUNTER — APPOINTMENT (OUTPATIENT)
Dept: HEMATOLOGY ONCOLOGY | Facility: CLINIC | Age: 73
End: 2018-05-14
Payer: MEDICARE

## 2018-05-14 VITALS
DIASTOLIC BLOOD PRESSURE: 88 MMHG | BODY MASS INDEX: 24.25 KG/M2 | SYSTOLIC BLOOD PRESSURE: 136 MMHG | WEIGHT: 141.3 LBS | OXYGEN SATURATION: 99 % | TEMPERATURE: 97.8 F | RESPIRATION RATE: 16 BRPM | HEART RATE: 87 BPM

## 2018-05-14 LAB
ANISOCYTOSIS BLD QL: SLIGHT — SIGNIFICANT CHANGE UP
BASOPHILS # BLD AUTO: 0.2 K/UL — SIGNIFICANT CHANGE UP (ref 0–0.2)
EOSINOPHIL # BLD AUTO: 0.1 K/UL — SIGNIFICANT CHANGE UP (ref 0–0.5)
HCT VFR BLD CALC: 44.1 % — SIGNIFICANT CHANGE UP (ref 39–50)
HGB BLD-MCNC: 14.6 G/DL — SIGNIFICANT CHANGE UP (ref 13–17)
LYMPHOCYTES # BLD AUTO: 48 % — HIGH (ref 13–44)
LYMPHOCYTES # BLD AUTO: 7.7 K/UL — HIGH (ref 1–3.3)
MACROCYTES BLD QL: SLIGHT — SIGNIFICANT CHANGE UP
MCHC RBC-ENTMCNC: 31.3 PG — SIGNIFICANT CHANGE UP (ref 27–34)
MCHC RBC-ENTMCNC: 33.2 G/DL — SIGNIFICANT CHANGE UP (ref 32–36)
MCV RBC AUTO: 94.3 FL — SIGNIFICANT CHANGE UP (ref 80–100)
MICROCYTES BLD QL: SLIGHT — SIGNIFICANT CHANGE UP
MONOCYTES # BLD AUTO: 0.4 K/UL — SIGNIFICANT CHANGE UP (ref 0–0.9)
MONOCYTES NFR BLD AUTO: 7 % — SIGNIFICANT CHANGE UP (ref 2–14)
NEUTROPHILS # BLD AUTO: 4.9 K/UL — SIGNIFICANT CHANGE UP (ref 1.8–7.4)
NEUTROPHILS NFR BLD AUTO: 45 % — SIGNIFICANT CHANGE UP (ref 43–77)
PLAT MORPH BLD: NORMAL — SIGNIFICANT CHANGE UP
PLATELET # BLD AUTO: 342 K/UL — SIGNIFICANT CHANGE UP (ref 150–400)
POIKILOCYTOSIS BLD QL AUTO: SLIGHT — SIGNIFICANT CHANGE UP
RBC # BLD: 4.67 M/UL — SIGNIFICANT CHANGE UP (ref 4.2–5.8)
RBC # FLD: 14.2 % — SIGNIFICANT CHANGE UP (ref 10.3–14.5)
RBC BLD AUTO: SIGNIFICANT CHANGE UP
WBC # BLD: 13.4 K/UL — HIGH (ref 3.8–10.5)
WBC # FLD AUTO: 13.4 K/UL — HIGH (ref 3.8–10.5)

## 2018-05-14 PROCEDURE — 99214 OFFICE O/P EST MOD 30 MIN: CPT

## 2018-05-15 LAB
ALBUMIN SERPL ELPH-MCNC: 4.4 G/DL
ALP BLD-CCNC: 72 U/L
ALT SERPL-CCNC: 22 U/L
ANION GAP SERPL CALC-SCNC: 14 MMOL/L
AST SERPL-CCNC: 18 U/L
BILIRUB SERPL-MCNC: 0.3 MG/DL
BUN SERPL-MCNC: 17 MG/DL
CALCIUM SERPL-MCNC: 10.2 MG/DL
CHLORIDE SERPL-SCNC: 101 MMOL/L
CO2 SERPL-SCNC: 27 MMOL/L
CREAT SERPL-MCNC: 0.83 MG/DL
GLUCOSE SERPL-MCNC: 92 MG/DL
POTASSIUM SERPL-SCNC: 4.7 MMOL/L
PROT SERPL-MCNC: 7.6 G/DL
SODIUM SERPL-SCNC: 142 MMOL/L

## 2018-05-23 ENCOUNTER — APPOINTMENT (OUTPATIENT)
Dept: NEUROLOGY | Facility: CLINIC | Age: 73
End: 2018-05-23

## 2018-06-06 ENCOUNTER — APPOINTMENT (OUTPATIENT)
Dept: NEUROLOGY | Facility: CLINIC | Age: 73
End: 2018-06-06
Payer: MEDICARE

## 2018-06-06 VITALS
SYSTOLIC BLOOD PRESSURE: 127 MMHG | WEIGHT: 141 LBS | DIASTOLIC BLOOD PRESSURE: 77 MMHG | HEART RATE: 96 BPM | HEIGHT: 64 IN | BODY MASS INDEX: 24.07 KG/M2

## 2018-06-06 PROCEDURE — 99215 OFFICE O/P EST HI 40 MIN: CPT

## 2018-07-03 NOTE — ED ADULT TRIAGE NOTE - HEART RATE (BEATS/MIN)
72 therapy frequency/predicted duration of therapy intervention/anticipated discharge recommendation

## 2018-07-09 ENCOUNTER — APPOINTMENT (OUTPATIENT)
Dept: NEUROLOGY | Facility: CLINIC | Age: 73
End: 2018-07-09

## 2018-07-23 PROBLEM — I10 ESSENTIAL (PRIMARY) HYPERTENSION: Chronic | Status: ACTIVE | Noted: 2018-01-26

## 2018-07-23 PROBLEM — E78.5 HYPERLIPIDEMIA, UNSPECIFIED: Chronic | Status: ACTIVE | Noted: 2018-01-26

## 2018-08-02 ENCOUNTER — OUTPATIENT (OUTPATIENT)
Dept: OUTPATIENT SERVICES | Facility: HOSPITAL | Age: 73
LOS: 1 days | Discharge: HOME | End: 2018-08-02

## 2018-08-02 DIAGNOSIS — Z98.49 CATARACT EXTRACTION STATUS, UNSPECIFIED EYE: Chronic | ICD-10-CM

## 2018-08-02 DIAGNOSIS — D72.829 ELEVATED WHITE BLOOD CELL COUNT, UNSPECIFIED: ICD-10-CM

## 2018-08-13 ENCOUNTER — APPOINTMENT (OUTPATIENT)
Dept: HEMATOLOGY ONCOLOGY | Facility: CLINIC | Age: 73
End: 2018-08-13
Payer: MEDICARE

## 2018-08-20 ENCOUNTER — RESULT REVIEW (OUTPATIENT)
Age: 73
End: 2018-08-20

## 2018-08-20 ENCOUNTER — APPOINTMENT (OUTPATIENT)
Dept: HEMATOLOGY ONCOLOGY | Facility: CLINIC | Age: 73
End: 2018-08-20
Payer: MEDICARE

## 2018-08-20 VITALS
SYSTOLIC BLOOD PRESSURE: 164 MMHG | RESPIRATION RATE: 16 BRPM | TEMPERATURE: 97.5 F | BODY MASS INDEX: 25.92 KG/M2 | OXYGEN SATURATION: 97 % | DIASTOLIC BLOOD PRESSURE: 98 MMHG | HEART RATE: 87 BPM | WEIGHT: 151 LBS

## 2018-08-20 LAB
ANISOCYTOSIS BLD QL: SLIGHT — SIGNIFICANT CHANGE UP
BASOPHILS # BLD AUTO: 0.2 K/UL — SIGNIFICANT CHANGE UP (ref 0–0.2)
EOSINOPHIL # BLD AUTO: 0.1 K/UL — SIGNIFICANT CHANGE UP (ref 0–0.5)
EOSINOPHIL NFR BLD AUTO: 1 % — SIGNIFICANT CHANGE UP (ref 0–6)
HCT VFR BLD CALC: 43.1 % — SIGNIFICANT CHANGE UP (ref 39–50)
HGB BLD-MCNC: 14.7 G/DL — SIGNIFICANT CHANGE UP (ref 13–17)
LG PLATELETS BLD QL AUTO: SLIGHT — SIGNIFICANT CHANGE UP
LYMPHOCYTES # BLD AUTO: 60 % — HIGH (ref 13–44)
LYMPHOCYTES # BLD AUTO: 9.5 K/UL — HIGH (ref 1–3.3)
MACROCYTES BLD QL: SLIGHT — SIGNIFICANT CHANGE UP
MCHC RBC-ENTMCNC: 31.8 PG — SIGNIFICANT CHANGE UP (ref 27–34)
MCHC RBC-ENTMCNC: 34.1 G/DL — SIGNIFICANT CHANGE UP (ref 32–36)
MCV RBC AUTO: 93.4 FL — SIGNIFICANT CHANGE UP (ref 80–100)
MONOCYTES # BLD AUTO: 0.3 K/UL — SIGNIFICANT CHANGE UP (ref 0–0.9)
MONOCYTES NFR BLD AUTO: 10 % — SIGNIFICANT CHANGE UP (ref 2–14)
NEUTROPHILS # BLD AUTO: 4.5 K/UL — SIGNIFICANT CHANGE UP (ref 1.8–7.4)
NEUTROPHILS NFR BLD AUTO: 29 % — LOW (ref 43–77)
PLAT MORPH BLD: NORMAL — SIGNIFICANT CHANGE UP
PLATELET # BLD AUTO: 303 K/UL — SIGNIFICANT CHANGE UP (ref 150–400)
POIKILOCYTOSIS BLD QL AUTO: SLIGHT — SIGNIFICANT CHANGE UP
RBC # BLD: 4.62 M/UL — SIGNIFICANT CHANGE UP (ref 4.2–5.8)
RBC # FLD: 13.4 % — SIGNIFICANT CHANGE UP (ref 10.3–14.5)
RBC BLD AUTO: SIGNIFICANT CHANGE UP
SMUDGE CELLS # BLD: PRESENT — SIGNIFICANT CHANGE UP
WBC # BLD: 14.6 K/UL — HIGH (ref 3.8–10.5)
WBC # FLD AUTO: 14.6 K/UL — HIGH (ref 3.8–10.5)

## 2018-08-20 PROCEDURE — 99214 OFFICE O/P EST MOD 30 MIN: CPT

## 2018-08-21 LAB
ALBUMIN SERPL ELPH-MCNC: 4.7 G/DL
ALP BLD-CCNC: 79 U/L
ALT SERPL-CCNC: 19 U/L
ANION GAP SERPL CALC-SCNC: 13 MMOL/L
AST SERPL-CCNC: 19 U/L
BILIRUB SERPL-MCNC: 0.3 MG/DL
BUN SERPL-MCNC: 19 MG/DL
CALCIUM SERPL-MCNC: 9.9 MG/DL
CHLORIDE SERPL-SCNC: 99 MMOL/L
CO2 SERPL-SCNC: 27 MMOL/L
CREAT SERPL-MCNC: 1.1 MG/DL
GLUCOSE SERPL-MCNC: 137 MG/DL
POTASSIUM SERPL-SCNC: 4 MMOL/L
PROT SERPL-MCNC: 7.6 G/DL
SODIUM SERPL-SCNC: 139 MMOL/L

## 2018-09-24 ENCOUNTER — APPOINTMENT (OUTPATIENT)
Dept: NEUROLOGY | Facility: CLINIC | Age: 73
End: 2018-09-24
Payer: MEDICARE

## 2018-09-24 VITALS
HEART RATE: 80 BPM | HEIGHT: 64 IN | DIASTOLIC BLOOD PRESSURE: 81 MMHG | WEIGHT: 151 LBS | SYSTOLIC BLOOD PRESSURE: 142 MMHG | BODY MASS INDEX: 25.78 KG/M2

## 2018-09-24 DIAGNOSIS — G57.93 UNSPECIFIED MONONEUROPATHY OF BILATERAL LOWER LIMBS: ICD-10-CM

## 2018-09-24 DIAGNOSIS — Z86.73 PERSONAL HISTORY OF TRANSIENT ISCHEMIC ATTACK (TIA), AND CEREBRAL INFARCTION W/OUT RESIDUAL DEFICITS: ICD-10-CM

## 2018-09-24 DIAGNOSIS — F99 MENTAL DISORDER, NOT OTHERWISE SPECIFIED: ICD-10-CM

## 2018-09-24 PROCEDURE — 99215 OFFICE O/P EST HI 40 MIN: CPT

## 2018-09-24 RX ORDER — ASPIRIN 81 MG
81 TABLET, DELAYED RELEASE (ENTERIC COATED) ORAL
Refills: 0 | Status: ACTIVE | COMMUNITY

## 2018-09-24 RX ORDER — RIVAROXABAN 20 MG/1
20 TABLET, FILM COATED ORAL
Qty: 30 | Refills: 2 | Status: DISCONTINUED | COMMUNITY
Start: 2018-05-14 | End: 2018-09-24

## 2018-10-04 ENCOUNTER — APPOINTMENT (OUTPATIENT)
Dept: NEUROLOGY | Facility: CLINIC | Age: 73
End: 2018-10-04

## 2018-10-09 ENCOUNTER — RX RENEWAL (OUTPATIENT)
Age: 73
End: 2018-10-09

## 2018-10-09 RX ORDER — GABAPENTIN 300 MG/1
300 CAPSULE ORAL
Qty: 1 | Refills: 3 | Status: ACTIVE | COMMUNITY
Start: 2018-06-06 | End: 1900-01-01

## 2018-11-08 ENCOUNTER — OUTPATIENT (OUTPATIENT)
Dept: OUTPATIENT SERVICES | Facility: HOSPITAL | Age: 73
LOS: 1 days | Discharge: HOME | End: 2018-11-08

## 2018-11-08 DIAGNOSIS — Z98.49 CATARACT EXTRACTION STATUS, UNSPECIFIED EYE: Chronic | ICD-10-CM

## 2018-11-08 DIAGNOSIS — D72.829 ELEVATED WHITE BLOOD CELL COUNT, UNSPECIFIED: ICD-10-CM

## 2018-11-19 ENCOUNTER — APPOINTMENT (OUTPATIENT)
Dept: HEMATOLOGY ONCOLOGY | Facility: CLINIC | Age: 73
End: 2018-11-19
Payer: MEDICARE

## 2018-11-19 ENCOUNTER — RESULT REVIEW (OUTPATIENT)
Age: 73
End: 2018-11-19

## 2018-11-19 VITALS
OXYGEN SATURATION: 96 % | BODY MASS INDEX: 26.38 KG/M2 | WEIGHT: 153.66 LBS | TEMPERATURE: 98.5 F | DIASTOLIC BLOOD PRESSURE: 77 MMHG | RESPIRATION RATE: 16 BRPM | SYSTOLIC BLOOD PRESSURE: 132 MMHG | HEART RATE: 82 BPM

## 2018-11-19 LAB
BASOPHILS # BLD AUTO: 0.2 K/UL — SIGNIFICANT CHANGE UP (ref 0–0.2)
EOSINOPHIL # BLD AUTO: 0.1 K/UL — SIGNIFICANT CHANGE UP (ref 0–0.5)
EOSINOPHIL NFR BLD AUTO: 1 % — SIGNIFICANT CHANGE UP (ref 0–6)
HCT VFR BLD CALC: 44.4 % — SIGNIFICANT CHANGE UP (ref 39–50)
HGB BLD-MCNC: 14.1 G/DL — SIGNIFICANT CHANGE UP (ref 13–17)
LYMPHOCYTES # BLD AUTO: 66 % — HIGH (ref 13–44)
LYMPHOCYTES # BLD AUTO: 7.7 K/UL — HIGH (ref 1–3.3)
MCHC RBC-ENTMCNC: 29.6 PG — SIGNIFICANT CHANGE UP (ref 27–34)
MCHC RBC-ENTMCNC: 31.8 G/DL — LOW (ref 32–36)
MCV RBC AUTO: 93 FL — SIGNIFICANT CHANGE UP (ref 80–100)
MONOCYTES # BLD AUTO: 0.6 K/UL — SIGNIFICANT CHANGE UP (ref 0–0.9)
MONOCYTES NFR BLD AUTO: 3 % — SIGNIFICANT CHANGE UP (ref 2–14)
NEUTROPHILS # BLD AUTO: 4.1 K/UL — SIGNIFICANT CHANGE UP (ref 1.8–7.4)
NEUTROPHILS NFR BLD AUTO: 30 % — LOW (ref 43–77)
PLAT MORPH BLD: NORMAL — SIGNIFICANT CHANGE UP
PLATELET # BLD AUTO: 324 K/UL — SIGNIFICANT CHANGE UP (ref 150–400)
RBC # BLD: 4.78 M/UL — SIGNIFICANT CHANGE UP (ref 4.2–5.8)
RBC # FLD: 13.5 % — SIGNIFICANT CHANGE UP (ref 10.3–14.5)
RBC BLD AUTO: NORMAL — SIGNIFICANT CHANGE UP
WBC # BLD: 12.8 K/UL — HIGH (ref 3.8–10.5)
WBC # FLD AUTO: 12.8 K/UL — HIGH (ref 3.8–10.5)

## 2018-11-19 PROCEDURE — 99213 OFFICE O/P EST LOW 20 MIN: CPT

## 2018-11-19 NOTE — PHYSICAL EXAM
[Ambulatory and capable of all self care but unable to carry out any work activities] : Status 2- Ambulatory and capable of all self care but unable to carry out any work activities. Up and about more than 50% of waking hours [Normal] : affect appropriate [de-identified] : ambulating w/ rollator [de-identified] : R sided weakness

## 2018-11-19 NOTE — HISTORY OF PRESENT ILLNESS
[de-identified] : 73yo M w/ HTN, HLD, recent CVA here for f/u of leukocytosis. \par \par He was admitted to [facility] complaining of R foot weakness that began a day ago, sustained 4 falls, 2 with head trauma, admitted to telemetry with acute left NURA CVA. MRI showed Left NURA territory acute versus acute/subacute infarction involving the cingulate gyrus and corpus callosum. Mild microvascular ischemic change. Intracranial MR angiography demonstrates mild narrowing along the bilateral MCA bifurcation and proximal M2 segments. Extracranial MR angiography demonstrates no flow-limiting stenosis by NASCET criteria.\par Hematology was consulted for leukocytosis, predominantly lymphocytes. 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 \par Dr. Cerda [facility] and [facility] in [place]. PMD for last 10 years. Sister said he was never told about a leukocytosis. \par He denies any pain. Eating well per sister.  [de-identified] : Pt was admitted on 2/23/18 after finding b/l PE on CT chest. CTA showed pulmonary thromboembolism involving the segmental and subsegmental branches of both lower lobes, started on lovenox 1mg/kg q12h. B/L LE duplex neg for DVT. Neurology following - pt was previously treated with Asa/Plavix for cerebrovascular lesions, now on hold since he is on AC. Thought likely secondary to immobility after CVA. \par He is now out of rehab. They switched him over to Xarelto on discharge. No bleeding. He completed 6 months of Xarelto.\par He is able to walk short distances with walker. He has a HHA for 12 hours. \par He is on ASA 81mg now.

## 2018-11-19 NOTE — ASSESSMENT
[FreeTextEntry1] : 74yo M w/ HTN, HLD, recent CVA here for f/u of leukocytosis. 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 He was found to have b/l PE on CT scans on 2/22/18, asymptomatic at the time, thought possibly secondary to immobility from CVA. s/p Xarelto for 6 months. Now on ASA\par WBC 12.8 today, stable, cont monitoring\par he has f/u w/ neurology\par cont ASA\par RTC 3 mo

## 2018-11-20 LAB
ALBUMIN SERPL ELPH-MCNC: 4.2 G/DL
ALP BLD-CCNC: 85 U/L
ALT SERPL-CCNC: 17 U/L
ANION GAP SERPL CALC-SCNC: 11 MMOL/L
AST SERPL-CCNC: 15 U/L
BILIRUB SERPL-MCNC: 0.3 MG/DL
BUN SERPL-MCNC: 13 MG/DL
CALCIUM SERPL-MCNC: 9.9 MG/DL
CHLORIDE SERPL-SCNC: 101 MMOL/L
CO2 SERPL-SCNC: 27 MMOL/L
CREAT SERPL-MCNC: 1.06 MG/DL
GLUCOSE SERPL-MCNC: 111 MG/DL
LDH SERPL-CCNC: 197 U/L
POTASSIUM SERPL-SCNC: 4.7 MMOL/L
PROT SERPL-MCNC: 7.6 G/DL
SODIUM SERPL-SCNC: 139 MMOL/L

## 2019-01-09 ENCOUNTER — APPOINTMENT (OUTPATIENT)
Dept: NEUROLOGY | Facility: CLINIC | Age: 74
End: 2019-01-09

## 2019-01-29 NOTE — H&P ADULT - NSHPOUTPATIENTPROVIDERS_GEN_ALL_CORE
Refill request recieved via interface from pharmacy.    Last office visit 10/11/18; pending appt none    Script refilled per protocol; e-prescribed  
Dr. Dinh Urbano - PCP

## 2019-02-21 ENCOUNTER — OUTPATIENT (OUTPATIENT)
Dept: OUTPATIENT SERVICES | Facility: HOSPITAL | Age: 74
LOS: 1 days | Discharge: HOME | End: 2019-02-21

## 2019-02-21 DIAGNOSIS — Z98.49 CATARACT EXTRACTION STATUS, UNSPECIFIED EYE: Chronic | ICD-10-CM

## 2019-02-21 DIAGNOSIS — D72.829 ELEVATED WHITE BLOOD CELL COUNT, UNSPECIFIED: ICD-10-CM

## 2019-03-06 ENCOUNTER — APPOINTMENT (OUTPATIENT)
Age: 74
End: 2019-03-06
Payer: MEDICARE

## 2019-03-06 ENCOUNTER — OTHER (OUTPATIENT)
Age: 74
End: 2019-03-06

## 2019-03-06 ENCOUNTER — APPOINTMENT (OUTPATIENT)
Dept: HEMATOLOGY ONCOLOGY | Facility: CLINIC | Age: 74
End: 2019-03-06
Payer: MEDICARE

## 2019-03-06 VITALS
HEIGHT: 64 IN | BODY MASS INDEX: 25.61 KG/M2 | DIASTOLIC BLOOD PRESSURE: 75 MMHG | SYSTOLIC BLOOD PRESSURE: 121 MMHG | HEART RATE: 91 BPM | WEIGHT: 150 LBS

## 2019-03-06 DIAGNOSIS — Z86.79 PERSONAL HISTORY OF OTHER DISEASES OF THE CIRCULATORY SYSTEM: ICD-10-CM

## 2019-03-06 DIAGNOSIS — Z78.9 OTHER SPECIFIED HEALTH STATUS: ICD-10-CM

## 2019-03-06 PROCEDURE — 99215 OFFICE O/P EST HI 40 MIN: CPT

## 2019-03-06 NOTE — PHYSICAL EXAM
[General Appearance - Alert] : alert [General Appearance - In No Acute Distress] : in no acute distress [Person] : oriented to person [Place] : oriented to place [Time] : oriented to time [Registration Intact] : recent registration memory intact [Concentration Intact] : normal concentrating ability [Visual Intact] : visual attention was ~T not ~L decreased [Naming Objects] : no difficulty naming common objects [Repeating Phrases] : no difficulty repeating a phrase [Reading] : reading intact [Past History] : adequate knowledge of personal past history [Cranial Nerves Optic (II)] : visual acuity intact bilaterally,  visual fields full to confrontation, pupils equal round and reactive to light [Cranial Nerves Oculomotor (III)] : extraocular motion intact [Cranial Nerves Trigeminal (V)] : facial sensation intact symmetrically [Cranial Nerves Vestibulocochlear (VIII)] : hearing was intact bilaterally [Cranial Nerves Glossopharyngeal (IX)] : tongue and palate midline [Cranial Nerves Accessory (XI - Cranial And Spinal)] : head turning and shoulder shrug symmetric [Cranial Nerves Hypoglossal (XII)] : there was no tongue deviation with protrusion [Cranial Nerves Facial Central - Right Only] : central 7th nerve weakness [No Muscle Atrophy] : normal bulk in all four extremities [Hemiparesis Of Right Side] : hemiparesis was present on the right [Motor Strength Upper Extremities Right] : there was weakness of the right upper extremity [Hand Weakness Right] : the hand  was weak [3] : ankle plantar flexion 3/5 [5] : ankle plantar flexion 5/5 [Sensation Tactile Decrease] : light touch was intact [Dysfluency] : dysfluency [Stuttering] : stuttering [Aphasia] : aphasia [4] : wrist flexion 4/5 [FreeTextEntry1] : unable to assess [Short Term Intact] : short term memory impaired [Fluency] : fluency not intact [Comprehension] : comprehension not intact [Past-pointing] : there was no past-pointing [Tremor] : no tremor present [Plantar Reflex Right Only] : normal on the right [Plantar Reflex Left Only] : normal on the left [FreeTextEntry4] : expressive aphasia, unable to follow commands off midline [FreeTextEntry8] : walks with major difficulty with a walker able to stand with support.

## 2019-03-06 NOTE — DISCUSSION/SUMMARY
[Intensive Blood Pressure Control] : intensive blood pressure control [Lipid Lowering Therapy] : lipid lowering therapy [Patient encouraged to discuss with Primary MD] : I encouraged the patient to discuss these important issues with ~his/her~ primary care doctor [Goals and Counseling] : I have reviewed the goals of stroke risk factor modification. I counseled the patient on measures to reduce stroke risk, including the importance of medication compliance, risk factor control, exercise, healthy diet and avoidance of smoking. I reviewed stroke warning signs and symptoms and appropriate actions to take if such occur. [Antithrombotic therapy with ___] : antithrombotic therapy with  [unfilled] [FreeTextEntry1] : Mr. Hollis is a [place] 74 yo R handed man with vascular risk factors of age, HTN, HLD who presented on Jan 26, 2018 complaining of R foot/ankle weakness.\par Upon evaluation at [facility] Brain MRI revealed acute ischemic infarction at the left NURA territory. \par Head MRA show bilateral MCA areas of stenosis at M2 branches otherwise no major intracranial or extracranial areas of stenosis or occlusion.\par TTE with no significant valvular disease neither evident source of intracardiac thrombus. \par Hospital course complicated with PE recommended Xarelto for 6mo.\par Today sister who accompanies him states has been stable still needs assistance with ADL's. Neurological exam pertinent for language dysfunction - mixed aphasia, but noticed improvement as compare to previous visit. Right hemiparesis lower extremity >>upper extremity. Stable as compare to previous evaluation.\par She states has noted improvement with diminished frequency of intermittent episodes of confusion. Also she is concern about some kidney findings regarding a big stone and wether he needs surgery or not. \par \par \par Impression:-\par Left NURA territory ischemic infarct ESUS.  Still will benefit from prolonged intracardiac monitoring to rule out paroxysmal A-fib. Hematologic disorder work up completed and no evidence of hypercoagulable state.\par \par Plan:\par 1. Continue Aspirin 81mg for secondary stroke prevention measures\par 2. LDL goal <100, continue atorvastatin 80mg daily and follow up with PCP lipid panel\par 3. BP on goal <140/90 on goal\par 4. Tight glycemic control HgA1C to be followed by PCP\par 5. Referral for speech, occupational and  physical therapy given\par 6. Cardiology referral for 30 day Holter monitoring vs LOOP recorder to evaluate for competing mechanism of stroke \par 7.Follow up June 2019 \par \par

## 2019-03-06 NOTE — REVIEW OF SYSTEMS
[Confused or Disoriented] : confusion [Abnormal Sensation] : an abnormal sensation [Difficulty Walking] : difficulty walking [Inability to Walk] : inability to walk [Incontinence] : incontinence [Negative] : Endocrine

## 2019-03-06 NOTE — DATA REVIEWED
[de-identified] : \par \par EXAM: MR ANGIO BRAIN \par \par EXAM: MR ANGIO NECK IC \par \par EXAM: MR BRAIN \par \par \par PROCEDURE DATE: Jan 26 2018 \par \par \par \par INTERPRETATION: HISTORY: Right-sided weakness. Falls. Stroke. \par \par Technique: Noncontrast brain MRI, MRA and neck MRA was performed. \par Additionally, brain MRI and neck MRA were performed with gadolinium enhanced \par technique. \par \par Through the brain, sagittal and axial T1, axial T2, FLAIR, contrast T1, \par diffusion weighted images and an ADC map were obtained. \par \par MR angiography of intracranial and extracranial circulation was performed \par with time of flight imaging technique. Additionally, approximately 7.5 cc of \par intravenous gadolinium was administered for contrast-enhanced MR angiography \par of the extracranial circulation. Maximal intensity projection images were \par reviewed in multiple planes. \par \par 3-D reformations were made of vasculature. 3-D reformations were reviewed \par and included in interpretation of the official report. \par \par 6 mls of Gadavist was administered intravenously without complication and 4 \par mls were discarded. \par \par COMPARISON: CT head dated 1/25/2018. \par \par FINDINGS: \par Brain MRI: \par Diffusion restriction along the left cingulate gyrus associated with ADC \par maps signal dropout involves the corpus callosum and is consistent with a \par left NURA territory acute versus acute/subacute infarction. There is no \par hyperintense T1 signal or susceptibility artifact to suggest hemorrhagic \par transformation. \par \par There are no additional areas of diffusion abnormality within the brain. \par Mild prominence of the sulci and ventricles are consistent with \par age-appropriate volume loss. There is no enhancement abnormality within the \par brain. There are foci of T2/FLAIR signal hyperintensity within the \par hemispheric white matter, which are nonspecific but likely related to \par sequelae of microvascular disease. There is no intraparenchymal hematoma, \par mass effect or midline shift. No abnormal extra-axial fluid collections are \par present. \par \par The calvarium is intact. Visualized intraorbital compartments, paranasal \par sinuses and tympanomastoid cavities appear free of acute disease. \par \par \par Brain MRA: \par Internal carotid arteries demonstrate normal antegrade flow related \par enhancement to the [place] bilaterally. There is mild narrowing \par along the right cavernous carotid artery. There is mild narrowing along the \par bilateral MCA bifurcation and proximal bilateral M2 segments. The anterior \par communicating artery is normal. There is no evidence of aneurysm, vascular \par malformation or occlusion. \par \par The vertebral arteries are normal to the vertebrobasilar confluence. Branch \par vasculature of the posterior circulation is within normal limits. \par \par \par Neck MRA: \par The aortic arch and origins of the great vessels are within normal limits. \par \par Right: The origin of the right common carotid artery is normal. The right \par common carotid artery is normal in course and caliber to the carotid \par bifurcation. There is no significant stenosis by NASCET criteria along \par origin of either internal or external carotid artery. The right internal \par carotid artery has normal course and caliber to the intracranial circulation. \par \par The origin of the right vertebral artery is normal. The right vertebral \par artery is normal in course and caliber to the intracranial circulation and \par vertebrobasilar confluence. \par \par Left: The origin of the left common carotid artery is normal. The left \par common carotid artery is normal in course and caliber to the carotid \par bifurcation. There is no significant stenosis by NASCET criteria along \par origin of either internal or external carotid artery. The left internal \par carotid artery has normal course and caliber to the intracranial circulation. \par \par The origin of the left vertebral artery is normal. The left vertebral artery \par is normal in course and caliber to the intracranial circulation and \par vertebrobasilar confluence. \par \par \par \par IMPRESSION: \par Left NURA territory acute versus acute/subacute infarction involving the \par cingulate gyrus and corpus callosum. \par Mild microvascular ischemic change. \par Intracranial MR angiography demonstrates mild narrowing along the bilateral \par MCA bifurcation and proximal M2 segments. \par Extracranial MR angiography demonstrates no flow-limiting stenosis by NASCET \par criteria. \par \par \par \par \par \par \par \par \par DANIELE SILVESTRE M.D., ATTENDING RADIOLOGIST \par This document has been electronically signed. Jan 26 2018 1:29PM  [de-identified] : ONCLUSIONS:\par 1. Mitral annular calcification, otherwise normal mitral\par valve. Minimal mitral regurgitation.\par 2. Endocardium not well visualized; grossly normal left\par ventricular systolic function.  Endocardial visualization\par enhanced with intravenous injection of echo contrast\par (Definity).\par 3. Unable to accurately evaluate right ventricular size or\par systolic function.\par No obvious cardiac source of embolus was identified on this\par transthoracic study.  If clinical suspicion is high,\par consider LEATHA for further evaluation.\par ------------------------------------------------------------------------\par Confirmed on  1/29/2018 - 15:30:31 by Marco Keller M.D.\par ------------------------------------------------------------------------

## 2019-03-07 ENCOUNTER — APPOINTMENT (OUTPATIENT)
Dept: NEUROLOGY | Facility: CLINIC | Age: 74
End: 2019-03-07

## 2019-03-12 PROBLEM — I26.99 PULMONARY EMBOLI: Status: ACTIVE | Noted: 2018-05-14

## 2019-03-13 ENCOUNTER — RESULT REVIEW (OUTPATIENT)
Age: 74
End: 2019-03-13

## 2019-03-13 ENCOUNTER — APPOINTMENT (OUTPATIENT)
Dept: HEMATOLOGY ONCOLOGY | Facility: CLINIC | Age: 74
End: 2019-03-13
Payer: MEDICARE

## 2019-03-13 VITALS
OXYGEN SATURATION: 98 % | HEART RATE: 92 BPM | BODY MASS INDEX: 25.92 KG/M2 | RESPIRATION RATE: 16 BRPM | WEIGHT: 151.01 LBS | TEMPERATURE: 98 F | SYSTOLIC BLOOD PRESSURE: 159 MMHG | DIASTOLIC BLOOD PRESSURE: 85 MMHG

## 2019-03-13 DIAGNOSIS — D72.829 ELEVATED WHITE BLOOD CELL COUNT, UNSPECIFIED: ICD-10-CM

## 2019-03-13 DIAGNOSIS — I26.99 OTHER PULMONARY EMBOLISM W/OUT ACUTE COR PULMONALE: ICD-10-CM

## 2019-03-13 LAB
BASOPHILS # BLD AUTO: 0.2 K/UL — SIGNIFICANT CHANGE UP (ref 0–0.2)
BASOPHILS NFR BLD AUTO: 2 % — SIGNIFICANT CHANGE UP (ref 0–2)
EOSINOPHIL # BLD AUTO: 0.2 K/UL — SIGNIFICANT CHANGE UP (ref 0–0.5)
EOSINOPHIL NFR BLD AUTO: 1 % — SIGNIFICANT CHANGE UP (ref 0–6)
HCT VFR BLD CALC: 40.6 % — SIGNIFICANT CHANGE UP (ref 39–50)
HGB BLD-MCNC: 13.7 G/DL — SIGNIFICANT CHANGE UP (ref 13–17)
LYMPHOCYTES # BLD AUTO: 67 % — HIGH (ref 13–44)
LYMPHOCYTES # BLD AUTO: 7.3 K/UL — HIGH (ref 1–3.3)
LYMPHOCYTES # SPEC AUTO: 1 % — HIGH (ref 0–0)
MCHC RBC-ENTMCNC: 30.4 PG — SIGNIFICANT CHANGE UP (ref 27–34)
MCHC RBC-ENTMCNC: 33.7 G/DL — SIGNIFICANT CHANGE UP (ref 32–36)
MCV RBC AUTO: 90 FL — SIGNIFICANT CHANGE UP (ref 80–100)
MONOCYTES # BLD AUTO: 0.6 K/UL — SIGNIFICANT CHANGE UP (ref 0–0.9)
MONOCYTES NFR BLD AUTO: 1 % — LOW (ref 2–14)
NEUTROPHILS # BLD AUTO: 4.3 K/UL — SIGNIFICANT CHANGE UP (ref 1.8–7.4)
NEUTROPHILS NFR BLD AUTO: 28 % — LOW (ref 43–77)
PLAT MORPH BLD: NORMAL — SIGNIFICANT CHANGE UP
PLATELET # BLD AUTO: 311 K/UL — SIGNIFICANT CHANGE UP (ref 150–400)
RBC # BLD: 4.51 M/UL — SIGNIFICANT CHANGE UP (ref 4.2–5.8)
RBC # FLD: 13.4 % — SIGNIFICANT CHANGE UP (ref 10.3–14.5)
RBC BLD AUTO: NORMAL — SIGNIFICANT CHANGE UP
WBC # BLD: 12.6 K/UL — HIGH (ref 3.8–10.5)
WBC # FLD AUTO: 12.6 K/UL — HIGH (ref 3.8–10.5)

## 2019-03-13 PROCEDURE — 99213 OFFICE O/P EST LOW 20 MIN: CPT

## 2019-03-13 NOTE — PHYSICAL EXAM
[Ambulatory and capable of all self care but unable to carry out any work activities] : Status 2- Ambulatory and capable of all self care but unable to carry out any work activities. Up and about more than 50% of waking hours [Normal] : normal spine exam without palpable tenderness, no kyphosis or scoliosis [de-identified] : ambulating w/ rollator [de-identified] : R sided weakness

## 2019-03-13 NOTE — ASSESSMENT
[FreeTextEntry1] : 74yo M w/ HTN, HLD, recent CVA here for f/u of leukocytosis. 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 He was found to have b/l PE on CT scans on 2/22/18, asymptomatic at the time, thought possibly secondary to immobility from CVA. s/p Xarelto for 6 months. Now on ASA\par WBC 12.6 today, stable, cont monitoring\par he has f/u w/ neurology\par cont ASA\par RTC 4-6 mo

## 2019-03-13 NOTE — HISTORY OF PRESENT ILLNESS
[de-identified] : 71yo M w/ HTN, HLD, recent CVA here for f/u of leukocytosis. \par \par He was admitted to [facility] complaining of R foot weakness that began a day ago, sustained 4 falls, 2 with head trauma, admitted to telemetry with acute left NURA CVA. MRI showed Left NURA territory acute versus acute/subacute infarction involving the cingulate gyrus and corpus callosum. Mild microvascular ischemic change. Intracranial MR angiography demonstrates mild narrowing along the bilateral MCA bifurcation and proximal M2 segments. Extracranial MR angiography demonstrates no flow-limiting stenosis by NASCET criteria.\par Hematology was consulted for leukocytosis, predominantly lymphocytes. 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 \par Dr. Cerda [facility] [place] in [place]. PMD for last 10 years. Sister said he was never told about a leukocytosis. \par He denies any pain. Eating well per sister.  [de-identified] : Pt was admitted on 2/23/18 after finding b/l PE on CT chest. CTA showed pulmonary thromboembolism involving the segmental and subsegmental branches of both lower lobes, started on lovenox 1mg/kg q12h. B/L LE duplex neg for DVT. Neurology following - pt was previously treated with Asa/Plavix for cerebrovascular lesions, now on hold since he is on AC. Thought likely secondary to immobility after CVA. \par He is now out of rehab. They switched him over to Xarelto on discharge. No bleeding. He completed 6 months of Xarelto.\par He is able to walk short distances with walker. \par He is on ASA 81mg now.

## 2019-03-19 LAB
ALBUMIN SERPL ELPH-MCNC: 4.4 G/DL
ALP BLD-CCNC: 87 U/L
ALT SERPL-CCNC: 21 U/L
ANION GAP SERPL CALC-SCNC: 15 MMOL/L
AST SERPL-CCNC: 23 U/L
BILIRUB SERPL-MCNC: 0.2 MG/DL
BUN SERPL-MCNC: 8 MG/DL
CALCIUM SERPL-MCNC: 9.8 MG/DL
CHLORIDE SERPL-SCNC: 104 MMOL/L
CO2 SERPL-SCNC: 24 MMOL/L
CREAT SERPL-MCNC: 1.05 MG/DL
GLUCOSE SERPL-MCNC: 120 MG/DL
LDH SERPL-CCNC: 222 U/L
POTASSIUM SERPL-SCNC: 3.9 MMOL/L
PROT SERPL-MCNC: 7.7 G/DL
SODIUM SERPL-SCNC: 143 MMOL/L

## 2019-04-11 ENCOUNTER — EMERGENCY (EMERGENCY)
Facility: HOSPITAL | Age: 74
LOS: 1 days | Discharge: HOME | End: 2019-04-11
Attending: EMERGENCY MEDICINE | Admitting: EMERGENCY MEDICINE
Payer: MEDICARE

## 2019-04-11 VITALS
SYSTOLIC BLOOD PRESSURE: 134 MMHG | RESPIRATION RATE: 16 BRPM | DIASTOLIC BLOOD PRESSURE: 90 MMHG | HEART RATE: 76 BPM | TEMPERATURE: 99 F

## 2019-04-11 DIAGNOSIS — Z98.49 CATARACT EXTRACTION STATUS, UNSPECIFIED EYE: Chronic | ICD-10-CM

## 2019-04-11 PROCEDURE — 99283 EMERGENCY DEPT VISIT LOW MDM: CPT | Mod: GC

## 2019-04-11 RX ORDER — HYDROXYZINE HCL 10 MG
25 TABLET ORAL ONCE
Qty: 0 | Refills: 0 | Status: COMPLETED | OUTPATIENT
Start: 2019-04-11 | End: 2019-04-11

## 2019-04-11 RX ADMIN — Medication 40 MILLIGRAM(S): at 23:05

## 2019-04-11 RX ADMIN — Medication 25 MILLIGRAM(S): at 23:05

## 2019-04-11 NOTE — ED PROVIDER NOTE - LATERALITY
Anesthesia Post Evaluation    Patient: Ronald Campbell    Procedure(s) Performed: Procedure(s) (LRB):  CYSTOSCOPY, WITH BLADDER BIOPSY, WITH FULGURATION IF INDICATED (N/A)  TURP (TRANSURETHRAL RESECTION OF PROSTATE) (N/A)    Final Anesthesia Type: general  Patient location during evaluation: PACU  Patient participation: Yes- Able to Participate  Level of consciousness: awake and alert  Post-procedure vital signs: reviewed and stable  Pain management: adequate  Airway patency: patent  PONV status at discharge: No PONV  Anesthetic complications: no      Cardiovascular status: hemodynamically stable  Respiratory status: room air, spontaneous ventilation and unassisted  Hydration status: euvolemic  Follow-up not needed.        Visit Vitals  /73 (BP Location: Right arm, Patient Position: Lying)   Pulse 72   Temp 36.2 °C (97.1 °F) (Temporal)   Resp 16   Ht 6' (1.829 m)   Wt 79.4 kg (175 lb)   SpO2 100%   BMI 23.73 kg/m²       Pain/Annabel Score: No Data Recorded       middle

## 2019-04-11 NOTE — ED PROVIDER NOTE - ATTENDING CONTRIBUTION TO CARE
73 year old male c/o rash s/p taking Cipro. PE: NAD, CV RRR, 6 x 10 cm oval erythematous macule on superior mid chest. I&P: fixed drug reaction, antihistamine, corticosteroids, PMD follow up

## 2019-04-11 NOTE — ED ADULT TRIAGE NOTE - CHIEF COMPLAINT QUOTE
possible allergic reaction redness to upper chest and back and itching. Denies any trouble breathing. States finished course of levaquin x 1 week ago for kidney infection. Hx CVA w/ rt sided weakness.

## 2019-04-12 NOTE — ED POST DISCHARGE NOTE - RESULT SUMMARY
Sent prednisone 40mg once a day for 3 days and instructed her to have pt  antihistamine (Claritin). Pt stopped cipro and instructed if s/s increase to return to ED

## 2019-04-19 ENCOUNTER — EMERGENCY (EMERGENCY)
Facility: HOSPITAL | Age: 74
LOS: 1 days | Discharge: HOME | End: 2019-04-19
Attending: EMERGENCY MEDICINE | Admitting: EMERGENCY MEDICINE
Payer: MEDICARE

## 2019-04-19 VITALS
DIASTOLIC BLOOD PRESSURE: 82 MMHG | OXYGEN SATURATION: 97 % | SYSTOLIC BLOOD PRESSURE: 137 MMHG | RESPIRATION RATE: 18 BRPM | TEMPERATURE: 98 F

## 2019-04-19 VITALS
TEMPERATURE: 98 F | DIASTOLIC BLOOD PRESSURE: 85 MMHG | RESPIRATION RATE: 16 BRPM | OXYGEN SATURATION: 98 % | SYSTOLIC BLOOD PRESSURE: 152 MMHG | HEART RATE: 85 BPM

## 2019-04-19 DIAGNOSIS — Z98.49 CATARACT EXTRACTION STATUS, UNSPECIFIED EYE: Chronic | ICD-10-CM

## 2019-04-19 LAB
ALBUMIN SERPL ELPH-MCNC: 3.6 G/DL — SIGNIFICANT CHANGE UP (ref 3.3–5)
ALP SERPL-CCNC: 68 U/L — SIGNIFICANT CHANGE UP (ref 40–120)
ALT FLD-CCNC: 15 U/L — SIGNIFICANT CHANGE UP (ref 4–41)
ANION GAP SERPL CALC-SCNC: 11 MMO/L — SIGNIFICANT CHANGE UP (ref 7–14)
APPEARANCE UR: SIGNIFICANT CHANGE UP
AST SERPL-CCNC: 14 U/L — SIGNIFICANT CHANGE UP (ref 4–40)
BACTERIA # UR AUTO: SIGNIFICANT CHANGE UP
BASOPHILS # BLD AUTO: 0.07 K/UL — SIGNIFICANT CHANGE UP (ref 0–0.2)
BASOPHILS NFR BLD AUTO: 0.3 % — SIGNIFICANT CHANGE UP (ref 0–2)
BILIRUB SERPL-MCNC: 0.3 MG/DL — SIGNIFICANT CHANGE UP (ref 0.2–1.2)
BILIRUB UR-MCNC: NEGATIVE — SIGNIFICANT CHANGE UP
BLOOD UR QL VISUAL: HIGH
BUN SERPL-MCNC: 14 MG/DL — SIGNIFICANT CHANGE UP (ref 7–23)
CALCIUM SERPL-MCNC: 9.5 MG/DL — SIGNIFICANT CHANGE UP (ref 8.4–10.5)
CHLORIDE SERPL-SCNC: 99 MMOL/L — SIGNIFICANT CHANGE UP (ref 98–107)
CO2 SERPL-SCNC: 28 MMOL/L — SIGNIFICANT CHANGE UP (ref 22–31)
COLOR SPEC: SIGNIFICANT CHANGE UP
CREAT SERPL-MCNC: 1.07 MG/DL — SIGNIFICANT CHANGE UP (ref 0.5–1.3)
EOSINOPHIL # BLD AUTO: 0.07 K/UL — SIGNIFICANT CHANGE UP (ref 0–0.5)
EOSINOPHIL NFR BLD AUTO: 0.3 % — SIGNIFICANT CHANGE UP (ref 0–6)
GLUCOSE SERPL-MCNC: 90 MG/DL — SIGNIFICANT CHANGE UP (ref 70–99)
GLUCOSE UR-MCNC: NEGATIVE — SIGNIFICANT CHANGE UP
HCT VFR BLD CALC: 39.4 % — SIGNIFICANT CHANGE UP (ref 39–50)
HGB BLD-MCNC: 12.5 G/DL — LOW (ref 13–17)
IMM GRANULOCYTES NFR BLD AUTO: 0.5 % — SIGNIFICANT CHANGE UP (ref 0–1.5)
KETONES UR-MCNC: NEGATIVE — SIGNIFICANT CHANGE UP
LEUKOCYTE ESTERASE UR-ACNC: SIGNIFICANT CHANGE UP
LYMPHOCYTES # BLD AUTO: 37.4 % — SIGNIFICANT CHANGE UP (ref 13–44)
LYMPHOCYTES # BLD AUTO: 7.94 K/UL — HIGH (ref 1–3.3)
MCHC RBC-ENTMCNC: 28.9 PG — SIGNIFICANT CHANGE UP (ref 27–34)
MCHC RBC-ENTMCNC: 31.7 % — LOW (ref 32–36)
MCV RBC AUTO: 91.2 FL — SIGNIFICANT CHANGE UP (ref 80–100)
METHOD TYPE: SIGNIFICANT CHANGE UP
MONOCYTES # BLD AUTO: 0.87 K/UL — SIGNIFICANT CHANGE UP (ref 0–0.9)
MONOCYTES NFR BLD AUTO: 4.1 % — SIGNIFICANT CHANGE UP (ref 2–14)
NEUTROPHILS # BLD AUTO: 12.17 K/UL — HIGH (ref 1.8–7.4)
NEUTROPHILS NFR BLD AUTO: 57.4 % — SIGNIFICANT CHANGE UP (ref 43–77)
NITRITE UR-MCNC: NEGATIVE — SIGNIFICANT CHANGE UP
NRBC # FLD: 0 K/UL — SIGNIFICANT CHANGE UP (ref 0–0)
ORGANISM # SPEC MICROSCOPIC CNT: SIGNIFICANT CHANGE UP
ORGANISM # SPEC MICROSCOPIC CNT: SIGNIFICANT CHANGE UP
PH UR: 7 — SIGNIFICANT CHANGE UP (ref 5–8)
PLATELET # BLD AUTO: 332 K/UL — SIGNIFICANT CHANGE UP (ref 150–400)
PMV BLD: 9.5 FL — SIGNIFICANT CHANGE UP (ref 7–13)
POTASSIUM SERPL-MCNC: 3.6 MMOL/L — SIGNIFICANT CHANGE UP (ref 3.5–5.3)
POTASSIUM SERPL-SCNC: 3.6 MMOL/L — SIGNIFICANT CHANGE UP (ref 3.5–5.3)
PROT SERPL-MCNC: 7.1 G/DL — SIGNIFICANT CHANGE UP (ref 6–8.3)
PROT UR-MCNC: SIGNIFICANT CHANGE UP
RBC # BLD: 4.32 M/UL — SIGNIFICANT CHANGE UP (ref 4.2–5.8)
RBC # FLD: 15.1 % — HIGH (ref 10.3–14.5)
RBC CASTS # UR COMP ASSIST: >50 — HIGH (ref 0–?)
RENAL EPI CELLS # UR COMP ASSIST: SIGNIFICANT CHANGE UP
REVIEW TO FOLLOW: YES — SIGNIFICANT CHANGE UP
SODIUM SERPL-SCNC: 138 MMOL/L — SIGNIFICANT CHANGE UP (ref 135–145)
SP GR SPEC: 1.01 — SIGNIFICANT CHANGE UP (ref 1–1.04)
SPECIMEN SOURCE: SIGNIFICANT CHANGE UP
SPECIMEN SOURCE: SIGNIFICANT CHANGE UP
SQUAMOUS # UR AUTO: SIGNIFICANT CHANGE UP
UROBILINOGEN FLD QL: NORMAL — SIGNIFICANT CHANGE UP
WBC # BLD: 21.23 K/UL — HIGH (ref 3.8–10.5)
WBC # FLD AUTO: 21.23 K/UL — HIGH (ref 3.8–10.5)
WBC UR QL: >50 — HIGH (ref 0–?)

## 2019-04-19 PROCEDURE — 99284 EMERGENCY DEPT VISIT MOD MDM: CPT | Mod: 25

## 2019-04-19 PROCEDURE — 74176 CT ABD & PELVIS W/O CONTRAST: CPT | Mod: 26,GC

## 2019-04-19 RX ORDER — CEPHALEXIN 500 MG
1 CAPSULE ORAL
Qty: 14 | Refills: 0 | OUTPATIENT
Start: 2019-04-19 | End: 2019-04-25

## 2019-04-19 RX ORDER — CEFTRIAXONE 500 MG/1
1 INJECTION, POWDER, FOR SOLUTION INTRAMUSCULAR; INTRAVENOUS ONCE
Qty: 0 | Refills: 0 | Status: COMPLETED | OUTPATIENT
Start: 2019-04-19 | End: 2019-04-19

## 2019-04-19 RX ADMIN — CEFTRIAXONE 100 GRAM(S): 500 INJECTION, POWDER, FOR SOLUTION INTRAMUSCULAR; INTRAVENOUS at 07:31

## 2019-04-19 RX ADMIN — CEFTRIAXONE 1 GRAM(S): 500 INJECTION, POWDER, FOR SOLUTION INTRAMUSCULAR; INTRAVENOUS at 08:55

## 2019-04-19 NOTE — ED PROVIDER NOTE - OBJECTIVE STATEMENT
Pertinent PMH/PSH/FHx/SHx and Review of Systems contained within:  74yo M w/ pmh of htn, hld, CVA w/ R residual hemiparesis (ambulates w/ walker at baseline), and nephrolithiasis requiring lithotripsy 1.5months ago, presents c/o R flank pain since last night, "sharp like a knife", constant, feels like previous renal stone pain, associated w/ subjective feves. No chills. Pt's sister gave pt 650mg of tylenol approx 1 hr PTA and upon arrival at ED, pt's pain has resolved. States he has had hematuria since lithotripsy performed 1.5 months ago, but this has been gradually improving. Last BM was this morning, not sure of color as pt did not look. No chills, No photophobia/eye pain/changes in vision, No ear pain/sore throat/dysphagia, No chest pain/palpitations, no SOB/cough/wheeze/stridor, No abdominal pain/testicular pain, No V/D, no dysuria/frequency/discharge, No neck pain, no rash, no new focal neuro symptoms.

## 2019-04-19 NOTE — ED PROVIDER NOTE - ATTENDING CONTRIBUTION TO CARE
History and physical above obtained and documented (or dictated) by me (attending physician).  Resident involved in case for assistance with disposition and may document involvement as necessary via progress note(s).   -Dr. Elaine

## 2019-04-19 NOTE — ED PROVIDER NOTE - CLINICAL SUMMARY MEDICAL DECISION MAKING FREE TEXT BOX
72yo M w/ pmh as above here for L flank pain X 1day. Ddx includes nephrolithiasis vs pyelonephritis vs msk pain. No pain or nausea at this time. Labs, imaging, ua, reassess.

## 2019-04-19 NOTE — ED ADULT NURSE NOTE - NSIMPLEMENTINTERV_GEN_ALL_ED
Implemented All Fall Risk Interventions:  [place] to call system. Call bell, personal items and telephone within reach. Instruct patient to call for assistance. Room bathroom lighting operational. Non-slip footwear when patient is off stretcher. Physically safe environment: no spills, clutter or unnecessary equipment. Stretcher in lowest position, wheels locked, appropriate side rails in place. Provide visual cue, wrist band, yellow gown, etc. Monitor gait and stability. Monitor for mental status changes and reorient to person, place, and time. Review medications for side effects contributing to fall risk. Reinforce activity limits and safety measures with patient and family.

## 2019-04-19 NOTE — ED PROVIDER NOTE - PROGRESS NOTE DETAILS
Emily: Pt's currently pain free. Will get CT to r/o stone. AARON Dennis MD: Rec'd signout on pt with PMH kidney stones, 1.5 mo s/p lithotripsy who p/w c/o L flank pain, found to have UTI, pending CTAP to eval for possible kidney stone. AARON Dennis MD: CT without ureteral stones. Will d/c home with keflex for uti with return precautions. PT made aware that he still has intrarenal stones and knows to return if f/c, n/v/d, severe pain, inability to urinate, or other concerning sx.

## 2019-04-19 NOTE — ED ADULT TRIAGE NOTE - CHIEF COMPLAINT QUOTE
Pt creole speaking arrives by EMS w/ - sister translating, c/o L Flank pain, subjective fevers.  Pt appearing in no distress.  Denies abdominal pain, n/v, dysuria or hematuria.  Hx CVA R residual weakness, HTN, recent diagnosis of kidney stones.  Vitally stable.

## 2019-04-19 NOTE — ED ADULT NURSE NOTE - NS ED NURSE IV DC DT
Add 52 Modifier (Optional): no Consent: The patient's consent was obtained including but not limited to risks of crusting, scabbing, blistering, scarring, darker or lighter pigmentary change, recurrence, incomplete removal and infection. Post-Care Instructions: I reviewed with the patient in detail post-care instructions. Patient is to wear sunprotection, and avoid picking at any of the treated lesions. Pt may apply Vaseline to crusted or scabbing areas. Medical Necessity Clause: This procedure was medically necessary because the lesions that were treated were: Detail Level: Detailed Medical Necessity Information: It is in your best interest to select a reason for this procedure from the list below. All of these items fulfill various CMS LCD requirements except the new and changing color options. 19-Apr-2019 09:33

## 2019-04-19 NOTE — ED PROVIDER NOTE - PHYSICAL EXAMINATION
Gen: Alert, NAD  Head: NC, AT,  EOMI, normal lids/conjunctiva  ENT:  normal hearing, patent oropharynx without erythema/exudate  Neck: +supple, no tenderness/meningismus/JVD, +Trachea midline  Chest: no chest wall tenderness, equal chest rise  Pulm: Bilateral BS, normal resp effort, no wheeze/stridor/retractions  CV: RRR, no M/R/G, +dist pulses  Abd: +BS, soft, NT/ND  back: no cva ttp  Rectal: deferred  Mskel: no edema/erythema/cyanosis  Skin: no rash  Neuro: AAOx3

## 2019-04-19 NOTE — ED PROVIDER NOTE - NSFOLLOWUPINSTRUCTIONS_ED_ALL_ED_FT
1) You were here for a UTI.   2) Take keflex 500mg every 12 hours for 7 days.   3) Follow up with your primary doctor for further evaluation and to answer any questions you have.    4) Return to the emergency department if you experience worsening symptoms, pain, fever, chills, nausea, vomiting or other concerning symptoms.

## 2019-04-19 NOTE — ED POST DISCHARGE NOTE - DETAILS
Call placed to phone # in chart, patient's mailbox full so unable to leave VM. Also called PCP office #[phone] and left VM will ED callback number to try and get in touch with patient. Called 666 Line for Admin PA to continue to follow and get in touch with patient for them to return to ED.

## 2019-04-19 NOTE — ED ADULT NURSE REASSESSMENT NOTE - NS ED NURSE REASSESS COMMENT FT1
assumed care of pt from night RN. pt a&ox3. pt states he has feelings of improvement since he arrived. pt states pain is "not too bad". pt just went for CT and is currently awaiting results. pt will need arrangements made for transportation home. will contact  if decision is made to DC. VS stable and as noted. Pt has walker at bedside with wife.

## 2019-04-20 ENCOUNTER — INPATIENT (INPATIENT)
Facility: HOSPITAL | Age: 74
LOS: 6 days | Discharge: HOME HEALTH | End: 2019-04-27
Attending: INTERNAL MEDICINE | Admitting: INTERNAL MEDICINE
Payer: MEDICARE

## 2019-04-20 VITALS
RESPIRATION RATE: 18 BRPM | HEART RATE: 109 BPM | SYSTOLIC BLOOD PRESSURE: 125 MMHG | TEMPERATURE: 99 F | OXYGEN SATURATION: 99 % | DIASTOLIC BLOOD PRESSURE: 65 MMHG

## 2019-04-20 DIAGNOSIS — I10 ESSENTIAL (PRIMARY) HYPERTENSION: ICD-10-CM

## 2019-04-20 DIAGNOSIS — I63.9 CEREBRAL INFARCTION, UNSPECIFIED: ICD-10-CM

## 2019-04-20 DIAGNOSIS — I26.99 OTHER PULMONARY EMBOLISM WITHOUT ACUTE COR PULMONALE: ICD-10-CM

## 2019-04-20 DIAGNOSIS — A40.1 SEPSIS DUE TO STREPTOCOCCUS, GROUP B: ICD-10-CM

## 2019-04-20 DIAGNOSIS — D47.9 NEOPLASM OF UNCERTAIN BEHAVIOR OF LYMPHOID, HEMATOPOIETIC AND RELATED TISSUE, UNSPECIFIED: ICD-10-CM

## 2019-04-20 DIAGNOSIS — N17.9 ACUTE KIDNEY FAILURE, UNSPECIFIED: ICD-10-CM

## 2019-04-20 DIAGNOSIS — Z29.9 ENCOUNTER FOR PROPHYLACTIC MEASURES, UNSPECIFIED: ICD-10-CM

## 2019-04-20 DIAGNOSIS — R78.81 BACTEREMIA: ICD-10-CM

## 2019-04-20 DIAGNOSIS — I27.82 CHRONIC PULMONARY EMBOLISM: ICD-10-CM

## 2019-04-20 DIAGNOSIS — Z98.49 CATARACT EXTRACTION STATUS, UNSPECIFIED EYE: Chronic | ICD-10-CM

## 2019-04-20 DIAGNOSIS — R31.0 GROSS HEMATURIA: ICD-10-CM

## 2019-04-20 LAB
B PERT DNA SPEC QL NAA+PROBE: NOT DETECTED — SIGNIFICANT CHANGE UP
BACTERIA UR CULT: SIGNIFICANT CHANGE UP
C PNEUM DNA SPEC QL NAA+PROBE: NOT DETECTED — SIGNIFICANT CHANGE UP
FLUAV H1 2009 PAND RNA SPEC QL NAA+PROBE: NOT DETECTED — SIGNIFICANT CHANGE UP
FLUAV H1 RNA SPEC QL NAA+PROBE: NOT DETECTED — SIGNIFICANT CHANGE UP
FLUAV H3 RNA SPEC QL NAA+PROBE: NOT DETECTED — SIGNIFICANT CHANGE UP
FLUAV SUBTYP SPEC NAA+PROBE: NOT DETECTED — SIGNIFICANT CHANGE UP
FLUBV RNA SPEC QL NAA+PROBE: NOT DETECTED — SIGNIFICANT CHANGE UP
HADV DNA SPEC QL NAA+PROBE: NOT DETECTED — SIGNIFICANT CHANGE UP
HCOV PNL SPEC NAA+PROBE: SIGNIFICANT CHANGE UP
HMPV RNA SPEC QL NAA+PROBE: NOT DETECTED — SIGNIFICANT CHANGE UP
HPIV1 RNA SPEC QL NAA+PROBE: NOT DETECTED — SIGNIFICANT CHANGE UP
HPIV2 RNA SPEC QL NAA+PROBE: NOT DETECTED — SIGNIFICANT CHANGE UP
HPIV3 RNA SPEC QL NAA+PROBE: NOT DETECTED — SIGNIFICANT CHANGE UP
HPIV4 RNA SPEC QL NAA+PROBE: NOT DETECTED — SIGNIFICANT CHANGE UP
RSV RNA SPEC QL NAA+PROBE: NOT DETECTED — SIGNIFICANT CHANGE UP
RV+EV RNA SPEC QL NAA+PROBE: DETECTED — HIGH
SPECIMEN SOURCE: SIGNIFICANT CHANGE UP

## 2019-04-20 PROCEDURE — 99223 1ST HOSP IP/OBS HIGH 75: CPT

## 2019-04-20 PROCEDURE — 71045 X-RAY EXAM CHEST 1 VIEW: CPT | Mod: 26

## 2019-04-20 RX ORDER — ATORVASTATIN CALCIUM 80 MG/1
80 TABLET, FILM COATED ORAL AT BEDTIME
Qty: 0 | Refills: 0 | Status: DISCONTINUED | OUTPATIENT
Start: 2019-04-20 | End: 2019-04-27

## 2019-04-20 RX ORDER — GABAPENTIN 400 MG/1
300 CAPSULE ORAL DAILY
Qty: 0 | Refills: 0 | Status: DISCONTINUED | OUTPATIENT
Start: 2019-04-20 | End: 2019-04-27

## 2019-04-20 RX ORDER — ASPIRIN/CALCIUM CARB/MAGNESIUM 324 MG
81 TABLET ORAL DAILY
Qty: 0 | Refills: 0 | Status: DISCONTINUED | OUTPATIENT
Start: 2019-04-20 | End: 2019-04-27

## 2019-04-20 RX ORDER — FINASTERIDE 5 MG/1
5 TABLET, FILM COATED ORAL DAILY
Qty: 0 | Refills: 0 | Status: DISCONTINUED | OUTPATIENT
Start: 2019-04-20 | End: 2019-04-27

## 2019-04-20 RX ORDER — ACETAMINOPHEN 500 MG
650 TABLET ORAL EVERY 6 HOURS
Qty: 0 | Refills: 0 | Status: DISCONTINUED | OUTPATIENT
Start: 2019-04-20 | End: 2019-04-27

## 2019-04-20 RX ORDER — AMPICILLIN SODIUM AND SULBACTAM SODIUM 250; 125 MG/ML; MG/ML
3 INJECTION, POWDER, FOR SUSPENSION INTRAMUSCULAR; INTRAVENOUS ONCE
Qty: 0 | Refills: 0 | Status: COMPLETED | OUTPATIENT
Start: 2019-04-20 | End: 2019-04-20

## 2019-04-20 RX ORDER — AMPICILLIN TRIHYDRATE 250 MG
2 CAPSULE ORAL EVERY 6 HOURS
Qty: 0 | Refills: 0 | Status: DISCONTINUED | OUTPATIENT
Start: 2019-04-20 | End: 2019-04-27

## 2019-04-20 RX ORDER — LORATADINE 10 MG/1
10 TABLET ORAL DAILY
Qty: 0 | Refills: 0 | Status: DISCONTINUED | OUTPATIENT
Start: 2019-04-20 | End: 2019-04-27

## 2019-04-20 RX ADMIN — Medication 216 GRAM(S): at 17:23

## 2019-04-20 RX ADMIN — Medication 100 MILLIGRAM(S): at 19:34

## 2019-04-20 RX ADMIN — ATORVASTATIN CALCIUM 80 MILLIGRAM(S): 80 TABLET, FILM COATED ORAL at 21:42

## 2019-04-20 RX ADMIN — Medication 650 MILLIGRAM(S): at 21:46

## 2019-04-20 RX ADMIN — Medication 216 GRAM(S): at 23:25

## 2019-04-20 RX ADMIN — Medication 650 MILLIGRAM(S): at 22:16

## 2019-04-20 RX ADMIN — AMPICILLIN SODIUM AND SULBACTAM SODIUM 200 GRAM(S): 250; 125 INJECTION, POWDER, FOR SUSPENSION INTRAMUSCULAR; INTRAVENOUS at 13:08

## 2019-04-20 NOTE — H&P ADULT - HISTORY OF PRESENT ILLNESS
HPI:  74 y/o Male PMHx of HTN, HLD, 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 Marco Cedillo who initially presented to the ED yesterday c/o L flank pain for 1-2 days. Diagnosed with a UTI and discharged on keflex, but today urine and blood cultures returned positive for strept agalactiae for which he was called back to the hospital. His sister Nathalia reports that he had a fever Tmax 102 last night, continues to have left flank pain. She states that his urine is always bloody since his last lithotripsy 1.5 months ago, was recently seen by his Urologist 3 weeks ago. Patient also developed a cough since last week, sisters states his aide was recently sick with a URI. Denies SOB/CP.     In the ED, he was found to be tachycardic 109, febrile 100.3, labs notable for leukocytosis of 17.67<-21.23. CT from yesterday was only + for B/L non-obstructing renal calculi. Patient was given 1 dose of unasyn and admitted for further management.     PAST MEDICAL & SURGICAL HISTORY:  Hyperlipidemia  Hypertension  History of cataract surgery      Review of Systems:   CONSTITUTIONAL: No fever, weight loss, or fatigue  EYES: No eye pain, visual disturbances, or discharge  ENMT:  No difficulty hearing, tinnitus, vertigo; No sinus or throat pain  NECK: No pain or stiffness  BREASTS: No pain, masses, or nipple discharge  RESPIRATORY: No cough, wheezing, chills or hemoptysis; No shortness of breath  CARDIOVASCULAR: No chest pain, palpitations, dizziness, or leg swelling  GASTROINTESTINAL: No abdominal or epigastric pain. No nausea, vomiting, or hematemesis; No diarrhea or constipation. No melena or hematochezia.  GENITOURINARY: No dysuria, frequency, hematuria, or incontinence  NEUROLOGICAL: No headaches, memory loss, loss of strength, numbness, or tremors  SKIN: No itching, burning, rashes, or lesions   LYMPH NODES: No enlarged glands  ENDOCRINE: No heat or cold intolerance; No hair loss  MUSCULOSKELETAL: No joint pain or swelling; No muscle, back, or extremity pain  PSYCHIATRIC: No depression, anxiety, mood swings, or difficulty sleeping  HEME/LYMPH: No easy bruising, or bleeding gums  ALLERGY AND IMMUNOLOGIC: No hives or eczema    Allergies    Levaquin (Rash)    Intolerances        Social History:   Denies smoking or alcohol, lives with his sister.     FAMILY HISTORY:  No pertinent family history in first degree relatives      MEDICATIONS  (STANDING):  ampicillin  IVPB 2 Gram(s) IV Intermittent every 6 hours  aspirin enteric coated 81 milliGRAM(s) Oral daily  atorvastatin 80 milliGRAM(s) Oral at bedtime  finasteride 5 milliGRAM(s) Oral daily  gabapentin 300 milliGRAM(s) Oral daily  loratadine 10 milliGRAM(s) Oral daily    MEDICATIONS  (PRN):  acetaminophen   Tablet .. 650 milliGRAM(s) Oral every 6 hours PRN Temp greater or equal to 38C (100.4F), Mild Pain (1 - 3), Moderate Pain (4 - 6)      T(C): 37.9 (19 @ 16:09), Max: 37.9 (19 @ 16:09)  HR: 97 (19 @ 16:09) (95 - 109)  BP: 134/77 (19 @ 16:09) (116/79 - 140/77)  RR: 18 (19 @ 16:09) (18 - 20)  SpO2: 98% (19 @ 16:09) (98% - 99%)    CAPILLARY BLOOD GLUCOSE        I&O's Summary      PHYSICAL EXAM:  GENERAL: Elderly man laying in bed, appears in mild distress  HEAD:  Atraumatic, Normocephalic  EYES: EOMI, PERRLA, conjunctiva and sclera clear  NECK: Supple, No elevated JVD  CHEST/LUNG: Clear to auscultation bilaterally; No wheeze  HEART: Regular rate and rhythm; No murmurs, rubs, or gallops  ABDOMEN: Soft, Nontender, Nondistended; Bowel sounds present  EXTREMITIES:  2+ Peripheral Pulses, No clubbing, cyanosis, or edema  PSYCH: AAOx3  NEUROLOGY: CN II-XII grossly intact, moving all extremities  SKIN: No rashes or lesions    LABS:                        13.0   17.67 )-----------( 324      ( 2019 13:09 )             41.4     04-20    137  |  95<L>  |  16  ----------------------------<  97  4.0   |  27  |  1.18    Ca    9.3      2019 13:09    TPro  7.7  /  Alb  3.6  /  TBili  0.5  /  DBili  x   /  AST  18  /  ALT  16  /  AlkPhos  83  04-20    PT/INR - ( 2019 12:49 )   PT: 14.5 SEC;   INR: 1.26                Urinalysis Basic - ( 2019 12:49 )    Color: DARK BROWN / Appearance: TURBID / S.016 / pH: 6.5  Gluc: NEGATIVE / Ketone: NEGATIVE  / Bili: NEGATIVE / Urobili: NORMAL   Blood: LARGE / Protein: 300 / Nitrite: NEGATIVE   Leuk Esterase: LARGE / RBC: >50 / WBC >50   Sq Epi: OCC / Non Sq Epi: x / Bacteria: NEGATIVE          RADIOLOGY & ADDITIONAL TESTS:    ECG Personally Reviewed -     Imaging Personally Reviewed:    Consultant(s) Notes Reviewed:      Care Discussed with Consultants/Other Providers:

## 2019-04-20 NOTE — H&P ADULT - PROBLEM SELECTOR PROBLEM 6
Essential hypertension Chronic pulmonary embolism without acute cor pulmonale, unspecified pulmonary embolism type

## 2019-04-20 NOTE — H&P ADULT - PROBLEM SELECTOR PLAN 5
Outpatient, likely no indication for treatment at this time given no cytopenias, lymphadenopathy, or symptoms

## 2019-04-20 NOTE — ED PROVIDER NOTE - CHIEF COMPLAINT
The patient is a 73y Male complaining of The patient is a 73y Male presenting for positive blood cultures

## 2019-04-20 NOTE — H&P ADULT - ASSESSMENT
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 Marco Cedillo who initially presented to the ED yesterday c/o L flank pain for 1-2 days. Diagnosed with a UTI and discharged on keflex, but today urine and blood cultures returned positive for strept agalactiae for which he was called back to the hospital.

## 2019-04-20 NOTE — ED PROVIDER NOTE - ATTENDING CONTRIBUTION TO CARE
73M p/w pos blood cultures - strep agalactiae, pt was seen yest for R flank pain, similar to prev kidney stones.  Still having flank pain, had a fever last night.  PMHX HTN, HLD, CVA.  Plan rpt bl cx, check labs, rx Unasyn for <strep>, admit.   Feels OK here.    VS:  LG temp, tachycardia    GEN - NAD; well appearing; A+O x3   HEAD - NC/AT     ENT - PEERL, EOMI, mucous membranes  moist , no discharge      NECK: Neck supple, non-tender without lymphadenopathy, no masses, no JVD  PULM - CTA b/l,  symmetric breath sounds  COR -  normal heart sounds    ABD - , ND, NT, soft,  BACK - no CVA tenderness, nontender spine     EXTREMS - no edema, no deformity, warm and well perfused    SKIN - no rash or bruising      NEUROLOGIC - alert, CN 2-12 intact, sensation nl, motor no focal deficit.

## 2019-04-20 NOTE — ED ADULT NURSE NOTE - OBJECTIVE STATEMENT
pt is a 73 yr old male who was seen in ER for a urinary infection yesterday--pt was called back for postive blood cultures--pt accompained by his elderly wife  pt had #20 placed rt antecubital--labs drawn and sent. Blood cultures repeated.

## 2019-04-20 NOTE — ED PROVIDER NOTE - OBJECTIVE STATEMENT
73M hx HTN/HLD/CVA w/ R sided deficits who presented to this hospital yesterday c/o L flank pain for 1-2 days. Diagnosed with a UTI and discharged on keflex, but today urine and blood cultures returned positive for strept agalactiae for which he was called back to the hospital. Notes fever Tmax = 102 last night, otherwise no change in his symptoms.

## 2019-04-20 NOTE — ED PROVIDER NOTE - CLINICAL SUMMARY MEDICAL DECISION MAKING FREE TEXT BOX
Jonathan Weil, PGY2 - admit for strep agalactiae bacteremia on two blood cultures and urine culture obtained at prior ED visit. HD stable and well appearing at this time.

## 2019-04-20 NOTE — H&P ADULT - PROBLEM SELECTOR PLAN 3
Hx of nephrolithiasis followed by Dr. Marco Cedillo with recent lithotripsy, reported hematuria since procedure  CT with non-obstructing B/L renal calculi   - close F/U as OP Hx of nephrolithiasis followed by Dr. Marco Cedillo with recent lithotripsy, reported hematuria since procedure  CT with non-obstructing B/L renal calculi   - monitor CBC  - close F/U as OP

## 2019-04-20 NOTE — H&P ADULT - PROBLEM SELECTOR PLAN 6
BP stable  - monitor off of home amlodipine for now in setting of sepsis PE diagnosed in 2/2018 (no longer on lovenox)  - stable respiratory status, continue to monitor

## 2019-04-20 NOTE — H&P ADULT - PROBLEM SELECTOR PLAN 1
Meets SIRS criteria, tachycardic 109, febrile 100.3, labs notable for leukocytosis of 17.67<-21.23  Likely due to GBS bacteremia 2/2 GBS UTI  - continue IV ampicillin  - F/U repeat blood and urine cx  - F/U CXR to r/o infiltrate in setting of new cough Meets SIRS criteria, tachycardic 109, febrile 100.3, labs notable for leukocytosis of 17.67<-21.23  Likely due to GBS bacteremia 2/2 GBS UTI  - continue IV ampicillin  - F/U repeat blood and urine cx  - F/U RVP  - F/U CXR to r/o infiltrate in setting of new cough

## 2019-04-20 NOTE — H&P ADULT - PROBLEM SELECTOR PLAN 7
DVT PPx: ICD, will hold off on AC in setting of hematuria  Diet: DASH/TLC  Dispo: PT consult BP stable  - monitor off of home amlodipine for now in setting of sepsis

## 2019-04-20 NOTE — ED ADULT TRIAGE NOTE - CHIEF COMPLAINT QUOTE
Patient brought to ER by EMS for admission.  Pt was here yesterday with a fever and given antibiotics. Pt called back for positive blood cultures.

## 2019-04-20 NOTE — H&P ADULT - PROBLEM SELECTOR PLAN 2
Likely pre-renal, F/U urine lytes, bladder scan to r/o obstruction  - IV LR @ 100 cc/hr  - continue home finasteride   - monitor BMP, urine output

## 2019-04-21 LAB
ANION GAP SERPL CALC-SCNC: 15 MMO/L — HIGH (ref 7–14)
BUN SERPL-MCNC: 15 MG/DL — SIGNIFICANT CHANGE UP (ref 7–23)
CALCIUM SERPL-MCNC: 8.9 MG/DL — SIGNIFICANT CHANGE UP (ref 8.4–10.5)
CHLORIDE SERPL-SCNC: 101 MMOL/L — SIGNIFICANT CHANGE UP (ref 98–107)
CO2 SERPL-SCNC: 22 MMOL/L — SIGNIFICANT CHANGE UP (ref 22–31)
CREAT SERPL-MCNC: 0.98 MG/DL — SIGNIFICANT CHANGE UP (ref 0.5–1.3)
GLUCOSE SERPL-MCNC: 86 MG/DL — SIGNIFICANT CHANGE UP (ref 70–99)
HCT VFR BLD CALC: 40.1 % — SIGNIFICANT CHANGE UP (ref 39–50)
HGB BLD-MCNC: 12.9 G/DL — LOW (ref 13–17)
MCHC RBC-ENTMCNC: 29.4 PG — SIGNIFICANT CHANGE UP (ref 27–34)
MCHC RBC-ENTMCNC: 32.2 % — SIGNIFICANT CHANGE UP (ref 32–36)
MCV RBC AUTO: 91.3 FL — SIGNIFICANT CHANGE UP (ref 80–100)
NRBC # FLD: 0 K/UL — SIGNIFICANT CHANGE UP (ref 0–0)
ORGANISM # SPEC MICROSCOPIC CNT: SIGNIFICANT CHANGE UP
PLATELET # BLD AUTO: 285 K/UL — SIGNIFICANT CHANGE UP (ref 150–400)
PMV BLD: 10 FL — SIGNIFICANT CHANGE UP (ref 7–13)
POTASSIUM SERPL-MCNC: 4.3 MMOL/L — SIGNIFICANT CHANGE UP (ref 3.5–5.3)
POTASSIUM SERPL-SCNC: 4.3 MMOL/L — SIGNIFICANT CHANGE UP (ref 3.5–5.3)
RBC # BLD: 4.39 M/UL — SIGNIFICANT CHANGE UP (ref 4.2–5.8)
RBC # FLD: 15.5 % — HIGH (ref 10.3–14.5)
SODIUM SERPL-SCNC: 138 MMOL/L — SIGNIFICANT CHANGE UP (ref 135–145)
WBC # BLD: 14.29 K/UL — HIGH (ref 3.8–10.5)
WBC # FLD AUTO: 14.29 K/UL — HIGH (ref 3.8–10.5)

## 2019-04-21 RX ADMIN — Medication 216 GRAM(S): at 11:48

## 2019-04-21 RX ADMIN — Medication 81 MILLIGRAM(S): at 11:48

## 2019-04-21 RX ADMIN — LORATADINE 10 MILLIGRAM(S): 10 TABLET ORAL at 11:48

## 2019-04-21 RX ADMIN — Medication 216 GRAM(S): at 23:31

## 2019-04-21 RX ADMIN — Medication 216 GRAM(S): at 17:14

## 2019-04-21 RX ADMIN — Medication 100 MILLIGRAM(S): at 11:47

## 2019-04-21 RX ADMIN — FINASTERIDE 5 MILLIGRAM(S): 5 TABLET, FILM COATED ORAL at 11:48

## 2019-04-21 RX ADMIN — Medication 216 GRAM(S): at 05:50

## 2019-04-21 RX ADMIN — GABAPENTIN 300 MILLIGRAM(S): 400 CAPSULE ORAL at 11:48

## 2019-04-21 RX ADMIN — ATORVASTATIN CALCIUM 80 MILLIGRAM(S): 80 TABLET, FILM COATED ORAL at 21:40

## 2019-04-22 DIAGNOSIS — N10 ACUTE PYELONEPHRITIS: ICD-10-CM

## 2019-04-22 DIAGNOSIS — R78.81 BACTEREMIA: ICD-10-CM

## 2019-04-22 LAB
-  CEFTRIAXONE: SIGNIFICANT CHANGE UP
-  CLINDAMYCIN: SIGNIFICANT CHANGE UP
-  ERYTHROMYCIN: SIGNIFICANT CHANGE UP
-  LEVOFLOXACIN: SIGNIFICANT CHANGE UP
-  PENICILLIN BETA STREP: SIGNIFICANT CHANGE UP
-  VANCOMYCIN: SIGNIFICANT CHANGE UP
ANION GAP SERPL CALC-SCNC: 10 MMO/L — SIGNIFICANT CHANGE UP (ref 7–14)
BACTERIA BLD CULT: SIGNIFICANT CHANGE UP
BACTERIA BLD CULT: SIGNIFICANT CHANGE UP
BUN SERPL-MCNC: 16 MG/DL — SIGNIFICANT CHANGE UP (ref 7–23)
CALCIUM SERPL-MCNC: 9.5 MG/DL — SIGNIFICANT CHANGE UP (ref 8.4–10.5)
CHLORIDE SERPL-SCNC: 102 MMOL/L — SIGNIFICANT CHANGE UP (ref 98–107)
CO2 SERPL-SCNC: 28 MMOL/L — SIGNIFICANT CHANGE UP (ref 22–31)
CREAT SERPL-MCNC: 0.98 MG/DL — SIGNIFICANT CHANGE UP (ref 0.5–1.3)
GLUCOSE SERPL-MCNC: 109 MG/DL — HIGH (ref 70–99)
GP B STREP DNA BLD POS QL NAA+NON-PROBE: SIGNIFICANT CHANGE UP
HCT VFR BLD CALC: 38 % — LOW (ref 39–50)
HGB BLD-MCNC: 12.1 G/DL — LOW (ref 13–17)
MAGNESIUM SERPL-MCNC: 2.1 MG/DL — SIGNIFICANT CHANGE UP (ref 1.6–2.6)
MCHC RBC-ENTMCNC: 28.3 PG — SIGNIFICANT CHANGE UP (ref 27–34)
MCHC RBC-ENTMCNC: 31.8 % — LOW (ref 32–36)
MCV RBC AUTO: 89 FL — SIGNIFICANT CHANGE UP (ref 80–100)
METHOD TYPE: SIGNIFICANT CHANGE UP
NRBC # FLD: 0.02 K/UL — SIGNIFICANT CHANGE UP (ref 0–0)
PHOSPHATE SERPL-MCNC: 2.4 MG/DL — LOW (ref 2.5–4.5)
PLATELET # BLD AUTO: 309 K/UL — SIGNIFICANT CHANGE UP (ref 150–400)
PMV BLD: 9.8 FL — SIGNIFICANT CHANGE UP (ref 7–13)
POTASSIUM SERPL-MCNC: 3.8 MMOL/L — SIGNIFICANT CHANGE UP (ref 3.5–5.3)
POTASSIUM SERPL-SCNC: 3.8 MMOL/L — SIGNIFICANT CHANGE UP (ref 3.5–5.3)
RBC # BLD: 4.27 M/UL — SIGNIFICANT CHANGE UP (ref 4.2–5.8)
RBC # FLD: 15.2 % — HIGH (ref 10.3–14.5)
SODIUM SERPL-SCNC: 140 MMOL/L — SIGNIFICANT CHANGE UP (ref 135–145)
WBC # BLD: 13.35 K/UL — HIGH (ref 3.8–10.5)
WBC # FLD AUTO: 13.35 K/UL — HIGH (ref 3.8–10.5)

## 2019-04-22 RX ADMIN — GABAPENTIN 300 MILLIGRAM(S): 400 CAPSULE ORAL at 13:01

## 2019-04-22 RX ADMIN — Medication 100 MILLIGRAM(S): at 14:15

## 2019-04-22 RX ADMIN — ATORVASTATIN CALCIUM 80 MILLIGRAM(S): 80 TABLET, FILM COATED ORAL at 22:40

## 2019-04-22 RX ADMIN — Medication 81 MILLIGRAM(S): at 13:01

## 2019-04-22 RX ADMIN — FINASTERIDE 5 MILLIGRAM(S): 5 TABLET, FILM COATED ORAL at 13:01

## 2019-04-22 RX ADMIN — Medication 216 GRAM(S): at 18:02

## 2019-04-22 RX ADMIN — Medication 216 GRAM(S): at 06:08

## 2019-04-22 RX ADMIN — LORATADINE 10 MILLIGRAM(S): 10 TABLET ORAL at 13:01

## 2019-04-22 RX ADMIN — Medication 216 GRAM(S): at 13:01

## 2019-04-22 NOTE — PROGRESS NOTE ADULT - PROBLEM SELECTOR PLAN 5
2/2 pyelonephritis   clinically improving  c/w treatment of underlying infection Stable with residual R hemiplegia and expressive aphasia  - continue home ASA, statin

## 2019-04-22 NOTE — PROGRESS NOTE ADULT - PROBLEM SELECTOR PLAN 1
on admission, improving sepsis 2/2 GBS bacteremia 2/2 complicated acute pyelonephritis, and viral urti  - continue IV ampicillin  - repeat bcx negative to date on admission, improving sepsis 2/2 GBS bacteremia 2/2 complicated acute pyelonephritis, and viral urti  -> F/u ID Consult  - continue IV ampicillin. F/u ID for abx, can adjust to po.   - repeat bcx negative to date

## 2019-04-22 NOTE — PROGRESS NOTE ADULT - PROBLEM SELECTOR PLAN 8
- stable respiratory status, continue to monitor BP stable  monitor vitals, restart home rx norvasc as needed

## 2019-04-22 NOTE — PROGRESS NOTE ADULT - PROBLEM SELECTOR PLAN 4
improving  ivf, treat underlying infection 2/2 pyelonephritis   clinically improving  c/w treatment of underlying infection

## 2019-04-22 NOTE — PROGRESS NOTE ADULT - ASSESSMENT
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 Marco Cedillo who initially presented to the ED yesterday c/o L flank pain for 1-2 days. Diagnosed with a UTI and discharged on keflex, but today urine and blood cultures returned positive for strept agalactiae for which he was called back to the hospital.

## 2019-04-22 NOTE — PROGRESS NOTE ADULT - PROBLEM SELECTOR PROBLEM 8
Chronic pulmonary embolism without acute cor pulmonale, unspecified pulmonary embolism type Essential hypertension

## 2019-04-22 NOTE — PROGRESS NOTE ADULT - SUBJECTIVE AND OBJECTIVE BOX
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 Marco Cedillo who initially presented to the ED yesterday c/o L flank pain for 1-2 days. Diagnosed with a UTI and discharged on keflex, but today urine and blood cultures returned positive for strept agalactiae for which he was called back to the hospital. His sister Nathalia reports that he had a fever Tmax 102 last night, continues to have left flank pain. She states that his urine is always bloody since his last lithotripsy 1.5 months ago, was recently seen by his Urologist 3 weeks ago. Patient also developed a cough since last week, sisters states his aide was recently sick with a URI. Denies SOB/CP.       PAST MEDICAL & SURGICAL HISTORY:  Hyperlipidemia  Hypertension  History of cataract surgery      Levaquin (Rash)       MEDICATIONS  (STANDING):  ampicillin  IVPB 2 Gram(s) IV Intermittent every 6 hours  aspirin enteric coated 81 milliGRAM(s) Oral daily  atorvastatin 80 milliGRAM(s) Oral at bedtime  finasteride 5 milliGRAM(s) Oral daily  gabapentin 300 milliGRAM(s) Oral daily  loratadine 10 milliGRAM(s) Oral daily    MEDICATIONS  (PRN):  acetaminophen   Tablet .. 650 milliGRAM(s) Oral every 6 hours PRN Temp greater or equal to 38C (100.4F), Mild Pain (1 - 3), Moderate Pain (4 - 6)  guaiFENesin   Syrup  (Sugar-Free) 100 milliGRAM(s) Oral every 6 hours PRN Cough      REVIEW OF SYSTEMS:  CONSTITUTIONAL: (+) malaise. lethargy  EYES: No acute change in vision   ENT:  No tinnitus  NECK: No stiffness  RESPIRATORY: No hemoptysis  CARDIOVASCULAR: No chest pain, palpitations, syncope  GASTROINTESTINAL: No hematemesis, diarrhea, melena, or hematochezia.  GENITOURINARY: improving dysuria. No hematuria  NEUROLOGICAL: No headaches  LYMPH Nodes: No enlarged glands  ENDOCRINE: No heat or cold intolerance	    T(C): 36.9 (19 @ 21:45), Max: 37 (19 @ 14:16)  HR: 82 (19 @ 21:45) (58 - 82)  BP: 114/65 (19 @ 21:45) (114/65 - 133/76)  RR: 19 (19 @ 21:45) (19 - 19)  SpO2: 98% (19 @ 21:45) (98% - 98%)    PHYSICAL EXAMINATION:   Constitutional: WD, NAD  HEENT: NC, AT  Neck:  Supple  Respiratory:  Adequate airflow b/l. Not using accessory muscles of respiration.  Cardiovascular:  S1 & S2 intact, no R/G, 2+ radial pulses b/l  Gastrointestinal: Soft, NT, ND, normoactive b.s., no organomegaly/RT/rigidity  Extremities: WWP  Neurological:  Alert and awake.  No acute focal motor deficits. Crude sensation intact.     Labs and imaging reviewed    LABS:                        12.1   13.35 )-----------( 309      ( 2019 06:10 )             38.0         140  |  102  |  16  ----------------------------<  109<H>  3.8   |  28  |  0.98    Ca    9.5      2019 06:10  Phos  2.4       Mg     2.1         TPro  7.7  /  Alb  3.6  /  TBili  0.5  /  DBili  x   /  AST  18  /  ALT  16  /  AlkPhos  83  -        PT/INR - ( 2019 12:49 )   PT: 14.5 SEC;   INR: 1.26            Urinalysis Basic - ( 2019 12:49 )    Color: DARK BROWN / Appearance: TURBID / S.016 / pH: 6.5  Gluc: NEGATIVE / Ketone: NEGATIVE  / Bili: NEGATIVE / Urobili: NORMAL   Blood: LARGE / Protein: 300 / Nitrite: NEGATIVE   Leuk Esterase: LARGE / RBC: >50 / WBC >50   Sq Epi: OCC / Non Sq Epi: x / Bacteria: NEGATIVE      CAPILLARY BLOOD GLUCOSE            LIVER FUNCTIONS - ( 2019 13:09 )  Alb: 3.6 g/dL / Pro: 7.7 g/dL / ALK PHOS: 83 u/L / ALT: 16 u/L / AST: 18 u/L / GGT: x               RADIOLOGY & ADDITIONAL STUDIES:

## 2019-04-22 NOTE — CONSULT NOTE ADULT - SUBJECTIVE AND OBJECTIVE BOX
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 Marco Cedillo who initially presented to the ED yesterday c/o L flank pain for 1-2 days. Diagnosed with a UTI and discharged on keflex, but today urine and blood cultures returned positive for strept agalactiae for which he was called back to the hospital. His sister Nathalia reports that he had a fever Tmax 102 last night, continues to have left flank pain. She states that his urine is always bloody since his last lithotripsy 1.5 months ago, was recently seen by his Urologist 3 weeks ago. Patient also developed a cough since last week, sisters states his aide was recently sick with a URI. Denies SOB/CP.     In the ED, he was found to be tachycardic 109, febrile 100.3, labs notable for leukocytosis of 17.67<-21.23. CT from yesterday was only + for B/L non-obstructing renal calculi. Patient was given 1 dose of unasyn and admitted for further management.     PAST MEDICAL & SURGICAL HISTORY:  Hyperlipidemia  Hypertension  History of cataract surgery      Review of Systems:   CONSTITUTIONAL: No fever, weight loss, or fatigue  EYES: No eye pain, visual disturbances, or discharge  ENMT:  No difficulty hearing, tinnitus, vertigo; No sinus or throat pain  NECK: No pain or stiffness  BREASTS: No pain, masses, or nipple discharge  RESPIRATORY: No cough, wheezing, chills or hemoptysis; No shortness of breath  CARDIOVASCULAR: No chest pain, palpitations, dizziness, or leg swelling  GASTROINTESTINAL: No abdominal or epigastric pain. No nausea, vomiting, or hematemesis; No diarrhea or constipation. No melena or hematochezia.  GENITOURINARY: No dysuria, frequency, hematuria, or incontinence  NEUROLOGICAL: No headaches, memory loss, loss of strength, numbness, or tremors  SKIN: No itching, burning, rashes, or lesions   LYMPH NODES: No enlarged glands  ENDOCRINE: No heat or cold intolerance; No hair loss  MUSCULOSKELETAL: No joint pain or swelling; No muscle, back, or extremity pain  PSYCHIATRIC: No depression, anxiety, mood swings, or difficulty sleeping  HEME/LYMPH: No easy bruising, or bleeding gums  ALLERGY AND IMMUNOLOGIC: No hives or eczema    Allergies    Levaquin (Rash)    Intolerances        Social History:   Denies smoking or alcohol, lives with his sister.     FAMILY HISTORY:  No pertinent family history in first degree relatives      MEDICATIONS  (STANDING):  ampicillin  IVPB 2 Gram(s) IV Intermittent every 6 hours  aspirin enteric coated 81 milliGRAM(s) Oral daily  atorvastatin 80 milliGRAM(s) Oral at bedtime  finasteride 5 milliGRAM(s) Oral daily  gabapentin 300 milliGRAM(s) Oral daily  loratadine 10 milliGRAM(s) Oral daily    MEDICATIONS  (PRN):  acetaminophen   Tablet .. 650 milliGRAM(s) Oral every 6 hours PRN Temp greater or equal to 38C (100.4F), Mild Pain (1 - 3), Moderate Pain (4 - 6)      T(C): 37.9 (19 @ 16:09), Max: 37.9 (19 @ 16:09)  HR: 97 (19 @ 16:09) (95 - 109)  BP: 134/77 (19 @ 16:09) (116/79 - 140/77)  RR: 18 (19 @ 16:09) (18 - 20)  SpO2: 98% (19 @ 16:09) (98% - 99%)    CAPILLARY BLOOD GLUCOSE        I&O's Summary      PHYSICAL EXAM:  GENERAL: Elderly man laying in bed, appears in mild distress  HEAD:  Atraumatic, Normocephalic  EYES: EOMI, PERRLA, conjunctiva and sclera clear  NECK: Supple, No elevated JVD  CHEST/LUNG: Clear to auscultation bilaterally; No wheeze  HEART: Regular rate and rhythm; No murmurs, rubs, or gallops  ABDOMEN: Soft, Nontender, Nondistended; Bowel sounds present  EXTREMITIES:  2+ Peripheral Pulses, No clubbing, cyanosis, or edema  PSYCH: AAOx3  NEUROLOGY: CN II-XII grossly intact, moving all extremities  SKIN: No rashes or lesions    LABS:                        13.0   17.67 )-----------( 324      ( 2019 13:09 )             41.4     04-20    137  |  95<L>  |  16  ----------------------------<  97  4.0   |  27  |  1.18    Ca    9.3      2019 13:09    TPro  7.7  /  Alb  3.6  /  TBili  0.5  /  DBili  x   /  AST  18  /  ALT  16  /  AlkPhos  83  -    PT/INR - ( 2019 12:49 )   PT: 14.5 SEC;   INR: 1.26                Urinalysis Basic - ( 2019 12:49 )    Color: DARK BROWN / Appearance: TURBID / S.016 / pH: 6.5  Gluc: NEGATIVE / Ketone: NEGATIVE  / Bili: NEGATIVE / Urobili: NORMAL   Blood: LARGE / Protein: 300 / Nitrite: NEGATIVE   Leuk Esterase: LARGE / RBC: >50 / WBC >50   Sq Epi: OCC / Non Sq Epi: x / Bacteria: NEGATIVE          RADIOLOGY & ADDITIONAL TESTS:    ECG Personally Reviewed -     Imaging Personally Reviewed:    Consultant(s) Notes Reviewed:      Care Discussed with Consultants/Other Providers: (2019 16:35)      REVIEW OF SYSTEMS:    CONSTITUTIONAL: No fever, weight loss, or fatigue  EYES: No eye pain, visual disturbances, or discharge  ENMT:  No sore throat  NECK: No pain or stiffness  RESPIRATORY: No cough, wheezing, chills or hemoptysis; No shortness of breath  CARDIOVASCULAR: No chest pain, palpitations, dizziness, or leg swelling  GASTROINTESTINAL: No abdominal or epigastric pain. No nausea, vomiting, or hematemesis; No diarrhea or constipation. No melena or hematochezia.  GENITOURINARY: No dysuria, frequency, hematuria, or incontinence  NEUROLOGICAL: No headaches, memory loss, loss of strength, numbness, or tremors  SKIN: No itching, burning, rashes, or lesions   LYMPH NODES: No enlarged glands  MUSCULOSKELETAL: No joint pain or swelling; No muscle, back, or extremity pain      PAST MEDICAL & SURGICAL HISTORY:  Hyperlipidemia  Hypertension  History of cataract surgery      Allergies    Levaquin (Rash)    Intolerances        FAMILY HISTORY:  No pertinent family history in first degree relatives      SOCIAL HISTORY:        MEDICATIONS  (STANDING):  ampicillin  IVPB 2 Gram(s) IV Intermittent every 6 hours  aspirin enteric coated 81 milliGRAM(s) Oral daily  atorvastatin 80 milliGRAM(s) Oral at bedtime  finasteride 5 milliGRAM(s) Oral daily  gabapentin 300 milliGRAM(s) Oral daily  loratadine 10 milliGRAM(s) Oral daily    MEDICATIONS  (PRN):  acetaminophen   Tablet .. 650 milliGRAM(s) Oral every 6 hours PRN Temp greater or equal to 38C (100.4F), Mild Pain (1 - 3), Moderate Pain (4 - 6)  guaiFENesin   Syrup  (Sugar-Free) 100 milliGRAM(s) Oral every 6 hours PRN Cough      Vital Signs Last 24 Hrs  T(C): 36.9 (2019 21:45), Max: 37 (2019 14:16)  T(F): 98.4 (2019 21:45), Max: 98.6 (2019 14:16)  HR: 82 (2019 21:45) (58 - 82)  BP: 114/65 (2019 21:45) (114/65 - 133/76)  BP(mean): --  RR: 19 (2019 21:45) (19 - 19)  SpO2: 98% (2019 21:45) (98% - 98%)    PHYSICAL EXAM:    GENERAL: NAD, well-groomed  HEAD:  Atraumatic, Normocephalic  EYES: EOMI, PERRLA, conjunctiva and sclera clear  ENMT: No tonsillar erythema, exudates, or enlargement; Moist mucous membranes  NECK: Supple, No JVD  CHEST/LUNG: Clear to percussion bilaterally; No rales, rhonchi, wheezing, or rubs  HEART: Regular rate and rhythm; No murmurs, rubs, or gallops  ABDOMEN: Soft, Nontender, Nondistended; Bowel sounds present  EXTREMITIES:  2+ Peripheral Pulses, No clubbing, cyanosis, or edema  LYMPH: No lymphadenopathy noted  SKIN: No rashes or lesions    LABS:  CBC Full  -  ( 2019 06:10 )  WBC Count : 13.35 K/uL  RBC Count : 4.27 M/uL  Hemoglobin : 12.1 g/dL  Hematocrit : 38.0 %  Platelet Count - Automated : 309 K/uL  Mean Cell Volume : 89.0 fL  Mean Cell Hemoglobin : 28.3 pg  Mean Cell Hemoglobin Concentration : 31.8 %  Auto Neutrophil # : x  Auto Lymphocyte # : x  Auto Monocyte # : x  Auto Eosinophil # : x  Auto Basophil # : x  Auto Neutrophil % : x  Auto Lymphocyte % : x  Auto Monocyte % : x  Auto Eosinophil % : x  Auto Basophil % : x          140  |  102  |  16  ----------------------------<  109<H>  3.8   |  28  |  0.98    Ca    9.5      2019 06:10  Phos  2.4       Mg     2.1         TPro  7.7  /  Alb  3.6  /  TBili  0.5  /  DBili  x   /  AST  18  /  ALT  16  /  AlkPhos  83        LIVER FUNCTIONS - ( 2019 13:09 )  Alb: 3.6 g/dL / Pro: 7.7 g/dL / ALK PHOS: 83 u/L / ALT: 16 u/L / AST: 18 u/L / GGT: x                               MICROBIOLOGY:        Urinalysis Basic - ( 2019 12:49 )    Color: DARK BROWN / Appearance: TURBID / S.016 / pH: 6.5  Gluc: NEGATIVE / Ketone: NEGATIVE  / Bili: NEGATIVE / Urobili: NORMAL   Blood: LARGE / Protein: 300 / Nitrite: NEGATIVE   Leuk Esterase: LARGE / RBC: >50 / WBC >50   Sq Epi: OCC / Non Sq Epi: x / Bacteria: NEGATIVE                RADIOLOGY: Patient is a 73y old  Male who presents with a chief complaint of Bacteremia (2019 09:12)        HPI:  74 y/o Male PMHx of HTN, HLD, 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 Marco Cedillo who initially presented to the ED yesterday c/o L flank pain for 1-2 days. Diagnosed with a UTI and discharged on keflex, but today urine and blood cultures returned positive for strept agalactiae for which he was called back to the hospital. His sister Nathalia reports that he had a fever Tmax 102 last night, continues to have left flank pain. She states that his urine is always bloody since his last lithotripsy 1.5 months ago, was recently seen by his Urologist 3 weeks ago. Patient also developed a cough since last week, sisters states his aide was recently sick with a URI. Denies SOB/CP.     In the ED, he was found to be tachycardic 109, febrile 100.3, labs notable for leukocytosis of 17.67<-21.23. CT from yesterday was only + for B/L non-obstructing renal calculi. Patient was given 1 dose of unasyn and admitted for further management.     ER vitals:  T 99, P 109, /65.  WBC 21.2 --> 13.3.  UA (+) LE, pyuria, RBCs.  RVP (+) enterorhinovirus.   Ucx (+) GB strep >100K.  Bcx (+) GB strep.  CT renal stone hunt with b/l nonobstructing renal calculi.      Pt on ampicillin.  ID consult called for further abx managment.      PAST MEDICAL & SURGICAL HISTORY:  Hyperlipidemia  Hypertension  History of cataract surgery      Review of Systems:   CONSTITUTIONAL: No fever, weight loss, or fatigue  EYES: No eye pain, visual disturbances, or discharge  ENMT:  No difficulty hearing, tinnitus, vertigo; No sinus or throat pain  NECK: No pain or stiffness  BREASTS: No pain, masses, or nipple discharge  RESPIRATORY: No cough, wheezing, chills or hemoptysis; No shortness of breath  CARDIOVASCULAR: No chest pain, palpitations, dizziness, or leg swelling  GASTROINTESTINAL: No abdominal or epigastric pain. No nausea, vomiting, or hematemesis; No diarrhea or constipation. No melena or hematochezia.  GENITOURINARY: No dysuria, frequency, hematuria, or incontinence  NEUROLOGICAL: No headaches, memory loss, loss of strength, numbness, or tremors  SKIN: No itching, burning, rashes, or lesions   LYMPH NODES: No enlarged glands  ENDOCRINE: No heat or cold intolerance; No hair loss  MUSCULOSKELETAL: No joint pain or swelling; No muscle, back, or extremity pain  PSYCHIATRIC: No depression, anxiety, mood swings, or difficulty sleeping  HEME/LYMPH: No easy bruising, or bleeding gums  ALLERGY AND IMMUNOLOGIC: No hives or eczema    Allergies    Levaquin (Rash)    Intolerances        Social History:   Denies smoking or alcohol, lives with his sister.     FAMILY HISTORY:  No pertinent family history in first degree relatives      MEDICATIONS  (STANDING):  ampicillin  IVPB 2 Gram(s) IV Intermittent every 6 hours  aspirin enteric coated 81 milliGRAM(s) Oral daily  atorvastatin 80 milliGRAM(s) Oral at bedtime  finasteride 5 milliGRAM(s) Oral daily  gabapentin 300 milliGRAM(s) Oral daily  loratadine 10 milliGRAM(s) Oral daily    MEDICATIONS  (PRN):  acetaminophen   Tablet .. 650 milliGRAM(s) Oral every 6 hours PRN Temp greater or equal to 38C (100.4F), Mild Pain (1 - 3), Moderate Pain (4 - 6)      T(C): 37.9 (19 @ 16:09), Max: 37.9 (19 @ 16:09)  HR: 97 (19 @ 16:09) (95 - 109)  BP: 134/77 (19 @ 16:09) (116/79 - 140/77)  RR: 18 (19 @ 16:09) (18 - 20)  SpO2: 98% (19 @ 16:09) (98% - 99%)    CAPILLARY BLOOD GLUCOSE        I&O's Summary      PHYSICAL EXAM:  GENERAL: Elderly man laying in bed, appears in mild distress  HEAD:  Atraumatic, Normocephalic  EYES: EOMI, PERRLA, conjunctiva and sclera clear  NECK: Supple, No elevated JVD  CHEST/LUNG: Clear to auscultation bilaterally; No wheeze  HEART: Regular rate and rhythm; No murmurs, rubs, or gallops  ABDOMEN: Soft, Nontender, Nondistended; Bowel sounds present  EXTREMITIES:  2+ Peripheral Pulses, No clubbing, cyanosis, or edema  PSYCH: AAOx3  NEUROLOGY: CN II-XII grossly intact, moving all extremities  SKIN: No rashes or lesions    LABS:                        13.0   17.67 )-----------( 324      ( 2019 13:09 )             41.4     04-20    137  |  95<L>  |  16  ----------------------------<  97  4.0   |  27  |  1.18    Ca    9.3      2019 13:09    TPro  7.7  /  Alb  3.6  /  TBili  0.5  /  DBili  x   /  AST  18  /  ALT  16  /  AlkPhos  83  04-20    PT/INR - ( 2019 12:49 )   PT: 14.5 SEC;   INR: 1.26                Urinalysis Basic - ( 2019 12:49 )    Color: DARK BROWN / Appearance: TURBID / S.016 / pH: 6.5  Gluc: NEGATIVE / Ketone: NEGATIVE  / Bili: NEGATIVE / Urobili: NORMAL   Blood: LARGE / Protein: 300 / Nitrite: NEGATIVE   Leuk Esterase: LARGE / RBC: >50 / WBC >50   Sq Epi: OCC / Non Sq Epi: x / Bacteria: NEGATIVE              MEDICATIONS  (STANDING):  ampicillin  IVPB 2 Gram(s) IV Intermittent every 6 hours  aspirin enteric coated 81 milliGRAM(s) Oral daily  atorvastatin 80 milliGRAM(s) Oral at bedtime  finasteride 5 milliGRAM(s) Oral daily  gabapentin 300 milliGRAM(s) Oral daily  loratadine 10 milliGRAM(s) Oral daily    MEDICATIONS  (PRN):  acetaminophen   Tablet .. 650 milliGRAM(s) Oral every 6 hours PRN Temp greater or equal to 38C (100.4F), Mild Pain (1 - 3), Moderate Pain (4 - 6)  guaiFENesin   Syrup  (Sugar-Free) 100 milliGRAM(s) Oral every 6 hours PRN Cough      Vital Signs Last 24 Hrs  T(C): 36.9 (2019 21:45), Max: 37 (2019 14:16)  T(F): 98.4 (2019 21:45), Max: 98.6 (2019 14:16)  HR: 82 (2019 21:45) (58 - 82)  BP: 114/65 (2019 21:45) (114/65 - 133/76)  BP(mean): --  RR: 19 (2019 21:45) (19 - 19)  SpO2: 98% (2019 21:45) (98% - 98%)    PHYSICAL EXAM:    GENERAL: NAD, well-groomed  HEAD:  Atraumatic, Normocephalic  EYES: EOMI, PERRLA, conjunctiva and sclera clear  ENMT: No tonsillar erythema, exudates, or enlargement; Moist mucous membranes  NECK: Supple, No JVD  CHEST/LUNG: Clear to percussion bilaterally; No rales, rhonchi, wheezing, or rubs  HEART: Regular rate and rhythm; No murmurs, rubs, or gallops  ABDOMEN: Soft, Nontender, Nondistended; Bowel sounds present  EXTREMITIES:  2+ Peripheral Pulses, No clubbing, cyanosis, or edema  LYMPH: No lymphadenopathy noted  SKIN: No rashes or lesions    LABS:  CBC Full  -  ( 2019 06:10 )  WBC Count : 13.35 K/uL  RBC Count : 4.27 M/uL  Hemoglobin : 12.1 g/dL  Hematocrit : 38.0 %  Platelet Count - Automated : 309 K/uL  Mean Cell Volume : 89.0 fL  Mean Cell Hemoglobin : 28.3 pg  Mean Cell Hemoglobin Concentration : 31.8 %  Auto Neutrophil # : x  Auto Lymphocyte # : x  Auto Monocyte # : x  Auto Eosinophil # : x  Auto Basophil # : x  Auto Neutrophil % : x  Auto Lymphocyte % : x  Auto Monocyte % : x  Auto Eosinophil % : x  Auto Basophil % : x      -    140  |  102  |  16  ----------------------------<  109<H>  3.8   |  28  |  0.98    Ca    9.5      2019 06:10  Phos  2.4     -  Mg     2.1     -    TPro  7.7  /  Alb  3.6  /  TBili  0.5  /  DBili  x   /  AST  18  /  ALT  16  /  AlkPhos  83  04-20      LIVER FUNCTIONS - ( 2019 13:09 )  Alb: 3.6 g/dL / Pro: 7.7 g/dL / ALK PHOS: 83 u/L / ALT: 16 u/L / AST: 18 u/L / GGT: x                               MICROBIOLOGY:        Urinalysis Basic - ( 2019 12:49 )    Color: DARK BROWN / Appearance: TURBID / S.016 / pH: 6.5  Gluc: NEGATIVE / Ketone: NEGATIVE  / Bili: NEGATIVE / Urobili: NORMAL   Blood: LARGE / Protein: 300 / Nitrite: NEGATIVE   Leuk Esterase: LARGE / RBC: >50 / WBC >50   Sq Epi: OCC / Non Sq Epi: x / Bacteria: NEGATIVE      Culture - Urine (19 @ 16:31)    Culture - Urine:   NO GROWTH AT 24 HOURS    Specimen Source: URINE MIDSTREAM    Culture - Blood (19 @ 14:52)    Culture - Blood:   NO ORGANISMS ISOLATED  NO ORGANISMS ISOLATED AT 24 HOURS    Specimen Source: BLOOD VENOUS    Culture - Blood (19 @ 14:52)    Culture - Blood:   NO ORGANISMS ISOLATED  NO ORGANISMS ISOLATED AT 24 HOURS    Specimen Source: BLOOD    Culture - Blood (19 @ 07:44)    Culture - Blood:   SEE PREVIOUS CULTURE: [id number] COLLECTED 19 07  RECEIVED 19 0744 FOR ALEX  RESULT CALLED TO / READ BACK: RN MENDELSON,L./Y  DATE / TIME CALLED: 19  CALLED BY: MARISOL SEAY^Strep Beta Hemolytic Gr B (SS)    Specimen Source: BLOOD VENOUS    Gram Stain Blood:   ***** CRITICAL RESULT *****  PERSON CALLED / READ-BACK: BASHIR MILLER / Y  DATE / TIME CALLED: 19  CALLED BY: JAIDEN PARRISH  [facility]^Gram Pos Cocci in Chains  AFTER: 12 HOURS INCUBATION  BOTTLE: AEROBIC   ANAEROBIC BOTTLES      Culture - Blood (19 @ 07:44)    -  Ceftriaxone: S <=1 ALEX    -  Clindamycin: S 0.25 ALEX    -  Erythromycin: S <0.5 ALEX    -  Levofloxacin: S <=1 ALEX    -  Vancomycin: S <=0.5 ALEX    -  Penicillin Beta Strep: S 0.12 ALEX    -  Streptococcus agalactiae (Group B): + DETECT ALEX    Culture - Blood:   ***Blood Panel PCR results on this specimen are available  approximately 3 hours after the Gram stain result***  Gram stain, PCR, and/or culture results may not always  correspond due to difference in methodologies  ------------------------------------------------------------  This PCR assay was performed using [employer].  The  following targets are tested for:  Enterococcus, vancomycin  resistant enterococci, Listeria monocytogenes,  coagulase  negative staphylococci, S. aureus, methicillin resistant S.  aureus, Streptococcus agalactiae (Group B), S. pneumoniae,  S. pyogenes (Group A), Acinetobacter baumannii, Enterobacter  cloacae, E. coli, Klebsiella oxytoca, K. pneumoniae, Proteus  sp., Serratia marcescens, Haemophilus influenzae, Neisseria  meningitidis, Pseudomonas aeruginosa, Candida albicans, C.  glabrata, C. krusei, C. parapsilosis, C. tropicalis and the  KPC resistance gene.  **NOTE: Due to technical problems, Proteus sp. will NOT be  reported as part of the BCID paneluntil further notice.    Culture - Blood:   RESULT CALLED TO / READ BACK: RN MENDELSON,L./WILFREDO  DATE / TIME CALLED: 19 1332  CALLED BY: MARISOL SEAY    Specimen Source: BLOOD PERIPHERAL    Organism: Strep Beta Hemolytic Gr B (SS)    Organism: BLOOD CULTURE PCR  ***Blood Panel PCR results on this specimen are available  approximately 3 hours after the Gram stain result***  Gram stain, PCR, and/or culture results may not always  correspond due to difference in methodologies  ------------------------------------------------------------  This PCR assay was performed using [employer].  The  following targets are tested for:  Enterococcus, vancomycin  resistant enterococci, Listeria monocytogenes,  coagulase  negative staphylococci, S. aureus, methicillin resistant S.  Culture - Urine (19 @ 05:50)    Culture - Urine:   [facility]^Gram Pos Cocci to be IDed  COLONY COUNT: > = 100,000 CFU/ML    Culture - Urine:   Routine susceptibility testing not performed as  Streptococcus Grp A/B is susceptible to drug(s) of choice  (Penicillin and Ampicillin).  [facility]^Streptococcus agalactiae Gp B  COLONY COUNT: > = 100,000 CFU/ML    Specimen Source: URINE MIDSTREAM    aureus, Streptococcus agalactiae (Group B), S. pneumoniae,  S. pyogenes (Group A), Acinetobacter baumannii, Enterobacter  cloacae, E. coli, Klebsiella oxytoca, K. pneumoniae, Proteus  sp., Serratia marcescens, Haemophilus influenzae, Neisseria  meningitidis, Pseudomonas aeruginosa, Candida albicans, C.  glabrata, C. krusei, C. parapsilosis, C. tropicalis and the  KPC resistance gene.  **NOTE: Due to technical problems, Proteus sp. will NOT be  reported as part of the BCID panel until further notice.    Gram Stain Blood:   ***** CRITICAL RESULT *****  PERSON CALLED / READ-BACK: BASHIR ZELAYA/ WILFREDO  DATE / TIME CALLED: 19  CALLED BY: JAIDEN PARRISH  [facility]^Gram Pos Cocci in Chains  AFTER: 12 HOURS INCUBATION  BOTTLE: AEROBIC   ANAEROBIC BOTTLES    Organism Identification: BLOOD CULTURE PCR  Strep Beta Hemolytic Gr B (SS)    Method Type: PCR    Method Type: MANUAL ALEX              RADIOLOGY:        < from: Xray Chest 1 View AP/PA (19 @ 18:07) >  FINDINGS:  Both lungs are equally aerated and free of any focal abnormalities. The   heart is not enlarged and there is no effusion.        COMPARISON:  2018.        IMPRESSION:  No acute pulmonary disease.    < end of copied text >          < from: CT Renal Stone Hunt (19 @ 08:30) >  FINDINGS:    LOWER CHEST: Within normal limits.    LIVER: Subcentimeter hypodense lesionin the left hepatic lobe, too small   to characterize.  BILE DUCTS: Normal caliber.   GALLBLADDER: Within normal limits.  SPLEEN: Within normal limits.  PANCREAS: Within normal limits.  ADRENALS: Within normal limits.  KIDNEYS/URETERS: Bilateral ureteral stents. Nonobstructing bilateral   renal calculi, measuring up to 0.9 cm in the left lower pole. Mild   fullness of the right renal collecting system.    BLADDER: Within normal limits.  REPRODUCTIVE ORGANS: The prostate is enlarged.    BOWEL: No bowel obstruction. Normal appendix. Colonic diverticulosis.   PERITONEUM: No ascites.  VESSELS:  Atherosclerotic change of the abdominal aorta and its branches.  RETROPERITONEUM: No lymphadenopathy.    ABDOMINAL WALL: Within normal limits.  BONES: Degenerative changes of the spine.    IMPRESSION: Nonobstructing bilateral renal calculi.    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 Marco Cedillo who initially presented to the ED yesterday c/o L flank pain for 1-2 days. Diagnosed with a UTI and discharged on keflex, but today urine and blood cultures returned positive for strept agalactiae for which he was called back to the hospital. His sister Nathalia reports that he had a fever Tmax 102 last night, continues to have left flank pain. She states that his urine is always bloody since his last lithotripsy 1.5 months ago, was recently seen by his Urologist 3 weeks ago. Patient also developed a cough since last week, sisters states his aide was recently sick with a URI. Denies SOB/CP.     In the ED, he was found to be tachycardic 109, febrile 100.3, labs notable for leukocytosis of 17.67<-21.23. CT from yesterday was only + for B/L non-obstructing renal calculi. Patient was given 1 dose of unasyn and admitted for further management.     ER vitals:  T 99, P 109, /65.  WBC 21.2 --> 13.3.  UA (+) LE, pyuria, RBCs.  RVP (+) enterorhinovirus.   Ucx (+) GB strep >100K.  Bcx (+) GB strep.  CT renal stone hunt with b/l nonobstructing renal calculi.      Pt on ampicillin.  ID consult called for further abx managment.      PAST MEDICAL & SURGICAL HISTORY:  Hyperlipidemia  Hypertension  History of cataract surgery      Review of Systems:   CONSTITUTIONAL: No fever, weight loss, or fatigue  EYES: No eye pain, visual disturbances, or discharge  ENMT:  No difficulty hearing, tinnitus, vertigo; No sinus or throat pain  NECK: No pain or stiffness  BREASTS: No pain, masses, or nipple discharge  RESPIRATORY: No cough, wheezing, chills or hemoptysis; No shortness of breath  CARDIOVASCULAR: No chest pain, palpitations, dizziness, or leg swelling  GASTROINTESTINAL: No abdominal or epigastric pain. No nausea, vomiting, or hematemesis; No diarrhea or constipation. No melena or hematochezia.  GENITOURINARY: No dysuria, frequency, hematuria, or incontinence  NEUROLOGICAL: No headaches, memory loss, loss of strength, numbness, or tremors  SKIN: No itching, burning, rashes, or lesions   LYMPH NODES: No enlarged glands  ENDOCRINE: No heat or cold intolerance; No hair loss  MUSCULOSKELETAL: No joint pain or swelling; No muscle, back, or extremity pain  PSYCHIATRIC: No depression, anxiety, mood swings, or difficulty sleeping  HEME/LYMPH: No easy bruising, or bleeding gums  ALLERGY AND IMMUNOLOGIC: No hives or eczema    Allergies    Levaquin (Rash)    Intolerances        Social History:   Denies smoking or alcohol, lives with his sister.     FAMILY HISTORY:  No pertinent family history in first degree relatives      MEDICATIONS  (STANDING):  ampicillin  IVPB 2 Gram(s) IV Intermittent every 6 hours  aspirin enteric coated 81 milliGRAM(s) Oral daily  atorvastatin 80 milliGRAM(s) Oral at bedtime  finasteride 5 milliGRAM(s) Oral daily  gabapentin 300 milliGRAM(s) Oral daily  loratadine 10 milliGRAM(s) Oral daily    MEDICATIONS  (PRN):  acetaminophen   Tablet .. 650 milliGRAM(s) Oral every 6 hours PRN Temp greater or equal to 38C (100.4F), Mild Pain (1 - 3), Moderate Pain (4 - 6)      T(C): 37.9 (19 @ 16:09), Max: 37.9 (19 @ 16:09)  HR: 97 (19 @ 16:09) (95 - 109)  BP: 134/77 (19 @ 16:09) (116/79 - 140/77)  RR: 18 (19 @ 16:09) (18 - 20)  SpO2: 98% (19 @ 16:09) (98% - 99%)    CAPILLARY BLOOD GLUCOSE        I&O's Summary      PHYSICAL EXAM:  GENERAL: Elderly man laying in bed, appears in mild distress  HEAD:  Atraumatic, Normocephalic  EYES: EOMI, PERRLA, conjunctiva and sclera clear  NECK: Supple, No elevated JVD  CHEST/LUNG: Clear to auscultation bilaterally; No wheeze  HEART: Regular rate and rhythm; No murmurs, rubs, or gallops  ABDOMEN: Soft, Nontender, Nondistended; Bowel sounds present  EXTREMITIES:  2+ Peripheral Pulses, No clubbing, cyanosis, or edema  PSYCH: AAOx3  NEUROLOGY: CN II-XII grossly intact, moving all extremities  SKIN: No rashes or lesions    LABS:                        13.0   17.67 )-----------( 324      ( 2019 13:09 )             41.4     04-20    137  |  95<L>  |  16  ----------------------------<  97  4.0   |  27  |  1.18    Ca    9.3      2019 13:09    TPro  7.7  /  Alb  3.6  /  TBili  0.5  /  DBili  x   /  AST  18  /  ALT  16  /  AlkPhos  83  04-20    PT/INR - ( 2019 12:49 )   PT: 14.5 SEC;   INR: 1.26                Urinalysis Basic - ( 2019 12:49 )    Color: DARK BROWN / Appearance: TURBID / S.016 / pH: 6.5  Gluc: NEGATIVE / Ketone: NEGATIVE  / Bili: NEGATIVE / Urobili: NORMAL   Blood: LARGE / Protein: 300 / Nitrite: NEGATIVE   Leuk Esterase: LARGE / RBC: >50 / WBC >50   Sq Epi: OCC / Non Sq Epi: x / Bacteria: NEGATIVE              MEDICATIONS  (STANDING):  ampicillin  IVPB 2 Gram(s) IV Intermittent every 6 hours  aspirin enteric coated 81 milliGRAM(s) Oral daily  atorvastatin 80 milliGRAM(s) Oral at bedtime  finasteride 5 milliGRAM(s) Oral daily  gabapentin 300 milliGRAM(s) Oral daily  loratadine 10 milliGRAM(s) Oral daily    MEDICATIONS  (PRN):  acetaminophen   Tablet .. 650 milliGRAM(s) Oral every 6 hours PRN Temp greater or equal to 38C (100.4F), Mild Pain (1 - 3), Moderate Pain (4 - 6)  guaiFENesin   Syrup  (Sugar-Free) 100 milliGRAM(s) Oral every 6 hours PRN Cough      Vital Signs Last 24 Hrs  T(C): 36.9 (2019 21:45), Max: 37 (2019 14:16)  T(F): 98.4 (2019 21:45), Max: 98.6 (2019 14:16)  HR: 82 (2019 21:45) (58 - 82)  BP: 114/65 (2019 21:45) (114/65 - 133/76)  BP(mean): --  RR: 19 (2019 21:45) (19 - 19)  SpO2: 98% (2019 21:45) (98% - 98%)        LABS:  CBC Full  -  ( 2019 06:10 )  WBC Count : 13.35 K/uL  RBC Count : 4.27 M/uL  Hemoglobin : 12.1 g/dL  Hematocrit : 38.0 %  Platelet Count - Automated : 309 K/uL  Mean Cell Volume : 89.0 fL  Mean Cell Hemoglobin : 28.3 pg  Mean Cell Hemoglobin Concentration : 31.8 %  Auto Neutrophil # : x  Auto Lymphocyte # : x  Auto Monocyte # : x  Auto Eosinophil # : x  Auto Basophil # : x  Auto Neutrophil % : x  Auto Lymphocyte % : x  Auto Monocyte % : x  Auto Eosinophil % : x  Auto Basophil % : x      -    140  |  102  |  16  ----------------------------<  109<H>  3.8   |  28  |  0.98    Ca    9.5      2019 06:10  Phos  2.4     -  Mg     2.1     -    TPro  7.7  /  Alb  3.6  /  TBili  0.5  /  DBili  x   /  AST  18  /  ALT  16  /  AlkPhos  83  04-20      LIVER FUNCTIONS - ( 2019 13:09 )  Alb: 3.6 g/dL / Pro: 7.7 g/dL / ALK PHOS: 83 u/L / ALT: 16 u/L / AST: 18 u/L / GGT: x                               MICROBIOLOGY:        Urinalysis Basic - ( 2019 12:49 )    Color: DARK BROWN / Appearance: TURBID / S.016 / pH: 6.5  Gluc: NEGATIVE / Ketone: NEGATIVE  / Bili: NEGATIVE / Urobili: NORMAL   Blood: LARGE / Protein: 300 / Nitrite: NEGATIVE   Leuk Esterase: LARGE / RBC: >50 / WBC >50   Sq Epi: OCC / Non Sq Epi: x / Bacteria: NEGATIVE      Culture - Urine (19 @ 16:31)    Culture - Urine:   NO GROWTH AT 24 HOURS    Specimen Source: URINE MIDSTREAM    Culture - Blood (19 @ 14:52)    Culture - Blood:   NO ORGANISMS ISOLATED  NO ORGANISMS ISOLATED AT 24 HOURS    Specimen Source: BLOOD VENOUS    Culture - Blood (19 @ 14:52)    Culture - Blood:   NO ORGANISMS ISOLATED  NO ORGANISMS ISOLATED AT 24 HOURS    Specimen Source: BLOOD    Culture - Blood (19 @ 07:44)    Culture - Blood:   SEE PREVIOUS CULTURE: [id number] COLLECTED 19  RECEIVED 19 FOR ALEX  RESULT CALLED TO / READ BACK: RN MENDELSON,L./Y  DATE / TIME CALLED: 19  CALLED BY: MARISOL SEAY^Strep Beta Hemolytic Gr B (SS)    Specimen Source: BLOOD VENOUS    Gram Stain Blood:   ***** CRITICAL RESULT *****  PERSON CALLED / READ-BACK: BASHIR MILLER / Y  DATE / TIME CALLED: 19  CALLED BY: JAIDEN PARRISH  [facility]^Gram Pos Cocci in Chains  AFTER: 12 HOURS INCUBATION  BOTTLE: AEROBIC   ANAEROBIC BOTTLES      Culture - Blood (19 @ 07:44)    -  Ceftriaxone: S <=1 ALEX    -  Clindamycin: S 0.25 ALEX    -  Erythromycin: S <0.5 ALEX    -  Levofloxacin: S <=1 ALEX    -  Vancomycin: S <=0.5 ALEX    -  Penicillin Beta Strep: S 0.12 ALEX    -  Streptococcus agalactiae (Group B): + DETECT ALEX    Culture - Blood:   ***Blood Panel PCR results on this specimen are available  approximately 3 hours after the Gram stain result***  Gram stain, PCR, and/or culture results may not always  correspond due to difference in methodologies  ------------------------------------------------------------  This PCR assay was performed using [employer].  The  following targets are tested for:  Enterococcus, vancomycin  resistant enterococci, Listeria monocytogenes,  coagulase  negative staphylococci, S. aureus, methicillin resistant S.  aureus, Streptococcus agalactiae (Group B), S. pneumoniae,  S. pyogenes (Group A), Acinetobacter baumannii, Enterobacter  cloacae, E. coli, Klebsiella oxytoca, K. pneumoniae, Proteus  sp., Serratia marcescens, Haemophilus influenzae, Neisseria  meningitidis, Pseudomonas aeruginosa, Candida albicans, C.  glabrata, C. krusei, C. parapsilosis, C. tropicalis and the  KPC resistance gene.  **NOTE: Due to technical problems, Proteus sp. will NOT be  reported as part of the BCID paneluntil further notice.    Culture - Blood:   RESULT CALLED TO / READ BACK: RN MENDELSON,L./Y  DATE / TIME CALLED: 19 1332  CALLED BY: MARISOL SEAY    Specimen Source: BLOOD PERIPHERAL    Organism: Strep Beta Hemolytic Gr B (SS)    Organism: BLOOD CULTURE PCR  ***Blood Panel PCR results on this specimen are available  approximately 3 hours after the Gram stain result***  Gram stain, PCR, and/or culture results may not always  correspond due to difference in methodologies  ------------------------------------------------------------  This PCR assay was performed using [employer].  The  following targets are tested for:  Enterococcus, vancomycin  resistant enterococci, Listeria monocytogenes,  coagulase  negative staphylococci, S. aureus, methicillin resistant S.  Culture - Urine (19 @ 05:50)    Culture - Urine:   [facility]^Gram Pos Cocci to be IDed  COLONY COUNT: > = 100,000 CFU/ML    Culture - Urine:   Routine susceptibility testing not performed as  Streptococcus Grp A/B is susceptible to drug(s) of choice  (Penicillin and Ampicillin).  [facility]^Streptococcus agalactiae Gp B  COLONY COUNT: > = 100,000 CFU/ML    Specimen Source: URINE MIDSTREAM    aureus, Streptococcus agalactiae (Group B), S. pneumoniae,  S. pyogenes (Group A), Acinetobacter baumannii, Enterobacter  cloacae, E. coli, Klebsiella oxytoca, K. pneumoniae, Proteus  sp., Serratia marcescens, Haemophilus influenzae, Neisseria  meningitidis, Pseudomonas aeruginosa, Candida albicans, C.  glabrata, C. krusei, C. parapsilosis, C. tropicalis and the  KPC resistance gene.  **NOTE: Due to technical problems, Proteus sp. will NOT be  reported as part of the BCID panel until further notice.    Gram Stain Blood:   ***** CRITICAL RESULT *****  PERSON CALLED / READ-BACK: LEXI ZELAYA./ Y  DATE / TIME CALLED: 19  CALLED BY: JAIDEN PARRISH  [facility]^Gram Pos Cocci in Chains  AFTER: 12 HOURS INCUBATION  BOTTLE: AEROBIC   ANAEROBIC BOTTLES    Organism Identification: BLOOD CULTURE PCR  Strep Beta Hemolytic Gr B (SS)    Method Type: PCR    Method Type: MANUAL ALEX              RADIOLOGY:        < from: Xray Chest 1 View AP/PA (19 @ 18:07) >  FINDINGS:  Both lungs are equally aerated and free of any focal abnormalities. The   heart is not enlarged and there is no effusion.        COMPARISON:  2018.        IMPRESSION:  No acute pulmonary disease.    < end of copied text >          < from: CT Renal Stone Hunt (19 @ 08:30) >  FINDINGS:    LOWER CHEST: Within normal limits.    LIVER: Subcentimeter hypodense lesionin the left hepatic lobe, too small   to characterize.  BILE DUCTS: Normal caliber.   GALLBLADDER: Within normal limits.  SPLEEN: Within normal limits.  PANCREAS: Within normal limits.  ADRENALS: Within normal limits.  KIDNEYS/URETERS: Bilateral ureteral stents. Nonobstructing bilateral   renal calculi, measuring up to 0.9 cm in the left lower pole. Mild   fullness of the right renal collecting system.    BLADDER: Within normal limits.  REPRODUCTIVE ORGANS: The prostate is enlarged.    BOWEL: No bowel obstruction. Normal appendix. Colonic diverticulosis.   PERITONEUM: No ascites.  VESSELS:  Atherosclerotic change of the abdominal aorta and its branches.  RETROPERITONEUM: No lymphadenopathy.    ABDOMINAL WALL: Within normal limits.  BONES: Degenerative changes of the spine.    IMPRESSION: Nonobstructing bilateral renal calculi.    < end of copied text >

## 2019-04-22 NOTE — PROGRESS NOTE ADULT - PROBLEM SELECTOR PLAN 6
Stable with residual R hemiplegia and expressive aphasia  - continue home ASA, statin Outpatient f/u

## 2019-04-22 NOTE — PROGRESS NOTE ADULT - PROBLEM SELECTOR PROBLEM 7
Lymphoproliferative disease Chronic pulmonary embolism without acute cor pulmonale, unspecified pulmonary embolism type

## 2019-04-22 NOTE — PROGRESS NOTE ADULT - PROBLEM SELECTOR PLAN 9
BP stable  monitor vitals, restart home rx norvasc as needed DVT PPx: ICD, will hold off on AC in setting of hematuria  Diet: DASH/TLC  Dispo: PT

## 2019-04-23 LAB
HCT VFR BLD CALC: 35.8 % — LOW (ref 39–50)
HGB BLD-MCNC: 11.5 G/DL — LOW (ref 13–17)
MCHC RBC-ENTMCNC: 29.5 PG — SIGNIFICANT CHANGE UP (ref 27–34)
MCHC RBC-ENTMCNC: 32.1 % — SIGNIFICANT CHANGE UP (ref 32–36)
MCV RBC AUTO: 91.8 FL — SIGNIFICANT CHANGE UP (ref 80–100)
NRBC # FLD: 0 K/UL — SIGNIFICANT CHANGE UP (ref 0–0)
PLATELET # BLD AUTO: 299 K/UL — SIGNIFICANT CHANGE UP (ref 150–400)
PMV BLD: 9.6 FL — SIGNIFICANT CHANGE UP (ref 7–13)
RBC # BLD: 3.9 M/UL — LOW (ref 4.2–5.8)
RBC # FLD: 15 % — HIGH (ref 10.3–14.5)
WBC # BLD: 11.06 K/UL — HIGH (ref 3.8–10.5)
WBC # FLD AUTO: 11.06 K/UL — HIGH (ref 3.8–10.5)

## 2019-04-23 RX ADMIN — Medication 216 GRAM(S): at 12:34

## 2019-04-23 RX ADMIN — GABAPENTIN 300 MILLIGRAM(S): 400 CAPSULE ORAL at 12:34

## 2019-04-23 RX ADMIN — FINASTERIDE 5 MILLIGRAM(S): 5 TABLET, FILM COATED ORAL at 12:34

## 2019-04-23 RX ADMIN — Medication 216 GRAM(S): at 06:27

## 2019-04-23 RX ADMIN — LORATADINE 10 MILLIGRAM(S): 10 TABLET ORAL at 12:34

## 2019-04-23 RX ADMIN — Medication 216 GRAM(S): at 01:37

## 2019-04-23 RX ADMIN — Medication 216 GRAM(S): at 18:21

## 2019-04-23 RX ADMIN — ATORVASTATIN CALCIUM 80 MILLIGRAM(S): 80 TABLET, FILM COATED ORAL at 21:00

## 2019-04-23 RX ADMIN — Medication 81 MILLIGRAM(S): at 12:40

## 2019-04-23 NOTE — PHYSICAL THERAPY INITIAL EVALUATION ADULT - GENERAL OBSERVATIONS, REHAB EVAL
Patient received semi supine in bed, R drop foot ,patient in no apparent distress, sister and Home Health Aide at bedside.

## 2019-04-23 NOTE — PROGRESS NOTE ADULT - SUBJECTIVE AND OBJECTIVE BOX
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 Marco Cedillo who initially presented to the ED yesterday c/o L flank pain for 1-2 days. Diagnosed with a UTI and discharged on keflex, but today urine and blood cultures returned positive for strept agalactiae for which he was called back to the hospital. His sister Nathalia reports that he had a fever Tmax 102 last night, continues to have left flank pain. She states that his urine is always bloody since his last lithotripsy 1.5 months ago, was recently seen by his Urologist 3 weeks ago. Patient also developed a cough since last week, sisters states his aide was recently sick with a URI. Denies SOB/CP.     In the ED, he was found to be tachycardic 109, febrile 100.3, labs notable for leukocytosis of 17.67<-21.23. CT from yesterday was only + for B/L non-obstructing renal calculi. Patient was given 1 dose of unasyn and admitted for further management.     PAST MEDICAL & SURGICAL HISTORY:  Hyperlipidemia  Hypertension  History of cataract surgery      Review of Systems:   CONSTITUTIONAL: No fever, weight loss, or fatigue  EYES: No eye pain, visual disturbances, or discharge  ENMT:  No difficulty hearing, tinnitus, vertigo; No sinus or throat pain  NECK: No pain or stiffness  BREASTS: No pain, masses, or nipple discharge  RESPIRATORY: No cough, wheezing, chills or hemoptysis; No shortness of breath  CARDIOVASCULAR: No chest pain, palpitations, dizziness, or leg swelling  GASTROINTESTINAL: No abdominal or epigastric pain. No nausea, vomiting, or hematemesis; No diarrhea or constipation. No melena or hematochezia.  GENITOURINARY: No dysuria, frequency, hematuria, or incontinence  NEUROLOGICAL: No headaches, memory loss, loss of strength, numbness, or tremors  SKIN: No itching, burning, rashes, or lesions   LYMPH NODES: No enlarged glands  ENDOCRINE: No heat or cold intolerance; No hair loss  MUSCULOSKELETAL: No joint pain or swelling; No muscle, back, or extremity pain  PSYCHIATRIC: No depression, anxiety, mood swings, or difficulty sleeping  HEME/LYMPH: No easy bruising, or bleeding gums  ALLERGY AND IMMUNOLOGIC: No hives or eczema    Allergies    Levaquin (Rash)    Intolerances        Social History:   Denies smoking or alcohol, lives with his sister.     FAMILY HISTORY:  No pertinent family history in first degree relatives      MEDICATIONS  (STANDING):  ampicillin  IVPB 2 Gram(s) IV Intermittent every 6 hours  aspirin enteric coated 81 milliGRAM(s) Oral daily  atorvastatin 80 milliGRAM(s) Oral at bedtime  finasteride 5 milliGRAM(s) Oral daily  gabapentin 300 milliGRAM(s) Oral daily  loratadine 10 milliGRAM(s) Oral daily    MEDICATIONS  (PRN):  acetaminophen   Tablet .. 650 milliGRAM(s) Oral every 6 hours PRN Temp greater or equal to 38C (100.4F), Mild Pain (1 - 3), Moderate Pain (4 - 6)      T(C): 37.9 (19 @ 16:09), Max: 37.9 (19 @ 16:09)  HR: 97 (19 @ 16:09) (95 - 109)  BP: 134/77 (19 @ 16:09) (116/79 - 140/77)  RR: 18 (19 @ 16:09) (18 - 20)  SpO2: 98% (19 @ 16:09) (98% - 99%)    CAPILLARY BLOOD GLUCOSE        I&O's Summary      PHYSICAL EXAM:  GENERAL: Elderly man laying in bed, appears in mild distress  HEAD:  Atraumatic, Normocephalic  EYES: EOMI, PERRLA, conjunctiva and sclera clear  NECK: Supple, No elevated JVD  CHEST/LUNG: Clear to auscultation bilaterally; No wheeze  HEART: Regular rate and rhythm; No murmurs, rubs, or gallops  ABDOMEN: Soft, Nontender, Nondistended; Bowel sounds present  EXTREMITIES:  2+ Peripheral Pulses, No clubbing, cyanosis, or edema  PSYCH: AAOx3  NEUROLOGY: CN II-XII grossly intact, moving all extremities  SKIN: No rashes or lesions    LABS:                        13.0   17.67 )-----------( 324      ( 2019 13:09 )             41.4     04-20    137  |  95<L>  |  16  ----------------------------<  97  4.0   |  27  |  1.18    Ca    9.3      2019 13:09    TPro  7.7  /  Alb  3.6  /  TBili  0.5  /  DBili  x   /  AST  18  /  ALT  16  /  AlkPhos  83  04-20    PT/INR - ( 2019 12:49 )   PT: 14.5 SEC;   INR: 1.26                Urinalysis Basic - ( 2019 12:49 )    Color: DARK BROWN / Appearance: TURBID / S.016 / pH: 6.5  Gluc: NEGATIVE / Ketone: NEGATIVE  / Bili: NEGATIVE / Urobili: NORMAL   Blood: LARGE / Protein: 300 / Nitrite: NEGATIVE   Leuk Esterase: LARGE / RBC: >50 / WBC >50   Sq Epi: OCC / Non Sq Epi: x / Bacteria: NEGATIVE          RADIOLOGY & ADDITIONAL TESTS:    ECG Personally Reviewed -     Imaging Personally Reviewed:    Consultant(s) Notes Reviewed:      Care Discussed with Consultants/Other Providers: (2019 16:35)      PAST MEDICAL & SURGICAL HISTORY:  Hyperlipidemia  Hypertension  History of cataract surgery      Levaquin (Rash)       MEDICATIONS  (STANDING):  ampicillin  IVPB 2 Gram(s) IV Intermittent every 6 hours  aspirin enteric coated 81 milliGRAM(s) Oral daily  atorvastatin 80 milliGRAM(s) Oral at bedtime  finasteride 5 milliGRAM(s) Oral daily  gabapentin 300 milliGRAM(s) Oral daily  loratadine 10 milliGRAM(s) Oral daily    MEDICATIONS  (PRN):  acetaminophen   Tablet .. 650 milliGRAM(s) Oral every 6 hours PRN Temp greater or equal to 38C (100.4F), Mild Pain (1 - 3), Moderate Pain (4 - 6)  guaiFENesin   Syrup  (Sugar-Free) 100 milliGRAM(s) Oral every 6 hours PRN Cough      REVIEW OF SYSTEMS:  CONSTITUTIONAL: (+) malaise.   EYES: No acute change in vision   ENT:  No tinnitus  NECK: No stiffness  RESPIRATORY: No hemoptysis  CARDIOVASCULAR: No chest pain, palpitations, syncope  GASTROINTESTINAL: No hematemesis, diarrhea, melena, or hematochezia.  GENITOURINARY: No hematuria  NEUROLOGICAL: No headaches  LYMPH Nodes: No enlarged glands  ENDOCRINE: No heat or cold intolerance	    T(C): 36.2 (19 @ 10:16), Max: 37.1 (19 @ 13:18)  HR: 80 (19 @ 10:16) (69 - 92)  BP: 131/73 (19 @ 10:16) (122/71 - 146/87)  RR: 17 (19 @ 10:16) (17 - 18)  SpO2: 96% (19 @ 10:16) (96% - 99%)    PHYSICAL EXAMINATION:   Constitutional: WD, NAD  HEENT: NC, AT  Neck:  Supple  Respiratory:  Adequate airflow b/l. Not using accessory muscles of respiration.  Cardiovascular:  S1 & S2 intact, no R/G, 2+ radial pulses b/l  Gastrointestinal: Soft, NT, ND, normoactive b.s., no organomegaly/RT/rigidity  Extremities: WWP  Neurological:  Alert and awake.  No acute focal motor deficits. Crude sensation intact.     Labs and imaging reviewed    LABS:                        11.5   11.06 )-----------( 299      ( 2019 06:15 )             35.8     04-22    140  |  102  |  16  ----------------------------<  109<H>  3.8   |  28  |  0.98    Ca    9.5      2019 06:10  Phos  2.4     -  Mg     2.1     -              CAPILLARY BLOOD GLUCOSE                  RADIOLOGY & ADDITIONAL STUDIES:

## 2019-04-23 NOTE — PHYSICAL THERAPY INITIAL EVALUATION ADULT - PERTINENT HX OF CURRENT PROBLEM, REHAB EVAL
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

## 2019-04-23 NOTE — PROGRESS NOTE ADULT - PROBLEM SELECTOR PLAN 1
improving  awaiting ucx final results  plan for discharge tmrw on po augmentin with outpt f/u w/ pcp Kareem Shahid wtihin 5 days, outpt urology f/u, and will need oupt repeat u/a after completion of antibiotics for f/u of hematuria

## 2019-04-23 NOTE — PHYSICAL THERAPY INITIAL EVALUATION ADULT - ACTIVE RANGE OF MOTION EXAMINATION, REHAB EVAL
bilateral  lower extremity Active ROM was WFL (within functional limits)/bilateral upper extremity Active ROM was WFL (within functional limits)/except R ankle DF -5 degrees

## 2019-04-24 ENCOUNTER — TRANSCRIPTION ENCOUNTER (OUTPATIENT)
Age: 74
End: 2019-04-24

## 2019-04-24 RX ORDER — ACETAMINOPHEN 500 MG
2 TABLET ORAL
Qty: 0 | Refills: 0 | DISCHARGE
Start: 2019-04-24

## 2019-04-24 RX ORDER — AMLODIPINE BESYLATE 2.5 MG/1
1 TABLET ORAL
Qty: 0 | Refills: 0 | COMMUNITY

## 2019-04-24 RX ADMIN — Medication 216 GRAM(S): at 19:29

## 2019-04-24 RX ADMIN — Medication 216 GRAM(S): at 00:21

## 2019-04-24 RX ADMIN — Medication 100 MILLIGRAM(S): at 01:17

## 2019-04-24 RX ADMIN — LORATADINE 10 MILLIGRAM(S): 10 TABLET ORAL at 11:43

## 2019-04-24 RX ADMIN — Medication 216 GRAM(S): at 05:59

## 2019-04-24 RX ADMIN — Medication 216 GRAM(S): at 11:43

## 2019-04-24 RX ADMIN — FINASTERIDE 5 MILLIGRAM(S): 5 TABLET, FILM COATED ORAL at 11:43

## 2019-04-24 RX ADMIN — Medication 81 MILLIGRAM(S): at 11:43

## 2019-04-24 RX ADMIN — ATORVASTATIN CALCIUM 80 MILLIGRAM(S): 80 TABLET, FILM COATED ORAL at 21:34

## 2019-04-24 RX ADMIN — Medication 100 MILLIGRAM(S): at 21:35

## 2019-04-24 RX ADMIN — GABAPENTIN 300 MILLIGRAM(S): 400 CAPSULE ORAL at 11:43

## 2019-04-24 NOTE — PROGRESS NOTE ADULT - SUBJECTIVE AND OBJECTIVE BOX
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 Marco Cedillo who initially presented to the ED yesterday c/o L flank pain for 1-2 days. Diagnosed with a UTI and discharged on keflex, but today urine and blood cultures returned positive for strept agalactiae for which he was called back to the hospital. His sister Nathalia reports that he had a fever Tmax 102 last night, continues to have left flank pain. She states that his urine is always bloody since his last lithotripsy 1.5 months ago, was recently seen by his Urologist 3 weeks ago. Patient also developed a cough since last week, sisters states his aide was recently sick with a URI. Denies SOB/CP.   In the ED, he was found to be tachycardic 109, febrile 100.3, labs notable for leukocytosis of 17.67<-21.23. CT from yesterday was only + for B/L non-obstructing renal calculi. Patient was given 1 dose of unasyn and admitted for further management.     Levaquin (Rash)       MEDICATIONS  (STANDING):  ampicillin  IVPB 2 Gram(s) IV Intermittent every 6 hours  aspirin enteric coated 81 milliGRAM(s) Oral daily  atorvastatin 80 milliGRAM(s) Oral at bedtime  finasteride 5 milliGRAM(s) Oral daily  gabapentin 300 milliGRAM(s) Oral daily  loratadine 10 milliGRAM(s) Oral daily    MEDICATIONS  (PRN):  acetaminophen   Tablet .. 650 milliGRAM(s) Oral every 6 hours PRN Temp greater or equal to 38C (100.4F), Mild Pain (1 - 3), Moderate Pain (4 - 6)  guaiFENesin   Syrup  (Sugar-Free) 100 milliGRAM(s) Oral every 6 hours PRN Cough      REVIEW OF SYSTEMS:  CONSTITUTIONAL: (+) improving malaise.   EYES: No acute change in vision   ENT:  No tinnitus  NECK: No stiffness  RESPIRATORY: No hemoptysis  CARDIOVASCULAR: No chest pain, palpitations, syncope  GASTROINTESTINAL: No hematemesis, diarrhea, melena, or hematochezia.  GENITOURINARY: No hematuria  NEUROLOGICAL: No headaches  LYMPH Nodes: No enlarged glands  ENDOCRINE: No heat or cold intolerance	    Vital Signs Last 24 Hrs  T(C): 37.1 (24 Apr 2019 06:12), Max: 37.1 (24 Apr 2019 06:12)  T(F): 98.8 (24 Apr 2019 06:12), Max: 98.8 (24 Apr 2019 06:12)  HR: 74 (24 Apr 2019 06:12) (74 - 82)  BP: 116/71 (24 Apr 2019 06:12) (116/71 - 149/82)  BP(mean): --  RR: 18 (24 Apr 2019 06:12) (17 - 18)  SpO2: 96% (24 Apr 2019 06:12) (96% - 99%)    PHYSICAL EXAMINATION:   Constitutional: WD, NAD  HEENT: NC, AT  Neck:  Supple  Respiratory:  Adequate airflow b/l. Not using accessory muscles of respiration.  Cardiovascular:  S1 & S2 intact, no R/G, 2+ radial pulses b/l  Gastrointestinal: Soft, NT, ND, normoactive b.s., no organomegaly/RT/rigidity  Extremities: WWP  Neurological:  Alert and awake.  No acute focal motor deficits. Crude sensation intact.     Labs and imaging reviewed    LABS:                        11.5   11.06 )-----------( 299      ( 23 Apr 2019 06:15 )             35.8     04-22    140  |  102  |  16  ----------------------------<  109<H>  3.8   |  28  |  0.98    Ca    9.5      22 Apr 2019 06:10  Phos  2.4     04-22  Mg     2.1     04-22

## 2019-04-24 NOTE — DISCHARGE NOTE PROVIDER - HOSPITAL COURSE
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 Marco Cedillo who initially presented to the ED yesterday c/o L flank pain for 1-2 days. Diagnosed with a UTI and discharged on keflex, but today urine and blood cultures returned positive for strept agalactiae for which he was called back to the hospital.        hospital course         Sepsis     - Likely due to GBS bacteremia 2/2 GBS UTI    - IV ampicillin q6    - 4/19 BCx- GPC TBI, UCx- Strep Agalactiae    - 4/20 blood Cx x2- NTD, Urine cx- NTD    - RVP + entero/rhinovirus     - CXR -No acute pulmonary disease        RIN     - Likely pre-renal, F/U urine lytes, bladder scan to r/o obstruction    - CT stone hunt negative for obstruction         Gross hematuria    - Hx of nephrolithiasis followed by Dr Marco Cedillo with recent lithotripsy, reported hematuria since procedure    - CT with non-obstructing B/L renal calculi         CVA    - Stable with residual R hemiplegia and expressive aphasia    - continue home ASA, statin         Lymphoproliferative disease    - Outpatient, likely no indication for treatment at this time given no cytopenias, lymphadenopathy, or symptoms.         Chronic pulmonary embolism without acute cor pulmonale    - PE diagnosed in 2/2018 (no longer on lovenox)    - stable respiratory status, continue to monitor        Essential hypertension    - BP stable    - monitor off of home amlodipine for now in setting of sepsis        Prophylactic measure.      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 Marco Cedillo who initially presented to the ED yesterday c/o L flank pain for 1-2 days. Diagnosed with a UTI and discharged on keflex, but today urine and blood cultures returned positive for strept agalactiae for which he was called back to the hospital.        hospital course         Sepsis     - Likely due to GBS bacteremia 2/2 GBS UTI    - IV ampicillin q6    - 4/19 BCx- GPC TBI, UCx- Strep Agalactiae    - 4/20 blood Cx x2- NTD, Urine cx- NTD    - RVP + entero/rhinovirus     - CXR -No acute pulmonary disease    -Augmentin 875 BID x 2 weeks         RIN     - Likely pre-renal, F/U urine lytes, bladder scan to r/o obstruction    - CT stone hunt negative for obstruction         Gross hematuria    - Hx of nephrolithiasis followed by Dr Marco Cedillo with recent lithotripsy, reported hematuria since procedure    - CT with non-obstructing B/L renal calculi     -as per dr adhikari can be followed up outpt         CVA    - Stable with residual R hemiplegia and expressive aphasia    - continue home ASA, statin         Lymphoproliferative disease    - Outpatient, likely no indication for treatment at this time given no cytopenias, lymphadenopathy, or symptoms.         Chronic pulmonary embolism without acute cor pulmonale    - PE diagnosed in 2/2018 (no longer on lovenox)    - stable respiratory status, continue to monitor        Essential hypertension    - BP stable    - monitor off of home amlodipine for now in setting of sepsis        Prophylactic measure.      - DVT PPx: ICD, will hold off on AC in setting of hematuria    Diet: DASH/TLC            dispo: home

## 2019-04-24 NOTE — PROGRESS NOTE ADULT - PROBLEM SELECTOR PLAN 1
improved  *** ->pt is medically cleared for safe discharge home today with outpt PCP f/u within 3-5 days  discharge on po augmentin with outpt f/u w/ pcp Kareem Shahid  outpt urology f/u  pt will need oupt repeat u/a after completion of antibiotics for f/u of hematuria

## 2019-04-24 NOTE — DISCHARGE NOTE PROVIDER - PROVIDER TOKENS
PROVIDER:[TOKEN:[[id number]]] PROVIDER:[TOKEN:[[id number]]],FREE:[LAST:[[place]],FIRST:[Arandrews],PHONE:[(   )    -],FAX:[(   )    -]]

## 2019-04-24 NOTE — PROGRESS NOTE ADULT - ASSESSMENT
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 Marco Cedillo who initially presented to the ED yesterday c/o L flank pain for 1-2 days. Diagnosed with a UTI and discharged on keflex, but today urine and blood cultures returned positive for strept agalactiae for which he was called back to the hospital. His sister Nathalia reports that he had a fever Tmax 102 last night, continues to have left flank pain. She states that his urine is always bloody since his last lithotripsy 1.5 months ago, was recently seen by his Urologist 3 weeks ago. Patient also developed a cough since last week, sisters states his aide was recently sick with a URI. Denies SOB/CP.     In the ED, he was found to be tachycardic 109, febrile 100.3, labs notable for leukocytosis of 17.67<-21.23. CT from yesterday was only + for B/L non-obstructing renal calculi. Patient was given 1 dose of unasyn and admitted for further management.     ER vitals:  T 99, P 109, /65.  WBC 21.2 --> 13.3.  UA (+) LE, pyuria, RBCs.  RVP (+) enterorhinovirus.  4/19 Ucx (+) GB strep >100K.  Bcx (+) GB strep.  CT renal stone hunt with b/l nonobstructing renal calculi.      Pt on ampicillin.  ID consult called for further abx managment.        Sepsis: (tachycardia, leukocytosis, bacteremia)    - Source from UTI.  Pt currently on ampicillin for bacteremia, urine cultures also growing Grp B strep.  Repeat blood cultures NGTD.  Switch to augmentin 875mg bid 2 more weeks      - Cont to monitor WBC and fever curve.  CT renal stone hunt without obstruction.  Pt with b/l ureteral stents.  Called pt's Urologist, Dr. Cedillo, and discussed pt's hospital course and managment of urosepsis and that sepsis resolved with repeat cultures now negative.   Pt will be followed up in Dr. Schnapp's office for removal of ureteral stents in approximately 2 weeks.          Ирина Kingsley  [phone]

## 2019-04-24 NOTE — PROGRESS NOTE ADULT - SUBJECTIVE AND OBJECTIVE BOX
Infectious Diseases progress note:    Subjective: No acute o/n events.  Pt without flank pain, or dysuria.  Repeat cultures cleared.  Leukocytosis resolving.  Afebrile.  Pt feeling better    ROS:  CONSTITUTIONAL:  No fever, chills, rigors  CARDIOVASCULAR:  No chest pain or palpitations  RESPIRATORY:   No SOB, cough, dyspnea on exertion.  No wheezing  GASTROINTESTINAL:  No abd pain, N/V, diarrhea/constipation  EXTREMITIES:  No swelling or joint pain  GENITOURINARY:  No burning on urination, increased frequency or urgency.  No flank pain  NEUROLOGIC:  No HA, visual disturbances  SKIN: No rashes    Allergies    Levaquin (Rash)    Intolerances        ANTIBIOTICS/RELEVANT:  antimicrobials  ampicillin  IVPB 2 Gram(s) IV Intermittent every 6 hours    immunologic:    OTHER:  acetaminophen   Tablet .. 650 milliGRAM(s) Oral every 6 hours PRN  aspirin enteric coated 81 milliGRAM(s) Oral daily  atorvastatin 80 milliGRAM(s) Oral at bedtime  finasteride 5 milliGRAM(s) Oral daily  gabapentin 300 milliGRAM(s) Oral daily  guaiFENesin   Syrup  (Sugar-Free) 100 milliGRAM(s) Oral every 6 hours PRN  loratadine 10 milliGRAM(s) Oral daily      Objective:  Vital Signs Last 24 Hrs  T(C): 36.7 (24 Apr 2019 11:25), Max: 37.1 (24 Apr 2019 06:12)  T(F): 98.1 (24 Apr 2019 11:25), Max: 98.8 (24 Apr 2019 06:12)  HR: 95 (24 Apr 2019 11:25) (74 - 95)  BP: 128/77 (24 Apr 2019 11:25) (116/71 - 149/82)  BP(mean): --  RR: 17 (24 Apr 2019 11:25) (17 - 18)  SpO2: 100% (24 Apr 2019 11:25) (96% - 100%)    PHYSICAL EXAM:  Constitutional:NAD  Eyes:JOSEE, EOMI  Ear/Nose/Throat: no thrush, mucositis.  Moist mucous membranes	  Neck:no JVD, no lymphadenopathy, supple  Respiratory: CTA tea  Cardiovascular: S1S2 RRR, no murmurs  Gastrointestinal:soft, nontender,  nondistended (+) BS  Extremities:no e/e/c  Skin:  no rashes, open wounds or ulcerations        LABS:                        11.5   11.06 )-----------( 299      ( 23 Apr 2019 06:15 )             35.8         MICROBIOLOGY:    Culture - Urine (04.20.19 @ 16:31)    Culture - Urine:   NO GROWTH AT 24 HOURS    Specimen Source: URINE MIDSTREAM    Culture - Blood (04.20.19 @ 14:52)    Culture - Blood:   NO ORGANISMS ISOLATED  NO ORGANISMS ISOLATED AT 72 HRS.    Specimen Source: BLOOD VENOUS    Culture - Blood (04.20.19 @ 14:52)    Culture - Blood:   NO ORGANISMS ISOLATED  NO ORGANISMS ISOLATED AT 72 HRS.    Specimen Source: BLOOD    Culture - Blood (04.19.19 @ 07:44)    Culture - Blood:   SEE PREVIOUS CULTURE: [id number] COLLECTED 04/19/19 0730  RECEIVED 04/19/19 0744 FOR ALEX  RESULT CALLED TO / READ BACK: RN MENDELSON,L./Y  DATE / TIME CALLED: 04/21/19 1331  CALLED BY: MARISOL SEAY^Strep Beta Hemolytic Gr B (SS)    Specimen Source: BLOOD VENOUS    Gram Stain Blood:   ***** CRITICAL RESULT *****  PERSON CALLED / READ-BACK: BASHIR MILLER / Y  DATE / TIME CALLED: 04/19/19 2045  CALLED BY: JAIDEN PARRISH  [facility]^Gram Pos Cocci in Chains  AFTER: 12 HOURS INCUBATION  BOTTLE: AEROBIC   ANAEROBIC BOTTLES    Culture - Blood (04.19.19 @ 07:44)    -  Penicillin Beta Strep: S 0.12 ALEX    -  Streptococcus agalactiae (Group B): + DETECT ALEX    Culture - Blood:   ***Blood Panel PCR results on this specimen are available  approximately 3 hours after the Gram stain result***  Gram stain, PCR, and/or culture results may not always  correspond due to difference in methodologies  ------------------------------------------------------------  This PCR assay was performed using [employer].  The  following targets are tested for:  Enterococcus, vancomycin  resistant enterococci, Listeria monocytogenes,  coagulase  negative staphylococci, S. aureus, methicillin resistant S.  aureus, Streptococcus agalactiae (Group B), S. pneumoniae,  S. pyogenes (Group A), Acinetobacter baumannii, Enterobacter  cloacae, E. coli, Klebsiella oxytoca, K. pneumoniae, Proteus  sp., Serratia marcescens, Haemophilus influenzae, Neisseria  meningitidis, Pseudomonas aeruginosa, Candida albicans, C.  glabrata, C. krusei, C. parapsilosis, C. tropicalis and the  KPC resistance gene.  **NOTE: Due to technical problems, Proteus sp. will NOT be  reported as part of the BCID paneluntil further notice.    Culture - Blood:   RESULT CALLED TO / READ BACK: RN MENDELSON,L./Y  DATE / TIME CALLED: 04/21/19 1332  CALLED BY: MARISOL SEAY    -  Ceftriaxone: S <=1 ALEX    -  Clindamycin: S 0.25 ALEX    -  Erythromycin: S <0.5 ALEX    -  Levofloxacin: S <=1 ALEX    -  Vancomycin: S <=0.5 ALEX    Specimen Source: BLOOD PERIPHERAL    Organism: Strep Beta Hemolytic Gr B (SS)    Organism: BLOOD CULTURE PCR  ***Blood Panel PCR results on this specimen are available  approximately 3 hours after the Gram stain result***  Gram stain, PCR, and/or culture results may not always  correspond due to difference in methodologies  ------------------------------------------------------------  This PCR assay was performed using [employer].  The  following targets are tested for:  Enterococcus, vancomycin  resistant enterococci, Listeria monocytogenes,  coagulase  negative staphylococci, S. aureus, methicillin resistant S.  aureus, Streptococcus agalactiae (Group B), S. pneumoniae,  S. pyogenes (Group A), Acinetobacter baumannii, Enterobacter  cloacae, E. coli, Klebsiella oxytoca, K. pneumoniae, Proteus  sp., Serratia marcescens, Haemophilus influenzae, Neisseria  meningitidis, Pseudomonas aeruginosa, Candida albicans, C.  glabrata, C. krusei, C. parapsilosis, C. tropicalis and the  KPC resistance gene.  **NOTE: Due to technical problems, Proteus sp. will NOT be  reported as part of the BCID panel until further notice.    Gram Stain Blood:   ***** CRITICAL RESULT *****  PERSON CALLED / READ-BACK: RASHEED O MEIL, PA./ Y  DATE / TIME CALLED: 04/19/19 2043  CALLED BY: STARLEXJOHANNAJAIDEN  [facility]^Gram Pos Cocci in Chains  AFTER: 12 HOURS INCUBATION  BOTTLE: AEROBIC   ANAEROBIC BOTTLES    Organism Identification: BLOOD CULTURE PCR  Strep Beta Hemolytic Gr B (SS)    Method Type: PCR    Method Type: MANUAL ALEX    Culture - Urine (04.19.19 @ 05:50)    Culture - Urine:   [facility]^Gram Pos Cocci to be IDed  COLONY COUNT: > = 100,000 CFU/ML    Culture - Urine:   Routine susceptibility testing not performed as  Streptococcus Grp A/B is susceptible to drug(s) of choice  (Penicillin and Ampicillin).  STRAG^Streptococcus agalactiae Gp B  COLONY COUNT: > = 100,000 CFU/ML    Specimen Source: URINE MIDSTREAM          RADIOLOGY & ADDITIONAL STUDIES:    < from: Xray Chest 1 View AP/PA (04.20.19 @ 18:07) >  FINDINGS:  Both lungs are equally aerated and free of any focal abnormalities. The   heart is not enlarged and there is no effusion.        COMPARISON:  January 28, 2018.        IMPRESSION:  No acute pulmonary disease.    < end of copied text >        < from: CT Renal Stone Hunt (04.19.19 @ 08:30) >  FINDINGS:    LOWER CHEST: Within normal limits.    LIVER: Subcentimeter hypodense lesionin the left hepatic lobe, too small   to characterize.  BILE DUCTS: Normal caliber.   GALLBLADDER: Within normal limits.  SPLEEN: Within normal limits.  PANCREAS: Within normal limits.  ADRENALS: Within normal limits.  KIDNEYS/URETERS: Bilateral ureteral stents. Nonobstructing bilateral   renal calculi, measuring up to 0.9 cm in the left lower pole. Mild   fullness of the right renal collecting system.    BLADDER: Within normal limits.  REPRODUCTIVE ORGANS: The prostate is enlarged.    BOWEL: No bowel obstruction. Normal appendix. Colonic diverticulosis.   PERITONEUM: No ascites.  VESSELS:  Atherosclerotic change of the abdominal aorta and its branches.  RETROPERITONEUM: No lymphadenopathy.    ABDOMINAL WALL: Within normal limits.  BONES: Degenerative changes of the spine.    IMPRESSION: Nonobstructing bilateral renal calculi.    < end of copied text >

## 2019-04-24 NOTE — DISCHARGE NOTE PROVIDER - NSDCHC_MEDRECSTATUS_GEN_ALL_CORE
Admission Reconciliation is Not Complete  Discharge Reconciliation is Not Complete Admission Reconciliation is Completed  Discharge Reconciliation is Completed Admission Reconciliation is Completed  Discharge Reconciliation is Not Complete

## 2019-04-24 NOTE — DISCHARGE NOTE PROVIDER - NSDCCPCAREPLAN_GEN_ALL_CORE_FT
PRINCIPAL DISCHARGE DIAGNOSIS  Diagnosis: Sepsis due to group B Streptococcus  Assessment and Plan of Treatment: Please continue Augmentin 875mg BID as directed.  Cleared by infectious disease doctor for discharge. Please follow up with your urologist Dr. Huff outpatient within 1 -2 weeks.      SECONDARY DISCHARGE DIAGNOSES  Diagnosis: Bacteremia  Assessment and Plan of Treatment: BacteremiaPlease continue Augmentin 875mg BID as directed.  Cleared by infectious disease doctor for discharge. Please follow up with your urologist Dr. Huff outpatient within 1 -2 weeks.    Diagnosis: Cerebrovascular accident (CVA), unspecified mechanism  Assessment and Plan of Treatment: Continue with home aspirin andstatin at this time.    Diagnosis: Essential hypertension  Assessment and Plan of Treatment: Continue blood pressure medication regimen as directed. Monitor for any visual changes, headaches or dizziness.  Monitor blood pressure regularly.  Follow up with your PCP for further management for high blood pressure.      Diagnosis: Chronic pulmonary embolism without acute cor pulmonale, unspecified pulmonary embolism type  Assessment and Plan of Treatment: State at this time    Diagnosis: Gross hematuria  Assessment and Plan of Treatment: Please follow up with your urologist Dr. Huff outpatient within 1 -2 weeks for possible stent exchange and evaluation of hematuria.    Diagnosis: Acute pyelonephritis  Assessment and Plan of Treatment: Please continue Augmentin 875mg BID as directed.  Cleared by infectious disease doctor for discharge. Please follow up with your urologist Dr. Huff outpatient within 1 -2 weeks.

## 2019-04-24 NOTE — DISCHARGE NOTE PROVIDER - CARE PROVIDER_API CALL
Marco Cedillo)  Urology  [address] [place] [address]  Phone: [phone]  Fax: [phone]  Follow Up Time: Marco Cedillo)  Urology  [address] [place] [address]  Phone: [phone]  Fax: [phone]  Follow Up Time:     Kareem Cerda  Phone: (   )    -  Fax: (   )    -  Follow Up Time:

## 2019-04-24 NOTE — CONSULT NOTE ADULT - ATTENDING COMMENTS
[facility] NEPHROLOGY  Winston Viramontes M.D.  Lan eLe D.O.  Melissa Johnson M.D.  Amanda Goldman, MSN, ANP-C    Telephone: [phone]  Facsimile: [phone]    [address] [place] [address]

## 2019-04-24 NOTE — CONSULT NOTE ADULT - SUBJECTIVE AND OBJECTIVE BOX
Pt interviewed and examined. Full note to follow. [facility] NEPHROLOGY- CONSULTATION NOTE    Patient is a 73y Male with viral URI also has new gross hematuria.  UCx was negative.  Pt and family are concerned about hematuria.  No clots.  No difficulty urinating.  No pain.  Hb stable.  Viral URI symptoms improving.    PAST MEDICAL & SURGICAL HISTORY:  Hyperlipidemia  Hypertension  History of cataract surgery    Levaquin (Rash)    Home Medications Reviewed  Hospital Medications:   MEDICATIONS  (STANDING):  ampicillin  IVPB 2 Gram(s) IV Intermittent every 6 hours  aspirin enteric coated 81 milliGRAM(s) Oral daily  atorvastatin 80 milliGRAM(s) Oral at bedtime  finasteride 5 milliGRAM(s) Oral daily  gabapentin 300 milliGRAM(s) Oral daily  loratadine 10 milliGRAM(s) Oral daily    SOCIAL HISTORY:  Denies ETOh,Smoking,   FAMILY HISTORY:  No pertinent family history in first degree relatives    REVIEW OF SYSTEMS:  CONSTITUTIONAL: mild generized weakness, fevers or chills  EYES/ENT: No visual changes;  No vertigo or throat pain   NECK: No pain or stiffness  RESPIRATORY: + cough, no wheezing, no hemoptysis; No shortness of breath  CARDIOVASCULAR: No chest pain or palpitations.  GASTROINTESTINAL: No abdominal or epigastric pain. No nausea, vomiting, or hematemesis; No diarrhea or constipation. No melena or hematochezia.  GENITOURINARY: No dysuria, frequency, foamy urine, urinary urgency, incontinence.  NEUROLOGICAL: No numbness or weakness  SKIN: No itching, burning, rashes, or lesions   VASCULAR: No bilateral lower extremity edema.   All other review of systems is negative unless indicated above.    VITALS:  T(F): 98.7 (19 @ 01:20), Max: 98.9 (19 @ 17:40)  HR: 77 (19 @ 01:20)  BP: 115/77 (19 @ 01:20)  RR: 18 (19 @ 01:20)  SpO2: 98% (19 @ 01:20)  Wt(kg): --     @ 07:01  -   @ 07:00  --------------------------------------------------------  IN: 450 mL / OUT: 1200 mL / NET: -750 mL     @ 07:01  -   @ 02:04  --------------------------------------------------------  IN: 0 mL / OUT: 1600 mL / NET: -1600 mL          PHYSICAL EXAM:  Constitutional: NAD  HEENT: anicteric sclera, oropharynx clear, MMM  Neck: No JVD  Respiratory: CTAB, no wheezes, rales or rhonchi  Cardiovascular: S1, S2, RRR  Gastrointestinal: BS+, soft, NT/ND  Extremities: No cyanosis or clubbing. No peripheral edema  Neurological: A/O x 3, no focal deficits  Psychiatric: Normal mood, normal affect  : No CVA tenderness. No garibay.   Skin: No rashes  Vascular Access:    LABS:    Creatinine Trend: 0.98 <--, 0.98 <--, 1.18 <--, 1.07 <--                        11.5   11.06 )-----------( 299      ( 2019 06:15 )             35.8     Urine Studies:  Urinalysis Basic - ( 2019 12:49 )    Color: DARK BROWN / Appearance: TURBID / S.016 / pH: 6.5  Gluc: NEGATIVE / Ketone: NEGATIVE  / Bili: NEGATIVE / Urobili: NORMAL   Blood: LARGE / Protein: 300 / Nitrite: NEGATIVE   Leuk Esterase: LARGE / RBC: >50 / WBC >50   Sq Epi: OCC / Non Sq Epi:  / Bacteria: NEGATIVE        RADIOLOGY & ADDITIONAL STUDIES:    < from: CT Renal Stone Hunt (19 @ 08:30) >    EXAM:  CT RENAL STONE FERREIRA        PROCEDURE DATE:  2019         INTERPRETATION:  CLINICAL INFORMATION: Flank pain, history of   nephrolithiasis and lithotripsy. Left flank pain.    COMPARISON: 2018.    PROCEDURE:   CT of the Abdomen and Pelvis was performed without intravenous contrast.   Intravenous contrast: None.  Oral contrast: None.  Sagittal and coronal reformats were performed.    FINDINGS:    LOWER CHEST: Within normal limits.    LIVER: Subcentimeter hypodense lesionin the left hepatic lobe, too small   to characterize.  BILE DUCTS: Normal caliber.   GALLBLADDER: Within normal limits.  SPLEEN: Within normal limits.  PANCREAS: Within normal limits.  ADRENALS: Within normal limits.  KIDNEYS/URETERS: Bilateral ureteral stents. Nonobstructing bilateral   renal calculi, measuring up to 0.9 cm in the left lower pole. Mild   fullness of the right renal collecting system.    BLADDER: Within normal limits.  REPRODUCTIVE ORGANS: The prostate is enlarged.    BOWEL: No bowel obstruction. Normal appendix. Colonic diverticulosis.   PERITONEUM: No ascites.  VESSELS:  Atherosclerotic change of the abdominal aorta and its branches.  RETROPERITONEUM: No lymphadenopathy.    ABDOMINAL WALL: Within normal limits.  BONES: Degenerative changes of the spine.    IMPRESSION: Nonobstructing bilateral renal calculi.                  LJ MIRZA M.D., ATTENDING RADIOLOGIST  This document has been electronically signed. 2019  8:55AM                  < end of copied text >

## 2019-04-25 RX ORDER — PANTOPRAZOLE SODIUM 20 MG/1
40 TABLET, DELAYED RELEASE ORAL DAILY
Qty: 0 | Refills: 0 | Status: DISCONTINUED | OUTPATIENT
Start: 2019-04-25 | End: 2019-04-27

## 2019-04-25 RX ADMIN — Medication 216 GRAM(S): at 18:22

## 2019-04-25 RX ADMIN — Medication 100 MILLIGRAM(S): at 17:01

## 2019-04-25 RX ADMIN — PANTOPRAZOLE SODIUM 40 MILLIGRAM(S): 20 TABLET, DELAYED RELEASE ORAL at 12:24

## 2019-04-25 RX ADMIN — Medication 216 GRAM(S): at 23:51

## 2019-04-25 RX ADMIN — GABAPENTIN 300 MILLIGRAM(S): 400 CAPSULE ORAL at 12:24

## 2019-04-25 RX ADMIN — FINASTERIDE 5 MILLIGRAM(S): 5 TABLET, FILM COATED ORAL at 12:24

## 2019-04-25 RX ADMIN — LORATADINE 10 MILLIGRAM(S): 10 TABLET ORAL at 12:24

## 2019-04-25 RX ADMIN — Medication 216 GRAM(S): at 12:25

## 2019-04-25 RX ADMIN — ATORVASTATIN CALCIUM 80 MILLIGRAM(S): 80 TABLET, FILM COATED ORAL at 22:01

## 2019-04-25 RX ADMIN — Medication 216 GRAM(S): at 05:21

## 2019-04-25 RX ADMIN — Medication 216 GRAM(S): at 00:32

## 2019-04-25 NOTE — CONSULT NOTE ADULT - ASSESSMENT
74 yo M with HTN and nephrolithiasis s/p b/l ureteroscopy with stent placement presenting with GBS bacteremia in the setting of UTI. Currently on antibiotics and stable. 1cm left lower pole and b/l punctate residual stones on CT. Stents in place.    - Hematuria likely secondary to ureteral stents. Urine color is reddish emilee  - Patient is voiding well subjectively  - No need for urologic intervention at this time  - Continue antibiotics per primary team  - Patient to follow-up with Dr. Cedillo as an outpatient
73 year-old man with gross hematuria likely due to B renal calculi (nonobstructing).  Pt with B ureteral stents.  Mild fullness/hydronephrosis of R collecting system.    Further urology evaluation may be done as outpatient or inpatient, but pt must f/u with urologist regardless.
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 Marco Cedillo who initially presented to the ED yesterday c/o L flank pain for 1-2 days. Diagnosed with a UTI and discharged on keflex, but today urine and blood cultures returned positive for strept agalactiae for which he was called back to the hospital. His sister Nathalia reports that he had a fever Tmax 102 last night, continues to have left flank pain. She states that his urine is always bloody since his last lithotripsy 1.5 months ago, was recently seen by his Urologist 3 weeks ago. Patient also developed a cough since last week, sisters states his aide was recently sick with a URI. Denies SOB/CP.     In the ED, he was found to be tachycardic 109, febrile 100.3, labs notable for leukocytosis of 17.67<-21.23. CT from yesterday was only + for B/L non-obstructing renal calculi. Patient was given 1 dose of unasyn and admitted for further management.     ER vitals:  T 99, P 109, /65.  WBC 21.2 --> 13.3.  UA (+) LE, pyuria, RBCs.  RVP (+) enterorhinovirus.  4/19 Ucx (+) GB strep >100K.  Bcx (+) GB strep.  CT renal stone hunt with b/l nonobstructing renal calculi.      Pt on ampicillin.  ID consult called for further abx managment.        Sepsis: (tachycardia, leukocytosis, bacteremia)    - Source from UTI.  Pt currently on ampicillin for bacteremia, urine cultures also growing Grp B strep.  F/u repeat blood cultures.  IF (-) at 48 hrs, can switch to augmentin 875mg bid x total 2 weeks from date of negative blood cultures.        - Cont to monitor WBC and fever curve.  CT renal stone hunt without obstruction.      Will follow,    Ирина Kingsley  [phone]

## 2019-04-25 NOTE — PROVIDER CONTACT NOTE (OTHER) - ACTION/TREATMENT ORDERED:
Provider assessed at bedside; BM was flushed; provider is unsure if blood is from urine or BM; diet temporarily changed to clears, Protonix ordered.

## 2019-04-25 NOTE — PROGRESS NOTE ADULT - ASSESSMENT
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 Marco Cedillo who initially presented to the ED yesterday c/o L flank pain for 1-2 days. Diagnosed with a UTI and discharged on keflex, but today urine and blood cultures returned positive for strept agalactiae for which he was called back to the hospital. His sister Nathalia reports that he had a fever Tmax 102 last night, continues to have left flank pain. She states that his urine is always bloody since his last lithotripsy 1.5 months ago, was recently seen by his Urologist 3 weeks ago. Patient also developed a cough since last week, sisters states his aide was recently sick with a URI. Denies SOB/CP.     In the ED, he was found to be tachycardic 109, febrile 100.3, labs notable for leukocytosis of 17.67<-21.23. CT from yesterday was only + for B/L non-obstructing renal calculi. Patient was given 1 dose of unasyn and admitted for further management.     ER vitals:  T 99, P 109, /65.  WBC 21.2 --> 13.3.  UA (+) LE, pyuria, RBCs.  RVP (+) enterorhinovirus.  4/19 Ucx (+) GB strep >100K.  Bcx (+) GB strep.  CT renal stone hunt with b/l nonobstructing renal calculi.      Pt on ampicillin.  ID consult called for further abx managment.        Sepsis: (tachycardia, leukocytosis, bacteremia)    - Source from UTI.  Pt currently on ampicillin for bacteremia, urine cultures also growing Grp B strep.  Repeat blood cultures NGTD.  Switch to augmentin 875mg bid 2 more weeks      - Cont to monitor WBC and fever curve.  CT renal stone hunt without obstruction.  Pt with b/l ureteral stents.  Called pt's Urologist, Dr. Cedillo, and discussed pt's hospital course and managment of urosepsis and that sepsis resolved with repeat cultures now negative.   Pt will be followed up in Dr. Schnapp's office for removal of ureteral stents in approximately 2 weeks.      - Still c/o hematuria.  Consider inpt Urology evaluation        Ирина Kingsley  [phone]

## 2019-04-25 NOTE — PROGRESS NOTE ADULT - PROBLEM SELECTOR PLAN 1
improved sepsis, however mild gross hematuria - f/u urology for further evaluation, if no inpt management then discharge home

## 2019-04-25 NOTE — CONSULT NOTE ADULT - SUBJECTIVE AND OBJECTIVE BOX
[facility]  Mr Hollis is a 74 yo M with hx HTN presenting due to GBS UTI and bacteremia. Approximately 2 months ago patient had b/l ureteroscopy for renal stones with Dr. Cedillo. Bilateral ureteral stents were placed at that time and are still in place. Dr. Cedillo had been reached out to by the primary team as was told that the stents will be removed in 2 weeks after discharge. The patient has been stable on antibiotics since admission. Urology was consulted for worsening hematuria.    Upon talking to the family, the hematuria has been present since the stents were placed. Since admission, he denies any lower urinary tract symptoms, fevers or flank pain.    PAST MEDICAL & SURGICAL HISTORY:  Hyperlipidemia  Hypertension  History of cataract surgery      MEDICATIONS  (STANDING):  ampicillin  IVPB 2 Gram(s) IV Intermittent every 6 hours  aspirin enteric coated 81 milliGRAM(s) Oral daily  atorvastatin 80 milliGRAM(s) Oral at bedtime  finasteride 5 milliGRAM(s) Oral daily  gabapentin 300 milliGRAM(s) Oral daily  loratadine 10 milliGRAM(s) Oral daily  pantoprazole  Injectable 40 milliGRAM(s) IV Push daily    MEDICATIONS  (PRN):  acetaminophen   Tablet .. 650 milliGRAM(s) Oral every 6 hours PRN Temp greater or equal to 38C (100.4F), Mild Pain (1 - 3), Moderate Pain (4 - 6)  guaiFENesin   Syrup  (Sugar-Free) 100 milliGRAM(s) Oral every 6 hours PRN Cough      FAMILY HISTORY:  No pertinent family history in first degree relatives      Allergies  Levaquin (Rash)        REVIEW OF SYSTEMS:   Otherwise negative as stated in HPI    Physical Exam  Vital signs  T(C): 37.6 (04-25-19 @ 17:07), Max: 37.6 (04-25-19 @ 17:07)  HR: 68 (04-25-19 @ 17:07)  BP: 115/76 (04-25-19 @ 17:07)  SpO2: 99% (04-25-19 @ 17:07)  Wt(kg): --    Output    OUT:    Voided: 1600 mL  Total OUT: 1600 mL    Total NET: -1600 mL      Gen:  NAD    Pulm:  No respiratory distress    GI:  S/ND/NT    :  No flank pain b/l  Urine reddish emilee in urinal      Urine Cx: Negative  Blood Cx: Negative

## 2019-04-25 NOTE — PROGRESS NOTE ADULT - SUBJECTIVE AND OBJECTIVE BOX
Infectious Diseases progress note:    Subjective:  NAD, events noted.  Pt with hematuria, possible fecal contamination with urine.  Pt c/o suprapubic discomfort.  No flank pain.  Afebrile.  Pt's wife at bedside.      ROS:  CONSTITUTIONAL:  No fever, chills, rigors  CARDIOVASCULAR:  No chest pain or palpitations  RESPIRATORY:   No SOB, cough, dyspnea on exertion.  No wheezing  GASTROINTESTINAL:  No abd pain, N/V, diarrhea/constipation  EXTREMITIES:  No swelling or joint pain  GENITOURINARY:  No burning on urination, increased frequency or urgency.  No flank pain  NEUROLOGIC:  No HA, visual disturbances  SKIN: No rashes    Allergies    Levaquin (Rash)    Intolerances        ANTIBIOTICS/RELEVANT:  antimicrobials  ampicillin  IVPB 2 Gram(s) IV Intermittent every 6 hours    immunologic:    OTHER:  acetaminophen   Tablet .. 650 milliGRAM(s) Oral every 6 hours PRN  aspirin enteric coated 81 milliGRAM(s) Oral daily  atorvastatin 80 milliGRAM(s) Oral at bedtime  finasteride 5 milliGRAM(s) Oral daily  gabapentin 300 milliGRAM(s) Oral daily  guaiFENesin   Syrup  (Sugar-Free) 100 milliGRAM(s) Oral every 6 hours PRN  loratadine 10 milliGRAM(s) Oral daily  pantoprazole  Injectable 40 milliGRAM(s) IV Push daily      Objective:  Vital Signs Last 24 Hrs  T(C): 36.9 (25 Apr 2019 10:51), Max: 37.2 (24 Apr 2019 17:40)  T(F): 98.5 (25 Apr 2019 10:51), Max: 98.9 (24 Apr 2019 17:40)  HR: 72 (25 Apr 2019 10:51) (72 - 89)  BP: 116/68 (25 Apr 2019 10:51) (107/72 - 131/74)  BP(mean): --  RR: 20 (25 Apr 2019 10:51) (18 - 20)  SpO2: 100% (25 Apr 2019 10:51) (98% - 100%)    PHYSICAL EXAM:  Constitutional:NAD  Eyes:JOSEE, EOMI  Ear/Nose/Throat: no thrush, mucositis.  Moist mucous membranes	  Neck:no JVD, no lymphadenopathy, supple  Respiratory: CTA tea  Cardiovascular: S1S2 RRR, no murmurs  Gastrointestinal:soft, nontender,  nondistended (+) BS  Extremities:no e/e/c  Skin:  no rashes, open wounds or ulcerations        LABS:          MICROBIOLOGY:    Culture - Urine (04.20.19 @ 16:31)    Culture - Urine:   NO GROWTH AT 24 HOURS    Specimen Source: URINE MIDSTREAM    Culture - Blood (04.20.19 @ 14:52)    Culture - Blood:   NO ORGANISMS ISOLATED  NO ORGANISMS ISOLATED AT 96 HOURS    Specimen Source: BLOOD VENOUS    Culture - Blood (04.20.19 @ 14:52)    Culture - Blood:   NO ORGANISMS ISOLATED  NO ORGANISMS ISOLATED AT 96 HOURS    Specimen Source: BLOOD          RADIOLOGY & ADDITIONAL STUDIES:    < from: Xray Chest 1 View AP/PA (04.20.19 @ 18:07) >  FINDINGS:  Both lungs are equally aerated and free of any focal abnormalities. The   heart is not enlarged and there is no effusion.        COMPARISON:  January 28, 2018.        IMPRESSION:  No acute pulmonary disease.    < end of copied text >  < from: Xray Chest 1 View AP/PA (04.20.19 @ 18:07) >  FINDINGS:  Both lungs are equally aerated and free of any focal abnormalities. The   heart is not enlarged and there is no effusion.        COMPARISON:  January 28, 2018.        IMPRESSION:  No acute pulmonary disease.    < end of copied text >

## 2019-04-25 NOTE — PROGRESS NOTE ADULT - SUBJECTIVE AND OBJECTIVE BOX
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 Marco Cedillo who initially presented to the ED yesterday c/o L flank pain for 1-2 days. Diagnosed with a UTI and discharged on keflex, but today urine and blood cultures returned positive for strept agalactiae for which he was called back to the hospital. His sister Nathalia reports that he had a fever Tmax 102 last night, continues to have left flank pain. She states that his urine is always bloody since his last lithotripsy 1.5 months ago, was recently seen by his Urologist 3 weeks ago. Patient also developed a cough since last week, sisters states his aide was recently sick with a URI. Denies SOB/CP.   In the ED, he was found to be tachycardic 109, febrile 100.3, labs notable for leukocytosis of 17.67<-21.23. CT from yesterday was only + for B/L non-obstructing renal calculi. Patient was given 1 dose of unasyn and admitted for further management.            MEDICATIONS  (STANDING):  ampicillin  IVPB 2 Gram(s) IV Intermittent every 6 hours  aspirin enteric coated 81 milliGRAM(s) Oral daily  atorvastatin 80 milliGRAM(s) Oral at bedtime  finasteride 5 milliGRAM(s) Oral daily  gabapentin 300 milliGRAM(s) Oral daily  loratadine 10 milliGRAM(s) Oral daily  pantoprazole  Injectable 40 milliGRAM(s) IV Push daily    MEDICATIONS  (PRN):  acetaminophen   Tablet .. 650 milliGRAM(s) Oral every 6 hours PRN Temp greater or equal to 38C (100.4F), Mild Pain (1 - 3), Moderate Pain (4 - 6)  guaiFENesin   Syrup  (Sugar-Free) 100 milliGRAM(s) Oral every 6 hours PRN Cough      REVIEW OF SYSTEMS:  CONSTITUTIONAL: (+) malaise.   EYES: No acute change in vision   ENT:  No tinnitus  NECK: No stiffness  RESPIRATORY: No hemoptysis  CARDIOVASCULAR: No chest pain, palpitations, syncope  GASTROINTESTINAL: No hematemesis, diarrhea, melena, or hematochezia.  GENITOURINARY: hematuria  NEUROLOGICAL: No headaches  LYMPH Nodes: No enlarged glands  ENDOCRINE: No heat or cold intolerance	    T(C): 36.9 (04-25-19 @ 10:51), Max: 37.2 (04-24-19 @ 17:40)  HR: 72 (04-25-19 @ 10:51) (72 - 89)  BP: 116/68 (04-25-19 @ 10:51) (107/72 - 131/74)  RR: 20 (04-25-19 @ 10:51) (18 - 20)  SpO2: 100% (04-25-19 @ 10:51) (98% - 100%)    PHYSICAL EXAMINATION:   Constitutional: WD, NAD  HEENT: NC, AT  Neck:  Supple  Respiratory:  Adequate airflow b/l. Not using accessory muscles of respiration.  Cardiovascular:  S1 & S2 intact, no R/G, 2+ radial pulses b/l  Gastrointestinal: Soft, NT, ND, normoactive b.s., no organomegaly/RT/rigidity  Extremities: WWP  : mild gross hematuria at bedside  Neurological:  Alert and awake.  No acute focal motor deficits. Crude sensation intact.     Labs and imaging reviewed    LABS:                  CAPILLARY BLOOD GLUCOSE                  RADIOLOGY & ADDITIONAL STUDIES:

## 2019-04-26 ENCOUNTER — TRANSCRIPTION ENCOUNTER (OUTPATIENT)
Age: 74
End: 2019-04-26

## 2019-04-26 LAB
ANION GAP SERPL CALC-SCNC: 12 MMO/L — SIGNIFICANT CHANGE UP (ref 7–14)
BASOPHILS # BLD AUTO: 0.05 K/UL — SIGNIFICANT CHANGE UP (ref 0–0.2)
BASOPHILS NFR BLD AUTO: 0.4 % — SIGNIFICANT CHANGE UP (ref 0–2)
BUN SERPL-MCNC: 11 MG/DL — SIGNIFICANT CHANGE UP (ref 7–23)
CALCIUM SERPL-MCNC: 8.9 MG/DL — SIGNIFICANT CHANGE UP (ref 8.4–10.5)
CHLORIDE SERPL-SCNC: 103 MMOL/L — SIGNIFICANT CHANGE UP (ref 98–107)
CO2 SERPL-SCNC: 25 MMOL/L — SIGNIFICANT CHANGE UP (ref 22–31)
CREAT SERPL-MCNC: 1.16 MG/DL — SIGNIFICANT CHANGE UP (ref 0.5–1.3)
EOSINOPHIL # BLD AUTO: 0.24 K/UL — SIGNIFICANT CHANGE UP (ref 0–0.5)
EOSINOPHIL NFR BLD AUTO: 1.7 % — SIGNIFICANT CHANGE UP (ref 0–6)
GLUCOSE SERPL-MCNC: 88 MG/DL — SIGNIFICANT CHANGE UP (ref 70–99)
HCT VFR BLD CALC: 34.4 % — LOW (ref 39–50)
HGB BLD-MCNC: 10.9 G/DL — LOW (ref 13–17)
IMM GRANULOCYTES NFR BLD AUTO: 0.6 % — SIGNIFICANT CHANGE UP (ref 0–1.5)
LYMPHOCYTES # BLD AUTO: 30.2 % — SIGNIFICANT CHANGE UP (ref 13–44)
LYMPHOCYTES # BLD AUTO: 4.22 K/UL — HIGH (ref 1–3.3)
MAGNESIUM SERPL-MCNC: 2.1 MG/DL — SIGNIFICANT CHANGE UP (ref 1.6–2.6)
MCHC RBC-ENTMCNC: 28.4 PG — SIGNIFICANT CHANGE UP (ref 27–34)
MCHC RBC-ENTMCNC: 31.7 % — LOW (ref 32–36)
MCV RBC AUTO: 89.6 FL — SIGNIFICANT CHANGE UP (ref 80–100)
MONOCYTES # BLD AUTO: 0.48 K/UL — SIGNIFICANT CHANGE UP (ref 0–0.9)
MONOCYTES NFR BLD AUTO: 3.4 % — SIGNIFICANT CHANGE UP (ref 2–14)
NEUTROPHILS # BLD AUTO: 8.89 K/UL — HIGH (ref 1.8–7.4)
NEUTROPHILS NFR BLD AUTO: 63.7 % — SIGNIFICANT CHANGE UP (ref 43–77)
NRBC # FLD: 0.02 K/UL — SIGNIFICANT CHANGE UP (ref 0–0)
PHOSPHATE SERPL-MCNC: 2.9 MG/DL — SIGNIFICANT CHANGE UP (ref 2.5–4.5)
PLATELET # BLD AUTO: 374 K/UL — SIGNIFICANT CHANGE UP (ref 150–400)
PMV BLD: 9.1 FL — SIGNIFICANT CHANGE UP (ref 7–13)
POTASSIUM SERPL-MCNC: 3.6 MMOL/L — SIGNIFICANT CHANGE UP (ref 3.5–5.3)
POTASSIUM SERPL-SCNC: 3.6 MMOL/L — SIGNIFICANT CHANGE UP (ref 3.5–5.3)
RBC # BLD: 3.84 M/UL — LOW (ref 4.2–5.8)
RBC # FLD: 14.9 % — HIGH (ref 10.3–14.5)
SODIUM SERPL-SCNC: 140 MMOL/L — SIGNIFICANT CHANGE UP (ref 135–145)
WBC # BLD: 13.97 K/UL — HIGH (ref 3.8–10.5)
WBC # FLD AUTO: 13.97 K/UL — HIGH (ref 3.8–10.5)

## 2019-04-26 RX ORDER — PANTOPRAZOLE SODIUM 20 MG/1
1 TABLET, DELAYED RELEASE ORAL
Qty: 30 | Refills: 0
Start: 2019-04-26 | End: 2019-05-25

## 2019-04-26 RX ADMIN — Medication 216 GRAM(S): at 12:00

## 2019-04-26 RX ADMIN — GABAPENTIN 300 MILLIGRAM(S): 400 CAPSULE ORAL at 11:19

## 2019-04-26 RX ADMIN — PANTOPRAZOLE SODIUM 40 MILLIGRAM(S): 20 TABLET, DELAYED RELEASE ORAL at 11:19

## 2019-04-26 RX ADMIN — LORATADINE 10 MILLIGRAM(S): 10 TABLET ORAL at 11:20

## 2019-04-26 RX ADMIN — Medication 216 GRAM(S): at 05:35

## 2019-04-26 RX ADMIN — FINASTERIDE 5 MILLIGRAM(S): 5 TABLET, FILM COATED ORAL at 11:19

## 2019-04-26 RX ADMIN — Medication 216 GRAM(S): at 17:44

## 2019-04-26 RX ADMIN — ATORVASTATIN CALCIUM 80 MILLIGRAM(S): 80 TABLET, FILM COATED ORAL at 21:05

## 2019-04-26 NOTE — PROGRESS NOTE ADULT - ASSESSMENT
74 yo M with HTN and nephrolithiasis s/p b/l ureteroscopy with stent placement presenting with GBS bacteremia in the setting of UTI. Currently on antibiotics and stable. 1cm left lower pole and b/l punctate residual stones on CT. Stents in place.    - please obtain bladder scan after pt voids and record on [facility] - order placed on [facility]  - Hematuria likely secondary to ureteral stents  - Patient is voiding well subjectively  - No need for urologic intervention at this time  - Continue antibiotics per primary team  - Patient to follow-up with Dr. Cedillo as an outpatient  - please call if questions

## 2019-04-26 NOTE — PROGRESS NOTE ADULT - SUBJECTIVE AND OBJECTIVE BOX
Subjective  No overnight events. Pt denies incomplete emptying, however reports persistent hematuria    Objective    Vital signs  T(F): , Max: 99.6 (04-25-19 @ 17:07)  HR: 77 (04-26-19 @ 05:33)  BP: 126/84 (04-26-19 @ 05:33)  SpO2: 99% (04-26-19 @ 05:33)  Wt(kg): --    Output     04-25 @ 07:01  -  04-26 @ 07:00  --------------------------------------------------------  IN: 0 mL / OUT: 500 mL / NET: -500 mL        Gen: NAD  Abd: soft, NT, ND    Labs      04-26 @ 06:15    WBC 13.97 / Hct 34.4  / SCr 1.16       Urine Cx: Culture - Urine (04.20.19 @ 16:31)    Culture - Urine:   NO GROWTH AT 24 HOURS    Specimen Source: URINE MIDSTREAM        Imaging

## 2019-04-26 NOTE — PROGRESS NOTE ADULT - ASSESSMENT
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 Marco Cedillo who initially presented to the ED yesterday c/o L flank pain for 1-2 days. Diagnosed with a UTI and discharged on keflex, but today urine and blood cultures returned positive for strept agalactiae for which he was called back to the hospital. His sister Nathalia reports that he had a fever Tmax 102 last night, continues to have left flank pain. She states that his urine is always bloody since his last lithotripsy 1.5 months ago, was recently seen by his Urologist 3 weeks ago. Patient also developed a cough since last week, sisters states his aide was recently sick with a URI. Denies SOB/CP.     In the ED, he was found to be tachycardic 109, febrile 100.3, labs notable for leukocytosis of 17.67<-21.23. CT from yesterday was only + for B/L non-obstructing renal calculi. Patient was given 1 dose of unasyn and admitted for further management.     ER vitals:  T 99, P 109, /65.  WBC 21.2 --> 13.3.  UA (+) LE, pyuria, RBCs.  RVP (+) enterorhinovirus.  4/19 Ucx (+) GB strep >100K.  Bcx (+) GB strep.  CT renal stone hunt with b/l nonobstructing renal calculi.      Pt on ampicillin.  ID consult called for further abx managment.        Sepsis: (tachycardia, leukocytosis, bacteremia)    - Source from UTI.  Pt currently on ampicillin for bacteremia, urine cultures also growing Grp B strep.  Repeat blood cultures NGTD.  Switch to augmentin 875mg bid 2 more weeks      - Cont to monitor WBC and fever curve.  CT renal stone hunt without obstruction.  Pt with b/l ureteral stents.  Called pt's Urologist, Dr. Cedillo, and discussed pt's hospital course and managment of urosepsis and that sepsis resolved with repeat cultures now negative.   Pt will be followed up in Dr. Schnapp's office for removal of ureteral stents in approximately 2 weeks.      - Still c/o hematuria.   Urology evaluation appreciate.  No surgical intervention deemed appropriate at this time.      d/c planning        Ирина Kingsley  [phone]

## 2019-04-26 NOTE — PROGRESS NOTE ADULT - SUBJECTIVE AND OBJECTIVE BOX
Infectious Diseases progress note:    Subjective: Pt seen by Urology.  No new complaints.  Afebrile.  No dysuria or flank pain.  No suprapubic pain today.    ROS:  CONSTITUTIONAL:  No fever, chills, rigors  CARDIOVASCULAR:  No chest pain or palpitations  RESPIRATORY:   No SOB, cough, dyspnea on exertion.  No wheezing  GASTROINTESTINAL:  No abd pain, N/V, diarrhea/constipation  EXTREMITIES:  No swelling or joint pain  GENITOURINARY:  No burning on urination, increased frequency or urgency.  No flank pain  NEUROLOGIC:  No HA, visual disturbances  SKIN: No rashes    Allergies    Levaquin (Rash)    Intolerances        ANTIBIOTICS/RELEVANT:  antimicrobials  ampicillin  IVPB 2 Gram(s) IV Intermittent every 6 hours    immunologic:    OTHER:  acetaminophen   Tablet .. 650 milliGRAM(s) Oral every 6 hours PRN  aspirin enteric coated 81 milliGRAM(s) Oral daily  atorvastatin 80 milliGRAM(s) Oral at bedtime  finasteride 5 milliGRAM(s) Oral daily  gabapentin 300 milliGRAM(s) Oral daily  guaiFENesin   Syrup  (Sugar-Free) 100 milliGRAM(s) Oral every 6 hours PRN  loratadine 10 milliGRAM(s) Oral daily  pantoprazole  Injectable 40 milliGRAM(s) IV Push daily      Objective:  Vital Signs Last 24 Hrs  T(C): 36.5 (26 Apr 2019 10:00), Max: 37.6 (25 Apr 2019 17:07)  T(F): 97.7 (26 Apr 2019 10:00), Max: 99.6 (25 Apr 2019 17:07)  HR: 72 (26 Apr 2019 10:00) (68 - 90)  BP: 143/89 (26 Apr 2019 10:00) (108/66 - 143/89)  BP(mean): --  RR: 18 (26 Apr 2019 10:00) (16 - 18)  SpO2: 99% (26 Apr 2019 10:00) (95% - 99%)    PHYSICAL EXAM:  Constitutional:NAD  Eyes:JOSEE, EOMI  Ear/Nose/Throat: no thrush, mucositis.  Moist mucous membranes	  Neck:no JVD, no lymphadenopathy, supple  Respiratory: CTA tea  Cardiovascular: S1S2 RRR, no murmurs  Gastrointestinal:soft, nontender,  nondistended (+) BS  Extremities:no e/e/c  Skin:  no rashes, open wounds or ulcerations        LABS:                        10.9   13.97 )-----------( 374      ( 26 Apr 2019 06:15 )             34.4     04-26    140  |  103  |  11  ----------------------------<  88  3.6   |  25  |  1.16    Ca    8.9      26 Apr 2019 06:15  Phos  2.9     04-26  Mg     2.1     04-26              MICROBIOLOGY:    Culture - Urine (04.20.19 @ 16:31)    Culture - Urine:   NO GROWTH AT 24 HOURS    Specimen Source: URINE MIDSTREAM    Culture - Blood (04.20.19 @ 14:52)    Culture - Blood:   NO ORGANISMS ISOLATED    Specimen Source: BLOOD VENOUS    Culture - Blood (04.20.19 @ 14:52)    Culture - Blood:   NO ORGANISMS ISOLATED    Specimen Source: BLOOD          RADIOLOGY & ADDITIONAL STUDIES:    < from: Xray Chest 1 View AP/PA (04.20.19 @ 18:07) >  IMPRESSION:  No acute pulmonary disease.    < end of copied text >

## 2019-04-26 NOTE — DISCHARGE NOTE NURSING/CASE MANAGEMENT/SOCIAL WORK - [id number]_GEN_ALL_CORE
Call 911 for stroke/Stroke education booklet/Risk factors for stroke/Stroke support groups for patients, families, and friends/Signs and symptoms of stroke/Need for follow up after discharge/Prescribed medications/Stroke warning signs and symptoms

## 2019-04-26 NOTE — PROGRESS NOTE ADULT - PROBLEM SELECTOR PLAN 1
improved sepsis   urology eval appreciated - NO urologic intervention at this time. Obtain bladder scan after pt voids.  If unremarkable then pt is again medically cleared for safe discharge and will complete po abx with outpt pcp f/u

## 2019-04-26 NOTE — PROGRESS NOTE ADULT - SUBJECTIVE AND OBJECTIVE BOX
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 Marco Cdeillo who initially presented to the ED yesterday c/o L flank pain for 1-2 days. Diagnosed with a UTI and discharged on keflex, but today urine and blood cultures returned positive for strept agalactiae for which he was called back to the hospital. His sister Nathalia reports that he had a fever Tmax 102 last night, continues to have left flank pain. She states that his urine is always bloody since his last lithotripsy 1.5 months ago, was recently seen by his Urologist 3 weeks ago. Patient also developed a cough since last week, sisters states his aide was recently sick with a URI. Denies SOB/CP.     In the ED, he was found to be tachycardic 109, febrile 100.3, labs notable for leukocytosis of 17.67<-21.23. CT from yesterday was only + for B/L non-obstructing renal calculi. Patient was given 1 dose of unasyn and admitted for further management.     PAST MEDICAL & SURGICAL HISTORY:  Hyperlipidemia  Hypertension  History of cataract surgery        PAST MEDICAL & SURGICAL HISTORY:  Hyperlipidemia  Hypertension  History of cataract surgery      Levaquin (Rash)       MEDICATIONS  (STANDING):  ampicillin  IVPB 2 Gram(s) IV Intermittent every 6 hours  aspirin enteric coated 81 milliGRAM(s) Oral daily  atorvastatin 80 milliGRAM(s) Oral at bedtime  finasteride 5 milliGRAM(s) Oral daily  gabapentin 300 milliGRAM(s) Oral daily  loratadine 10 milliGRAM(s) Oral daily  pantoprazole  Injectable 40 milliGRAM(s) IV Push daily    MEDICATIONS  (PRN):  acetaminophen   Tablet .. 650 milliGRAM(s) Oral every 6 hours PRN Temp greater or equal to 38C (100.4F), Mild Pain (1 - 3), Moderate Pain (4 - 6)  guaiFENesin   Syrup  (Sugar-Free) 100 milliGRAM(s) Oral every 6 hours PRN Cough      REVIEW OF SYSTEMS:  CONSTITUTIONAL: (+) malaise.   EYES: No acute change in vision   ENT:  No tinnitus  NECK: No stiffness  RESPIRATORY: No hemoptysis  CARDIOVASCULAR: No chest pain, palpitations, syncope  GASTROINTESTINAL: No hematemesis, diarrhea, melena, or hematochezia.  GENITOURINARY: hematuria  NEUROLOGICAL: No headaches  LYMPH Nodes: No enlarged glands  ENDOCRINE: No heat or cold intolerance	    T(C): 36.4 (04-26-19 @ 05:33), Max: 37.6 (04-25-19 @ 17:07)  HR: 77 (04-26-19 @ 05:33) (68 - 90)  BP: 126/84 (04-26-19 @ 05:33) (108/66 - 126/84)  RR: 16 (04-26-19 @ 05:33) (16 - 20)  SpO2: 99% (04-26-19 @ 05:33) (95% - 100%)    PHYSICAL EXAMINATION:   Constitutional: WD, NAD  HEENT: NC, AT  Neck:  Supple  Respiratory:  Adequate airflow b/l. Not using accessory muscles of respiration.  Cardiovascular:  S1 & S2 intact, no R/G, 2+ radial pulses b/l  Gastrointestinal: Soft, NT, ND, normoactive b.s., no organomegaly/RT/rigidity  Extremities: WWP  Neurological:  Alert and awake.  No acute focal motor deficits. Crude sensation intact.     Labs and imaging reviewed    LABS:                        10.9   13.97 )-----------( 374      ( 26 Apr 2019 06:15 )             34.4     04-26    140  |  103  |  11  ----------------------------<  88  3.6   |  25  |  1.16    Ca    8.9      26 Apr 2019 06:15  Phos  2.9     04-26  Mg     2.1     04-26              CAPILLARY BLOOD GLUCOSE                  RADIOLOGY & ADDITIONAL STUDIES:

## 2019-04-26 NOTE — PROGRESS NOTE ADULT - PROBLEM/PLAN-8
----- Message from Leidy Butts sent at 6/21/2018  9:27 AM CDT -----  Contact: self/ [phone]  Patient is calling requesting the status of medication pantoprazole (PROTONIX) 40 MG tablet.   Please advise, thanks   
Spoke with pt and he stated verbalized understanding  Notified pt medication was called in on 6/10/18  Pt stated he will give pharmacy a call   
DISPLAY PLAN FREE TEXT

## 2019-04-27 VITALS
SYSTOLIC BLOOD PRESSURE: 138 MMHG | OXYGEN SATURATION: 100 % | HEART RATE: 82 BPM | TEMPERATURE: 98 F | RESPIRATION RATE: 17 BRPM | DIASTOLIC BLOOD PRESSURE: 86 MMHG

## 2019-04-27 RX ADMIN — GABAPENTIN 300 MILLIGRAM(S): 400 CAPSULE ORAL at 11:05

## 2019-04-27 RX ADMIN — Medication 81 MILLIGRAM(S): at 11:05

## 2019-04-27 RX ADMIN — LORATADINE 10 MILLIGRAM(S): 10 TABLET ORAL at 11:33

## 2019-04-27 RX ADMIN — FINASTERIDE 5 MILLIGRAM(S): 5 TABLET, FILM COATED ORAL at 11:05

## 2019-04-27 RX ADMIN — Medication 216 GRAM(S): at 06:56

## 2019-04-27 RX ADMIN — Medication 216 GRAM(S): at 00:16

## 2019-04-27 NOTE — PROGRESS NOTE ADULT - SUBJECTIVE AND OBJECTIVE BOX
Infectious Diseases progress note:    Subjective:  NAD, afebrile, no acute o/n events.  No new somatic complaints.  Pt ready to go home     ROS:  CONSTITUTIONAL:  No fever, chills, rigors  CARDIOVASCULAR:  No chest pain or palpitations  RESPIRATORY:   No SOB, cough, dyspnea on exertion.  No wheezing  GASTROINTESTINAL:  No abd pain, N/V, diarrhea/constipation  EXTREMITIES:  No swelling or joint pain  GENITOURINARY:  No burning on urination, increased frequency or urgency.  No flank pain  NEUROLOGIC:  No HA, visual disturbances  SKIN: No rashes    Allergies    Levaquin (Rash)    Intolerances        ANTIBIOTICS/RELEVANT:  antimicrobials  ampicillin  IVPB 2 Gram(s) IV Intermittent every 6 hours    immunologic:    OTHER:  acetaminophen   Tablet .. 650 milliGRAM(s) Oral every 6 hours PRN  aspirin enteric coated 81 milliGRAM(s) Oral daily  atorvastatin 80 milliGRAM(s) Oral at bedtime  finasteride 5 milliGRAM(s) Oral daily  gabapentin 300 milliGRAM(s) Oral daily  guaiFENesin   Syrup  (Sugar-Free) 100 milliGRAM(s) Oral every 6 hours PRN  loratadine 10 milliGRAM(s) Oral daily  pantoprazole  Injectable 40 milliGRAM(s) IV Push daily      Objective:  Vital Signs Last 24 Hrs  T(C): 36.7 (27 Apr 2019 11:31), Max: 37.2 (26 Apr 2019 18:39)  T(F): 98.1 (27 Apr 2019 11:31), Max: 99 (26 Apr 2019 20:58)  HR: 82 (27 Apr 2019 11:31) (73 - 85)  BP: 138/86 (27 Apr 2019 11:31) (125/81 - 145/91)  BP(mean): --  RR: 17 (27 Apr 2019 11:31) (16 - 18)  SpO2: 100% (27 Apr 2019 11:31) (95% - 100%)    PHYSICAL EXAM:  Constitutional:NAD  Eyes:JOSEE, EOMI  Ear/Nose/Throat: no thrush, mucositis.  Moist mucous membranes	  Neck:no JVD, no lymphadenopathy, supple  Respiratory: CTA tea  Cardiovascular: S1S2 RRR, no murmurs  Gastrointestinal:soft, nontender,  nondistended (+) BS  Extremities:no e/e/c  Skin:  no rashes, open wounds or ulcerations        LABS:                        10.9   13.97 )-----------( 374      ( 26 Apr 2019 06:15 )             34.4     04-26    140  |  103  |  11  ----------------------------<  88  3.6   |  25  |  1.16    Ca    8.9      26 Apr 2019 06:15  Phos  2.9     04-26  Mg     2.1     04-26                              MICROBIOLOGY:          RADIOLOGY & ADDITIONAL STUDIES:

## 2019-04-27 NOTE — PROGRESS NOTE ADULT - ASSESSMENT
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 Marco Cedillo who initially presented to the ED yesterday c/o L flank pain for 1-2 days. Diagnosed with a UTI and discharged on keflex, but today urine and blood cultures returned positive for strept agalactiae for which he was called back to the hospital. His sister Nathalia reports that he had a fever Tmax 102 last night, continues to have left flank pain. She states that his urine is always bloody since his last lithotripsy 1.5 months ago, was recently seen by his Urologist 3 weeks ago. Patient also developed a cough since last week, sisters states his aide was recently sick with a URI. Denies SOB/CP.     In the ED, he was found to be tachycardic 109, febrile 100.3, labs notable for leukocytosis of 17.67<-21.23. CT from yesterday was only + for B/L non-obstructing renal calculi. Patient was given 1 dose of unasyn and admitted for further management.     ER vitals:  T 99, P 109, /65.  WBC 21.2 --> 13.3.  UA (+) LE, pyuria, RBCs.  RVP (+) enterorhinovirus.  4/19 Ucx (+) GB strep >100K.  Bcx (+) GB strep.  CT renal stone hunt with b/l nonobstructing renal calculi.      Pt on ampicillin.  ID consult called for further abx managment.        Sepsis: (tachycardia, leukocytosis, bacteremia)    - Source from UTI.  Switch to augmentin 875mg bid 2 more weeks upon discharge      - CT renal stone hunt without obstruction.  Pt with b/l ureteral stents.  Called pt's Urologist, Dr. Cedillo, and discussed pt's hospital course and managment of urosepsis and that sepsis resolved with repeat cultures now negative.   Pt will be followed up in Dr. Cedillo's office for removal of ureteral stents in approximately 2 weeks.      - Inpatient urology evaluation appreciated for hematuria - likely secondary to ureteral stents.  No surgical intervention deemed appropriate at this time.  outpt urology f/u    d/c planning        [facility]  [phone]

## 2019-04-27 NOTE — PROGRESS NOTE ADULT - REASON FOR ADMISSION
Bacteremia
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
## 2019-06-21 ENCOUNTER — INPATIENT (INPATIENT)
Facility: HOSPITAL | Age: 74
LOS: 5 days | Discharge: NURSING, REHAB OR LONG-TERM CARE | End: 2019-06-27
Attending: INTERNAL MEDICINE | Admitting: INTERNAL MEDICINE
Payer: MEDICARE

## 2019-06-21 VITALS
OXYGEN SATURATION: 97 % | RESPIRATION RATE: 20 BRPM | DIASTOLIC BLOOD PRESSURE: 80 MMHG | HEART RATE: 110 BPM | SYSTOLIC BLOOD PRESSURE: 123 MMHG | TEMPERATURE: 103 F

## 2019-06-21 DIAGNOSIS — A41.9 SEPSIS, UNSPECIFIED ORGANISM: ICD-10-CM

## 2019-06-21 DIAGNOSIS — D47.9 NEOPLASM OF UNCERTAIN BEHAVIOR OF LYMPHOID, HEMATOPOIETIC AND RELATED TISSUE, UNSPECIFIED: ICD-10-CM

## 2019-06-21 DIAGNOSIS — I63.9 CEREBRAL INFARCTION, UNSPECIFIED: ICD-10-CM

## 2019-06-21 DIAGNOSIS — E78.5 HYPERLIPIDEMIA, UNSPECIFIED: ICD-10-CM

## 2019-06-21 DIAGNOSIS — Z98.49 CATARACT EXTRACTION STATUS, UNSPECIFIED EYE: Chronic | ICD-10-CM

## 2019-06-21 DIAGNOSIS — Z29.9 ENCOUNTER FOR PROPHYLACTIC MEASURES, UNSPECIFIED: ICD-10-CM

## 2019-06-21 LAB
ALBUMIN SERPL ELPH-MCNC: 3.9 G/DL — SIGNIFICANT CHANGE UP (ref 3.3–5)
ALP SERPL-CCNC: 110 U/L — SIGNIFICANT CHANGE UP (ref 40–120)
ALT FLD-CCNC: 15 U/L — SIGNIFICANT CHANGE UP (ref 4–41)
ANION GAP SERPL CALC-SCNC: 12 MMO/L — SIGNIFICANT CHANGE UP (ref 7–14)
APPEARANCE UR: SIGNIFICANT CHANGE UP
APTT BLD: 28.1 SEC — SIGNIFICANT CHANGE UP (ref 27.5–36.3)
AST SERPL-CCNC: 17 U/L — SIGNIFICANT CHANGE UP (ref 4–40)
B PERT DNA SPEC QL NAA+PROBE: NOT DETECTED — SIGNIFICANT CHANGE UP
BACTERIA # UR AUTO: HIGH
BASE EXCESS BLDV CALC-SCNC: 3.2 MMOL/L — SIGNIFICANT CHANGE UP
BASOPHILS # BLD AUTO: 0.06 K/UL — SIGNIFICANT CHANGE UP (ref 0–0.2)
BASOPHILS NFR BLD AUTO: 0.3 % — SIGNIFICANT CHANGE UP (ref 0–2)
BILIRUB SERPL-MCNC: 0.8 MG/DL — SIGNIFICANT CHANGE UP (ref 0.2–1.2)
BILIRUB UR-MCNC: NEGATIVE — SIGNIFICANT CHANGE UP
BLOOD GAS VENOUS - CREATININE: 1.08 MG/DL — SIGNIFICANT CHANGE UP (ref 0.5–1.3)
BLOOD GAS VENOUS - FIO2: 21 — SIGNIFICANT CHANGE UP
BLOOD UR QL VISUAL: HIGH
BUN SERPL-MCNC: 10 MG/DL — SIGNIFICANT CHANGE UP (ref 7–23)
C PNEUM DNA SPEC QL NAA+PROBE: NOT DETECTED — SIGNIFICANT CHANGE UP
CALCIUM SERPL-MCNC: 10 MG/DL — SIGNIFICANT CHANGE UP (ref 8.4–10.5)
CHLORIDE BLDV-SCNC: 105 MMOL/L — SIGNIFICANT CHANGE UP (ref 96–108)
CHLORIDE SERPL-SCNC: 99 MMOL/L — SIGNIFICANT CHANGE UP (ref 98–107)
CO2 SERPL-SCNC: 23 MMOL/L — SIGNIFICANT CHANGE UP (ref 22–31)
COLOR SPEC: YELLOW — SIGNIFICANT CHANGE UP
CREAT SERPL-MCNC: 1.07 MG/DL — SIGNIFICANT CHANGE UP (ref 0.5–1.3)
EOSINOPHIL # BLD AUTO: 0 K/UL — SIGNIFICANT CHANGE UP (ref 0–0.5)
EOSINOPHIL NFR BLD AUTO: 0 % — SIGNIFICANT CHANGE UP (ref 0–6)
FLUAV H1 2009 PAND RNA SPEC QL NAA+PROBE: NOT DETECTED — SIGNIFICANT CHANGE UP
FLUAV H1 RNA SPEC QL NAA+PROBE: NOT DETECTED — SIGNIFICANT CHANGE UP
FLUAV H3 RNA SPEC QL NAA+PROBE: NOT DETECTED — SIGNIFICANT CHANGE UP
FLUAV SUBTYP SPEC NAA+PROBE: NOT DETECTED — SIGNIFICANT CHANGE UP
FLUBV RNA SPEC QL NAA+PROBE: NOT DETECTED — SIGNIFICANT CHANGE UP
GAS PNL BLDV: 133 MMOL/L — LOW (ref 136–146)
GLUCOSE BLDV-MCNC: 109 MG/DL — HIGH (ref 70–99)
GLUCOSE SERPL-MCNC: 105 MG/DL — HIGH (ref 70–99)
GLUCOSE UR-MCNC: NEGATIVE — SIGNIFICANT CHANGE UP
HADV DNA SPEC QL NAA+PROBE: NOT DETECTED — SIGNIFICANT CHANGE UP
HCO3 BLDV-SCNC: 25 MMOL/L — SIGNIFICANT CHANGE UP (ref 20–27)
HCOV PNL SPEC NAA+PROBE: SIGNIFICANT CHANGE UP
HCT VFR BLD CALC: 37.5 % — LOW (ref 39–50)
HCT VFR BLDV CALC: 37.4 % — LOW (ref 39–51)
HGB BLD-MCNC: 11.8 G/DL — LOW (ref 13–17)
HGB BLDV-MCNC: 12.2 G/DL — LOW (ref 13–17)
HMPV RNA SPEC QL NAA+PROBE: NOT DETECTED — SIGNIFICANT CHANGE UP
HPIV1 RNA SPEC QL NAA+PROBE: NOT DETECTED — SIGNIFICANT CHANGE UP
HPIV2 RNA SPEC QL NAA+PROBE: NOT DETECTED — SIGNIFICANT CHANGE UP
HPIV3 RNA SPEC QL NAA+PROBE: NOT DETECTED — SIGNIFICANT CHANGE UP
HPIV4 RNA SPEC QL NAA+PROBE: NOT DETECTED — SIGNIFICANT CHANGE UP
HYALINE CASTS # UR AUTO: HIGH
IMM GRANULOCYTES NFR BLD AUTO: 0.9 % — SIGNIFICANT CHANGE UP (ref 0–1.5)
INR BLD: 1.27 — HIGH (ref 0.88–1.17)
KETONES UR-MCNC: NEGATIVE — SIGNIFICANT CHANGE UP
LACTATE BLDV-MCNC: 1.7 MMOL/L — SIGNIFICANT CHANGE UP (ref 0.5–2)
LEUKOCYTE ESTERASE UR-ACNC: SIGNIFICANT CHANGE UP
LYMPHOCYTES # BLD AUTO: 16.6 % — SIGNIFICANT CHANGE UP (ref 13–44)
LYMPHOCYTES # BLD AUTO: 3.83 K/UL — HIGH (ref 1–3.3)
MCHC RBC-ENTMCNC: 28 PG — SIGNIFICANT CHANGE UP (ref 27–34)
MCHC RBC-ENTMCNC: 31.5 % — LOW (ref 32–36)
MCV RBC AUTO: 88.9 FL — SIGNIFICANT CHANGE UP (ref 80–100)
MONOCYTES # BLD AUTO: 0.61 K/UL — SIGNIFICANT CHANGE UP (ref 0–0.9)
MONOCYTES NFR BLD AUTO: 2.6 % — SIGNIFICANT CHANGE UP (ref 2–14)
NEUTROPHILS # BLD AUTO: 18.35 K/UL — HIGH (ref 1.8–7.4)
NEUTROPHILS NFR BLD AUTO: 79.6 % — HIGH (ref 43–77)
NITRITE UR-MCNC: POSITIVE — HIGH
NRBC # FLD: 0 K/UL — SIGNIFICANT CHANGE UP (ref 0–0)
PCO2 BLDV: 46 MMHG — SIGNIFICANT CHANGE UP (ref 41–51)
PH BLDV: 7.4 PH — SIGNIFICANT CHANGE UP (ref 7.32–7.43)
PH UR: 6.5 — SIGNIFICANT CHANGE UP (ref 5–8)
PLATELET # BLD AUTO: 568 K/UL — HIGH (ref 150–400)
PMV BLD: 9.2 FL — SIGNIFICANT CHANGE UP (ref 7–13)
PO2 BLDV: < 24 MMHG — LOW (ref 35–40)
POTASSIUM BLDV-SCNC: 3.7 MMOL/L — SIGNIFICANT CHANGE UP (ref 3.4–4.5)
POTASSIUM SERPL-MCNC: 3.9 MMOL/L — SIGNIFICANT CHANGE UP (ref 3.5–5.3)
POTASSIUM SERPL-SCNC: 3.9 MMOL/L — SIGNIFICANT CHANGE UP (ref 3.5–5.3)
PROT SERPL-MCNC: 8.5 G/DL — HIGH (ref 6–8.3)
PROT UR-MCNC: 200 — HIGH
PROTHROM AB SERPL-ACNC: 14.6 SEC — HIGH (ref 9.8–13.1)
RBC # BLD: 4.22 M/UL — SIGNIFICANT CHANGE UP (ref 4.2–5.8)
RBC # FLD: 16.4 % — HIGH (ref 10.3–14.5)
RBC CASTS # UR COMP ASSIST: >50 — HIGH (ref 0–?)
RSV RNA SPEC QL NAA+PROBE: NOT DETECTED — SIGNIFICANT CHANGE UP
RV+EV RNA SPEC QL NAA+PROBE: NOT DETECTED — SIGNIFICANT CHANGE UP
SAO2 % BLDV: 19.7 % — LOW (ref 60–85)
SODIUM SERPL-SCNC: 134 MMOL/L — LOW (ref 135–145)
SP GR SPEC: 1.02 — SIGNIFICANT CHANGE UP (ref 1–1.04)
SQUAMOUS # UR AUTO: SIGNIFICANT CHANGE UP
UROBILINOGEN FLD QL: SIGNIFICANT CHANGE UP
WBC # BLD: 23.05 K/UL — HIGH (ref 3.8–10.5)
WBC # FLD AUTO: 23.05 K/UL — HIGH (ref 3.8–10.5)
WBC UR QL: >50 — HIGH (ref 0–?)

## 2019-06-21 PROCEDURE — 71045 X-RAY EXAM CHEST 1 VIEW: CPT | Mod: 26

## 2019-06-21 PROCEDURE — 99223 1ST HOSP IP/OBS HIGH 75: CPT

## 2019-06-21 RX ORDER — AZITHROMYCIN 500 MG/1
500 TABLET, FILM COATED ORAL ONCE
Refills: 0 | Status: COMPLETED | OUTPATIENT
Start: 2019-06-21 | End: 2019-06-21

## 2019-06-21 RX ORDER — FINASTERIDE 5 MG/1
1 TABLET, FILM COATED ORAL
Qty: 0 | Refills: 0 | DISCHARGE

## 2019-06-21 RX ORDER — ATORVASTATIN CALCIUM 80 MG/1
80 TABLET, FILM COATED ORAL AT BEDTIME
Refills: 0 | Status: DISCONTINUED | OUTPATIENT
Start: 2019-06-21 | End: 2019-06-27

## 2019-06-21 RX ORDER — ASPIRIN/CALCIUM CARB/MAGNESIUM 324 MG
1 TABLET ORAL
Qty: 0 | Refills: 0 | DISCHARGE

## 2019-06-21 RX ORDER — PANTOPRAZOLE SODIUM 20 MG/1
40 TABLET, DELAYED RELEASE ORAL
Refills: 0 | Status: DISCONTINUED | OUTPATIENT
Start: 2019-06-21 | End: 2019-06-27

## 2019-06-21 RX ORDER — CEFTRIAXONE 500 MG/1
1000 INJECTION, POWDER, FOR SOLUTION INTRAMUSCULAR; INTRAVENOUS ONCE
Refills: 0 | Status: COMPLETED | OUTPATIENT
Start: 2019-06-21 | End: 2019-06-21

## 2019-06-21 RX ORDER — SODIUM CHLORIDE 9 MG/ML
500 INJECTION, SOLUTION INTRAVENOUS ONCE
Refills: 0 | Status: COMPLETED | OUTPATIENT
Start: 2019-06-21 | End: 2019-06-21

## 2019-06-21 RX ORDER — ASPIRIN/CALCIUM CARB/MAGNESIUM 324 MG
81 TABLET ORAL DAILY
Refills: 0 | Status: DISCONTINUED | OUTPATIENT
Start: 2019-06-21 | End: 2019-06-27

## 2019-06-21 RX ORDER — LORATADINE 10 MG/1
1 TABLET ORAL
Qty: 0 | Refills: 0 | DISCHARGE

## 2019-06-21 RX ORDER — MULTIVIT-MIN/FERROUS GLUCONATE 9 MG/15 ML
1 LIQUID (ML) ORAL
Qty: 0 | Refills: 0 | DISCHARGE

## 2019-06-21 RX ORDER — HEPARIN SODIUM 5000 [USP'U]/ML
5000 INJECTION INTRAVENOUS; SUBCUTANEOUS EVERY 8 HOURS
Refills: 0 | Status: DISCONTINUED | OUTPATIENT
Start: 2019-06-21 | End: 2019-06-27

## 2019-06-21 RX ORDER — FINASTERIDE 5 MG/1
5 TABLET, FILM COATED ORAL DAILY
Refills: 0 | Status: DISCONTINUED | OUTPATIENT
Start: 2019-06-21 | End: 2019-06-27

## 2019-06-21 RX ORDER — SODIUM CHLORIDE 9 MG/ML
1000 INJECTION, SOLUTION INTRAVENOUS ONCE
Refills: 0 | Status: COMPLETED | OUTPATIENT
Start: 2019-06-21 | End: 2019-06-21

## 2019-06-21 RX ORDER — GABAPENTIN 400 MG/1
300 CAPSULE ORAL DAILY
Refills: 0 | Status: DISCONTINUED | OUTPATIENT
Start: 2019-06-21 | End: 2019-06-22

## 2019-06-21 RX ORDER — ACETAMINOPHEN 500 MG
650 TABLET ORAL ONCE
Refills: 0 | Status: COMPLETED | OUTPATIENT
Start: 2019-06-21 | End: 2019-06-21

## 2019-06-21 RX ORDER — CEFTRIAXONE 500 MG/1
1000 INJECTION, POWDER, FOR SOLUTION INTRAMUSCULAR; INTRAVENOUS EVERY 24 HOURS
Refills: 0 | Status: DISCONTINUED | OUTPATIENT
Start: 2019-06-21 | End: 2019-06-22

## 2019-06-21 RX ADMIN — SODIUM CHLORIDE 1000 MILLILITER(S): 9 INJECTION, SOLUTION INTRAVENOUS at 19:46

## 2019-06-21 RX ADMIN — Medication 650 MILLIGRAM(S): at 17:25

## 2019-06-21 RX ADMIN — CEFTRIAXONE 100 MILLIGRAM(S): 500 INJECTION, POWDER, FOR SOLUTION INTRAMUSCULAR; INTRAVENOUS at 17:30

## 2019-06-21 RX ADMIN — SODIUM CHLORIDE 1000 MILLILITER(S): 9 INJECTION, SOLUTION INTRAVENOUS at 17:25

## 2019-06-21 RX ADMIN — SODIUM CHLORIDE 500 MILLILITER(S): 9 INJECTION, SOLUTION INTRAVENOUS at 19:05

## 2019-06-21 RX ADMIN — AZITHROMYCIN 500 MILLIGRAM(S): 500 TABLET, FILM COATED ORAL at 21:08

## 2019-06-21 RX ADMIN — AZITHROMYCIN 250 MILLIGRAM(S): 500 TABLET, FILM COATED ORAL at 18:59

## 2019-06-21 RX ADMIN — CEFTRIAXONE 1000 MILLIGRAM(S): 500 INJECTION, POWDER, FOR SOLUTION INTRAMUSCULAR; INTRAVENOUS at 19:46

## 2019-06-21 RX ADMIN — SODIUM CHLORIDE 500 MILLILITER(S): 9 INJECTION, SOLUTION INTRAVENOUS at 21:08

## 2019-06-21 RX ADMIN — Medication 650 MILLIGRAM(S): at 19:46

## 2019-06-21 NOTE — ED PROVIDER NOTE - PMH
Cerebrovascular accident (CVA), unspecified mechanism    Hyperlipidemia    Hypertension    Kidney stone    Lymphoproliferative disorder    Urinary tract infection without hematuria, site unspecified

## 2019-06-21 NOTE — H&P ADULT - NSHPPHYSICALEXAM_GEN_ALL_CORE
Vital Signs Last 24 Hrs  T(C): 36.8 (21 Jun 2019 21:23), Max: 39.2 (21 Jun 2019 14:42)  T(F): 98.3 (21 Jun 2019 21:23), Max: 102.6 (21 Jun 2019 14:42)  HR: 75 (21 Jun 2019 21:23) (75 - 110)  BP: 120/68 (21 Jun 2019 21:23) (119/70 - 123/80)  BP(mean): --  RR: 17 (21 Jun 2019 21:23) (16 - 20)  SpO2: 99% (21 Jun 2019 21:23) (97% - 100%)    GENERAL: No acute distress, well-developed  ENT: EOMI, PERRL, conjunctiva and sclera clear, Neck supple, No JVD, moist mucosa  CHEST/LUNG: Clear to auscultation bilaterally; No wheeze, equal breath sounds bilaterally   BACK: No spinal tenderness  HEART: Regular rate and rhythm; No murmurs, rubs, or gallops  ABDOMEN: Soft, Nontender, Nondistended; Bowel sounds present  EXTREMITIES: No clubbing, cyanosis, or edema  PSYCH: Nl behavior, nl affect  NEUROLOGY: AAOx3, 4/5 strength RUE and RLE (chronic 2/2 CVA), sensation intact to light touch, speech fluent, face symmetric, tongue midline, V1-V3 sensation intact to light touch  SKIN: Normal color, No rashes or lesions

## 2019-06-21 NOTE — ED PROVIDER NOTE - ATTENDING CONTRIBUTION TO CARE
ED Attending (Claire ALLISON): I have personally performed a face to face bedside history and physical examination of this patient. I have discussed the history, examination, assessment and plan of management with the resident and medical student. My findings include: 68-year-old man with PMHx CVA (1/2019, residual R weakness and slurred speech), lymphoproliferative disorder, UTI, HTN, HLD, kidney stones, presenting with his aide from his sister's (Nathalia [phone]) home who notes worsening of his chronic weakness and slurred speech with anorexia and generalized fatigue over the past several days. She also reports a mild dry cough and the patient c/o dysuria over the same time period. At presentation, the patient voices no complaints and denies any pain, although his history appears unreliable. No nausea, vomiting, diarrhea. No rashes. No SOB. No other acute complaints. On exam: + febrile and tachycardic, otherwise VSS lungs, heart, pulses, abdomen, extremities, skin, all normal on exam. old R arm and leg mild weakness from prior CVA. IMP: acute febrile illness. RO UTI, pulm or other etiology. Plan: sepsis labs and cultures, UA C&S, CXR, RVP, IV fluids, IV Rocephin and Azithro to cover lungs and urinary tract.  Will need medical admission.

## 2019-06-21 NOTE — ED ADULT NURSE NOTE - NSIMPLEMENTINTERV_GEN_ALL_ED
Implemented All Fall with Harm Risk Interventions:  [place] to call system. Call bell, personal items and telephone within reach. Instruct patient to call for assistance. Room bathroom lighting operational. Non-slip footwear when patient is off stretcher. Physically safe environment: no spills, clutter or unnecessary equipment. Stretcher in lowest position, wheels locked, appropriate side rails in place. Provide visual cue, wrist band, yellow gown, etc. Monitor gait and stability. Monitor for mental status changes and reorient to person, place, and time. Review medications for side effects contributing to fall risk. Reinforce activity limits and safety measures with patient and family. Provide visual clues: red socks.

## 2019-06-21 NOTE — ED ADULT NURSE NOTE - OBJECTIVE STATEMENT
received pt in bed A and Ox 3  in NAD pt reports " I am feeling like I have fever and I feel weak" pt denies burning on urination denies abd pain or cough. lung sounds clear B/L abd soft non distended. IV initiated albs sent, orders noted and completed.  fall precautions initiated .

## 2019-06-21 NOTE — ED PROVIDER NOTE - PROGRESS NOTE DETAILS
ED attending Varinder: I have reviewed and discussed the medical student’s documentation and findings with the student. After personally examining the patient, my findings have been added to this documentation. Xiang PGY2: Well appearing, vitals improved, PMD Dr Cerda, accepted by Dr Li

## 2019-06-21 NOTE — ED PROVIDER NOTE - CLINICAL SUMMARY MEDICAL DECISION MAKING FREE TEXT BOX
Mr. Hollis is a 68-year-old man with PMHx CVA (1/2019, residual R weakness and slurred speech), lymphoproliferative disorder, UTI, HTN, HLD presenting with his aide from his sister's (Nathalia [phone]) home with weakness, anorexia, dry cough and dysuria for several days. The patient is unable to provide reliable history on his own. Given dysuria, fever, history of UTI and adult diaper use, suspicion for UTI is high; will empirically treat with azithromycin and ceftriaxone and collect UA and blood cultures. Will also obtain CXR to evaluate for pna given cough. The patient will likely require admission given his history and clinical condition. Plan of care discussed with the patient's sister (above).

## 2019-06-21 NOTE — H&P ADULT - NSHPSOCIALHISTORY_GEN_ALL_CORE
Lives with sister  Has a HHA for 12 hrs/day 7 days/week  Ambulates with walker/1 person assist  No tobacco, EtOH, or illicit substance use

## 2019-06-21 NOTE — H&P ADULT - NSHPLABSRESULTS_GEN_ALL_CORE
LABS and ADDITIONAL STUDIES:                        11.8   23.05 )-----------( 568      ( 2019 17:15 )             37.5   Complete Blood Count + Automated Diff (19 @ 17:15)    Auto Neutrophil #: 18.35 K/uL    Auto Neutrophil %: 79.6 %          134<L>  |  99  |  10  ----------------------------<  105<H>  3.9   |  23  |  1.07    Ca    10.0      2019 17:15    TPro  8.5<H>  /  Alb  3.9  /  TBili  0.8  /  DBili  x   /  AST  17  /  ALT  15  /  AlkPhos  110              LIVER FUNCTIONS - ( 2019 17:15 )  Alb: 3.9 g/dL / Pro: 8.5 g/dL / ALK PHOS: 110 u/L / ALT: 15 u/L / AST: 17 u/L / GGT: x             PT/INR - ( 2019 17:15 )   PT: 14.6 SEC;   INR: 1.27          PTT - ( 2019 17:15 )  PTT:28.1 SEC    Urinalysis Basic - ( 2019 17:15 )    Color: YELLOW / Appearance: Lt TURBID / S.017 / pH: 6.5  Gluc: NEGATIVE / Ketone: NEGATIVE  / Bili: NEGATIVE / Urobili: TRACE   Blood: MODERATE / Protein: 200 / Nitrite: POSITIVE   Leuk Esterase: LARGE / RBC: >50 / WBC >50   Sq Epi: FEW / Non Sq Epi: x / Bacteria: MANY LABS and ADDITIONAL STUDIES:                        11.8   23.05 )-----------( 568      ( 2019 17:15 )             37.5   Complete Blood Count + Automated Diff (19 @ 17:15)    Auto Neutrophil #: 18.35 K/uL    Auto Neutrophil %: 79.6 %        134<L>  |  99  |  10  ----------------------------<  105<H>  3.9   |  23  |  1.07    Ca    10.0      2019 17:15    TPro  8.5<H>  /  Alb  3.9  /  TBili  0.8  /  DBili  x   /  AST  17  /  ALT  15  /  AlkPhos  110      LIVER FUNCTIONS - ( 2019 17:15 )  Alb: 3.9 g/dL / Pro: 8.5 g/dL / ALK PHOS: 110 u/L / ALT: 15 u/L / AST: 17 u/L / GGT: x           PT/INR - ( 2019 17:15 )   PT: 14.6 SEC;   INR: 1.27     PTT - ( 2019 17:15 )  PTT:28.1 SEC    Urinalysis Basic - ( 2019 17:15 )  Color: YELLOW / Appearance: Lt TURBID / S.017 / pH: 6.5  Gluc: NEGATIVE / Ketone: NEGATIVE  / Bili: NEGATIVE / Urobili: TRACE   Blood: MODERATE / Protein: 200 / Nitrite: POSITIVE   Leuk Esterase: LARGE / RBC: >50 / WBC >50   Sq Epi: FEW / Non Sq Epi: x / Bacteria: MANY    RVP - negative    Blood Gas Venous Comprehensive (19 @ 17:20)    Blood Gas Venous - Lactate: 1.7: Please note updated reference range. mmol/L    < from: Xray Chest 1 View AP/PA (19 @ 18:07) >    FINDINGS:  Both lungs are equally aerated and free of any focal abnormalities. The   heart is not enlarged and there is no effusion.        COMPARISON:  2018.        IMPRESSION:  No acute pulmonary disease.    < end of copied text >    EKG - Sinus tach at 101, nl axis, QTc 438, no significant ST-T wave changes

## 2019-06-21 NOTE — H&P ADULT - NSHPREVIEWOFSYSTEMS_GEN_ALL_CORE
REVIEW OF SYSTEMS:    CONSTITUTIONAL: +Generalized fatigue/lethargy, +fevers/diaphoresis, Decreased PO intake  EYES: No visual changes or eye discharge  ENT: No rhinorrhea or sore throat  NECK: No pain or stiffness  RESPIRATORY: +Cough (no sputum production), No wheezing, hemoptysis; No shortness of breath  CARDIOVASCULAR: No chest pain or palpitations; No lower extremity edema  GASTROINTESTINAL: No abdominal or epigastric pain. No nausea, vomiting, or hematemesis; No diarrhea or constipation. No melena or hematochezia.  BACK: No back pain, no flank pain  GENITOURINARY: +Dysuria, +urinary frequency, no hematuria, no pelvic pain  NEUROLOGICAL: No numbness or weakness  SKIN: No itching, burning, rashes, or lesions

## 2019-06-21 NOTE — H&P ADULT - NSICDXPASTMEDICALHX_GEN_ALL_CORE_FT
PAST MEDICAL HISTORY:  Cerebrovascular accident (CVA), unspecified mechanism With residual R sided weakness and expressive aphasia    Hyperlipidemia     Hypertension     Kidney stone s/p lithotripsy    Lymphoproliferative disorder B-Cell    Urinary tract infection without hematuria, site unspecified

## 2019-06-21 NOTE — ED PROVIDER NOTE - PHYSICAL EXAMINATION
General: Appearing stated age laying supine in stretcher in NAD  HEENT: Normocephalic/atraumatic  Pulm: CTA BL; no wheezing, rhonchi or rales; occasional dry cough during interview  CV: Nl S1/S2; RRR; no murmurs, rubs or gallops  Abdomen: Soft; non-tender; non-distended; no suprapubic tenderness; no flank tenderness  Extremities: 2+ radial and DP pulses; no peripheral edema  Skin: Warm to touch  Neuro: Alert; no gross slurring of speech; able to hold all extremities against gravity General: febrile, Appearing stated age laying supine in stretcher in NAD  HEENT: Normocephalic/atraumatic  Pulm: CTA BL; no wheezing, rhonchi or rales; occasional dry cough during interview  CV: Nl S1/S2; RRR; no murmurs, rubs or gallops  Abdomen: Soft; non-tender; non-distended; no suprapubic tenderness; no flank tenderness  Extremities: 2+ radial and DP pulses; no peripheral edema  Skin: Warm to touch  Neuro: Alert; no gross slurring of speech; able to hold all extremities against gravity

## 2019-06-21 NOTE — H&P ADULT - HISTORY OF PRESENT ILLNESS
This is a 73M with history of CVA (1/2018) with residual R arm/leg weakness and expressive aphasia, B-Cell Lymphoproliferative d/o, HTN, HLD, and nephrolithiasis s/p lithotripsy who presents to the hospital with complaints of worsening lethargy/fatigue with dysuria. Patient lives with his sister and had a 12hr HHA for 7 days a week. Said that he started having dysuria about 1 week ago but had not informed his sister of this. Said that over the next few days he started to have worsening lethargy/fatigue and fevers. Said that he also started having a dry cough over this time. his sister noted that he was more fatigued and the new cough and thought he was having a PNA and brought him to the hospital for evaluation. His sister stated that the patient had a kidney stone earlier this year that was removed via lithotripsy but since then has had a few recurrent UTIs, No other complaints.     On arrival to the ED, his vitals were T 102.6, P 110, /80, RR 20, O2 sat 99% RA. His lab work was significant for leukocytosis (higher than baseline) with neutrophilia and a positive UA. He had a CXR that showed clear lungs and a RVP that was negative. He was given tylenol 650mg PO x1, CTX 1g/AZT 500mg IVPB x1, and LR 1.5L. He was then admitted to medicine for further management.

## 2019-06-21 NOTE — ED PROVIDER NOTE - OBJECTIVE STATEMENT
Mr. Hollis is a 68-year-old man with PMHx CVA (1/2019, residual R weakness and slurred speech), lymphoproliferative disorder, UTI, HTN, HLD presenting with his aide from his sister's (Nathalia [phone]) home who notes worsening of his chronic weakness and slurred speech with anorexia and generalized fatigue over the past several days. She also reports a mild dry cough and the patient c/o dysuria over the same time period. At presentation, the patient voices no complaints and denies any pain, although his history appears unreliable. No nausea, vomiting, diarrhea. No rashes. No SOB. No other acute complaints. Mr. Hollis is a 68-year-old man with PMHx CVA (1/2019, residual R weakness and slurred speech), lymphoproliferative disorder, UTI, HTN, HLD, kidney stones, presenting with his aide from his sister's (Nathalia [phone]) home who notes worsening of his chronic weakness and slurred speech with anorexia and generalized fatigue over the past several days. She also reports a mild dry cough and the patient c/o dysuria over the same time period. At presentation, the patient voices no complaints and denies any pain, although his history appears unreliable. No nausea, vomiting, diarrhea. No rashes. No SOB. No other acute complaints.

## 2019-06-21 NOTE — ED ADULT TRIAGE NOTE - CHIEF COMPLAINT QUOTE
spoke with pts sister (Nathalia [phone]) pt has been experiencing left leg heaviness and weakness x 2 weeks as well as not eating x 2-3 days. pt noted to be febrile and tachy in triage.

## 2019-06-22 DIAGNOSIS — N10 ACUTE PYELONEPHRITIS: ICD-10-CM

## 2019-06-22 DIAGNOSIS — M25.551 PAIN IN RIGHT HIP: ICD-10-CM

## 2019-06-22 DIAGNOSIS — E87.1 HYPO-OSMOLALITY AND HYPONATREMIA: ICD-10-CM

## 2019-06-22 DIAGNOSIS — R78.81 BACTEREMIA: ICD-10-CM

## 2019-06-22 LAB
ANION GAP SERPL CALC-SCNC: 11 MMO/L — SIGNIFICANT CHANGE UP (ref 7–14)
BASOPHILS # BLD AUTO: 0.04 K/UL — SIGNIFICANT CHANGE UP (ref 0–0.2)
BASOPHILS NFR BLD AUTO: 0.2 % — SIGNIFICANT CHANGE UP (ref 0–2)
BUN SERPL-MCNC: 10 MG/DL — SIGNIFICANT CHANGE UP (ref 7–23)
CALCIUM SERPL-MCNC: 8.9 MG/DL — SIGNIFICANT CHANGE UP (ref 8.4–10.5)
CHLORIDE SERPL-SCNC: 102 MMOL/L — SIGNIFICANT CHANGE UP (ref 98–107)
CO2 SERPL-SCNC: 26 MMOL/L — SIGNIFICANT CHANGE UP (ref 22–31)
CREAT SERPL-MCNC: 0.96 MG/DL — SIGNIFICANT CHANGE UP (ref 0.5–1.3)
EOSINOPHIL # BLD AUTO: 0.03 K/UL — SIGNIFICANT CHANGE UP (ref 0–0.5)
EOSINOPHIL NFR BLD AUTO: 0.2 % — SIGNIFICANT CHANGE UP (ref 0–6)
GLUCOSE SERPL-MCNC: 92 MG/DL — SIGNIFICANT CHANGE UP (ref 70–99)
HCT VFR BLD CALC: 34.3 % — LOW (ref 39–50)
HGB BLD-MCNC: 11 G/DL — LOW (ref 13–17)
IMM GRANULOCYTES NFR BLD AUTO: 0.6 % — SIGNIFICANT CHANGE UP (ref 0–1.5)
LYMPHOCYTES # BLD AUTO: 14.9 % — SIGNIFICANT CHANGE UP (ref 13–44)
LYMPHOCYTES # BLD AUTO: 2.94 K/UL — SIGNIFICANT CHANGE UP (ref 1–3.3)
MAGNESIUM SERPL-MCNC: 2.2 MG/DL — SIGNIFICANT CHANGE UP (ref 1.6–2.6)
MCHC RBC-ENTMCNC: 28.7 PG — SIGNIFICANT CHANGE UP (ref 27–34)
MCHC RBC-ENTMCNC: 32.1 % — SIGNIFICANT CHANGE UP (ref 32–36)
MCV RBC AUTO: 89.6 FL — SIGNIFICANT CHANGE UP (ref 80–100)
METHOD TYPE: SIGNIFICANT CHANGE UP
MONOCYTES # BLD AUTO: 0.65 K/UL — SIGNIFICANT CHANGE UP (ref 0–0.9)
MONOCYTES NFR BLD AUTO: 3.3 % — SIGNIFICANT CHANGE UP (ref 2–14)
NEUTROPHILS # BLD AUTO: 16 K/UL — HIGH (ref 1.8–7.4)
NEUTROPHILS NFR BLD AUTO: 80.8 % — HIGH (ref 43–77)
NRBC # FLD: 0 K/UL — SIGNIFICANT CHANGE UP (ref 0–0)
ORGANISM # SPEC MICROSCOPIC CNT: SIGNIFICANT CHANGE UP
ORGANISM # SPEC MICROSCOPIC CNT: SIGNIFICANT CHANGE UP
PHOSPHATE SERPL-MCNC: 2.9 MG/DL — SIGNIFICANT CHANGE UP (ref 2.5–4.5)
PLATELET # BLD AUTO: 503 K/UL — HIGH (ref 150–400)
PMV BLD: 9.4 FL — SIGNIFICANT CHANGE UP (ref 7–13)
POTASSIUM SERPL-MCNC: 3.8 MMOL/L — SIGNIFICANT CHANGE UP (ref 3.5–5.3)
POTASSIUM SERPL-SCNC: 3.8 MMOL/L — SIGNIFICANT CHANGE UP (ref 3.5–5.3)
RBC # BLD: 3.83 M/UL — LOW (ref 4.2–5.8)
RBC # FLD: 16.6 % — HIGH (ref 10.3–14.5)
SODIUM SERPL-SCNC: 139 MMOL/L — SIGNIFICANT CHANGE UP (ref 135–145)
SPECIMEN SOURCE: SIGNIFICANT CHANGE UP
WBC # BLD: 19.77 K/UL — HIGH (ref 3.8–10.5)
WBC # FLD AUTO: 19.77 K/UL — HIGH (ref 3.8–10.5)

## 2019-06-22 PROCEDURE — 71260 CT THORAX DX C+: CPT | Mod: 26

## 2019-06-22 PROCEDURE — 74177 CT ABD & PELVIS W/CONTRAST: CPT | Mod: 26

## 2019-06-22 PROCEDURE — 73501 X-RAY EXAM HIP UNI 1 VIEW: CPT | Mod: 26,RT

## 2019-06-22 RX ORDER — PIPERACILLIN AND TAZOBACTAM 4; .5 G/20ML; G/20ML
3.38 INJECTION, POWDER, LYOPHILIZED, FOR SOLUTION INTRAVENOUS ONCE
Refills: 0 | Status: COMPLETED | OUTPATIENT
Start: 2019-06-22 | End: 2019-06-22

## 2019-06-22 RX ORDER — CEFTRIAXONE 500 MG/1
1000 INJECTION, POWDER, FOR SOLUTION INTRAMUSCULAR; INTRAVENOUS EVERY 24 HOURS
Refills: 0 | Status: DISCONTINUED | OUTPATIENT
Start: 2019-06-22 | End: 2019-06-22

## 2019-06-22 RX ORDER — ACETAMINOPHEN 500 MG
650 TABLET ORAL ONCE
Refills: 0 | Status: COMPLETED | OUTPATIENT
Start: 2019-06-22 | End: 2019-06-22

## 2019-06-22 RX ORDER — SODIUM CHLORIDE 9 MG/ML
500 INJECTION, SOLUTION INTRAVENOUS ONCE
Refills: 0 | Status: DISCONTINUED | OUTPATIENT
Start: 2019-06-22 | End: 2019-06-22

## 2019-06-22 RX ORDER — SODIUM CHLORIDE 9 MG/ML
1000 INJECTION INTRAMUSCULAR; INTRAVENOUS; SUBCUTANEOUS
Refills: 0 | Status: DISCONTINUED | OUTPATIENT
Start: 2019-06-22 | End: 2019-06-23

## 2019-06-22 RX ORDER — GABAPENTIN 400 MG/1
300 CAPSULE ORAL AT BEDTIME
Refills: 0 | Status: DISCONTINUED | OUTPATIENT
Start: 2019-06-22 | End: 2019-06-27

## 2019-06-22 RX ORDER — ACETAMINOPHEN 500 MG
1000 TABLET ORAL ONCE
Refills: 0 | Status: DISCONTINUED | OUTPATIENT
Start: 2019-06-22 | End: 2019-06-27

## 2019-06-22 RX ORDER — PIPERACILLIN AND TAZOBACTAM 4; .5 G/20ML; G/20ML
INJECTION, POWDER, LYOPHILIZED, FOR SOLUTION INTRAVENOUS
Refills: 0 | Status: DISCONTINUED | OUTPATIENT
Start: 2019-06-22 | End: 2019-06-24

## 2019-06-22 RX ORDER — PIPERACILLIN AND TAZOBACTAM 4; .5 G/20ML; G/20ML
3.38 INJECTION, POWDER, LYOPHILIZED, FOR SOLUTION INTRAVENOUS EVERY 8 HOURS
Refills: 0 | Status: DISCONTINUED | OUTPATIENT
Start: 2019-06-22 | End: 2019-06-24

## 2019-06-22 RX ORDER — ONDANSETRON 8 MG/1
4 TABLET, FILM COATED ORAL EVERY 6 HOURS
Refills: 0 | Status: DISCONTINUED | OUTPATIENT
Start: 2019-06-22 | End: 2019-06-27

## 2019-06-22 RX ADMIN — PANTOPRAZOLE SODIUM 40 MILLIGRAM(S): 20 TABLET, DELAYED RELEASE ORAL at 05:32

## 2019-06-22 RX ADMIN — GABAPENTIN 300 MILLIGRAM(S): 400 CAPSULE ORAL at 22:41

## 2019-06-22 RX ADMIN — GABAPENTIN 300 MILLIGRAM(S): 400 CAPSULE ORAL at 00:14

## 2019-06-22 RX ADMIN — Medication 650 MILLIGRAM(S): at 00:44

## 2019-06-22 RX ADMIN — HEPARIN SODIUM 5000 UNIT(S): 5000 INJECTION INTRAVENOUS; SUBCUTANEOUS at 05:32

## 2019-06-22 RX ADMIN — Medication 200 MILLIGRAM(S): at 00:14

## 2019-06-22 RX ADMIN — SODIUM CHLORIDE 50 MILLILITER(S): 9 INJECTION INTRAMUSCULAR; INTRAVENOUS; SUBCUTANEOUS at 10:09

## 2019-06-22 RX ADMIN — SODIUM CHLORIDE 50 MILLILITER(S): 9 INJECTION INTRAMUSCULAR; INTRAVENOUS; SUBCUTANEOUS at 22:42

## 2019-06-22 RX ADMIN — PIPERACILLIN AND TAZOBACTAM 25 GRAM(S): 4; .5 INJECTION, POWDER, LYOPHILIZED, FOR SOLUTION INTRAVENOUS at 22:41

## 2019-06-22 RX ADMIN — Medication 81 MILLIGRAM(S): at 11:45

## 2019-06-22 RX ADMIN — PIPERACILLIN AND TAZOBACTAM 25 GRAM(S): 4; .5 INJECTION, POWDER, LYOPHILIZED, FOR SOLUTION INTRAVENOUS at 12:19

## 2019-06-22 RX ADMIN — PIPERACILLIN AND TAZOBACTAM 200 GRAM(S): 4; .5 INJECTION, POWDER, LYOPHILIZED, FOR SOLUTION INTRAVENOUS at 11:45

## 2019-06-22 RX ADMIN — HEPARIN SODIUM 5000 UNIT(S): 5000 INJECTION INTRAVENOUS; SUBCUTANEOUS at 22:41

## 2019-06-22 RX ADMIN — ATORVASTATIN CALCIUM 80 MILLIGRAM(S): 80 TABLET, FILM COATED ORAL at 22:41

## 2019-06-22 RX ADMIN — ONDANSETRON 4 MILLIGRAM(S): 8 TABLET, FILM COATED ORAL at 00:14

## 2019-06-22 RX ADMIN — Medication 650 MILLIGRAM(S): at 00:14

## 2019-06-22 RX ADMIN — FINASTERIDE 5 MILLIGRAM(S): 5 TABLET, FILM COATED ORAL at 11:45

## 2019-06-22 RX ADMIN — HEPARIN SODIUM 5000 UNIT(S): 5000 INJECTION INTRAVENOUS; SUBCUTANEOUS at 13:20

## 2019-06-22 NOTE — PROGRESS NOTE ADULT - SUBJECTIVE AND OBJECTIVE BOX
Patient seen and examined at bedside  c/o right hip/flank pain causing limited rom. c/o gen weakness. fatigue. malaise  Case discussed with medical team    HPI:  This is a 73M with history of CVA (2018) with residual R arm/leg weakness and expressive aphasia, B-Cell Lymphoproliferative d/o, HTN, HLD, and nephrolithiasis s/p lithotripsy who presents to the hospital with complaints of worsening lethargy/fatigue with dysuria. Patient lives with his sister and had a 12hr HHA for 7 days a week. Said that he started having dysuria about 1 week ago but had not informed his sister of this. Said that over the next few days he started to have worsening lethargy/fatigue and fevers. Said that he also started having a dry cough over this time. his sister noted that he was more fatigued and the new cough and thought he was having a PNA and brought him to the hospital for evaluation. His sister stated that the patient had a kidney stone earlier this year that was removed via lithotripsy but since then has had a few recurrent UTIs, No other complaints.     On arrival to the ED, his vitals were T 102.6, P 110, /80, RR 20, O2 sat 99% RA. His lab work was significant for leukocytosis (higher than baseline) with neutrophilia and a positive UA. He had a CXR that showed clear lungs and a RVP that was negative. He was given tylenol 650mg PO x1, CTX 1g/AZT 500mg IVPB x1, and LR 1.5L. He was then admitted to medicine for further management. (2019 22:20)      PAST MEDICAL & SURGICAL HISTORY:  Kidney stone: s/p lithotripsy  Urinary tract infection without hematuria, site unspecified  Cerebrovascular accident (CVA), unspecified mechanism: With residual R sided weakness and expressive aphasia  Lymphoproliferative disorder: B-Cell  Hyperlipidemia  Hypertension  History of cataract surgery      Levaquin (Rash)       MEDICATIONS  (STANDING):  aspirin enteric coated 81 milliGRAM(s) Oral daily  atorvastatin 80 milliGRAM(s) Oral at bedtime  cefTRIAXone   IVPB 1000 milliGRAM(s) IV Intermittent every 24 hours  finasteride 5 milliGRAM(s) Oral daily  gabapentin 300 milliGRAM(s) Oral at bedtime  heparin  Injectable 5000 Unit(s) SubCutaneous every 8 hours  pantoprazole    Tablet 40 milliGRAM(s) Oral before breakfast    MEDICATIONS  (PRN):  guaiFENesin    Syrup 200 milliGRAM(s) Oral every 6 hours PRN Cough  ondansetron Injectable 4 milliGRAM(s) IV Push every 6 hours PRN Nausea and/or Vomiting      REVIEW OF SYSTEMS:  CONSTITUTIONAL: (+) right hip/flank pain. gen weakness. malaise.   EYES: No acute change in vision   ENT:  No tinnitus  NECK: No stiffness  RESPIRATORY: preciado. No hemoptysis  CARDIOVASCULAR: decreased exercise tolerance. No chest pain, palpitations, syncope  GASTROINTESTINAL: No hematemesis, diarrhea, melena, or hematochezia.  GENITOURINARY: dysuria.  No hematuria  NEUROLOGICAL: No headaches  LYMPH Nodes: No enlarged glands  ENDOCRINE: No heat or cold intolerance	    T(C): 36.6 (19 @ 05:29), Max: 39.2 (19 @ 14:42)  HR: 74 (19 @ 05:29) (74 - 110)  BP: 112/56 (19 @ 05:29) (107/58 - 158/77)  RR: 17 (19 @ 05:29) (16 - 20)  SpO2: 100% (19 @ 05:29) (96% - 100%)    PHYSICAL EXAMINATION:   Constitutional: ill appearing, NAD  HEENT: NC, AT  Neck:  Supple  Respiratory:  Adequate airflow b/l. Not using accessory muscles of respiration.  Cardiovascular:  S1 & S2 intact, no R/G, 2+ radial pulses b/l  Gastrointestinal: Soft, NT, ND, normoactive b.s., no organomegaly/RT/rigidity  Extremities: WWP  Neurological:  Alert and awake.  Crude sensation intact.     Labs and imaging reviewed    LABS:                        11.0   19.77 )-----------( 503      ( 2019 07:13 )             34.3     -    139  |  102  |  10  ----------------------------<  92  3.8   |  26  |  0.96    Ca    8.9      2019 07:13  Phos  2.9     -  Mg     2.2     -    TPro  8.5<H>  /  Alb  3.9  /  TBili  0.8  /  DBili  x   /  AST  17  /  ALT  15  /  AlkPhos  110  06-21        PT/INR - ( 2019 17:15 )   PT: 14.6 SEC;   INR: 1.27          PTT - ( 2019 17:15 )  PTT:28.1 SEC  Urinalysis Basic - ( 2019 17:15 )    Color: YELLOW / Appearance: Lt TURBID / S.017 / pH: 6.5  Gluc: NEGATIVE / Ketone: NEGATIVE  / Bili: NEGATIVE / Urobili: TRACE   Blood: MODERATE / Protein: 200 / Nitrite: POSITIVE   Leuk Esterase: LARGE / RBC: >50 / WBC >50   Sq Epi: FEW / Non Sq Epi: x / Bacteria: MANY      CAPILLARY BLOOD GLUCOSE      POCT Blood Glucose.: 108 mg/dL (2019 14:53)        LIVER FUNCTIONS - ( 2019 17:15 )  Alb: 3.9 g/dL / Pro: 8.5 g/dL / ALK PHOS: 110 u/L / ALT: 15 u/L / AST: 17 u/L / GGT: x               RADIOLOGY & ADDITIONAL STUDIES:

## 2019-06-22 NOTE — CONSULT NOTE ADULT - SUBJECTIVE AND OBJECTIVE BOX
[facility] NEPHROLOGY- CONSULTATION NOTE    73y Male with history of below presents with fatigue found to have sepsis secondary to UTI. Nephrology consulted for UTI.    Patient with h/o bilateral renal calculus with stents placed by urology. Patient currently on CTX for bacteremia (GNR) and IVF. Patient planned for repeat CT scan today.    REVIEW OF SYSTEMS:  Gen: no changes in weight, + fatigue  HEENT: no rhinorrhea  Neck: no sore throat  Cards: no chest pain  Resp: no dyspnea  GI: no nausea or vomiting or diarrhea  : + dysuria, no hematuria  Vascular: no LE edema  Derm: no rashes  Neuro: no numbness/tingling    Levaquin (Rash)      Home Medications Reviewed  Hospital Medications:   MEDICATIONS  (STANDING):  acetaminophen  IVPB .. 1000 milliGRAM(s) IV Intermittent once  aspirin enteric coated 81 milliGRAM(s) Oral daily  atorvastatin 80 milliGRAM(s) Oral at bedtime  cefTRIAXone   IVPB 1000 milliGRAM(s) IV Intermittent every 24 hours  finasteride 5 milliGRAM(s) Oral daily  gabapentin 300 milliGRAM(s) Oral at bedtime  heparin  Injectable 5000 Unit(s) SubCutaneous every 8 hours  pantoprazole    Tablet 40 milliGRAM(s) Oral before breakfast  sodium chloride 0.9%. 1000 milliLiter(s) (50 mL/Hr) IV Continuous <Continuous>      PAST MEDICAL & SURGICAL HISTORY:  Kidney stone: s/p lithotripsy  Urinary tract infection without hematuria, site unspecified  Cerebrovascular accident (CVA), unspecified mechanism: With residual R sided weakness and expressive aphasia  Lymphoproliferative disorder: B-Cell  Hyperlipidemia  Hypertension  History of cataract surgery      FAMILY HISTORY:  No pertinent family history in first degree relatives      SOCIAL HISTORY:  Denies toxic substance use     VITALS:  T(F): 98.1 (19 @ 10:00), Max: 102.6 (19 @ 14:42)  HR: 83 (19 @ 10:00)  BP: 152/73 (19 @ 10:00)  RR: 18 (19 @ 10:00)  SpO2: 96% (19 @ 10:00)  Wt(kg): --    Height (cm): 162.56 ( @ 23:13)  Weight (kg): 64.6 ( @ 23:13)  BMI (kg/m2): 24.4 ( @ 23:13)  BSA (m2): 1.69 ( @ 23:13)    PHYSICAL EXAM:  Gen: NAD, calm  HEENT: MMM  Neck: no JVD  Cards: RRR, +S1/S2, no M/G/R  Resp: CTA B/L  GI: soft, NT/ND, NABS  : no CVA tenderness  Vascular: no LE edema B/L  Derm: no rashes  Neuro: non-focal    LABS:      139  |  102  |  10  ----------------------------<  92  3.8   |  26  |  0.96    Ca    8.9      2019 07:13  Phos  2.9       Mg     2.2         TPro  8.5<H>  /  Alb  3.9  /  TBili  0.8  /  DBili      /  AST  17  /  ALT  15  /  AlkPhos  110      Creatinine Trend: 0.96 <--, 1.07 <--                        11.0   19.77 )-----------( 503      ( 2019 07:13 )             34.3     Urine Studies:  Urinalysis Basic - ( 2019 17:15 )    Color: YELLOW / Appearance: Lt TURBID / S.017 / pH: 6.5  Gluc: NEGATIVE / Ketone: NEGATIVE  / Bili: NEGATIVE / Urobili: TRACE   Blood: MODERATE / Protein: 200 / Nitrite: POSITIVE   Leuk Esterase: LARGE / RBC: >50 / WBC >50   Sq Epi: FEW / Non Sq Epi:  / Bacteria: MANY          RADIOLOGY & ADDITIONAL STUDIES:  < from: Xray Chest 1 View AP/PA (19 @ 18:31) >  FINDINGS/  IMPRESSION:    Clear lungs. No pleural effusions or pneumothorax.    Cardiac and mediastinal silhouettes within normal limits.    Trachea midline.    Osteopenic appearing but otherwise unremarkable osseous structures.     < end of copied text >

## 2019-06-22 NOTE — PROGRESS NOTE ADULT - ASSESSMENT
This is a 73M with history as above who presents to the hospital with sepsis 2/2 UTI. This is a 73M with history as above who presents to the hospital with sepsis 2/2 acute pyelonephritis and bacteremia

## 2019-06-22 NOTE — CHART NOTE - NSCHARTNOTEFT_GEN_A_CORE
Patient with mild abdominal pain, no N/V. Last BM yesterday.  CT A/P ordered by attending, received call from radiology -recommend for CT A/P with Contrast, ordered.   BCx with GNR, started on CTX per MD, changed to Zosyn as per nephro.

## 2019-06-22 NOTE — CHART NOTE - NSCHARTNOTEFT_GEN_A_CORE
RN informed that pt's BP 98/56 HR 72, afebrile this evening.  Pt with poor PO intake, on IVF. 500cc bolus LR given, Tylenol given for H/A. No c/o chest pain, SOB, chills, N/V. RN informed that patient with poor PO intake, on continuous IVF. No c/o chest pain, SOB, dizziness, chills, N/V.  Ensure ordered, will f/u RD recs

## 2019-06-22 NOTE — PHYSICAL THERAPY INITIAL EVALUATION ADULT - ACTIVE RANGE OF MOTION EXAMINATION, REHAB EVAL
A [employer]  or [employer]ar[employer]  The above information is an  only  It is not intended as medical advice for individual conditions or treatments  Talk to your doctor, nurse or pharmacist before following any medical regimen to see if it is safe and effective for you  no Active ROM deficits were identified

## 2019-06-22 NOTE — PHYSICAL THERAPY INITIAL EVALUATION ADULT - PLANNED THERAPY INTERVENTIONS, PT EVAL
stairs training/gait training/strengthening/balance training/transfer training/bed mobility training

## 2019-06-22 NOTE — PHYSICAL THERAPY INITIAL EVALUATION ADULT - PERTINENT HX OF CURRENT PROBLEM, REHAB EVAL
Patient is 73 year old male admitted with history of CVA, HTN, HLD, presents with worsening lethargy, fatigue with dysuria.

## 2019-06-22 NOTE — CONSULT NOTE ADULT - ASSESSMENT
73y Male with history of bilateral non-obstructive renal calculus with stents presents with fatigue found to have sepsis secondary to UTI. Nephrology consulted for UTI.    1) Acute cystitis with hematuria: On CTX with positive blood and urine cultures with GNR. Would change to broad spectrum Zosyn and can de-escalate pending culture sensitivities. Follow up ID recommendations.    2) Bilateral renal calculus: With stents. Given infection, follow up CT (pending) and would consult urology for possible intervention.     3) HTN: BP controlled. Monitor off of medications.    4) Hyponatremia: Mild and resolved with IVF.

## 2019-06-23 ENCOUNTER — TRANSCRIPTION ENCOUNTER (OUTPATIENT)
Age: 74
End: 2019-06-23

## 2019-06-23 LAB
-  AMIKACIN: SIGNIFICANT CHANGE UP
-  AMPICILLIN/SULBACTAM: SIGNIFICANT CHANGE UP
-  AMPICILLIN: SIGNIFICANT CHANGE UP
-  AZTREONAM: SIGNIFICANT CHANGE UP
-  CEFAZOLIN: SIGNIFICANT CHANGE UP
-  CEFEPIME: SIGNIFICANT CHANGE UP
-  CEFOXITIN: SIGNIFICANT CHANGE UP
-  CEFTAZIDIME: SIGNIFICANT CHANGE UP
-  CEFTRIAXONE: SIGNIFICANT CHANGE UP
-  CIPROFLOXACIN: SIGNIFICANT CHANGE UP
-  ERTAPENEM: SIGNIFICANT CHANGE UP
-  GENTAMICIN: SIGNIFICANT CHANGE UP
-  IMIPENEM: SIGNIFICANT CHANGE UP
-  LEVOFLOXACIN: SIGNIFICANT CHANGE UP
-  MEROPENEM: SIGNIFICANT CHANGE UP
-  NITROFURANTOIN: SIGNIFICANT CHANGE UP
-  PIPERACILLIN/TAZOBACTAM: SIGNIFICANT CHANGE UP
-  TIGECYCLINE: SIGNIFICANT CHANGE UP
-  TOBRAMYCIN: SIGNIFICANT CHANGE UP
-  TRIMETHOPRIM/SULFAMETHOXAZOLE: SIGNIFICANT CHANGE UP
ANION GAP SERPL CALC-SCNC: 10 MMO/L — SIGNIFICANT CHANGE UP (ref 7–14)
BACTERIA UR CULT: SIGNIFICANT CHANGE UP
BASOPHILS # BLD AUTO: 0.05 K/UL — SIGNIFICANT CHANGE UP (ref 0–0.2)
BASOPHILS NFR BLD AUTO: 0.4 % — SIGNIFICANT CHANGE UP (ref 0–2)
BUN SERPL-MCNC: 8 MG/DL — SIGNIFICANT CHANGE UP (ref 7–23)
CALCIUM SERPL-MCNC: 8.4 MG/DL — SIGNIFICANT CHANGE UP (ref 8.4–10.5)
CHLORIDE SERPL-SCNC: 104 MMOL/L — SIGNIFICANT CHANGE UP (ref 98–107)
CO2 SERPL-SCNC: 26 MMOL/L — SIGNIFICANT CHANGE UP (ref 22–31)
CREAT SERPL-MCNC: 1.09 MG/DL — SIGNIFICANT CHANGE UP (ref 0.5–1.3)
EOSINOPHIL # BLD AUTO: 0.09 K/UL — SIGNIFICANT CHANGE UP (ref 0–0.5)
EOSINOPHIL NFR BLD AUTO: 0.7 % — SIGNIFICANT CHANGE UP (ref 0–6)
GLUCOSE SERPL-MCNC: 95 MG/DL — SIGNIFICANT CHANGE UP (ref 70–99)
HCT VFR BLD CALC: 31.9 % — LOW (ref 39–50)
HGB BLD-MCNC: 9.9 G/DL — LOW (ref 13–17)
IMM GRANULOCYTES NFR BLD AUTO: 0.4 % — SIGNIFICANT CHANGE UP (ref 0–1.5)
LYMPHOCYTES # BLD AUTO: 27.7 % — SIGNIFICANT CHANGE UP (ref 13–44)
LYMPHOCYTES # BLD AUTO: 3.4 K/UL — HIGH (ref 1–3.3)
MAGNESIUM SERPL-MCNC: 2.1 MG/DL — SIGNIFICANT CHANGE UP (ref 1.6–2.6)
MCHC RBC-ENTMCNC: 28 PG — SIGNIFICANT CHANGE UP (ref 27–34)
MCHC RBC-ENTMCNC: 31 % — LOW (ref 32–36)
MCV RBC AUTO: 90.1 FL — SIGNIFICANT CHANGE UP (ref 80–100)
METHOD TYPE: SIGNIFICANT CHANGE UP
MONOCYTES # BLD AUTO: 0.5 K/UL — SIGNIFICANT CHANGE UP (ref 0–0.9)
MONOCYTES NFR BLD AUTO: 4.1 % — SIGNIFICANT CHANGE UP (ref 2–14)
NEUTROPHILS # BLD AUTO: 8.17 K/UL — HIGH (ref 1.8–7.4)
NEUTROPHILS NFR BLD AUTO: 66.7 % — SIGNIFICANT CHANGE UP (ref 43–77)
NRBC # FLD: 0 K/UL — SIGNIFICANT CHANGE UP (ref 0–0)
ORGANISM # SPEC MICROSCOPIC CNT: SIGNIFICANT CHANGE UP
PHOSPHATE SERPL-MCNC: 2.3 MG/DL — LOW (ref 2.5–4.5)
PLATELET # BLD AUTO: 522 K/UL — HIGH (ref 150–400)
PMV BLD: 9.6 FL — SIGNIFICANT CHANGE UP (ref 7–13)
POTASSIUM SERPL-MCNC: 3.3 MMOL/L — LOW (ref 3.5–5.3)
POTASSIUM SERPL-SCNC: 3.3 MMOL/L — LOW (ref 3.5–5.3)
RBC # BLD: 3.54 M/UL — LOW (ref 4.2–5.8)
RBC # FLD: 16.5 % — HIGH (ref 10.3–14.5)
SODIUM SERPL-SCNC: 140 MMOL/L — SIGNIFICANT CHANGE UP (ref 135–145)
WBC # BLD: 12.26 K/UL — HIGH (ref 3.8–10.5)
WBC # FLD AUTO: 12.26 K/UL — HIGH (ref 3.8–10.5)

## 2019-06-23 RX ORDER — POTASSIUM PHOSPHATE, MONOBASIC POTASSIUM PHOSPHATE, DIBASIC 236; 224 MG/ML; MG/ML
15 INJECTION, SOLUTION INTRAVENOUS ONCE
Refills: 0 | Status: COMPLETED | OUTPATIENT
Start: 2019-06-23 | End: 2019-06-23

## 2019-06-23 RX ORDER — POTASSIUM CHLORIDE 20 MEQ
40 PACKET (EA) ORAL ONCE
Refills: 0 | Status: COMPLETED | OUTPATIENT
Start: 2019-06-23 | End: 2019-06-23

## 2019-06-23 RX ORDER — SODIUM CHLORIDE 9 MG/ML
1000 INJECTION INTRAMUSCULAR; INTRAVENOUS; SUBCUTANEOUS
Refills: 0 | Status: DISCONTINUED | OUTPATIENT
Start: 2019-06-23 | End: 2019-06-27

## 2019-06-23 RX ORDER — SODIUM,POTASSIUM PHOSPHATES 278-250MG
1 POWDER IN PACKET (EA) ORAL ONCE
Refills: 0 | Status: COMPLETED | OUTPATIENT
Start: 2019-06-23 | End: 2019-06-23

## 2019-06-23 RX ORDER — SENNA PLUS 8.6 MG/1
2 TABLET ORAL AT BEDTIME
Refills: 0 | Status: DISCONTINUED | OUTPATIENT
Start: 2019-06-23 | End: 2019-06-27

## 2019-06-23 RX ORDER — DOCUSATE SODIUM 100 MG
100 CAPSULE ORAL
Refills: 0 | Status: DISCONTINUED | OUTPATIENT
Start: 2019-06-23 | End: 2019-06-27

## 2019-06-23 RX ORDER — ACETAMINOPHEN 500 MG
650 TABLET ORAL EVERY 6 HOURS
Refills: 0 | Status: DISCONTINUED | OUTPATIENT
Start: 2019-06-23 | End: 2019-06-27

## 2019-06-23 RX ADMIN — PIPERACILLIN AND TAZOBACTAM 25 GRAM(S): 4; .5 INJECTION, POWDER, LYOPHILIZED, FOR SOLUTION INTRAVENOUS at 15:37

## 2019-06-23 RX ADMIN — SODIUM CHLORIDE 50 MILLILITER(S): 9 INJECTION INTRAMUSCULAR; INTRAVENOUS; SUBCUTANEOUS at 12:16

## 2019-06-23 RX ADMIN — HEPARIN SODIUM 5000 UNIT(S): 5000 INJECTION INTRAVENOUS; SUBCUTANEOUS at 15:37

## 2019-06-23 RX ADMIN — SENNA PLUS 2 TABLET(S): 8.6 TABLET ORAL at 21:08

## 2019-06-23 RX ADMIN — Medication 81 MILLIGRAM(S): at 11:22

## 2019-06-23 RX ADMIN — Medication 1 PACKET(S): at 10:16

## 2019-06-23 RX ADMIN — PIPERACILLIN AND TAZOBACTAM 25 GRAM(S): 4; .5 INJECTION, POWDER, LYOPHILIZED, FOR SOLUTION INTRAVENOUS at 06:14

## 2019-06-23 RX ADMIN — HEPARIN SODIUM 5000 UNIT(S): 5000 INJECTION INTRAVENOUS; SUBCUTANEOUS at 06:14

## 2019-06-23 RX ADMIN — GABAPENTIN 300 MILLIGRAM(S): 400 CAPSULE ORAL at 21:08

## 2019-06-23 RX ADMIN — SODIUM CHLORIDE 50 MILLILITER(S): 9 INJECTION INTRAMUSCULAR; INTRAVENOUS; SUBCUTANEOUS at 21:09

## 2019-06-23 RX ADMIN — HEPARIN SODIUM 5000 UNIT(S): 5000 INJECTION INTRAVENOUS; SUBCUTANEOUS at 21:08

## 2019-06-23 RX ADMIN — Medication 650 MILLIGRAM(S): at 20:16

## 2019-06-23 RX ADMIN — PANTOPRAZOLE SODIUM 40 MILLIGRAM(S): 20 TABLET, DELAYED RELEASE ORAL at 06:14

## 2019-06-23 RX ADMIN — PIPERACILLIN AND TAZOBACTAM 25 GRAM(S): 4; .5 INJECTION, POWDER, LYOPHILIZED, FOR SOLUTION INTRAVENOUS at 21:08

## 2019-06-23 RX ADMIN — Medication 40 MILLIEQUIVALENT(S): at 10:16

## 2019-06-23 RX ADMIN — FINASTERIDE 5 MILLIGRAM(S): 5 TABLET, FILM COATED ORAL at 11:22

## 2019-06-23 RX ADMIN — ATORVASTATIN CALCIUM 80 MILLIGRAM(S): 80 TABLET, FILM COATED ORAL at 21:08

## 2019-06-23 RX ADMIN — POTASSIUM PHOSPHATE, MONOBASIC POTASSIUM PHOSPHATE, DIBASIC 62.5 MILLIMOLE(S): 236; 224 INJECTION, SOLUTION INTRAVENOUS at 10:16

## 2019-06-23 NOTE — CONSULT NOTE ADULT - SUBJECTIVE AND OBJECTIVE BOX
73y Male with history of B cell lymphoproliferative disorder who presents with fatigue found to have sepsis secondary to UTI.     Patient with h/o bilateral renal calculus with stents placed by urology. Patient currently on CTX for bacteremia (GNR) and IVF.     REVIEW OF SYSTEMS:  Gen: no changes in weight, + fatigue  HEENT: no rhinorrhea  Neck: no sore throat  Cards: no chest pain  Resp: no dyspnea  GI: no nausea or vomiting or diarrhea  : + dysuria, no hematuria  Vascular: no LE edema  Derm: no rashes  Neuro: no numbness/tingling    Levaquin (Rash)    Hospital Medications:     MEDICATIONS  (STANDING):  acetaminophen  IVPB .. 1000 milliGRAM(s) IV Intermittent once  aspirin enteric coated 81 milliGRAM(s) Oral daily  atorvastatin 80 milliGRAM(s) Oral at bedtime  docusate sodium 100 milliGRAM(s) Oral two times a day  finasteride 5 milliGRAM(s) Oral daily  gabapentin 300 milliGRAM(s) Oral at bedtime  heparin  Injectable 5000 Unit(s) SubCutaneous every 8 hours  pantoprazole    Tablet 40 milliGRAM(s) Oral before breakfast  piperacillin/tazobactam IVPB..      piperacillin/tazobactam IVPB.. 3.375 Gram(s) IV Intermittent every 8 hours  senna 2 Tablet(s) Oral at bedtime  sodium chloride 0.9%. 1000 milliLiter(s) (50 mL/Hr) IV Continuous <Continuous>    MEDICATIONS  (PRN):  guaiFENesin    Syrup 200 milliGRAM(s) Oral every 6 hours PRN Cough  ondansetron Injectable 4 milliGRAM(s) IV Push every 6 hours PRN Nausea and/or Vomiting      PAST MEDICAL & SURGICAL HISTORY:  Kidney stone: s/p lithotripsy  Urinary tract infection without hematuria, site unspecified  Cerebrovascular accident (CVA), unspecified mechanism: With residual R sided weakness and expressive aphasia  Lymphoproliferative disorder: B-Cell  Hyperlipidemia  Hypertension  History of cataract surgery      FAMILY HISTORY:  No pertinent family history in first degree relatives      SOCIAL HISTORY:  Denies toxic substance use     Vital Signs Last 24 Hrs  T(C): 36.9 (2019 10:15), Max: 37.2 (2019 21:22)  T(F): 98.5 (2019 10:15), Max: 98.9 (2019 21:22)  HR: 79 (2019 10:15) (67 - 84)  BP: 103/65 (2019 10:15) (100/59 - 110/58)  BP(mean): --  RR: 18 (2019 10:15) (17 - 18)  SpO2: 96% (2019 10:15) (95% - 98%)    PHYSICAL EXAM:  Gen: NAD, calm  HEENT: MMM  Neck: no JVD  Cards: RRR, +S1/S2, no M/G/R  Resp: CTA B/L  GI: soft, NT/ND, NABS  : no CVA tenderness  Vascular: no LE edema B/L  Derm: no rashes  Neuro: non-focal    LABS:      139  |  102  |  10  ----------------------------<  92  3.8   |  26  |  0.96    Ca    8.9      2019 07:13  Phos  2.9     -  Mg     2.2     -    TPro  8.5<H>  /  Alb  3.9  /  TBili  0.8  /  DBili      /  AST  17  /  ALT  15  /  AlkPhos  110  06-    Creatinine Trend: 0.96 <--, 1.07 <--                        11.0   19.77 )-----------( 503      ( 2019 07:13 )             34.3     Urine Studies:  Urinalysis Basic - ( 2019 17:15 )    Color: YELLOW / Appearance: Lt TURBID / S.017 / pH: 6.5  Gluc: NEGATIVE / Ketone: NEGATIVE  / Bili: NEGATIVE / Urobili: TRACE   Blood: MODERATE / Protein: 200 / Nitrite: POSITIVE   Leuk Esterase: LARGE / RBC: >50 / WBC >50   Sq Epi: FEW / Non Sq Epi:  / Bacteria: MANY          RADIOLOGY & ADDITIONAL STUDIES:  < from: Xray Chest 1 View AP/PA (19 @ 18:31) >  FINDINGS/  IMPRESSION:    Clear lungs. No pleural effusions or pneumothorax.    Cardiac and mediastinal silhouettes within normal limits.    Trachea midline.    Osteopenic appearing but otherwise unremarkable osseous structures.     < end of copied text > 73y Male with history of B cell lymphoproliferative disorder? who presents with fatigue found to have sepsis secondary to UTI.   Patient with h/o bilateral renal calculus with stents placed by urology. Patient currently on CTX for bacteremia (GNR) and IVF.   pt is poor historian. spoke with sister who was unable to recall his Hematologist.  He apparently has been followed for a blood disorder every 6 mo.  Never required therapy.      REVIEW OF SYSTEMS:  Gen: no changes in weight, + fatigue  HEENT: no rhinorrhea  Neck: no sore throat  Cards: no chest pain  Resp: no dyspnea  GI: no nausea or vomiting or diarrhea  : + dysuria, no hematuria  Vascular: no LE edema  Derm: no rashes  Neuro: no numbness/tingling    Levaquin (Rash)    Hospital Medications:     MEDICATIONS  (STANDING):  acetaminophen  IVPB .. 1000 milliGRAM(s) IV Intermittent once  aspirin enteric coated 81 milliGRAM(s) Oral daily  atorvastatin 80 milliGRAM(s) Oral at bedtime  docusate sodium 100 milliGRAM(s) Oral two times a day  finasteride 5 milliGRAM(s) Oral daily  gabapentin 300 milliGRAM(s) Oral at bedtime  heparin  Injectable 5000 Unit(s) SubCutaneous every 8 hours  pantoprazole    Tablet 40 milliGRAM(s) Oral before breakfast  piperacillin/tazobactam IVPB..      piperacillin/tazobactam IVPB.. 3.375 Gram(s) IV Intermittent every 8 hours  senna 2 Tablet(s) Oral at bedtime  sodium chloride 0.9%. 1000 milliLiter(s) (50 mL/Hr) IV Continuous <Continuous>    MEDICATIONS  (PRN):  guaiFENesin    Syrup 200 milliGRAM(s) Oral every 6 hours PRN Cough  ondansetron Injectable 4 milliGRAM(s) IV Push every 6 hours PRN Nausea and/or Vomiting      PAST MEDICAL & SURGICAL HISTORY:  Kidney stone: s/p lithotripsy  Urinary tract infection without hematuria, site unspecified  Cerebrovascular accident (CVA), unspecified mechanism: With residual R sided weakness and expressive aphasia  Lymphoproliferative disorder: B-Cell  Hyperlipidemia  Hypertension  History of cataract surgery      FAMILY HISTORY:  No pertinent family history in first degree relatives      SOCIAL HISTORY:  Denies toxic substance use     Vital Signs Last 24 Hrs  T(C): 36.9 (2019 10:15), Max: 37.2 (2019 21:22)  T(F): 98.5 (2019 10:15), Max: 98.9 (2019 21:22)  HR: 79 (2019 10:15) (67 - 84)  BP: 103/65 (2019 10:15) (100/59 - 110/58)  BP(mean): --  RR: 18 (2019 10:15) (17 - 18)  SpO2: 96% (2019 10:15) (95% - 98%)    PHYSICAL EXAM:  Gen: NAD, calm  HEENT: MMM  Neck: no JVD  Cards: RRR, +S1/S2, no M/G/R  Resp: CTA B/L  GI: soft, NT/ND, NABS  : no CVA tenderness  Vascular: no LE edema B/L  Derm: no rashes  Neuro: non-focal    LABS:      139  |  102  |  10  ----------------------------<  92  3.8   |  26  |  0.96    Ca    8.9      2019 07:13  Phos  2.9       Mg     2.2         TPro  8.5<H>  /  Alb  3.9  /  TBili  0.8  /  DBili      /  AST  17  /  ALT  15  /  AlkPhos  110  -    Creatinine Trend: 0.96 <--, 1.07 <--                        11.0   19.77 )-----------( 503      ( 2019 07:13 )             34.3     Urine Studies:  Urinalysis Basic - ( 2019 17:15 )    Color: YELLOW / Appearance: Lt TURBID / S.017 / pH: 6.5  Gluc: NEGATIVE / Ketone: NEGATIVE  / Bili: NEGATIVE / Urobili: TRACE   Blood: MODERATE / Protein: 200 / Nitrite: POSITIVE   Leuk Esterase: LARGE / RBC: >50 / WBC >50   Sq Epi: FEW / Non Sq Epi:  / Bacteria: MANY          RADIOLOGY & ADDITIONAL STUDIES:  < from: Xray Chest 1 View AP/PA (19 @ 18:31) >  FINDINGS/  IMPRESSION:    Clear lungs. No pleural effusions or pneumothorax.    Cardiac and mediastinal silhouettes within normal limits.    Trachea midline.    Osteopenic appearing but otherwise unremarkable osseous structures.     < end of copied text >

## 2019-06-23 NOTE — CONSULT NOTE ADULT - ATTENDING COMMENTS
[facility] NEPHROLOGY  Winston Viramontes M.D.  Lan Lee D.O.  Melissa Johnson M.D.  Amanda Goldman, MSN, ANP-C    Telephone: [phone]  Facsimile: [phone]    [address] [place] [address]
[facility] NEPHROLOGY  Winston Viramontes M.D.  Lan Lee D.O.  Melissa Johnson M.D.  Amanda Goldman, MSN, ANP-C    Telephone: [phone]  Facsimile: [phone]    [address] [place] [address]

## 2019-06-23 NOTE — PROGRESS NOTE ADULT - ASSESSMENT
This is a 73M with history as above who presents to the hospital with sepsis 2/2 acute pyelonephritis and bacteremia

## 2019-06-23 NOTE — CONSULT NOTE ADULT - ASSESSMENT
This is a 73M with history of CVA (1/2018) with residual R arm/leg weakness and expressive aphasia, B-Cell Lymphoproliferative d/o, HTN, HLD, and nephrolithiasis s/p lithotripsy who presents to the hospital with complaints of worsening lethargy/fatigue with dysuria. Patient lives with his sister and had a 12hr HHA for 7 days a week. Said that he started having dysuria about 1 week ago but had not informed his sister of this. Said that over the next few days he started to have worsening lethargy/fatigue and fevers. Said that he also started having a dry cough over this time. his sister noted that he was more fatigued and the new cough and thought he was having a PNA and brought him to the hospital for evaluation. His sister stated that the patient had a kidney stone earlier this year that was removed via lithotripsy but since then has had a few recurrent UTIs, No other complaints.     On arrival to the ED, his vitals were T 102.6, P 110, /80, RR 20, O2 sat 99% RA. His lab work was significant for leukocytosis (higher than baseline) with neutrophilia and a positive UA. He had a CXR that showed clear lungs and a RVP that was negative. He was given tylenol 650mg PO x1, CTX 1g/AZT 500mg IVPB x1, and LR 1.5L. He was then admitted to medicine for further management. (21 Jun 2019 22:20)      Sepsis: (fever, tachycardia, leukocytosis, bacteremia)    - due to UTI.  Urine and blood cultures growing E.coli.  Sensitivities noted, can change from zosyn to ceftriaxone.    - f/u repeat blood cultures to ensure clearance.    - ct ap with enlarged prostate  and multiple punctate nonobstructing renal calculi which may serve as nidus for infection.  Cont finasteride.    - De-escalate to PO abx once repeat blood cultures have cleared.      Will follow,    Ирина Kingsley  [phone]

## 2019-06-23 NOTE — DISCHARGE NOTE PROVIDER - CARE PROVIDER_API CALL
Ronda Hawley)  Hematology; Internal Medicine; Medical Oncology  [address]  [place] [address]  Phone: [phone]  Fax: [phone]  Follow Up Time: 1 week    [place] [address]  Phone: [phone]  Fax: [phone]  Follow Up Time: 1 week Ronda Hawley)  Hematology; Internal Medicine; Medical Oncology  [address] [facility] [address]  [place] [address]  Phone: [phone]  Fax: [phone]  Follow Up Time: 1 week    [place])  [place] [address]  Phone: [phone]  Fax: [phone]  Follow Up Time: 1 week    Blanca Rowe (DO)  Cardiology; Internal Medicine  [address] [facility], [address]  [place] [address]  Phone: [phone]  Fax: [phone]  Follow Up Time: 1 week Ronda Hawley)  Hematology; Internal Medicine; Medical Oncology  [address] [facility] [address], [address]  [place] [address]  Phone: [phone]  Fax: [phone]  Follow Up Time: 1 week    Micah Li)  [address] [address]  Phone: [phone]  Fax: [phone]  Follow Up Time: 1 week    Blanca Rowe (DO)  Cardiology; Internal Medicine  [address] [facility], [address]  [place] [address]  Phone: [phone]  Fax: [phone]  Follow Up Time: 1 week    [facility] for urology,   [phone]  Phone: (   )    -  Fax: (   )    -  Follow Up Time: 1 week

## 2019-06-23 NOTE — PROGRESS NOTE ADULT - SUBJECTIVE AND OBJECTIVE BOX
Patient seen and examined at bedside, with ?wife/sister at bedside per pt's request  poor oral intake 2/2 loss of appetite and displeasure with hospital food  improving cough and pain   Case discussed with medical team    HPI:  This is a 73M with history of CVA (2018) with residual R arm/leg weakness and expressive aphasia, B-Cell Lymphoproliferative d/o, HTN, HLD, and nephrolithiasis s/p lithotripsy who presents to the hospital with complaints of worsening lethargy/fatigue with dysuria. Patient lives with his sister and had a 12hr HHA for 7 days a week. Said that he started having dysuria about 1 week ago but had not informed his sister of this. Said that over the next few days he started to have worsening lethargy/fatigue and fevers. Said that he also started having a dry cough over this time. his sister noted that he was more fatigued and the new cough and thought he was having a PNA and brought him to the hospital for evaluation. His sister stated that the patient had a kidney stone earlier this year that was removed via lithotripsy but since then has had a few recurrent UTIs, No other complaints.     On arrival to the ED, his vitals were T 102.6, P 110, /80, RR 20, O2 sat 99% RA. His lab work was significant for leukocytosis (higher than baseline) with neutrophilia and a positive UA. He had a CXR that showed clear lungs and a RVP that was negative. He was given tylenol 650mg PO x1, CTX 1g/AZT 500mg IVPB x1, and LR 1.5L. He was then admitted to medicine for further management. (2019 22:20)      PAST MEDICAL & SURGICAL HISTORY:  Kidney stone: s/p lithotripsy  Urinary tract infection without hematuria, site unspecified  Cerebrovascular accident (CVA), unspecified mechanism: With residual R sided weakness and expressive aphasia  Lymphoproliferative disorder: B-Cell  Hyperlipidemia  Hypertension  History of cataract surgery      Levaquin (Rash)       MEDICATIONS  (STANDING):  acetaminophen  IVPB .. 1000 milliGRAM(s) IV Intermittent once  aspirin enteric coated 81 milliGRAM(s) Oral daily  atorvastatin 80 milliGRAM(s) Oral at bedtime  finasteride 5 milliGRAM(s) Oral daily  gabapentin 300 milliGRAM(s) Oral at bedtime  heparin  Injectable 5000 Unit(s) SubCutaneous every 8 hours  pantoprazole    Tablet 40 milliGRAM(s) Oral before breakfast  piperacillin/tazobactam IVPB..      piperacillin/tazobactam IVPB.. 3.375 Gram(s) IV Intermittent every 8 hours  potassium chloride    Tablet ER 40 milliEquivalent(s) Oral once  potassium phosphate / sodium phosphate powder 1 Packet(s) Oral once  potassium phosphate IVPB 15 milliMole(s) IV Intermittent once  sodium chloride 0.9%. 1000 milliLiter(s) (50 mL/Hr) IV Continuous <Continuous>    MEDICATIONS  (PRN):  guaiFENesin    Syrup 200 milliGRAM(s) Oral every 6 hours PRN Cough  ondansetron Injectable 4 milliGRAM(s) IV Push every 6 hours PRN Nausea and/or Vomiting      REVIEW OF SYSTEMS:  CONSTITUTIONAL: (+) malaise. poor oral intake   EYES: No acute change in vision   ENT:  No tinnitus  NECK: No stiffness  RESPIRATORY: cough, preciado. No hemoptysis  CARDIOVASCULAR: No chest pain, palpitations, syncope  GASTROINTESTINAL: No hematemesis, diarrhea, melena, or hematochezia.  GENITOURINARY: No hematuria  NEUROLOGICAL: No headaches  LYMPH Nodes: No enlarged glands  ENDOCRINE: No heat or cold intolerance	    T(C): 36.6 (19 @ 06:12), Max: 37.2 (19 @ 21:22)  HR: 69 (19 @ 06:12) (67 - 84)  BP: 100/59 (19 @ 06:12) (100/59 - 152/73)  RR: 18 (19 @ 06:12) (17 - 18)  SpO2: 98% (19 @ 06:12) (95% - 98%)    PHYSICAL EXAMINATION:   Constitutional: improving ill appearance. NAD  HEENT: NC, AT  Neck:  Supple  Respiratory: improving rhonchi.  Adequate airflow b/l. Not using accessory muscles of respiration.  Cardiovascular:  sys murmur, S1 & S2 intact, no R/G, 2+ radial pulses b/l  Gastrointestinal: Soft, NT, ND, normoactive b.s., no organomegaly/RT/rigidity  Extremities: WWP  Neurological:  Alert and awake.  No acute focal motor deficits. Crude sensation intact.     Labs and imaging reviewed    LABS:                        9.9    12.26 )-----------( 522      ( 2019 06:23 )             31.9     -    140  |  104  |  8   ----------------------------<  95  3.3<L>   |  26  |  1.09    Ca    8.4      2019 06:23  Phos  2.3     -  Mg     2.1     -    TPro  8.5<H>  /  Alb  3.9  /  TBili  0.8  /  DBili  x   /  AST  17  /  ALT  15  /  AlkPhos  110  06-21        PT/INR - ( 2019 17:15 )   PT: 14.6 SEC;   INR: 1.27          PTT - ( 2019 17:15 )  PTT:28.1 SEC  Urinalysis Basic - ( 2019 17:15 )    Color: YELLOW / Appearance: Lt TURBID / S.017 / pH: 6.5  Gluc: NEGATIVE / Ketone: NEGATIVE  / Bili: NEGATIVE / Urobili: TRACE   Blood: MODERATE / Protein: 200 / Nitrite: POSITIVE   Leuk Esterase: LARGE / RBC: >50 / WBC >50   Sq Epi: FEW / Non Sq Epi: x / Bacteria: MANY      CAPILLARY BLOOD GLUCOSE            LIVER FUNCTIONS - ( 2019 17:15 )  Alb: 3.9 g/dL / Pro: 8.5 g/dL / ALK PHOS: 110 u/L / ALT: 15 u/L / AST: 17 u/L / GGT: x               RADIOLOGY & ADDITIONAL STUDIES:

## 2019-06-23 NOTE — PROGRESS NOTE ADULT - SUBJECTIVE AND OBJECTIVE BOX
[facility] NEPHROLOGY- PROGRESS NOTE    73y Male with history of bilateral non-obstructive renal calculus with stents presents with fatigue found to have sepsis secondary to UTI. Nephrology consulted for UTI.    REVIEW OF SYSTEMS:  Gen: no changes in weight, + fatigue  Cards: no chest pain  Resp: no dyspnea  GI: no nausea or vomiting or diarrhea, + decreased PO intake  Vascular: no LE edema    Levaquin (Rash)      Hospital Medications: Medications reviewed    VITALS:  T(F): 98.5 (19 @ 10:15), Max: 98.9 (19 @ 21:22)  HR: 79 (19 @ 10:15)  BP: 103/65 (19 @ 10:15)  RR: 18 (19 @ 10:15)  SpO2: 96% (19 @ 10:15)  Wt(kg): --  Height (cm): 162.56 ( @ 23:13)  Weight (kg): 64.6 ( @ 23:13)  BMI (kg/m2): 24.4 ( @ 23:13)  BSA (m2): 1.69 ( @ 23:13)      PHYSICAL EXAM:    Gen: NAD, calm  Cards: RRR, +S1/S2, no M/G/R  Resp: CTA B/L  GI: soft, NT/ND, NABS  Vascular: no LE edema B/L    LABS:      140  |  104  |  8   ----------------------------<  95  3.3<L>   |  26  |  1.09    Ca    8.4      2019 06:23  Phos  2.3       Mg     2.1         TPro  8.5<H>  /  Alb  3.9  /  TBili  0.8  /  DBili      /  AST  17  /  ALT  15  /  AlkPhos  110      Creatinine Trend: 1.09 <--, 0.96 <--, 1.07 <--                        9.9    12.26 )-----------( 522      ( 2019 06:23 )             31.9     Urine Studies:  Urinalysis Basic - ( 2019 17:15 )    Color: YELLOW / Appearance: Lt TURBID / S.017 / pH: 6.5  Gluc: NEGATIVE / Ketone: NEGATIVE  / Bili: NEGATIVE / Urobili: TRACE   Blood: MODERATE / Protein: 200 / Nitrite: POSITIVE   Leuk Esterase: LARGE / RBC: >50 / WBC >50   Sq Epi: FEW / Non Sq Epi:  / Bacteria: MANY          RADIOLOGY & ADDITIONAL STUDIES:  < from: CT Abdomen and Pelvis w/ IV Cont (19 @ 14:30) >  KIDNEYS/URETERS:  Interval removal of the bilateral ureteral stents.   Multiple punctate nonobstructing left intrarenal calculi. No   hydronephrosis.    BLADDER: Nondistended.    < end of copied text >

## 2019-06-23 NOTE — DISCHARGE NOTE PROVIDER - PROVIDER TOKENS
PROVIDER:[TOKEN:[[id number]],FOLLOWUP:[1 week]],PROVIDER:[TOKEN:[[id number]],FOLLOWUP:[1 week]] PROVIDER:[TOKEN:[[id number]],FOLLOWUP:[1 week]],PROVIDER:[TOKEN:[[id number]],FOLLOWUP:[1 week]],PROVIDER:[TOKEN:[[id number]],FOLLOWUP:[1 week]] PROVIDER:[TOKEN:[[id number]],FOLLOWUP:[1 week]],PROVIDER:[TOKEN:[[id number]],FOLLOWUP:[1 week]],PROVIDER:[TOKEN:[[id number]],FOLLOWUP:[1 week]],FREE:[LAST:[[facility] urology],PHONE:[(   )    -],FAX:[(   )    -],ADDRESS:[[id number]],FOLLOWUP:[1 week]]

## 2019-06-23 NOTE — CONSULT NOTE ADULT - ASSESSMENT
73y Male with history of B cell lymphoproliferative disorder who presents with fatigue found to have sepsis secondary to UTI.     UTI sepsis/ Pyelonephritis  -on IV Abx as per primary team   - Bilateral renal calculus s/p stents. Renal and Urology to evaluate     B cell lymphoproliferative disorder  -CT C/A/P - no lymphadenopathy or spleenomegay  -elevated WBC in setting of sepsis.   WBC =12; ALC =3400    Normocytic Anemia   -will send work up   Thrombocytosis - probably reactive     will follow       Daniel Morin MD  [facility]  [phone] 73y Male with history of B cell lymphoproliferative disorder who presents with fatigue found to have sepsis secondary to UTI.     UTI sepsis/ Pyelonephritis  -on IV Abx as per primary team   - Bilateral renal calculus s/p stents. Renal and Urology to evaluate     B cell lymphoproliferative disorder  -CT C/A/P - no lymphadenopathy or spleenomegay  -elevated WBC in setting of sepsis.   WBC =12; ALC =3400;   ALC on previous admissions has been >4000  ?CLL   will need to obtain records from prior oncologist.  No urgent Heme/Onc issues.  May f/u as outpt for further work up and managment.     Normocytic Anemia   -will send work up   Thrombocytosis - probably reactive     will follow       Daniel Morin MD  [facility]  [phone]

## 2019-06-23 NOTE — PROGRESS NOTE ADULT - ASSESSMENT
73y Male with history of bilateral non-obstructive renal calculus with stents presents with fatigue found to have sepsis secondary to UTI. Nephrology consulted for UTI.    1) Acute cystitis with hematuria: On Zosyn with positive blood and urine cultures with E. Coli. Follow up pending sensitivities. Follow up ID recommendations.    2) Bilateral renal calculus: Given infection, would consult urology for possible intervention.     3) HTN: BP controlled. Monitor off of medications.    4) Hyponatremia: Mild and resolved with IVF.    Will sign off. Please call with questions.

## 2019-06-23 NOTE — CONSULT NOTE ADULT - SUBJECTIVE AND OBJECTIVE BOX
Patient is a 73y old  Male who presents with a chief complaint of UTI, Worsening Fatigue (23 Jun 2019 18:56)      HPI:  This is a 73M with history of CVA (1/2018) with residual R arm/leg weakness and expressive aphasia, B-Cell Lymphoproliferative d/o, HTN, HLD, and nephrolithiasis s/p lithotripsy who presents to the hospital with complaints of worsening lethargy/fatigue with dysuria. Patient lives with his sister and had a 12hr HHA for 7 days a week. Said that he started having dysuria about 1 week ago but had not informed his sister of this. Said that over the next few days he started to have worsening lethargy/fatigue and fevers. Said that he also started having a dry cough over this time. his sister noted that he was more fatigued and the new cough and thought he was having a PNA and brought him to the hospital for evaluation. His sister stated that the patient had a kidney stone earlier this year that was removed via lithotripsy but since then has had a few recurrent UTIs, No other complaints.     On arrival to the ED, his vitals were T 102.6, P 110, /80, RR 20, O2 sat 99% RA. His lab work was significant for leukocytosis (higher than baseline) with neutrophilia and a positive UA. He had a CXR that showed clear lungs and a RVP that was negative. He was given tylenol 650mg PO x1, CTX 1g/AZT 500mg IVPB x1, and LR 1.5L. He was then admitted to medicine for further management. (21 Jun 2019 22:20)      REVIEW OF SYSTEMS:    CONSTITUTIONAL: No fever, weight loss, or fatigue  EYES: No eye pain, visual disturbances, or discharge  ENMT:  No sore throat  NECK: No pain or stiffness  RESPIRATORY: No cough, wheezing, chills or hemoptysis; No shortness of breath  CARDIOVASCULAR: No chest pain, palpitations, dizziness, or leg swelling  GASTROINTESTINAL: No abdominal or epigastric pain. No nausea, vomiting, or hematemesis; No diarrhea or constipation. No melena or hematochezia.  GENITOURINARY: No dysuria, frequency, hematuria, or incontinence  NEUROLOGICAL: No headaches, memory loss, loss of strength, numbness, or tremors  SKIN: No itching, burning, rashes, or lesions   LYMPH NODES: No enlarged glands  MUSCULOSKELETAL: No joint pain or swelling; No muscle, back, or extremity pain      PAST MEDICAL & SURGICAL HISTORY:  Kidney stone: s/p lithotripsy  Urinary tract infection without hematuria, site unspecified  Cerebrovascular accident (CVA), unspecified mechanism: With residual R sided weakness and expressive aphasia  Lymphoproliferative disorder: B-Cell  Hyperlipidemia  Hypertension  History of cataract surgery      Allergies    Levaquin (Rash)    Intolerances        FAMILY HISTORY:  No pertinent family history in first degree relatives      SOCIAL HISTORY:        MEDICATIONS  (STANDING):  acetaminophen  IVPB .. 1000 milliGRAM(s) IV Intermittent once  aspirin enteric coated 81 milliGRAM(s) Oral daily  atorvastatin 80 milliGRAM(s) Oral at bedtime  docusate sodium 100 milliGRAM(s) Oral two times a day  finasteride 5 milliGRAM(s) Oral daily  gabapentin 300 milliGRAM(s) Oral at bedtime  heparin  Injectable 5000 Unit(s) SubCutaneous every 8 hours  pantoprazole    Tablet 40 milliGRAM(s) Oral before breakfast  piperacillin/tazobactam IVPB..      piperacillin/tazobactam IVPB.. 3.375 Gram(s) IV Intermittent every 8 hours  senna 2 Tablet(s) Oral at bedtime  sodium chloride 0.9%. 1000 milliLiter(s) (50 mL/Hr) IV Continuous <Continuous>    MEDICATIONS  (PRN):  acetaminophen   Tablet .. 650 milliGRAM(s) Oral every 6 hours PRN Temp greater or equal to 38C (100.4F)  guaiFENesin    Syrup 200 milliGRAM(s) Oral every 6 hours PRN Cough  ondansetron Injectable 4 milliGRAM(s) IV Push every 6 hours PRN Nausea and/or Vomiting      Vital Signs Last 24 Hrs  T(C): 38 (23 Jun 2019 19:20), Max: 38 (23 Jun 2019 19:20)  T(F): 100.4 (23 Jun 2019 19:20), Max: 100.4 (23 Jun 2019 19:20)  HR: 72 (23 Jun 2019 19:06) (67 - 84)  BP: 113/61 (23 Jun 2019 19:06) (100/59 - 113/61)  BP(mean): --  RR: 17 (23 Jun 2019 19:06) (17 - 18)  SpO2: 97% (23 Jun 2019 19:06) (95% - 100%)    PHYSICAL EXAM:    GENERAL: NAD, well-groomed  HEAD:  Atraumatic, Normocephalic  EYES: EOMI, PERRLA, conjunctiva and sclera clear  ENMT: No tonsillar erythema, exudates, or enlargement; Moist mucous membranes  NECK: Supple, No JVD  CHEST/LUNG: Clear to percussion bilaterally; No rales, rhonchi, wheezing, or rubs  HEART: Regular rate and rhythm; No murmurs, rubs, or gallops  ABDOMEN: Soft, Nontender, Nondistended; Bowel sounds present  EXTREMITIES:  2+ Peripheral Pulses, No clubbing, cyanosis, or edema  LYMPH: No lymphadenopathy noted  SKIN: No rashes or lesions    LABS:  CBC Full  -  ( 23 Jun 2019 06:23 )  WBC Count : 12.26 K/uL  RBC Count : 3.54 M/uL  Hemoglobin : 9.9 g/dL  Hematocrit : 31.9 %  Platelet Count - Automated : 522 K/uL  Mean Cell Volume : 90.1 fL  Mean Cell Hemoglobin : 28.0 pg  Mean Cell Hemoglobin Concentration : 31.0 %  Auto Neutrophil # : 8.17 K/uL  Auto Lymphocyte # : 3.40 K/uL  Auto Monocyte # : 0.50 K/uL  Auto Eosinophil # : 0.09 K/uL  Auto Basophil # : 0.05 K/uL  Auto Neutrophil % : 66.7 %  Auto Lymphocyte % : 27.7 %  Auto Monocyte % : 4.1 %  Auto Eosinophil % : 0.7 %  Auto Basophil % : 0.4 %      06-23    140  |  104  |  8   ----------------------------<  95  3.3<L>   |  26  |  1.09    Ca    8.4      23 Jun 2019 06:23  Phos  2.3     06-23  Mg     2.1     06-23                              MICROBIOLOGY:                    RADIOLOGY: Patient is a 73y old  Male who presents with a chief complaint of UTI, Worsening Fatigue (23 Jun 2019 18:56)      HPI:  This is a 73M with history of CVA (1/2018) with residual R arm/leg weakness and expressive aphasia, B-Cell Lymphoproliferative d/o, HTN, HLD, and nephrolithiasis s/p lithotripsy who presents to the hospital with complaints of worsening lethargy/fatigue with dysuria. Patient lives with his sister and had a 12hr HHA for 7 days a week. Said that he started having dysuria about 1 week ago but had not informed his sister of this. Said that over the next few days he started to have worsening lethargy/fatigue and fevers. Said that he also started having a dry cough over this time. his sister noted that he was more fatigued and the new cough and thought he was having a PNA and brought him to the hospital for evaluation. His sister stated that the patient had a kidney stone earlier this year that was removed via lithotripsy but since then has had a few recurrent UTIs, No other complaints.     On arrival to the ED, his vitals were T 102.6, P 110, /80, RR 20, O2 sat 99% RA. His lab work was significant for leukocytosis (higher than baseline) with neutrophilia and a positive UA. He had a CXR that showed clear lungs and a RVP that was negative. He was given tylenol 650mg PO x1, CTX 1g/AZT 500mg IVPB x1, and LR 1.5L. He was then admitted to medicine for further management. (21 Jun 2019 22:20)      REVIEW OF SYSTEMS:    CONSTITUTIONAL: No fever, weight loss, or fatigue  EYES: No eye pain, visual disturbances, or discharge  ENMT:  No sore throat  NECK: No pain or stiffness  RESPIRATORY: No cough, wheezing, chills or hemoptysis; No shortness of breath  CARDIOVASCULAR: No chest pain, palpitations, dizziness, or leg swelling  GASTROINTESTINAL: No abdominal or epigastric pain. No nausea, vomiting, or hematemesis; No diarrhea or constipation. No melena or hematochezia.  GENITOURINARY: No dysuria, frequency, hematuria, or incontinence  NEUROLOGICAL: No headaches, memory loss, loss of strength, numbness, or tremors  SKIN: No itching, burning, rashes, or lesions   LYMPH NODES: No enlarged glands  MUSCULOSKELETAL: No joint pain or swelling; No muscle, back, or extremity pain      PAST MEDICAL & SURGICAL HISTORY:  Kidney stone: s/p lithotripsy  Urinary tract infection without hematuria, site unspecified  Cerebrovascular accident (CVA), unspecified mechanism: With residual R sided weakness and expressive aphasia  Lymphoproliferative disorder: B-Cell  Hyperlipidemia  Hypertension  History of cataract surgery      Allergies    Levaquin (Rash)    Intolerances        FAMILY HISTORY:  No pertinent family history in first degree relatives      SOCIAL HISTORY:        MEDICATIONS  (STANDING):  acetaminophen  IVPB .. 1000 milliGRAM(s) IV Intermittent once  aspirin enteric coated 81 milliGRAM(s) Oral daily  atorvastatin 80 milliGRAM(s) Oral at bedtime  docusate sodium 100 milliGRAM(s) Oral two times a day  finasteride 5 milliGRAM(s) Oral daily  gabapentin 300 milliGRAM(s) Oral at bedtime  heparin  Injectable 5000 Unit(s) SubCutaneous every 8 hours  pantoprazole    Tablet 40 milliGRAM(s) Oral before breakfast  piperacillin/tazobactam IVPB..      piperacillin/tazobactam IVPB.. 3.375 Gram(s) IV Intermittent every 8 hours  senna 2 Tablet(s) Oral at bedtime  sodium chloride 0.9%. 1000 milliLiter(s) (50 mL/Hr) IV Continuous <Continuous>    MEDICATIONS  (PRN):  acetaminophen   Tablet .. 650 milliGRAM(s) Oral every 6 hours PRN Temp greater or equal to 38C (100.4F)  guaiFENesin    Syrup 200 milliGRAM(s) Oral every 6 hours PRN Cough  ondansetron Injectable 4 milliGRAM(s) IV Push every 6 hours PRN Nausea and/or Vomiting      Vital Signs Last 24 Hrs  T(C): 38 (23 Jun 2019 19:20), Max: 38 (23 Jun 2019 19:20)  T(F): 100.4 (23 Jun 2019 19:20), Max: 100.4 (23 Jun 2019 19:20)  HR: 72 (23 Jun 2019 19:06) (67 - 84)  BP: 113/61 (23 Jun 2019 19:06) (100/59 - 113/61)  BP(mean): --  RR: 17 (23 Jun 2019 19:06) (17 - 18)  SpO2: 97% (23 Jun 2019 19:06) (95% - 100%)    PHYSICAL EXAM:    GENERAL: NAD, well-groomed  HEAD:  Atraumatic, Normocephalic  EYES: EOMI, PERRLA, conjunctiva and sclera clear  ENMT: No tonsillar erythema, exudates, or enlargement; Moist mucous membranes  NECK: Supple, No JVD  CHEST/LUNG: Clear to percussion bilaterally; No rales, rhonchi, wheezing, or rubs  HEART: Regular rate and rhythm; No murmurs, rubs, or gallops  ABDOMEN: Soft, Nontender, Nondistended; Bowel sounds present  EXTREMITIES:  2+ Peripheral Pulses, No clubbing, cyanosis, or edema  LYMPH: No lymphadenopathy noted  SKIN: No rashes or lesions    LABS:  CBC Full  -  ( 23 Jun 2019 06:23 )  WBC Count : 12.26 K/uL  RBC Count : 3.54 M/uL  Hemoglobin : 9.9 g/dL  Hematocrit : 31.9 %  Platelet Count - Automated : 522 K/uL  Mean Cell Volume : 90.1 fL  Mean Cell Hemoglobin : 28.0 pg  Mean Cell Hemoglobin Concentration : 31.0 %  Auto Neutrophil # : 8.17 K/uL  Auto Lymphocyte # : 3.40 K/uL  Auto Monocyte # : 0.50 K/uL  Auto Eosinophil # : 0.09 K/uL  Auto Basophil # : 0.05 K/uL  Auto Neutrophil % : 66.7 %  Auto Lymphocyte % : 27.7 %  Auto Monocyte % : 4.1 %  Auto Eosinophil % : 0.7 %  Auto Basophil % : 0.4 %      06-23    140  |  104  |  8   ----------------------------<  95  3.3<L>   |  26  |  1.09    Ca    8.4      23 Jun 2019 06:23  Phos  2.3     06-23  Mg     2.1     06-23                              MICROBIOLOGY:    Culture - Urine (06.21.19 @ 19:16)    -  Amikacin: S <=8 ALEX    -  Ampicillin: R >16 ALEX    -  Ampicillin/Sulbactam: I 16/8 ALEX    -  Aztreonam: S <=4 ALEX    -  Cefazolin: S <=2 ALEX    -  Cefepime: S <=2 ALEX    -  Cefoxitin: S <=4 ALEX    -  Ceftazidime: S <=1 ALEX    -  Ertapenem: S <=0.5 ALEX    -  Ceftriaxone: S <=1 ALEX    -  Ciprofloxacin: I 2 ALEX    -  Meropenem: S <=1 ALEX    -  Nitrofurantoin: S <=32 ALEX    -  Piperacillin/Tazobactam: S <=8 ALEX    -  Tigecycline: S <=1 ALEX    -  Tobramycin: I 8 ALEX    -  Trimethoprim/Sulfamethoxazole: S <=0.5/9.5 ALEX    -  Gentamicin: R >8 ALEX    -  Imipenem: S <=1 ALEX    -  Levofloxacin: S <=1 ALEX    Culture - Urine:   COLONY COUNT: > = 100,000 CFU/ML    Specimen Source: URINE MIDSTREAM    Organism Identification: Escherichia coli    Organism: Escherichia coli    Method Type: NEGATIVE ALEX 43      Culture - Blood (06.21.19 @ 18:56)    Culture - Blood:   EC^Escherichia coli    Specimen Source: BLOOD VENOUS    Gram Stain Blood:   ***** CRITICAL RESULT *****  PERSON CALLED / READ-BACK: ROCHELLE ZEE RN. / Y  DATE / TIME CALLED: 06/22/19 0818  CALLED BY: JAIDEN PARRISH^Gram Neg Rods  AFTER: 12 HOURS INCUBATION  BOTTLE: AEROBIC   ANAEROBIC BOTTLES    Culture - Blood (06.21.19 @ 18:55)    Culture - Blood:   ***Blood Panel PCR results on this specimen are available  approximately 3 hours after the Gram stain result***  Gram stain, PCR, and/or culture results may not always  correspond due to difference in methodologies  ------------------------------------------------------------  This PCR assay was performed using [employer].  The  following targets are tested for:  Enterococcus, vancomycin  resistant enterococci, Listeria monocytogenes,  coagulase  negative staphylococci, S. aureus, methicillin resistant S.  aureus, Streptococcus agalactiae (Group B), S. pneumoniae,  S. pyogenes (Group A), Acinetobacter baumannii, Enterobacter  cloacae, E. coli, Klebsiella oxytoca, K. pneumoniae, Proteus  sp., Serratia marcescens, Haemophilus influenzae, Neisseria  meningitidis, Pseudomonas aeruginosa, Candida albicans, C.  glabrata, C. krusei, C. parapsilosis, C. tropicalis and the  KPC resistance gene.  **NOTE: Due to technical problems, Proteus sp. will NOT be  reported as part of the BCID paneluntil further notice.    -  Escherichia coli: + DETECT ALEX    Specimen Source: BLOOD PERIPHERAL    Organism: BLOOD CULTURE PCR    Organism: Escherichia coli    Gram Stain Blood:   ***** CRITICAL RESULT *****  PERSON CALLED / READ-BACK: ROCHELLE ZEE RN. / Y  DATE / TIME CALLED: 06/22/19 0817  CALLED BY: JAIDEN PARRISH^Gram Neg Rods  AFTER: 12 HOURS INCUBATION  BOTTLE: ANAEROBIC BOTTLE    Organism Identification: BLOOD CULTURE PCR  Escherichia coli    Method Type: PCR                    RADIOLOGY:      < from: CT Abdomen and Pelvis w/ IV Cont (06.22.19 @ 14:30) >  FINDINGS:    CHEST:     LUNGS AND LARGE AIRWAYS: Central airways are patent. Bilateral lower lobe   subsegmental atelectasis.  PLEURA: No pleural effusion or pneumothorax.  VESSELS: Within normal limits.  HEART: The heart size is normal. No pericardial effusion. Coronary artery   calcifications.  MEDIASTINUM AND HENRY: No lymphadenopathy.  CHEST WALL AND LOWER NECK: Within normal limits.    ABDOMEN AND PELVIS:    LIVER: Left subcentimeter hypodense lesion   BILE DUCTS: Normal caliber.  GALLBLADDER: Within normal limits.  SPLEEN: Within normal limits.  PANCREAS: Within normal limits.  ADRENALS: Within normal limits.  KIDNEYS/URETERS:  Interval removal of the bilateral ureteral stents.   Multiple punctate nonobstructing left intrarenal calculi. No   hydronephrosis.    BLADDER: Nondistended.  REPRODUCTIVE ORGANS: Prostate is enlarged.    BOWEL: No bowel obstruction. Appendix is normal. Colonic diverticulosis   without diverticulitis. Small hiatal hernia.  PERITONEUM: No ascites.  VESSELS:  Atherosclerotic changes.  RETROPERITONEUM: No lymphadenopathy.    ABDOMINAL WALL: Within normal limits.  BONES: Degenerative changes of the spine.    IMPRESSION:     Bilateral renal calculi as previously demonstrated    Enlarged prostate.    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 Patient lives with his sister and had a 12hr HHA for 7 days a week. Said that he started having dysuria about 1 week ago but had not informed his sister of this. Said that over the next few days he started to have worsening lethargy/fatigue and fevers. Said that he also started having a dry cough over this time. his sister noted that he was more fatigued and the new cough and thought he was having a PNA and brought him to the hospital for evaluation. His sister stated that the patient had a kidney stone earlier this year that was removed via lithotripsy but since then has had a few recurrent UTIs, No other complaints.     On arrival to the ED, his vitals were T 102.6, P 110, /80, RR 20, O2 sat 99% RA. His lab work was significant for leukocytosis (higher than baseline) with neutrophilia and a positive UA. He had a CXR that showed clear lungs and a RVP that was negative. He was given tylenol 650mg PO x1, CTX 1g/AZT 500mg IVPB x1, and LR 1.5L. He was then admitted to medicine for further management. (21 Jun 2019 22:20)      REVIEW OF SYSTEMS:    CONSTITUTIONAL: No fever, weight loss, or fatigue  EYES: No eye pain, visual disturbances, or discharge  ENMT:  No sore throat  NECK: No pain or stiffness  RESPIRATORY: No cough, wheezing, chills or hemoptysis; No shortness of breath  CARDIOVASCULAR: No chest pain, palpitations, dizziness, or leg swelling  GASTROINTESTINAL: No abdominal or epigastric pain. No nausea, vomiting, or hematemesis; No diarrhea or constipation. No melena or hematochezia.  GENITOURINARY: No dysuria, frequency, hematuria, or incontinence  NEUROLOGICAL: No headaches, memory loss, loss of strength, numbness, or tremors  SKIN: No itching, burning, rashes, or lesions   LYMPH NODES: No enlarged glands  MUSCULOSKELETAL: No joint pain or swelling; No muscle, back, or extremity pain      PAST MEDICAL & SURGICAL HISTORY:  Kidney stone: s/p lithotripsy  Urinary tract infection without hematuria, site unspecified  Cerebrovascular accident (CVA), unspecified mechanism: With residual R sided weakness and expressive aphasia  Lymphoproliferative disorder: B-Cell  Hyperlipidemia  Hypertension  History of cataract surgery      Allergies    Levaquin (Rash)    Intolerances        FAMILY HISTORY:  No pertinent family history in first degree relatives      SOCIAL HISTORY:  Lives with sister  	Has a HHA for 12 hrs/day 7 days/week  	Ambulates with walker/1 person assist  No tobacco, EtOH, or illicit substance use      MEDICATIONS  (STANDING):  acetaminophen  IVPB .. 1000 milliGRAM(s) IV Intermittent once  aspirin enteric coated 81 milliGRAM(s) Oral daily  atorvastatin 80 milliGRAM(s) Oral at bedtime  docusate sodium 100 milliGRAM(s) Oral two times a day  finasteride 5 milliGRAM(s) Oral daily  gabapentin 300 milliGRAM(s) Oral at bedtime  heparin  Injectable 5000 Unit(s) SubCutaneous every 8 hours  pantoprazole    Tablet 40 milliGRAM(s) Oral before breakfast  piperacillin/tazobactam IVPB..      piperacillin/tazobactam IVPB.. 3.375 Gram(s) IV Intermittent every 8 hours  senna 2 Tablet(s) Oral at bedtime  sodium chloride 0.9%. 1000 milliLiter(s) (50 mL/Hr) IV Continuous <Continuous>    MEDICATIONS  (PRN):  acetaminophen   Tablet .. 650 milliGRAM(s) Oral every 6 hours PRN Temp greater or equal to 38C (100.4F)  guaiFENesin    Syrup 200 milliGRAM(s) Oral every 6 hours PRN Cough  ondansetron Injectable 4 milliGRAM(s) IV Push every 6 hours PRN Nausea and/or Vomiting      Vital Signs Last 24 Hrs  T(C): 38 (23 Jun 2019 19:20), Max: 38 (23 Jun 2019 19:20)  T(F): 100.4 (23 Jun 2019 19:20), Max: 100.4 (23 Jun 2019 19:20)  HR: 72 (23 Jun 2019 19:06) (67 - 84)  BP: 113/61 (23 Jun 2019 19:06) (100/59 - 113/61)  BP(mean): --  RR: 17 (23 Jun 2019 19:06) (17 - 18)  SpO2: 97% (23 Jun 2019 19:06) (95% - 100%)    PHYSICAL EXAM:    GENERAL: NAD, well-groomed  HEAD:  Atraumatic, Normocephalic  EYES: EOMI, PERRLA, conjunctiva and sclera clear  ENMT: No tonsillar erythema, exudates, or enlargement; Moist mucous membranes  NECK: Supple, No JVD  CHEST/LUNG: Clear to percussion bilaterally; No rales, rhonchi, wheezing, or rubs  HEART: Regular rate and rhythm; No murmurs, rubs, or gallops  ABDOMEN: Soft, Nontender, Nondistended; Bowel sounds present  EXTREMITIES:  2+ Peripheral Pulses, No clubbing, cyanosis, or edema  LYMPH: No lymphadenopathy noted  SKIN: No rashes or lesions    LABS:  CBC Full  -  ( 23 Jun 2019 06:23 )  WBC Count : 12.26 K/uL  RBC Count : 3.54 M/uL  Hemoglobin : 9.9 g/dL  Hematocrit : 31.9 %  Platelet Count - Automated : 522 K/uL  Mean Cell Volume : 90.1 fL  Mean Cell Hemoglobin : 28.0 pg  Mean Cell Hemoglobin Concentration : 31.0 %  Auto Neutrophil # : 8.17 K/uL  Auto Lymphocyte # : 3.40 K/uL  Auto Monocyte # : 0.50 K/uL  Auto Eosinophil # : 0.09 K/uL  Auto Basophil # : 0.05 K/uL  Auto Neutrophil % : 66.7 %  Auto Lymphocyte % : 27.7 %  Auto Monocyte % : 4.1 %  Auto Eosinophil % : 0.7 %  Auto Basophil % : 0.4 %      06-23    140  |  104  |  8   ----------------------------<  95  3.3<L>   |  26  |  1.09    Ca    8.4      23 Jun 2019 06:23  Phos  2.3     06-23  Mg     2.1     06-23                              MICROBIOLOGY:    Culture - Urine (06.21.19 @ 19:16)    -  Amikacin: S <=8 ALEX    -  Ampicillin: R >16 ALEX    -  Ampicillin/Sulbactam: I 16/8 ALEX    -  Aztreonam: S <=4 ALEX    -  Cefazolin: S <=2 ALXE    -  Cefepime: S <=2 ALEX    -  Cefoxitin: S <=4 ALEX    -  Ceftazidime: S <=1 ALEX    -  Ertapenem: S <=0.5 ALEX    -  Ceftriaxone: S <=1 ALEX    -  Ciprofloxacin: I 2 ALEX    -  Meropenem: S <=1 ALEX    -  Nitrofurantoin: S <=32 ALEX    -  Piperacillin/Tazobactam: S <=8 ALEX    -  Tigecycline: S <=1 ALEX    -  Tobramycin: I 8 ALEX    -  Trimethoprim/Sulfamethoxazole: S <=0.5/9.5 ALEX    -  Gentamicin: R >8 ALEX    -  Imipenem: S <=1 ALEX    -  Levofloxacin: S <=1 ALEX    Culture - Urine:   COLONY COUNT: > = 100,000 CFU/ML    Specimen Source: URINE MIDSTREAM    Organism Identification: Escherichia coli    Organism: Escherichia coli    Method Type: NEGATIVE ALEX 43      Culture - Blood (06.21.19 @ 18:56)    Culture - Blood:   EC^Escherichia coli    Specimen Source: BLOOD VENOUS    Gram Stain Blood:   ***** CRITICAL RESULT *****  PERSON CALLED / READ-BACK: ROCHELLE ZEE RN. / Y  DATE / TIME CALLED: 06/22/19 0818  CALLED BY: JAIDEN PARRISH^Gram Neg Rods  AFTER: 12 HOURS INCUBATION  BOTTLE: AEROBIC   ANAEROBIC BOTTLES    Culture - Blood (06.21.19 @ 18:55)    Culture - Blood:   ***Blood Panel PCR results on this specimen are available  approximately 3 hours after the Gram stain result***  Gram stain, PCR, and/or culture results may not always  correspond due to difference in methodologies  ------------------------------------------------------------  This PCR assay was performed using [employer].  The  following targets are tested for:  Enterococcus, vancomycin  resistant enterococci, Listeria monocytogenes,  coagulase  negative staphylococci, S. aureus, methicillin resistant S.  aureus, Streptococcus agalactiae (Group B), S. pneumoniae,  S. pyogenes (Group A), Acinetobacter baumannii, Enterobacter  cloacae, E. coli, Klebsiella oxytoca, K. pneumoniae, Proteus  sp., Serratia marcescens, Haemophilus influenzae, Neisseria  meningitidis, Pseudomonas aeruginosa, Candida albicans, C.  glabrata, C. krusei, C. parapsilosis, C. tropicalis and the  KPC resistance gene.  **NOTE: Due to technical problems, Proteus sp. will NOT be  reported as part of the BCID paneluntil further notice.    -  Escherichia coli: + DETECT ALEX    Specimen Source: BLOOD PERIPHERAL    Organism: BLOOD CULTURE PCR    Organism: Escherichia coli    Gram Stain Blood:   ***** CRITICAL RESULT *****  PERSON CALLED / READ-BACK: ROCHELLE ZEE RN. / Y  DATE / TIME CALLED: 06/22/19 0817  CALLED BY: JAIDEN PARRISH^Gram Neg Rods  AFTER: 12 HOURS INCUBATION  BOTTLE: ANAEROBIC BOTTLE    Organism Identification: BLOOD CULTURE PCR  Escherichia coli    Method Type: PCR                    RADIOLOGY:      < from: CT Abdomen and Pelvis w/ IV Cont (06.22.19 @ 14:30) >  FINDINGS:    CHEST:     LUNGS AND LARGE AIRWAYS: Central airways are patent. Bilateral lower lobe   subsegmental atelectasis.  PLEURA: No pleural effusion or pneumothorax.  VESSELS: Within normal limits.  HEART: The heart size is normal. No pericardial effusion. Coronary artery   calcifications.  MEDIASTINUM AND HENRY: No lymphadenopathy.  CHEST WALL AND LOWER NECK: Within normal limits.    ABDOMEN AND PELVIS:    LIVER: Left subcentimeter hypodense lesion   BILE DUCTS: Normal caliber.  GALLBLADDER: Within normal limits.  SPLEEN: Within normal limits.  PANCREAS: Within normal limits.  ADRENALS: Within normal limits.  KIDNEYS/URETERS:  Interval removal of the bilateral ureteral stents.   Multiple punctate nonobstructing left intrarenal calculi. No   hydronephrosis.    BLADDER: Nondistended.  REPRODUCTIVE ORGANS: Prostate is enlarged.    BOWEL: No bowel obstruction. Appendix is normal. Colonic diverticulosis   without diverticulitis. Small hiatal hernia.  PERITONEUM: No ascites.  VESSELS:  Atherosclerotic changes.  RETROPERITONEUM: No lymphadenopathy.    ABDOMINAL WALL: Within normal limits.  BONES: Degenerative changes of the spine.    IMPRESSION:     Bilateral renal calculi as previously demonstrated    Enlarged prostate.    < end of copied text >

## 2019-06-23 NOTE — DISCHARGE NOTE PROVIDER - NSDCCPCAREPLAN_GEN_ALL_CORE_FT
PRINCIPAL DISCHARGE DIAGNOSIS  Diagnosis: Sepsis  Assessment and Plan of Treatment: continue antibiotics as prescribed.      SECONDARY DISCHARGE DIAGNOSES  Diagnosis: UTI (urinary tract infection)  Assessment and Plan of Treatment: Continue antibiotics as directed and monitor for signs/symptoms of infection, such as, fever/chills, burning/pain with urination, urinary frequency/hesitancy, cloudy urine, or blood in urine.  Follow up with your PCP or kidney doctor for further evaluation and management. Please call to make an appointment within 1-2 weeks of discharge.      Diagnosis: Weakness  Assessment and Plan of Treatment: continue with physical therapy    Diagnosis: Bacteremia  Assessment and Plan of Treatment: conitinue antibiotics as prescribed. Follow up with your PCP for further evaluation and management. Please call to make an appointment within 1-2 weeks of discharge.      Diagnosis: Hip pain, acute, right  Assessment and Plan of Treatment: continue pain medications, xray was negative    Diagnosis: Hyponatremia  Assessment and Plan of Treatment: resolved, seen by kidney doctor in hospital    Diagnosis: Cerebrovascular accident (CVA), unspecified mechanism  Assessment and Plan of Treatment: continue home medications    Diagnosis: Hyperlipidemia, unspecified hyperlipidemia type  Assessment and Plan of Treatment: Continue cholesterol control medications. Continue DASH diet. Follow up with your PCP within 1 week of discharge for further management and monitoring of lipid and cholesterol panels. Please call to make an appointment PRINCIPAL DISCHARGE DIAGNOSIS  Diagnosis: Sepsis  Assessment and Plan of Treatment: continue antibiotics as prescribed.      SECONDARY DISCHARGE DIAGNOSES  Diagnosis: Cerebrovascular accident (CVA), unspecified mechanism  Assessment and Plan of Treatment: continue home medications    Diagnosis: Hyperlipidemia, unspecified hyperlipidemia type  Assessment and Plan of Treatment: Continue cholesterol control medications. Continue DASH diet. Follow up with your PCP within 1 week of discharge for further management and monitoring of lipid and cholesterol panels. Please call to make an appointment      Diagnosis: Bacteremia  Assessment and Plan of Treatment: conitinue antibiotics as prescribed. Follow up with your PCP for further evaluation and management. Please call to make an appointment within 1-2 weeks of discharge.      Diagnosis: Hip pain, acute, right  Assessment and Plan of Treatment: continue pain medications, xray was negative    Diagnosis: Hyponatremia  Assessment and Plan of Treatment: resolved, seen by kidney doctor in hospital    Diagnosis: Weakness  Assessment and Plan of Treatment: continue with physical therapy    Diagnosis: UTI (urinary tract infection)  Assessment and Plan of Treatment: Continue antibiotics as directed and monitor for signs/symptoms of infection, such as, fever/chills, burning/pain with urination, urinary frequency/hesitancy, cloudy urine, or blood in urine.  Follow up with your PCP or kidney doctor for further evaluation and management. Please call to make an appointment within 1-2 weeks of discharge. PRINCIPAL DISCHARGE DIAGNOSIS  Diagnosis: Sepsis  Assessment and Plan of Treatment: continue antibiotics as prescribed.      SECONDARY DISCHARGE DIAGNOSES  Diagnosis: Abnormal cardiovascular stress test  Assessment and Plan of Treatment: "There is a medium sized, mild to moderate defect in  inferior wall that is fixed, suggestive of infarct.  * Post-stress gated wall motion analysis was performed  (LVEF = 41 %;LVEDV = 62 ml.), revealing mild hypokinesis  of the inferior wall."  -without chest pain during this admission  -stress test consistent with infarct , will continue statin and aspirin therapy at this time  -must follow up with Cardiologist within 1 week  -would return to ER if experiencing new chest pain   -started lopressor this admission follow up with cardiologist for medical management       Diagnosis: Cerebrovascular accident (CVA), unspecified mechanism  Assessment and Plan of Treatment: continue home medications    Diagnosis: Hyperlipidemia, unspecified hyperlipidemia type  Assessment and Plan of Treatment: Continue cholesterol control medications. Continue DASH diet. Follow up with your PCP within 1 week of discharge for further management and monitoring of lipid and cholesterol panels. Please call to make an appointment      Diagnosis: Bacteremia  Assessment and Plan of Treatment: conitinue antibiotics as prescribed. Follow up with your PCP for further evaluation and management. Please call to make an appointment within 1-2 weeks of discharge.      Diagnosis: Hip pain, acute, right  Assessment and Plan of Treatment: continue pain medications, xray was negative    Diagnosis: Hyponatremia  Assessment and Plan of Treatment: resolved, seen by kidney doctor in hospital    Diagnosis: Weakness  Assessment and Plan of Treatment: continue with physical therapy    Diagnosis: Lymphoproliferative disorder  Assessment and Plan of Treatment: Lymphoproliferative disorder  f/u DR BOLTON    Diagnosis: UTI (urinary tract infection)  Assessment and Plan of Treatment: Continue antibiotics as directed and monitor for signs/symptoms of infection, such as, fever/chills, burning/pain with urination, urinary frequency/hesitancy, cloudy urine, or blood in urine.  Follow up with your PCP or kidney doctor for further evaluation and management. Please call to make an appointment within 1-2 weeks of discharge. PRINCIPAL DISCHARGE DIAGNOSIS  Diagnosis: Sepsis  Assessment and Plan of Treatment: continue antibiotics as prescribed.      SECONDARY DISCHARGE DIAGNOSES  Diagnosis: Abnormal cardiovascular stress test  Assessment and Plan of Treatment: "There is a medium sized, mild to moderate defect in  inferior wall that is fixed, suggestive of infarct.  * Post-stress gated wall motion analysis was performed  (LVEF = 41 %;LVEDV = 62 ml.), revealing mild hypokinesis  of the inferior wall."  -without chest pain during this admission  -stress test consistent with infarct , will continue statin and aspirin therapy at this time  -must follow up with Cardiologist within 1 week  -would return to ER if experiencing new chest pain   -started lopressor this admission follow up with cardiologist for medical management       Diagnosis: UTI (urinary tract infection)  Assessment and Plan of Treatment: Continue antibiotics as directed and monitor for signs/symptoms of infection, such as, fever/chills, burning/pain with urination, urinary frequency/hesitancy, cloudy urine, or blood in urine.  Follow up with your PCP or kidney doctor for further evaluation and management. Please call to make an appointment within 1-2 weeks of discharge.  FOLLOW UP WITH OUTPATIENT UROLOGIST- CT SCAN SHOWS BILATERAL RENAL CALCULI      Diagnosis: Cerebrovascular accident (CVA), unspecified mechanism  Assessment and Plan of Treatment: continue home medications    Diagnosis: Hyperlipidemia, unspecified hyperlipidemia type  Assessment and Plan of Treatment: Continue cholesterol control medications. Continue DASH diet. Follow up with your PCP within 1 week of discharge for further management and monitoring of lipid and cholesterol panels. Please call to make an appointment      Diagnosis: Bacteremia  Assessment and Plan of Treatment: conitinue antibiotics as prescribed. Follow up with your PCP for further evaluation and management. Please call to make an appointment within 1-2 weeks of discharge.      Diagnosis: Hip pain, acute, right  Assessment and Plan of Treatment: continue pain medications, xray was negative    Diagnosis: Hyponatremia  Assessment and Plan of Treatment: resolved, seen by kidney doctor in hospital    Diagnosis: Weakness  Assessment and Plan of Treatment: continue with physical therapy    Diagnosis: Lymphoproliferative disorder  Assessment and Plan of Treatment: Lymphoproliferative disorder  f/u DR BOLTON PRINCIPAL DISCHARGE DIAGNOSIS  Diagnosis: Sepsis  Assessment and Plan of Treatment: Continue antibiotics as directed and monitor for signs/symptoms of infection, such as, fever/chills, burning/pain with urination, urinary frequency/hesitancy, cloudy urine, or blood in urine.      SECONDARY DISCHARGE DIAGNOSES  Diagnosis: Abnormal cardiovascular stress test  Assessment and Plan of Treatment: "There is a medium sized, mild to moderate defect in  inferior wall that is fixed, suggestive of infarct.  * Post-stress gated wall motion analysis was performed  (LVEF = 41 %;LVEDV = 62 ml.), revealing mild hypokinesis  of the inferior wall."  -without chest pain during this admission  -stress test consistent with infarct , will continue statin and aspirin therapy at this time  -must follow up with Cardiologist within 1 week  -would return to ER if experiencing new chest pain   -started lopressor this admission follow up with cardiologist for medical management       Diagnosis: UTI (urinary tract infection)  Assessment and Plan of Treatment: Continue antibiotics as directed and monitor for signs/symptoms of infection, such as, fever/chills, burning/pain with urination, urinary frequency/hesitancy, cloudy urine, or blood in urine.  Follow up with your PCP or kidney doctor for further evaluation and management. Please call to make an appointment within 1-2 weeks of discharge.  FOLLOW UP WITH OUTPATIENT UROLOGIST- CT SCAN SHOWS BILATERAL RENAL CALCULI      Diagnosis: Cerebrovascular accident (CVA), unspecified mechanism  Assessment and Plan of Treatment: continue home medications    Diagnosis: Hyperlipidemia, unspecified hyperlipidemia type  Assessment and Plan of Treatment: Continue cholesterol control medications. Continue DASH diet. Follow up with your PCP within 1 week of discharge for further management and monitoring of lipid and cholesterol panels. Please call to make an appointment      Diagnosis: Bacteremia  Assessment and Plan of Treatment: conitinue antibiotics as prescribed. Follow up with your PCP for further evaluation and management. Please call to make an appointment within 1-2 weeks of discharge.      Diagnosis: Hip pain, acute, right  Assessment and Plan of Treatment: continue pain medications, xray was negative    Diagnosis: Hyponatremia  Assessment and Plan of Treatment: resolved, seen by kidney doctor in hospital    Diagnosis: Weakness  Assessment and Plan of Treatment: continue with physical therapy    Diagnosis: Lymphoproliferative disorder  Assessment and Plan of Treatment: Lymphoproliferative disorder  f/u DR BOLTON outpatient

## 2019-06-23 NOTE — DISCHARGE NOTE PROVIDER - HOSPITAL COURSE
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 Patient lives with his sister and had a 12hr HHA for 7 days a week. Said that he started having dysuria about 1 week ago but had not informed his sister of this. Said that over the next few days he started to have worsening lethargy/fatigue and fevers. Said that he also started having a dry cough over this time. his sister noted that he was more fatigued and the new cough and thought he was having a PNA and brought him to the hospital for evaluation. His sister stated that the patient had a kidney stone earlier this year that was removed via lithotripsy but since then has had a few recurrent UTIs, No other complaints.         On arrival to the ED, his vitals were T 102.6, P 110, /80, RR 20, O2 sat 99% RA. His lab work was significant for leukocytosis (higher than baseline) with neutrophilia and a positive UA. He had a CXR that showed clear lungs and a RVP that was negative. He was given tylenol 650mg PO x1, CTX 1g/AZT 500mg IVPB x1, and LR 1.5L. He was then admitted to medicine for further management, treatment with antibiotics for sepsis 2/2 acute pyelonephritis and bacteremia         Sepsis due to urinary tract infection.       - positive UA,  CXR clear    - Has a history of nephrolithiasis but currently without complaints or physical exam findings suggestive of nephrolithiasis.     -  improving sepsis 2/2 acute pyelonephritis and bacteremia of gram negative rods    - f/u cultures ; c/w Zosyn    - f/u repeat bcx til clearance     - c/w abx, ivf, monitor labs, monitor vitals    - supportive care prn    - Nephro following     - CT C/A/P - Bilateral renal calculus: Given infection, would consult urology for possible intervention.         Acute pyelonephritis.      - as above    - c/w abx, awaiting UCx senstivities     - nephro following        Bacteremia.      - gram negative rods on initial BCx    - c/w Zosyn    - f/u bcx to sensitivies ; repeat bcx till clearance        Cerebrovascular accident (CVA), unspecified mechanism.      - c/w home ASA/statin therapy.         B cell Lymphoproliferative disorder.      - Heme/onc following - obtain outpt records from prior oncologist, w/u for anemia F/U--     - CT C/A/P - no lymphadenopathy or spleenomegay    - Outpatient f/u with heme         Hyperlipidemia, unspecified hyperlipidemia type.      - c/w statin         Hip pain, acute, right.      - potentially symptomatic with radiating pain 2/2 acute pyelo    however r/o alternative complications vs etiologies    - Right Hip-Xray - no - no fx or dislocations        Hyponatremia.      - improved s/p fluid hydration.         Need for prophylactic measure.      - HSQ for DVT ppx (IMPROVE score 2.9%)    - Fall precautions    - PT eval. 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 Patient lives with his sister and had a 12hr HHA for 7 days a week. Said that he started having dysuria about 1 week ago but had not informed his sister of this. Said that over the next few days he started to have worsening lethargy/fatigue and fevers. Said that he also started having a dry cough over this time. his sister noted that he was more fatigued and the new cough and thought he was having a PNA and brought him to the hospital for evaluation. His sister stated that the patient had a kidney stone earlier this year that was removed via lithotripsy but since then has had a few recurrent UTIs, No other complaints.         On arrival to the ED, his vitals were T 102.6, P 110, /80, RR 20, O2 sat 99% RA. His lab work was significant for leukocytosis (higher than baseline) with neutrophilia and a positive UA. He had a CXR that showed clear lungs and a RVP that was negative. He was given tylenol 650mg PO x1, CTX 1g/AZT 500mg IVPB x1, and LR 1.5L. He was then admitted to medicine for further management, treatment with antibiotics for sepsis 2/2 acute pyelonephritis and bacteremia         Sepsis due to urinary tract infection.       - positive UA,  CXR clear    - Has a history of nephrolithiasis but currently without complaints or physical exam findings suggestive of nephrolithiasis.     -  improving sepsis 2/2 acute pyelonephritis and bacteremia of gram negative rods    - f/u cultures ; c/w Zosyn    - repeat bcx til clearance  repeat neg    - c/w abx, ivf, monitor labs, monitor vitals    - supportive care prn    - Nephro following     - CT C/A/P - Bilateral renal calculus: Given infection, would consult urology for possible intervention.         Acute pyelonephritis.      - as above    - c/w abx, awaiting UCx senstivities     - nephro following        Bacteremia.      - gram negative rods on initial BCx    - c/w Zosyn    - f/u bcx to sensitivies ; repeat bcx till clearance        Cerebrovascular accident (CVA), unspecified mechanism.      - c/w home ASA/statin therapy.         B cell Lymphoproliferative disorder.      - Heme/onc following - obtain outpt records from prior oncologist, w/u for anemia F/U--     - CT C/A/P - no lymphadenopathy or spleenomegay    - Outpatient f/u with heme         Hyperlipidemia, unspecified hyperlipidemia type.      - c/w statin         Hip pain, acute, right.      - potentially symptomatic with radiating pain 2/2 acute pyelo    however r/o alternative complications vs etiologies    - Right Hip-Xray - no - no fx or dislocations        Hyponatremia.      - improved s/p fluid hydration.         Need for prophylactic measure.      - HSQ for DVT ppx (IMPROVE score 2.9%)    - Fall precautions    - PT eval. rehab HPI:    This is a 73M with history of CVA (1/2018) with residual R arm/leg weakness and expressive aphasia, B-Cell Lymphoproliferative d/o, HTN, HLD, and nephrolithiasis s/p lithotripsy who presents to the hospital with complaints of worsening lethargy/fatigue with dysuria. Patient lives with his sister and had a 12hr HHA for 7 days a week. Said that he started having dysuria about 1 week ago but had not informed his sister of this. Said that over the next few days he started to have worsening lethargy/fatigue and fevers. Said that he also started having a dry cough over this time. his sister noted that he was more fatigued and the new cough and thought he was having a PNA and brought him to the hospital for evaluation. His sister stated that the patient had a kidney stone earlier this year that was removed via lithotripsy but since then has had a few recurrent UTIs, No other complaints.         On arrival to the ED, his vitals were T 102.6, P 110, /80, RR 20, O2 sat 99% RA. His lab work was significant for leukocytosis (higher than baseline) with neutrophilia and a positive UA. He had a CXR that showed clear lungs and a RVP that was negative. He was given tylenol 650mg PO x1, CTX 1g/AZT 500mg IVPB x1, and LR 1.5L. He was then admitted to medicine for further management, treatment with antibiotics for sepsis 2/2 acute pyelonephritis and bacteremia         6/26 Abnormal stress test     "There is a medium sized, mild to moderate defect in    inferior wall that is fixed, suggestive of infarct.    * Post-stress gated wall motion analysis was performed    (LVEF = 41 %;LVEDV = 62 ml.), revealing mild hypokinesis    of the inferior wall."    -without chest pain during this admission    -stress test consistent with infarct , will continue statin and aspirin therapy at this time    -must follow up with Cardiologist within 1 week    -would return to ER if experiencing new chest pain     -started lopressor this admission follow up with cardiologist for medical management         Sepsis due to urinary tract infection.       - positive UA,  CXR clear    - Has a history of nephrolithiasis but currently without complaints or physical exam findings suggestive of nephrolithiasis.     -  improving sepsis 2/2 acute pyelonephritis and bacteremia of gram negative rods    - f/u cultures ; c/w Zosyn    - repeat bcx til clearance  repeat neg    - c/w abx, ivf, monitor labs, monitor vitals    - supportive care prn    - Nephro following     - CT C/A/P - Bilateral renal calculus: Given infection, would consult urology for possible intervention.         Acute pyelonephritis.      - as above    - c/w abx, awaiting UCx senstivities     - nephro following        Bacteremia.      - gram negative rods on initial BCx    - c/w Zosyn    - f/u bcx to sensitivies ; repeat bcx till clearance        Cerebrovascular accident (CVA), unspecified mechanism.      - c/w home ASA/statin therapy.         B cell Lymphoproliferative disorder.      - Heme/onc following - obtain outpt records from prior oncologist, w/u for anemia F/U--     - CT C/A/P - no lymphadenopathy or spleenomegay    - Outpatient f/u with heme         Hyperlipidemia, unspecified hyperlipidemia type.      - c/w statin         Hip pain, acute, right.      - potentially symptomatic with radiating pain 2/2 acute pyelo    however r/o alternative complications vs etiologies    - Right Hip-Xray - no - no fx or dislocations        Hyponatremia.      - improved s/p fluid hydration.         Need for prophylactic measure.      - HSQ for DVT ppx (IMPROVE score 2.9%)    - Fall precautions    - PT eval. rehab

## 2019-06-24 LAB
-  AMIKACIN: SIGNIFICANT CHANGE UP
-  AMPICILLIN/SULBACTAM: SIGNIFICANT CHANGE UP
-  AMPICILLIN: SIGNIFICANT CHANGE UP
-  AZTREONAM: SIGNIFICANT CHANGE UP
-  CEFAZOLIN: SIGNIFICANT CHANGE UP
-  CEFEPIME: SIGNIFICANT CHANGE UP
-  CEFOXITIN: SIGNIFICANT CHANGE UP
-  CEFTAZIDIME: SIGNIFICANT CHANGE UP
-  CEFTRIAXONE: SIGNIFICANT CHANGE UP
-  CIPROFLOXACIN: SIGNIFICANT CHANGE UP
-  ERTAPENEM: SIGNIFICANT CHANGE UP
-  GENTAMICIN: SIGNIFICANT CHANGE UP
-  IMIPENEM: SIGNIFICANT CHANGE UP
-  LEVOFLOXACIN: SIGNIFICANT CHANGE UP
-  MEROPENEM: SIGNIFICANT CHANGE UP
-  PIPERACILLIN/TAZOBACTAM: SIGNIFICANT CHANGE UP
-  TIGECYCLINE: SIGNIFICANT CHANGE UP
-  TOBRAMYCIN: SIGNIFICANT CHANGE UP
-  TRIMETHOPRIM/SULFAMETHOXAZOLE: SIGNIFICANT CHANGE UP
ANION GAP SERPL CALC-SCNC: 11 MMO/L — SIGNIFICANT CHANGE UP (ref 7–14)
BACTERIA BLD CULT: SIGNIFICANT CHANGE UP
BACTERIA BLD CULT: SIGNIFICANT CHANGE UP
BASOPHILS # BLD AUTO: 0.04 K/UL — SIGNIFICANT CHANGE UP (ref 0–0.2)
BASOPHILS NFR BLD AUTO: 0.5 % — SIGNIFICANT CHANGE UP (ref 0–2)
BUN SERPL-MCNC: 8 MG/DL — SIGNIFICANT CHANGE UP (ref 7–23)
CALCIUM SERPL-MCNC: 8.9 MG/DL — SIGNIFICANT CHANGE UP (ref 8.4–10.5)
CHLORIDE SERPL-SCNC: 107 MMOL/L — SIGNIFICANT CHANGE UP (ref 98–107)
CO2 SERPL-SCNC: 26 MMOL/L — SIGNIFICANT CHANGE UP (ref 22–31)
CREAT SERPL-MCNC: 0.94 MG/DL — SIGNIFICANT CHANGE UP (ref 0.5–1.3)
E COLI DNA BLD POS QL NAA+NON-PROBE: SIGNIFICANT CHANGE UP
EOSINOPHIL # BLD AUTO: 0.13 K/UL — SIGNIFICANT CHANGE UP (ref 0–0.5)
EOSINOPHIL NFR BLD AUTO: 1.6 % — SIGNIFICANT CHANGE UP (ref 0–6)
FERRITIN SERPL-MCNC: 346.5 NG/ML — SIGNIFICANT CHANGE UP (ref 30–400)
FOLATE SERPL-MCNC: 17.4 NG/ML — SIGNIFICANT CHANGE UP (ref 4.7–20)
GLUCOSE SERPL-MCNC: 82 MG/DL — SIGNIFICANT CHANGE UP (ref 70–99)
HCT VFR BLD CALC: 35.4 % — LOW (ref 39–50)
HGB BLD-MCNC: 11 G/DL — LOW (ref 13–17)
IMM GRANULOCYTES NFR BLD AUTO: 0.3 % — SIGNIFICANT CHANGE UP (ref 0–1.5)
IRON SATN MFR SERPL: 147 UG/DL — LOW (ref 155–535)
IRON SATN MFR SERPL: 24 UG/DL — LOW (ref 45–165)
LDH SERPL L TO P-CCNC: 205 U/L — SIGNIFICANT CHANGE UP (ref 135–225)
LYMPHOCYTES # BLD AUTO: 3.85 K/UL — HIGH (ref 1–3.3)
LYMPHOCYTES # BLD AUTO: 48.7 % — HIGH (ref 13–44)
MAGNESIUM SERPL-MCNC: 2.2 MG/DL — SIGNIFICANT CHANGE UP (ref 1.6–2.6)
MCHC RBC-ENTMCNC: 27.8 PG — SIGNIFICANT CHANGE UP (ref 27–34)
MCHC RBC-ENTMCNC: 31.1 % — LOW (ref 32–36)
MCV RBC AUTO: 89.4 FL — SIGNIFICANT CHANGE UP (ref 80–100)
METHOD TYPE: SIGNIFICANT CHANGE UP
MONOCYTES # BLD AUTO: 0.38 K/UL — SIGNIFICANT CHANGE UP (ref 0–0.9)
MONOCYTES NFR BLD AUTO: 4.8 % — SIGNIFICANT CHANGE UP (ref 2–14)
NEUTROPHILS # BLD AUTO: 3.49 K/UL — SIGNIFICANT CHANGE UP (ref 1.8–7.4)
NEUTROPHILS NFR BLD AUTO: 44.1 % — SIGNIFICANT CHANGE UP (ref 43–77)
NRBC # FLD: 0 K/UL — SIGNIFICANT CHANGE UP (ref 0–0)
PHOSPHATE SERPL-MCNC: 2.8 MG/DL — SIGNIFICANT CHANGE UP (ref 2.5–4.5)
PLATELET # BLD AUTO: 602 K/UL — HIGH (ref 150–400)
PMV BLD: 9.9 FL — SIGNIFICANT CHANGE UP (ref 7–13)
POTASSIUM SERPL-MCNC: 4 MMOL/L — SIGNIFICANT CHANGE UP (ref 3.5–5.3)
POTASSIUM SERPL-SCNC: 4 MMOL/L — SIGNIFICANT CHANGE UP (ref 3.5–5.3)
RBC # BLD: 3.96 M/UL — LOW (ref 4.2–5.8)
RBC # FLD: 16.5 % — HIGH (ref 10.3–14.5)
RETICS #: 35 K/UL — SIGNIFICANT CHANGE UP (ref 25–125)
RETICS/RBC NFR: 0.9 % — SIGNIFICANT CHANGE UP (ref 0.5–2.5)
SODIUM SERPL-SCNC: 144 MMOL/L — SIGNIFICANT CHANGE UP (ref 135–145)
SPECIMEN SOURCE: SIGNIFICANT CHANGE UP
UIBC SERPL-MCNC: 123 UG/DL — SIGNIFICANT CHANGE UP (ref 110–370)
VIT B12 SERPL-MCNC: 778 PG/ML — SIGNIFICANT CHANGE UP (ref 200–900)
WBC # BLD: 7.91 K/UL — SIGNIFICANT CHANGE UP (ref 3.8–10.5)
WBC # FLD AUTO: 7.91 K/UL — SIGNIFICANT CHANGE UP (ref 3.8–10.5)

## 2019-06-24 RX ORDER — CEFTRIAXONE 500 MG/1
1000 INJECTION, POWDER, FOR SOLUTION INTRAMUSCULAR; INTRAVENOUS EVERY 24 HOURS
Refills: 0 | Status: COMPLETED | OUTPATIENT
Start: 2019-06-24 | End: 2019-06-25

## 2019-06-24 RX ADMIN — HEPARIN SODIUM 5000 UNIT(S): 5000 INJECTION INTRAVENOUS; SUBCUTANEOUS at 05:23

## 2019-06-24 RX ADMIN — Medication 81 MILLIGRAM(S): at 14:02

## 2019-06-24 RX ADMIN — GABAPENTIN 300 MILLIGRAM(S): 400 CAPSULE ORAL at 22:51

## 2019-06-24 RX ADMIN — CEFTRIAXONE 100 MILLIGRAM(S): 500 INJECTION, POWDER, FOR SOLUTION INTRAMUSCULAR; INTRAVENOUS at 14:59

## 2019-06-24 RX ADMIN — Medication 200 MILLIGRAM(S): at 22:52

## 2019-06-24 RX ADMIN — FINASTERIDE 5 MILLIGRAM(S): 5 TABLET, FILM COATED ORAL at 14:02

## 2019-06-24 RX ADMIN — HEPARIN SODIUM 5000 UNIT(S): 5000 INJECTION INTRAVENOUS; SUBCUTANEOUS at 14:02

## 2019-06-24 RX ADMIN — Medication 100 MILLIGRAM(S): at 05:22

## 2019-06-24 RX ADMIN — HEPARIN SODIUM 5000 UNIT(S): 5000 INJECTION INTRAVENOUS; SUBCUTANEOUS at 22:51

## 2019-06-24 RX ADMIN — SENNA PLUS 2 TABLET(S): 8.6 TABLET ORAL at 22:52

## 2019-06-24 RX ADMIN — PANTOPRAZOLE SODIUM 40 MILLIGRAM(S): 20 TABLET, DELAYED RELEASE ORAL at 05:23

## 2019-06-24 RX ADMIN — ATORVASTATIN CALCIUM 80 MILLIGRAM(S): 80 TABLET, FILM COATED ORAL at 22:51

## 2019-06-24 RX ADMIN — PIPERACILLIN AND TAZOBACTAM 25 GRAM(S): 4; .5 INJECTION, POWDER, LYOPHILIZED, FOR SOLUTION INTRAVENOUS at 05:24

## 2019-06-24 NOTE — PROGRESS NOTE ADULT - SUBJECTIVE AND OBJECTIVE BOX
CHIEF COMPLAINT:  Fatigue    HISTORY OF PRESENT ILLNESS:  Patient is a 73-year-old male with a past medical history significant for hypertension, hyperlipidemia, CVA with right-sided weakness and expressive aphasia, and kidney stones who presents with complaints of weakness.  Patient was recently at physical therapy and states he was not feeling well.  Patient has difficulty describing his symptoms.  However, patient's sister reports that patient has been easily fatigued.  Patient denies any chest pain, shortness of breath, or palpitations.  Patient also denies any dizziness.  Patient's sister reports that patient was scheduled for cardiac testing but was unable to attend since he has been hospitalized.  Patient's sister does not recall what type of testing patient was scheduled for like to make sure that everything is okay with his heart.      PAST MEDICAL & SURGICAL HISTORY:  Kidney stone: s/p lithotripsy  Urinary tract infection without hematuria, site unspecified  Cerebrovascular accident (CVA), unspecified mechanism: With residual R sided weakness and expressive aphasia  Lymphoproliferative disorder: B-Cell  Hyperlipidemia  Hypertension  History of cataract surgery          MEDICATIONS:  aspirin enteric coated 81 milliGRAM(s) Oral daily  heparin  Injectable 5000 Unit(s) SubCutaneous every 8 hours    piperacillin/tazobactam IVPB..      piperacillin/tazobactam IVPB.. 3.375 Gram(s) IV Intermittent every 8 hours    guaiFENesin    Syrup 200 milliGRAM(s) Oral every 6 hours PRN    acetaminophen   Tablet .. 650 milliGRAM(s) Oral every 6 hours PRN  acetaminophen  IVPB .. 1000 milliGRAM(s) IV Intermittent once  gabapentin 300 milliGRAM(s) Oral at bedtime  ondansetron Injectable 4 milliGRAM(s) IV Push every 6 hours PRN    docusate sodium 100 milliGRAM(s) Oral two times a day  pantoprazole    Tablet 40 milliGRAM(s) Oral before breakfast  senna 2 Tablet(s) Oral at bedtime    atorvastatin 80 milliGRAM(s) Oral at bedtime  finasteride 5 milliGRAM(s) Oral daily    sodium chloride 0.9%. 1000 milliLiter(s) IV Continuous <Continuous>            REVIEW OF SYSTEMS:  CONSTITUTIONAL: No fever, weight loss, or fatigue  EYES: No eye pain, visual disturbances, or discharge  ENMT:  No difficulty hearing, tinnitus, vertigo; No sinus or throat pain  NECK: No pain or stiffness  RESPIRATORY: No cough, wheezing, chills or hemoptysis; No Shortness of Breath  CARDIOVASCULAR: No chest pain, palpitations, passing out, dizziness, or leg swelling  GASTROINTESTINAL: No abdominal or epigastric pain. No nausea, vomiting, or hematemesis; No diarrhea or constipation. No melena or hematochezia.  GENITOURINARY: No dysuria, frequency, hematuria, or incontinence  NEUROLOGICAL: No headaches, memory loss, loss of strength, numbness, or tremors  SKIN: No itching, burning, rashes, or lesions   LYMPH Nodes: No enlarged glands  ENDOCRINE: No heat or cold intolerance; No hair loss  MUSCULOSKELETAL: No joint pain or swelling; No muscle, back, or extremity pain  PSYCHIATRIC: No depression, anxiety, mood swings, or difficulty sleeping  HEME/LYMPH: No easy bruising, or bleeding gums  ALLERY AND IMMUNOLOGIC: No hives or eczema	        PHYSICAL EXAM:  T(C): 36.4 (06-24-19 @ 05:20), Max: 38 (06-23-19 @ 19:20)  HR: 65 (06-24-19 @ 05:20) (65 - 98)  BP: 123/72 (06-24-19 @ 05:20) (111/65 - 144/87)  RR: 17 (06-24-19 @ 05:20) (17 - 18)  SpO2: 99% (06-24-19 @ 05:20) (96% - 100%)  Wt(kg): --  I&O's Summary      Appearance: Normal	  HEENT:   Normal oral mucosa, PERRL, EOMI	  Lymphatic: No lymphadenopathy  Cardiovascular: Normal S1 S2, No JVD, No murmurs, No edema  Respiratory: Lungs clear to auscultation	  Psychiatry: A & O x 3, Mood & affect appropriate  Gastrointestinal:  Soft, Non-tender, + BS	  Skin: No rashes, No ecchymoses, No cyanosis	  Neurologic: Non-focal  Extremities: Normal range of motion, No clubbing, cyanosis or edema  Vascular: Peripheral pulses palpable 2+ bilaterally    TELEMETRY: 	  Not on telemetry  ECG:  	Sinus tachycardia with a heart rate of 101 and nonspecific ST changes.  RADIOLOGY:  OTHER: 	  	  LABS:	 Reviewed	    CARDIAC MARKERS:                                  11.0   7.91  )-----------( 602      ( 24 Jun 2019 05:54 )             35.4     06-24    144  |  107  |  8   ----------------------------<  82  4.0   |  26  |  0.94    Ca    8.9      24 Jun 2019 05:54  Phos  2.8     06-24  Mg     2.2     06-24      proBNP:   Lipid Profile:   HgA1c:   TSH:

## 2019-06-24 NOTE — DIETITIAN INITIAL EVALUATION ADULT. - OTHER INFO
Internal Medicine Pt. endorses good appetite & is observed consuming breakfast with poor- fair PO intake (~25% of meal).  States he likes Ensure supplement.  Pt. denies food allergies, nausea/vomiting/diarrhea/constipation, or issues with chewing/swallowing.   Encouraged intake of nutrient dense foods, heart healthy (given Hx of HTN & CVA).  Discussed ways to flavor food without additional salt, which sister is reluctant to comply with stating Pt. c/o food having no flavor.

## 2019-06-24 NOTE — PROGRESS NOTE ADULT - SUBJECTIVE AND OBJECTIVE BOX
Infectious Diseases progress note:    Subjective: NAD, feeling better.  No fever/chills/flank pain/rigors.  No acute o/n events.      ROS:  CONSTITUTIONAL:  No fever, chills, rigors  CARDIOVASCULAR:  No chest pain or palpitations  RESPIRATORY:   No SOB, cough, dyspnea on exertion.  No wheezing  GASTROINTESTINAL:  No abd pain, N/V, diarrhea/constipation  EXTREMITIES:  No swelling or joint pain  GENITOURINARY:  No burning on urination, increased frequency or urgency.  No flank pain  NEUROLOGIC:  No HA, visual disturbances  SKIN: No rashes    Allergies    Levaquin (Rash)    Intolerances        ANTIBIOTICS/RELEVANT:  antimicrobials  cefTRIAXone   IVPB 1000 milliGRAM(s) IV Intermittent every 24 hours    immunologic:    OTHER:  acetaminophen   Tablet .. 650 milliGRAM(s) Oral every 6 hours PRN  acetaminophen  IVPB .. 1000 milliGRAM(s) IV Intermittent once  aspirin enteric coated 81 milliGRAM(s) Oral daily  atorvastatin 80 milliGRAM(s) Oral at bedtime  docusate sodium 100 milliGRAM(s) Oral two times a day  finasteride 5 milliGRAM(s) Oral daily  gabapentin 300 milliGRAM(s) Oral at bedtime  guaiFENesin    Syrup 200 milliGRAM(s) Oral every 6 hours PRN  heparin  Injectable 5000 Unit(s) SubCutaneous every 8 hours  ondansetron Injectable 4 milliGRAM(s) IV Push every 6 hours PRN  pantoprazole    Tablet 40 milliGRAM(s) Oral before breakfast  senna 2 Tablet(s) Oral at bedtime  sodium chloride 0.9%. 1000 milliLiter(s) IV Continuous <Continuous>      Objective:  Vital Signs Last 24 Hrs  T(C): 36.6 (24 Jun 2019 12:37), Max: 38 (23 Jun 2019 19:20)  T(F): 97.9 (24 Jun 2019 12:37), Max: 100.4 (23 Jun 2019 19:20)  HR: 84 (24 Jun 2019 12:37) (65 - 98)  BP: 133/69 (24 Jun 2019 12:37) (111/65 - 144/87)  BP(mean): --  RR: 17 (24 Jun 2019 12:37) (17 - 18)  SpO2: 100% (24 Jun 2019 12:37) (96% - 100%)    PHYSICAL EXAM:  Constitutional:NAD  Eyes:JOSEE, EOMI  Ear/Nose/Throat: no thrush, mucositis.  Moist mucous membranes	  Neck:no JVD, no lymphadenopathy, supple  Respiratory: CTA tea  Cardiovascular: S1S2 RRR, no murmurs  Gastrointestinal:soft, nontender,  nondistended (+) BS  Extremities:no e/e/c  Skin:  no rashes, open wounds or ulcerations        LABS:                        11.0   7.91  )-----------( 602      ( 24 Jun 2019 05:54 )             35.4     06-24    144  |  107  |  8   ----------------------------<  82  4.0   |  26  |  0.94    Ca    8.9      24 Jun 2019 05:54  Phos  2.8     06-24  Mg     2.2     06-24              MICROBIOLOGY:    Culture - Blood in AM (06.23.19 @ 07:32)    Culture - Blood:   NO ORGANISMS ISOLATED  NO ORGANISMS ISOLATED AT 24 HOURS    Specimen Source: BLOOD    Culture - Urine (06.21.19 @ 19:16)    Culture - Urine:   COLONY COUNT: > = 100,000 CFU/ML    -  Cefazolin: S <=2 ALEX    -  Aztreonam: S <=4 ALEX    -  Ampicillin/Sulbactam: I 16/8 ALEX    -  Ampicillin: R >16 ALEX    -  Cefoxitin: S <=4 ALEX    -  Cefepime: S <=2 ALEX    -  Ceftazidime: S <=1 ALEX    -  Ceftriaxone: S <=1 ALEX    -  Amikacin: S <=8 ALEX    -  Ciprofloxacin: I 2 ALEX    -  Ertapenem: S <=0.5 ALEX    -  Imipenem: S <=1 ALEX    -  Gentamicin: R >8 ALEX    -  Piperacillin/Tazobactam: S <=8 ALEX    -  Levofloxacin: S <=1 ALEX    -  Meropenem: S <=1 ALEX    -  Nitrofurantoin: S <=32 ALEX    -  Tobramycin: I 8 ALEX    -  Tigecycline: S <=1 ALEX    -  Trimethoprim/Sulfamethoxazole: S <=0.5/9.5 ALEX    Specimen Source: URINE MIDSTREAM    Organism Identification: Escherichia coli    Organism: Escherichia coli    Method Type: NEGATIVE ALEX 43    Culture - Blood (06.21.19 @ 18:56)    Culture - Blood:   EC^Escherichia coli    Culture - Blood:   SEE PREVIOUS CULTURE:[id number] COLLECTED 6/21/19   RECEIVED  6/21/19    FOR ALEX  EC^Escherichia coli    Specimen Source: BLOOD VENOUS    Gram Stain Blood:   ***** CRITICAL RESULT *****  PERSON CALLED / READ-BACK: ROCHELLE ZEE RN. / Y  DATE / TIME CALLED: 06/22/19 0818  CALLED BY: JAIDEN PARRISH^Gram Neg Rods  AFTER: 12 HOURS INCUBATION  BOTTLE: AEROBIC   ANAEROBIC BOTTLES    Culture - Blood (06.21.19 @ 18:55)    -  Amikacin: S <=8 ALEX    Culture - Blood:   ***Blood Panel PCR results on this specimen are available  approximately 3 hours after the Gram stain result***  Gram stain, PCR, and/or culture results may not always  correspond due to difference in methodologies  ------------------------------------------------------------  This PCR assay was performed using [employer].  The  following targets are tested for:  Enterococcus, vancomycin  resistant enterococci, Listeria monocytogenes,  coagulase  negative staphylococci, S. aureus, methicillin resistant S.  aureus, Streptococcus agalactiae (Group B), S. pneumoniae,  S. pyogenes (Group A), Acinetobacter baumannii, Enterobacter  cloacae, E. coli, Klebsiella oxytoca, K. pneumoniae, Proteus  sp., Serratia marcescens, Haemophilus influenzae, Neisseria  meningitidis, Pseudomonas aeruginosa, Candida albicans, C.  glabrata, C. krusei, C. parapsilosis, C. tropicalis and the  KPC resistance gene.  **NOTE: Due to technical problems, Proteus sp. will NOT be  reported as part of the BCID paneluntil further notice.    -  Escherichia coli: + DETECT ALEX    -  Ceftriaxone: S <=1 ALEX    -  Ceftazidime: S <=1 ALEX    -  Cefepime: S <=2 ALEX    -  Cefoxitin: S <=4 ALEX    -  Ampicillin: R >16 ALEX    -  Ampicillin/Sulbactam: I 16/8 ALEX    -  Aztreonam: S <=4 ALEX    -  Cefazolin: I 4 ALEX    -  Trimethoprim/Sulfamethoxazole: S <=0.5/9.5 ALEX    -  Tigecycline: S <=1 ALEX    -  Tobramycin: I 8 ALEX    -  Meropenem: S <=1 ALEX    -  Levofloxacin: S <=1 ALEX    -  Piperacillin/Tazobactam: S <=8 ALEX    -  Gentamicin: R >8 ALEX    -  Imipenem: S <=1 ALEX    -  Ertapenem: S <=0.5 ALEX    -  Ciprofloxacin: I 2 ALEX    Specimen Source: BLOOD PERIPHERAL    Organism: BLOOD CULTURE PCR    Organism: Escherichia coli    Gram Stain Blood:   ***** CRITICAL RESULT *****  PERSON CALLED / READ-BACK: ROCHELLE ZEE RN. / Y  DATE / TIME CALLED: 06/22/19 0817  CALLED BY: JAIDEN PARRISH^Gram Neg Rods  AFTER: 12 HOURS INCUBATION  BOTTLE: ANAEROBIC BOTTLE    Organism Identification: BLOOD CULTURE PCR  Escherichia coli    Method Type: PCR    Method Type: NEGATIVE ALEX 43          RADIOLOGY & ADDITIONAL STUDIES:    < from: CT Abdomen and Pelvis w/ IV Cont (06.22.19 @ 14:30) >  IMPRESSION:     Bilateral renal calculi as previously demonstrated    Enlarged prostate.    < end of copied text >

## 2019-06-24 NOTE — PROGRESS NOTE ADULT - SUBJECTIVE AND OBJECTIVE BOX
Patient seen and examined at bedside  No acute events noted overnight  Case discussed with medical team    HPI:  This is a 73M with history of CVA (1/2018) with residual R arm/leg weakness and expressive aphasia, B-Cell Lymphoproliferative d/o, HTN, HLD, and nephrolithiasis s/p lithotripsy who presents to the hospital with complaints of worsening lethargy/fatigue with dysuria. Patient lives with his sister and had a 12hr HHA for 7 days a week. Said that he started having dysuria about 1 week ago but had not informed his sister of this. Said that over the next few days he started to have worsening lethargy/fatigue and fevers. Said that he also started having a dry cough over this time. his sister noted that he was more fatigued and the new cough and thought he was having a PNA and brought him to the hospital for evaluation. His sister stated that the patient had a kidney stone earlier this year that was removed via lithotripsy but since then has had a few recurrent UTIs, No other complaints.     On arrival to the ED, his vitals were T 102.6, P 110, /80, RR 20, O2 sat 99% RA. His lab work was significant for leukocytosis (higher than baseline) with neutrophilia and a positive UA. He had a CXR that showed clear lungs and a RVP that was negative. He was given tylenol 650mg PO x1, CTX 1g/AZT 500mg IVPB x1, and LR 1.5L. He was then admitted to medicine for further management. (21 Jun 2019 22:20)      PAST MEDICAL & SURGICAL HISTORY:  Kidney stone: s/p lithotripsy  Urinary tract infection without hematuria, site unspecified  Cerebrovascular accident (CVA), unspecified mechanism: With residual R sided weakness and expressive aphasia  Lymphoproliferative disorder: B-Cell  Hyperlipidemia  Hypertension  History of cataract surgery      Levaquin (Rash)       MEDICATIONS  (STANDING):  acetaminophen  IVPB .. 1000 milliGRAM(s) IV Intermittent once  aspirin enteric coated 81 milliGRAM(s) Oral daily  atorvastatin 80 milliGRAM(s) Oral at bedtime  docusate sodium 100 milliGRAM(s) Oral two times a day  finasteride 5 milliGRAM(s) Oral daily  gabapentin 300 milliGRAM(s) Oral at bedtime  heparin  Injectable 5000 Unit(s) SubCutaneous every 8 hours  pantoprazole    Tablet 40 milliGRAM(s) Oral before breakfast  piperacillin/tazobactam IVPB..      piperacillin/tazobactam IVPB.. 3.375 Gram(s) IV Intermittent every 8 hours  senna 2 Tablet(s) Oral at bedtime  sodium chloride 0.9%. 1000 milliLiter(s) (50 mL/Hr) IV Continuous <Continuous>    MEDICATIONS  (PRN):  acetaminophen   Tablet .. 650 milliGRAM(s) Oral every 6 hours PRN Temp greater or equal to 38C (100.4F)  guaiFENesin    Syrup 200 milliGRAM(s) Oral every 6 hours PRN Cough  ondansetron Injectable 4 milliGRAM(s) IV Push every 6 hours PRN Nausea and/or Vomiting      REVIEW OF SYSTEMS:  CONSTITUTIONAL: (+) improving malaise. improving gen weakness  EYES: No acute change in vision   ENT:  No tinnitus  NECK: No stiffness  RESPIRATORY: No hemoptysis  CARDIOVASCULAR: stable murmur, No chest pain, palpitations, syncope  GASTROINTESTINAL: No hematemesis, diarrhea, melena, or hematochezia.  GENITOURINARY: No hematuria  NEUROLOGICAL: No headaches  LYMPH Nodes: No enlarged glands  ENDOCRINE: No heat or cold intolerance	    T(C): 36.4 (06-24-19 @ 05:20), Max: 38 (06-23-19 @ 19:20)  HR: 65 (06-24-19 @ 05:20) (65 - 98)  BP: 123/72 (06-24-19 @ 05:20) (111/65 - 144/87)  RR: 17 (06-24-19 @ 05:20) (17 - 18)  SpO2: 99% (06-24-19 @ 05:20) (96% - 100%)    PHYSICAL EXAMINATION:   Constitutional: NAD  HEENT: NC, AT  Neck:  Supple  Respiratory: Adequate airflow b/l. Not using accessory muscles of respiration.  Cardiovascular:  S1 & S2 intact, no R/G, 2+ radial pulses b/l  Gastrointestinal: Soft, NT, ND, normoactive b.s., no organomegaly/RT/rigidity  Extremities: WWP  Neurological:  Alert and awake.  No acute focal motor deficits. Crude sensation intact.     Labs and imaging reviewed    LABS:                        11.0   7.91  )-----------( 602      ( 24 Jun 2019 05:54 )             35.4     06-24    144  |  107  |  8   ----------------------------<  82  4.0   |  26  |  0.94    Ca    8.9      24 Jun 2019 05:54  Phos  2.8     06-24  Mg     2.2     06-24              CAPILLARY BLOOD GLUCOSE                  RADIOLOGY & ADDITIONAL STUDIES:

## 2019-06-24 NOTE — DIETITIAN INITIAL EVALUATION ADULT. - PERTINENT LABORATORY DATA
06-24 Na144 mmol/L Glu 82 mg/dL K+ 4.0 mmol/L Cr  0.94 mg/dL BUN 8 mg/dL 06-24 Phos 2.8 mg/dL 06-21 Alb 3.9 g/dL

## 2019-06-24 NOTE — DIETITIAN INITIAL EVALUATION ADULT. - PROBLEM SELECTOR PLAN 1
- Patient with fever, tachycardia, and leukocytosis with positive UA  - Received ceftriaxone in ED, also received azithroymcin for possible PNA but CXR clear, will c/w ceftriaxone for now  - Has a history of nephrolithiasis but currently without complaints or physical exam findings suggestive of nephrolithiasis

## 2019-06-24 NOTE — DIETITIAN INITIAL EVALUATION ADULT. - NS AS NUTRI INTERV COLLABORAT
1)Continuer regular diet with Ensure Enlive 8oz PO 2x daily                   2)Obtain weekly weights                    3)Add Multivitamin for micronutrient coverage               RDN remains available.  Leslie Cole, TIFFANIEN, CDN  pager [phone]

## 2019-06-24 NOTE — PROGRESS NOTE ADULT - ASSESSMENT
73y Male with history of B cell lymphoproliferative disorder who presents with fatigue found to have sepsis secondary to UTI.     UTI sepsis/ Pyelonephritis  -on IV Abx as per primary team   - Bilateral renal calculus s/p stents. Renal and Urology following    B cell lymphoproliferative disorder  -CT C/A/P - no lymphadenopathy or splenomegaly  -elevated WBC in setting of sepsis.   -WBC nml today  - No urgent Heme/Onc issues.  May f/u as outpt for further work up and managment with primary hematologist    Normocytic Anemia   -Fe, B12, folate adequate    Thrombocytosis - probably reactive, monitor for now    will follow, [phone]

## 2019-06-24 NOTE — PROGRESS NOTE ADULT - ASSESSMENT
This is a 73M with history of CVA (1/2018) with residual R arm/leg weakness and expressive aphasia, B-Cell Lymphoproliferative d/o, HTN, HLD, and nephrolithiasis s/p lithotripsy who presents to the hospital with complaints of worsening lethargy/fatigue with dysuria. Patient lives with his sister and had a 12hr HHA for 7 days a week. Said that he started having dysuria about 1 week ago but had not informed his sister of this. Said that over the next few days he started to have worsening lethargy/fatigue and fevers. Said that he also started having a dry cough over this time. his sister noted that he was more fatigued and the new cough and thought he was having a PNA and brought him to the hospital for evaluation. His sister stated that the patient had a kidney stone earlier this year that was removed via lithotripsy but since then has had a few recurrent UTIs, No other complaints.     On arrival to the ED, his vitals were T 102.6, P 110, /80, RR 20, O2 sat 99% RA. His lab work was significant for leukocytosis (higher than baseline) with neutrophilia and a positive UA. He had a CXR that showed clear lungs and a RVP that was negative. He was given tylenol 650mg PO x1, CTX 1g/AZT 500mg IVPB x1, and LR 1.5L. He was then admitted to medicine for further management. (21 Jun 2019 22:20)      Sepsis: (fever, tachycardia, leukocytosis, bacteremia)    - due to UTI.  Urine and blood cultures growing E.coli.  Sensitivities noted, can change from zosyn to ceftriaxone.    - f/u repeat blood cultures to ensure clearance - NGTD at 24 hrs.     - ct ap with enlarged prostate  and multiple punctate nonobstructing renal calculi which may serve as nidus for infection.  Cont finasteride.    - De-escalate to PO abx once repeat blood cultures have cleared at 48 hrs.  Change to ceftin 250mg po bid through July 4th, to treat for upper tract infection.      Will follow,    Ирина Kingsley  [phone]

## 2019-06-24 NOTE — CHART NOTE - NSCHARTNOTEFT_GEN_A_CORE
NUTRITION SERVICES     Upon Nutritional Assessment by the Registered Dietitian your patient was determined to meet criteria/ has evidence of the following diagnosis/diagnoses:  [ ] Mild Protein Calorie Malnutrition   [ X ] Moderate Protein Calorie Malnutrition   [ ] Severe Protein Calorie Malnutrition       Findings as based on:  •  Comprehensive nutritional assessment and consultation    Please refer to Initial Dietitian Evaluation via documents section of [facility] EMR for further recommendations.    Leslie Cole RDN, CDN   pager: [phone]

## 2019-06-24 NOTE — DIETITIAN INITIAL EVALUATION ADULT. - NUTRITION INTERVENTION
Medical Food Supplements/Meals and Snack/Vitamin/Nutrition Education/Collaboration and Referral of Nutrition Care

## 2019-06-24 NOTE — CHART NOTE - NSCHARTNOTEFT_GEN_A_CORE
Spoke with urology regarding b/l renal calculi. At this time no acute intervention recommended. Pt to f/u with outpt urologist.

## 2019-06-24 NOTE — DIETITIAN INITIAL EVALUATION ADULT. - PHYSICAL APPEARANCE
Moderate subcutaneous fat loss of orbital & upper arm regions.  Moderatr muscle loss of temple, clavicle, acromion and patellar regions as evidenced by protruding, prominent bone with little muscle definition of posterior calf region.

## 2019-06-24 NOTE — DIETITIAN INITIAL EVALUATION ADULT. - PERTINENT MEDS FT
MEDICATIONS  (STANDING):  acetaminophen  IVPB .. 1000 milliGRAM(s) IV Intermittent once  aspirin enteric coated 81 milliGRAM(s) Oral daily  atorvastatin 80 milliGRAM(s) Oral at bedtime  docusate sodium 100 milliGRAM(s) Oral two times a day  finasteride 5 milliGRAM(s) Oral daily  gabapentin 300 milliGRAM(s) Oral at bedtime  heparin  Injectable 5000 Unit(s) SubCutaneous every 8 hours  pantoprazole    Tablet 40 milliGRAM(s) Oral before breakfast  piperacillin/tazobactam IVPB..      piperacillin/tazobactam IVPB.. 3.375 Gram(s) IV Intermittent every 8 hours  senna 2 Tablet(s) Oral at bedtime  sodium chloride 0.9%. 1000 milliLiter(s) (50 mL/Hr) IV Continuous <Continuous>    MEDICATIONS  (PRN):  acetaminophen   Tablet .. 650 milliGRAM(s) Oral every 6 hours PRN Temp greater or equal to 38C (100.4F)  guaiFENesin    Syrup 200 milliGRAM(s) Oral every 6 hours PRN Cough  ondansetron Injectable 4 milliGRAM(s) IV Push every 6 hours PRN Nausea and/or Vomiting  --------------------------------------------------------------------------------------------------------------------------------  Diet, Regular:   Supplement Feeding Modality:  Oral  Ensure Enlive Cans or Servings Per Day:  1       Frequency:  Two Times a day (06-23-19 @ 08:43)

## 2019-06-24 NOTE — PROGRESS NOTE ADULT - ASSESSMENT
Assessment:  1.  Cystitis  2.  History of CVA  3.  Hypertension  4.  Hyperlipidemia  5.  Mild tachycardia, resolved    Plan:  1.  Given patient's history of CVA, patient's sister is concerned of cardiac condition.  At this point patient does not have any acute cardiac symptoms.  Will obtain echocardiogram to evaluate left ventricular function.  Recommended treatment of cystitis.  Will follow-up as outpatient  2.  Continue statins  3.  The etiology of mild tachycardia is most likely due to underlying infectious process.  Recommend continue treatment as per primary team      Thank you for the courtesy of this consult    Plan was discussed with Dr. Blanca Rowe    [facility]  Blanca Rowe D.O., FACC, MELI Ortiz, MSN, AGPCNP-BC      [address] [place] [address], [address]  [place] [address]  [phone]

## 2019-06-24 NOTE — PROGRESS NOTE ADULT - SUBJECTIVE AND OBJECTIVE BOX
Pt seen    MEDICATIONS  (STANDING):  acetaminophen  IVPB .. 1000 milliGRAM(s) IV Intermittent once  aspirin enteric coated 81 milliGRAM(s) Oral daily  atorvastatin 80 milliGRAM(s) Oral at bedtime  docusate sodium 100 milliGRAM(s) Oral two times a day  finasteride 5 milliGRAM(s) Oral daily  gabapentin 300 milliGRAM(s) Oral at bedtime  heparin  Injectable 5000 Unit(s) SubCutaneous every 8 hours  pantoprazole    Tablet 40 milliGRAM(s) Oral before breakfast  piperacillin/tazobactam IVPB..      piperacillin/tazobactam IVPB.. 3.375 Gram(s) IV Intermittent every 8 hours  senna 2 Tablet(s) Oral at bedtime  sodium chloride 0.9%. 1000 milliLiter(s) (50 mL/Hr) IV Continuous <Continuous>    MEDICATIONS  (PRN):  acetaminophen   Tablet .. 650 milliGRAM(s) Oral every 6 hours PRN Temp greater or equal to 38C (100.4F)  guaiFENesin    Syrup 200 milliGRAM(s) Oral every 6 hours PRN Cough  ondansetron Injectable 4 milliGRAM(s) IV Push every 6 hours PRN Nausea and/or Vomiting      ROS  UTO 2/2 participation    Vital Signs Last 24 Hrs  T(C): 36.4 (24 Jun 2019 05:20), Max: 38 (23 Jun 2019 19:20)  T(F): 97.5 (24 Jun 2019 05:20), Max: 100.4 (23 Jun 2019 19:20)  HR: 65 (24 Jun 2019 05:20) (65 - 98)  BP: 123/72 (24 Jun 2019 05:20) (111/65 - 144/87)  BP(mean): --  RR: 17 (24 Jun 2019 05:20) (17 - 18)  SpO2: 99% (24 Jun 2019 05:20) (96% - 100%)    PE  NAD  Awake, alert  thin  no edema  remainder of exam limited 2/2 participation                          11.0   7.91  )-----------( 602      ( 24 Jun 2019 05:54 )             35.4       06-24    144  |  107  |  8   ----------------------------<  82  4.0   |  26  |  0.94    Ca    8.9      24 Jun 2019 05:54  Phos  2.8     06-24  Mg     2.2     06-24

## 2019-06-25 ENCOUNTER — TRANSCRIPTION ENCOUNTER (OUTPATIENT)
Age: 74
End: 2019-06-25

## 2019-06-25 PROCEDURE — 93306 TTE W/DOPPLER COMPLETE: CPT | Mod: 26

## 2019-06-25 RX ORDER — CEFUROXIME AXETIL 250 MG
250 TABLET ORAL EVERY 12 HOURS
Refills: 0 | Status: DISCONTINUED | OUTPATIENT
Start: 2019-06-26 | End: 2019-06-27

## 2019-06-25 RX ORDER — DOCUSATE SODIUM 100 MG
1 CAPSULE ORAL
Qty: 0 | Refills: 0 | DISCHARGE
Start: 2019-06-25

## 2019-06-25 RX ORDER — SENNA PLUS 8.6 MG/1
2 TABLET ORAL
Qty: 0 | Refills: 0 | DISCHARGE
Start: 2019-06-25

## 2019-06-25 RX ORDER — GABAPENTIN 400 MG/1
1 CAPSULE ORAL
Qty: 0 | Refills: 0 | DISCHARGE
Start: 2019-06-25

## 2019-06-25 RX ORDER — CEFUROXIME AXETIL 250 MG
1 TABLET ORAL
Qty: 0 | Refills: 0 | DISCHARGE
Start: 2019-06-25

## 2019-06-25 RX ORDER — GABAPENTIN 400 MG/1
1 CAPSULE ORAL
Qty: 0 | Refills: 0 | DISCHARGE

## 2019-06-25 RX ADMIN — PANTOPRAZOLE SODIUM 40 MILLIGRAM(S): 20 TABLET, DELAYED RELEASE ORAL at 05:32

## 2019-06-25 RX ADMIN — Medication 81 MILLIGRAM(S): at 14:15

## 2019-06-25 RX ADMIN — ATORVASTATIN CALCIUM 80 MILLIGRAM(S): 80 TABLET, FILM COATED ORAL at 21:18

## 2019-06-25 RX ADMIN — GABAPENTIN 300 MILLIGRAM(S): 400 CAPSULE ORAL at 21:18

## 2019-06-25 RX ADMIN — HEPARIN SODIUM 5000 UNIT(S): 5000 INJECTION INTRAVENOUS; SUBCUTANEOUS at 21:18

## 2019-06-25 RX ADMIN — Medication 100 MILLIGRAM(S): at 05:32

## 2019-06-25 RX ADMIN — HEPARIN SODIUM 5000 UNIT(S): 5000 INJECTION INTRAVENOUS; SUBCUTANEOUS at 14:15

## 2019-06-25 RX ADMIN — FINASTERIDE 5 MILLIGRAM(S): 5 TABLET, FILM COATED ORAL at 14:14

## 2019-06-25 RX ADMIN — CEFTRIAXONE 100 MILLIGRAM(S): 500 INJECTION, POWDER, FOR SOLUTION INTRAMUSCULAR; INTRAVENOUS at 14:25

## 2019-06-25 RX ADMIN — HEPARIN SODIUM 5000 UNIT(S): 5000 INJECTION INTRAVENOUS; SUBCUTANEOUS at 05:31

## 2019-06-25 RX ADMIN — SENNA PLUS 2 TABLET(S): 8.6 TABLET ORAL at 21:18

## 2019-06-25 NOTE — PROGRESS NOTE ADULT - SUBJECTIVE AND OBJECTIVE BOX
Patient seen and examined at bedside, with sister at bedside per pt's request. All questions/concerns answered accordingly  feeling better  improving appetite  No acute events noted overnight  Case discussed with medical team    HPI:  This is a 73M with history of CVA (1/2018) with residual R arm/leg weakness and expressive aphasia, B-Cell Lymphoproliferative d/o, HTN, HLD, and nephrolithiasis s/p lithotripsy who presents to the hospital with complaints of worsening lethargy/fatigue with dysuria. Patient lives with his sister and had a 12hr HHA for 7 days a week. Said that he started having dysuria about 1 week ago but had not informed his sister of this. Said that over the next few days he started to have worsening lethargy/fatigue and fevers. Said that he also started having a dry cough over this time. his sister noted that he was more fatigued and the new cough and thought he was having a PNA and brought him to the hospital for evaluation. His sister stated that the patient had a kidney stone earlier this year that was removed via lithotripsy but since then has had a few recurrent UTIs, No other complaints.     On arrival to the ED, his vitals were T 102.6, P 110, /80, RR 20, O2 sat 99% RA. His lab work was significant for leukocytosis (higher than baseline) with neutrophilia and a positive UA. He had a CXR that showed clear lungs and a RVP that was negative. He was given tylenol 650mg PO x1, CTX 1g/AZT 500mg IVPB x1, and LR 1.5L. He was then admitted to medicine for further management. (21 Jun 2019 22:20)      PAST MEDICAL & SURGICAL HISTORY:  Kidney stone: s/p lithotripsy  Urinary tract infection without hematuria, site unspecified  Cerebrovascular accident (CVA), unspecified mechanism: With residual R sided weakness and expressive aphasia  Lymphoproliferative disorder: B-Cell  Hyperlipidemia  Hypertension  History of cataract surgery      Levaquin (Rash)       MEDICATIONS  (STANDING):  acetaminophen  IVPB .. 1000 milliGRAM(s) IV Intermittent once  aspirin enteric coated 81 milliGRAM(s) Oral daily  atorvastatin 80 milliGRAM(s) Oral at bedtime  cefTRIAXone   IVPB 1000 milliGRAM(s) IV Intermittent every 24 hours  docusate sodium 100 milliGRAM(s) Oral two times a day  finasteride 5 milliGRAM(s) Oral daily  gabapentin 300 milliGRAM(s) Oral at bedtime  heparin  Injectable 5000 Unit(s) SubCutaneous every 8 hours  pantoprazole    Tablet 40 milliGRAM(s) Oral before breakfast  senna 2 Tablet(s) Oral at bedtime  sodium chloride 0.9%. 1000 milliLiter(s) (50 mL/Hr) IV Continuous <Continuous>    MEDICATIONS  (PRN):  acetaminophen   Tablet .. 650 milliGRAM(s) Oral every 6 hours PRN Temp greater or equal to 38C (100.4F)  guaiFENesin    Syrup 200 milliGRAM(s) Oral every 6 hours PRN Cough  ondansetron Injectable 4 milliGRAM(s) IV Push every 6 hours PRN Nausea and/or Vomiting      REVIEW OF SYSTEMS:  CONSTITUTIONAL: (+) malaise.   EYES: No acute change in vision   ENT:  No tinnitus  NECK: No stiffness  RESPIRATORY: No hemoptysis  CARDIOVASCULAR: No chest pain, palpitations, syncope  GASTROINTESTINAL: No hematemesis, diarrhea, melena, or hematochezia.  GENITOURINARY: No hematuria  NEUROLOGICAL: No headaches  LYMPH Nodes: No enlarged glands  ENDOCRINE: No heat or cold intolerance	    T(C): 36.8 (06-25-19 @ 05:28), Max: 37.2 (06-24-19 @ 20:29)  HR: 70 (06-25-19 @ 05:28) (69 - 84)  BP: 137/79 (06-25-19 @ 05:28) (129/78 - 145/70)  RR: 18 (06-25-19 @ 05:28) (16 - 18)  SpO2: 98% (06-25-19 @ 05:28) (98% - 100%)    PHYSICAL EXAMINATION:   Constitutional: WD, NAD  HEENT: NC, AT  Neck:  Supple  Respiratory:  Adequate airflow b/l. Not using accessory muscles of respiration.  Cardiovascular:  S1 & S2 intact, no R/G, 2+ radial pulses b/l  Gastrointestinal: Soft, NT, ND, normoactive b.s., no organomegaly/RT/rigidity  Extremities: WWP  Neurological:  Alert and awake.  No acute focal motor deficits. Crude sensation intact.     Labs and imaging reviewed    LABS:                        11.0   7.91  )-----------( 602      ( 24 Jun 2019 05:54 )             35.4     06-24    144  |  107  |  8   ----------------------------<  82  4.0   |  26  |  0.94    Ca    8.9      24 Jun 2019 05:54  Phos  2.8     06-24  Mg     2.2     06-24              CAPILLARY BLOOD GLUCOSE                  RADIOLOGY & ADDITIONAL STUDIES:

## 2019-06-25 NOTE — PROGRESS NOTE ADULT - ASSESSMENT
Assessment  1. Abnormal echocardiogram  2. Cystitis  3. Hypercholesterolemia  4. H/o CVA      Plan  1. Echocardiographic findings reviewed. Would benefit from stress MPI.  2. Aspirin and statins

## 2019-06-25 NOTE — PROGRESS NOTE ADULT - ASSESSMENT
This is a 73M with history of CVA (1/2018) with residual R arm/leg weakness and expressive aphasia, B-Cell Lymphoproliferative d/o, HTN, HLD, and nephrolithiasis s/p lithotripsy who presents to the hospital with complaints of worsening lethargy/fatigue with dysuria. Patient lives with his sister and had a 12hr HHA for 7 days a week. Said that he started having dysuria about 1 week ago but had not informed his sister of this. Said that over the next few days he started to have worsening lethargy/fatigue and fevers. Said that he also started having a dry cough over this time. his sister noted that he was more fatigued and the new cough and thought he was having a PNA and brought him to the hospital for evaluation. His sister stated that the patient had a kidney stone earlier this year that was removed via lithotripsy but since then has had a few recurrent UTIs, No other complaints.     On arrival to the ED, his vitals were T 102.6, P 110, /80, RR 20, O2 sat 99% RA. His lab work was significant for leukocytosis (higher than baseline) with neutrophilia and a positive UA. He had a CXR that showed clear lungs and a RVP that was negative. He was given tylenol 650mg PO x1, CTX 1g/AZT 500mg IVPB x1, and LR 1.5L. He was then admitted to medicine for further management. (21 Jun 2019 22:20)      Sepsis: (fever, tachycardia, leukocytosis, bacteremia)    - due to UTI.  Urine and blood cultures growing E.coli.  Sensitivities noted, can change from zosyn to ceftriaxone.    - f/u repeat blood cultures to ensure clearance - NGTD at 24 hrs.     - ct ap with enlarged prostate  and multiple punctate nonobstructing renal calculi which may serve as nidus for infection.  Cont finasteride.    - De-escalate to PO abx once repeat blood cultures have cleared at 48 hrs.  Change to ceftin 250mg po bid through July 4th, to treat for upper tract infection.    OK to d/c from ID standpoint.  d/w pt and wife at bedside.           Ирина Sancho  [phone]

## 2019-06-25 NOTE — DISCHARGE NOTE NURSING/CASE MANAGEMENT/SOCIAL WORK - NSDCDPATPORTLINK_GEN_ALL_CORE
You can access the [email or web address] Patient Portal, offered by [facility], by registering with the following website: [email or web address]

## 2019-06-25 NOTE — CHART NOTE - NSCHARTNOTEFT_GEN_A_CORE
TTE showing walk hypokinesis and MR - spoke with Dr Rowe and Dr Li will hold discharge and order pharmacologic stress test    f/u   discharge to be held     d/w Attending and RN TTE showing wall hypokinesis and MR - spoke with Dr Rowe and Dr Li will hold discharge and order pharmacologic stress test    f/u   discharge to be held     d/w Attending and RN

## 2019-06-25 NOTE — DISCHARGE NOTE NURSING/CASE MANAGEMENT/SOCIAL WORK - NSDCCRNAME_GEN_ALL_CORE_FT
[facility] [phone] [facility]. Waqas to transport at 3pm

## 2019-06-25 NOTE — PROGRESS NOTE ADULT - SUBJECTIVE AND OBJECTIVE BOX
CHIEF COMPLAINT: comfortable      MEDICATIONS:  aspirin enteric coated 81 milliGRAM(s) Oral daily  heparin  Injectable 5000 Unit(s) SubCutaneous every 8 hours      guaiFENesin    Syrup 200 milliGRAM(s) Oral every 6 hours PRN    acetaminophen   Tablet .. 650 milliGRAM(s) Oral every 6 hours PRN  acetaminophen  IVPB .. 1000 milliGRAM(s) IV Intermittent once  gabapentin 300 milliGRAM(s) Oral at bedtime  ondansetron Injectable 4 milliGRAM(s) IV Push every 6 hours PRN    docusate sodium 100 milliGRAM(s) Oral two times a day  pantoprazole    Tablet 40 milliGRAM(s) Oral before breakfast  senna 2 Tablet(s) Oral at bedtime    atorvastatin 80 milliGRAM(s) Oral at bedtime  finasteride 5 milliGRAM(s) Oral daily    sodium chloride 0.9%. 1000 milliLiter(s) IV Continuous <Continuous>          Allergies    Levaquin (Rash)    Intolerances    	        PHYSICAL EXAM:  T(C): 36.9 (06-25-19 @ 21:15), Max: 36.9 (06-25-19 @ 14:47)  HR: 70 (06-25-19 @ 21:15) (69 - 76)  BP: 137/87 (06-25-19 @ 21:15) (129/78 - 137/87)  RR: 17 (06-25-19 @ 21:15) (16 - 18)  SpO2: 98% (06-25-19 @ 21:15) (98% - 99%)  Wt(kg): --  I&O's Summary    24 Jun 2019 07:01  -  25 Jun 2019 07:00  --------------------------------------------------------  IN: 250 mL / OUT: 600 mL / NET: -350 mL        Appearance: Normal	  Cardiovascular: Normal S1 S2, No JVD, No murmurs, No edema  Respiratory: Lungs clear to auscultation	  Psychiatry: A & O x 3, Mood & affect appropriate  Gastrointestinal:  Soft, Non-tender, + BS	  Skin: No rashes, No ecchymoses, No cyanosis	  Neurologic: Non-focal  Extremities: Normal range of motion, No clubbing, cyanosis or edema                                    11.0   7.91  )-----------( 602      ( 24 Jun 2019 05:54 )             35.4     06-24    144  |  107  |  8   ----------------------------<  82  4.0   |  26  |  0.94    Ca    8.9      24 Jun 2019 05:54  Phos  2.8     06-24  Mg     2.2     06-24

## 2019-06-25 NOTE — PROGRESS NOTE ADULT - SUBJECTIVE AND OBJECTIVE BOX
Infectious Diseases progress note:    Subjective: NAD, no acute o/n events.  Afebrile.  Wife at bedside.     ROS:  CONSTITUTIONAL:  No fever, chills, rigors  CARDIOVASCULAR:  No chest pain or palpitations  RESPIRATORY:   No SOB, cough, dyspnea on exertion.  No wheezing  GASTROINTESTINAL:  No abd pain, N/V, diarrhea/constipation  EXTREMITIES:  No swelling or joint pain  GENITOURINARY:  No burning on urination, increased frequency or urgency.  No flank pain  NEUROLOGIC:  No HA, visual disturbances  SKIN: No rashes    Allergies    Levaquin (Rash)    Intolerances        ANTIBIOTICS/RELEVANT:  antimicrobials    immunologic:    OTHER:  acetaminophen   Tablet .. 650 milliGRAM(s) Oral every 6 hours PRN  acetaminophen  IVPB .. 1000 milliGRAM(s) IV Intermittent once  aspirin enteric coated 81 milliGRAM(s) Oral daily  atorvastatin 80 milliGRAM(s) Oral at bedtime  docusate sodium 100 milliGRAM(s) Oral two times a day  finasteride 5 milliGRAM(s) Oral daily  gabapentin 300 milliGRAM(s) Oral at bedtime  guaiFENesin    Syrup 200 milliGRAM(s) Oral every 6 hours PRN  heparin  Injectable 5000 Unit(s) SubCutaneous every 8 hours  ondansetron Injectable 4 milliGRAM(s) IV Push every 6 hours PRN  pantoprazole    Tablet 40 milliGRAM(s) Oral before breakfast  senna 2 Tablet(s) Oral at bedtime  sodium chloride 0.9%. 1000 milliLiter(s) IV Continuous <Continuous>      Objective:  Vital Signs Last 24 Hrs  T(C): 36.9 (25 Jun 2019 14:47), Max: 37.2 (24 Jun 2019 20:29)  T(F): 98.5 (25 Jun 2019 14:47), Max: 98.9 (24 Jun 2019 20:29)  HR: 76 (25 Jun 2019 14:47) (69 - 84)  BP: 136/89 (25 Jun 2019 14:47) (129/78 - 145/70)  BP(mean): --  RR: 18 (25 Jun 2019 14:47) (16 - 18)  SpO2: 99% (25 Jun 2019 14:47) (98% - 99%)    PHYSICAL EXAM:  Constitutional:NAD  Eyes:JOSEE, EOMI  Ear/Nose/Throat: no thrush, mucositis.  Moist mucous membranes	  Neck:no JVD, no lymphadenopathy, supple  Respiratory: CTA tea  Cardiovascular: S1S2 RRR, no murmurs  Gastrointestinal:soft, nontender,  nondistended (+) BS  Extremities:no e/e/c  Skin:  no rashes, open wounds or ulcerations        LABS:                        11.0   7.91  )-----------( 602      ( 24 Jun 2019 05:54 )             35.4     06-24    144  |  107  |  8   ----------------------------<  82  4.0   |  26  |  0.94    Ca    8.9      24 Jun 2019 05:54  Phos  2.8     06-24  Mg     2.2     06-24                              MICROBIOLOGY:    Culture - Blood in AM (06.23.19 @ 07:32)    Culture - Blood:   NO ORGANISMS ISOLATED  NO ORGANISMS ISOLATED AT 48 HRS.    Specimen Source: BLOOD    Culture - Urine (06.21.19 @ 19:16)    -  Amikacin: S <=8 ALEX    -  Ampicillin: R >16 ALEX    -  Ampicillin/Sulbactam: I 16/8 ALEX    -  Aztreonam: S <=4 ALEX    -  Cefazolin: S <=2 ALEX    -  Cefepime: S <=2 ALEX    -  Cefoxitin: S <=4 ALEX    -  Ceftazidime: S <=1 ALEX    -  Ceftriaxone: S <=1 ALEX    -  Ciprofloxacin: I 2 ALEX    -  Ertapenem: S <=0.5 ALEX    -  Gentamicin: R >8 ALEX    -  Imipenem: S <=1 ALEX    -  Levofloxacin: S <=1 ALEX    -  Meropenem: S <=1 ALEX    -  Nitrofurantoin: S <=32 ALEX    -  Piperacillin/Tazobactam: S <=8 ALEX    -  Tigecycline: S <=1 ALEX    -  Tobramycin: I 8 ALEX    -  Trimethoprim/Sulfamethoxazole: S <=0.5/9.5 ALEX    Culture - Urine:   COLONY COUNT: > = 100,000 CFU/ML    Specimen Source: URINE MIDSTREAM    Organism Identification: Escherichia coli    Organism: Escherichia coli    Method Type: NEGATIVE ALEX 43    Culture - Blood (06.21.19 @ 18:55)    -  Amikacin: S <=8 ALEX    -  Levofloxacin: S <=1 ALEX    -  Imipenem: S <=1 ALEX    -  Ertapenem: S <=0.5 ALEX    -  Gentamicin: R >8 ALEX    -  Ciprofloxacin: I 2 ALEX    -  Ceftriaxone: S <=1 ALEX    -  Ceftazidime: S <=1 ALEX    -  Cefoxitin: S <=4 ALEX    -  Cefepime: S <=2 ALEX    -  Cefazolin: I 4 ALEX    -  Aztreonam: S <=4 ALEX    -  Ampicillin/Sulbactam: I 16/8 ALEX    -  Ampicillin: R >16 ALEX    Culture - Blood:   ***Blood Panel PCR results on this specimen are available  approximately 3 hours after the Gram stain result***  Gram stain, PCR, and/or culture results may not always  correspond due to difference in methodologies  ------------------------------------------------------------  This PCR assay was performed using [employer].  The  following targets are tested for:  Enterococcus, vancomycin  resistant enterococci, Listeria monocytogenes,  coagulase  negative staphylococci, S. aureus, methicillin resistant S.  aureus, Streptococcus agalactiae (Group B), S. pneumoniae,  S. pyogenes (Group A), Acinetobacter baumannii, Enterobacter  cloacae, E. coli, Klebsiella oxytoca, K. pneumoniae, Proteus  sp., Serratia marcescens, Haemophilus influenzae, Neisseria  meningitidis, Pseudomonas aeruginosa, Candida albicans, C.  glabrata, C. krusei, C. parapsilosis, C. tropicalis and the  KPC resistance gene.  **NOTE: Due to technical problems, Proteus sp. will NOT be  reported as part of the BCID paneluntil further notice.    -  Escherichia coli: + DETECT ALEX    -  Meropenem: S <=1 ALEX    -  Trimethoprim/Sulfamethoxazole: S <=0.5/9.5 ALEX    -  Tobramycin: I 8 ALEX    -  Tigecycline: S <=1 ALEX    -  Piperacillin/Tazobactam: S <=8 ALEX    Specimen Source: BLOOD PERIPHERAL    Organism: BLOOD CULTURE PCR    Organism: Escherichia coli    Gram Stain Blood:   ***** CRITICAL RESULT *****  PERSON CALLED / READ-BACK: ROCHELLE ZEE RN. / Y  DATE / TIME CALLED: 06/22/19 0817  CALLED BY: THAMBIRAJ,JAIDEN  GNR^Gram Neg Rods  AFTER: 12 HOURS INCUBATION  BOTTLE: ANAEROBIC BOTTLE    Organism Identification: BLOOD CULTURE PCR  Escherichia coli    Method Type: PCR    Method Type: NEGATIVE ALEX 43          RADIOLOGY & ADDITIONAL STUDIES:    < from: CT Abdomen and Pelvis w/ IV Cont (06.22.19 @ 14:30) >  IMPRESSION:     Bilateral renal calculi as previously demonstrated    Enlarged prostate.    < end of copied text >

## 2019-06-25 NOTE — PROGRESS NOTE ADULT - ATTENDING COMMENTS
[facility] NEPHROLOGY  Winston Viramontes M.D.  Lan Lee D.O.  Melissa Johnson M.D.  Amanda Goldman, MSN, ANP-C    Telephone: [phone]  Facsimile: [phone]    [address] [place] [address]
hypokalemia - improved  hypophosphatemia - improved
hypokalemia - improved  hypophosphatemia - improved
hypokalemia - replaced  hypophosphatemia - replaced

## 2019-06-26 ENCOUNTER — APPOINTMENT (OUTPATIENT)
Dept: NEUROLOGY | Facility: CLINIC | Age: 74
End: 2019-06-26

## 2019-06-26 DIAGNOSIS — R94.39 ABNORMAL RESULT OF OTHER CARDIOVASCULAR FUNCTION STUDY: ICD-10-CM

## 2019-06-26 DIAGNOSIS — R68.89 OTHER GENERAL SYMPTOMS AND SIGNS: ICD-10-CM

## 2019-06-26 LAB
ANION GAP SERPL CALC-SCNC: 10 MMO/L — SIGNIFICANT CHANGE UP (ref 7–14)
BUN SERPL-MCNC: 9 MG/DL — SIGNIFICANT CHANGE UP (ref 7–23)
CALCIUM SERPL-MCNC: 9.6 MG/DL — SIGNIFICANT CHANGE UP (ref 8.4–10.5)
CHLORIDE SERPL-SCNC: 103 MMOL/L — SIGNIFICANT CHANGE UP (ref 98–107)
CO2 SERPL-SCNC: 29 MMOL/L — SIGNIFICANT CHANGE UP (ref 22–31)
CREAT SERPL-MCNC: 0.88 MG/DL — SIGNIFICANT CHANGE UP (ref 0.5–1.3)
GLUCOSE SERPL-MCNC: 88 MG/DL — SIGNIFICANT CHANGE UP (ref 70–99)
HCT VFR BLD CALC: 35 % — LOW (ref 39–50)
HGB BLD-MCNC: 11.1 G/DL — LOW (ref 13–17)
MCHC RBC-ENTMCNC: 28.2 PG — SIGNIFICANT CHANGE UP (ref 27–34)
MCHC RBC-ENTMCNC: 31.7 % — LOW (ref 32–36)
MCV RBC AUTO: 88.8 FL — SIGNIFICANT CHANGE UP (ref 80–100)
NRBC # FLD: 0 K/UL — SIGNIFICANT CHANGE UP (ref 0–0)
PLATELET # BLD AUTO: 778 K/UL — HIGH (ref 150–400)
PMV BLD: 9.7 FL — SIGNIFICANT CHANGE UP (ref 7–13)
POTASSIUM SERPL-MCNC: 4.1 MMOL/L — SIGNIFICANT CHANGE UP (ref 3.5–5.3)
POTASSIUM SERPL-SCNC: 4.1 MMOL/L — SIGNIFICANT CHANGE UP (ref 3.5–5.3)
RBC # BLD: 3.94 M/UL — LOW (ref 4.2–5.8)
RBC # FLD: 16.2 % — HIGH (ref 10.3–14.5)
SODIUM SERPL-SCNC: 142 MMOL/L — SIGNIFICANT CHANGE UP (ref 135–145)
WBC # BLD: 10.14 K/UL — SIGNIFICANT CHANGE UP (ref 3.8–10.5)
WBC # FLD AUTO: 10.14 K/UL — SIGNIFICANT CHANGE UP (ref 3.8–10.5)

## 2019-06-26 PROCEDURE — 78452 HT MUSCLE IMAGE SPECT MULT: CPT | Mod: 26

## 2019-06-26 PROCEDURE — 93016 CV STRESS TEST SUPVJ ONLY: CPT | Mod: GC

## 2019-06-26 PROCEDURE — 93018 CV STRESS TEST I&R ONLY: CPT | Mod: GC

## 2019-06-26 RX ORDER — METOPROLOL TARTRATE 50 MG
12.5 TABLET ORAL DAILY
Refills: 0 | Status: DISCONTINUED | OUTPATIENT
Start: 2019-06-26 | End: 2019-06-26

## 2019-06-26 RX ORDER — METOPROLOL TARTRATE 50 MG
0 TABLET ORAL
Qty: 0 | Refills: 0 | DISCHARGE
Start: 2019-06-26

## 2019-06-26 RX ORDER — METOPROLOL TARTRATE 50 MG
12.5 TABLET ORAL DAILY
Refills: 0 | Status: DISCONTINUED | OUTPATIENT
Start: 2019-06-26 | End: 2019-06-27

## 2019-06-26 RX ADMIN — SENNA PLUS 2 TABLET(S): 8.6 TABLET ORAL at 21:40

## 2019-06-26 RX ADMIN — FINASTERIDE 5 MILLIGRAM(S): 5 TABLET, FILM COATED ORAL at 13:34

## 2019-06-26 RX ADMIN — ATORVASTATIN CALCIUM 80 MILLIGRAM(S): 80 TABLET, FILM COATED ORAL at 23:34

## 2019-06-26 RX ADMIN — Medication 250 MILLIGRAM(S): at 18:22

## 2019-06-26 RX ADMIN — HEPARIN SODIUM 5000 UNIT(S): 5000 INJECTION INTRAVENOUS; SUBCUTANEOUS at 13:34

## 2019-06-26 RX ADMIN — Medication 81 MILLIGRAM(S): at 13:33

## 2019-06-26 RX ADMIN — Medication 100 MILLIGRAM(S): at 06:03

## 2019-06-26 RX ADMIN — GABAPENTIN 300 MILLIGRAM(S): 400 CAPSULE ORAL at 21:40

## 2019-06-26 RX ADMIN — HEPARIN SODIUM 5000 UNIT(S): 5000 INJECTION INTRAVENOUS; SUBCUTANEOUS at 21:40

## 2019-06-26 RX ADMIN — Medication 200 MILLIGRAM(S): at 06:03

## 2019-06-26 RX ADMIN — Medication 250 MILLIGRAM(S): at 06:03

## 2019-06-26 RX ADMIN — PANTOPRAZOLE SODIUM 40 MILLIGRAM(S): 20 TABLET, DELAYED RELEASE ORAL at 06:03

## 2019-06-26 RX ADMIN — HEPARIN SODIUM 5000 UNIT(S): 5000 INJECTION INTRAVENOUS; SUBCUTANEOUS at 06:03

## 2019-06-26 NOTE — PROGRESS NOTE ADULT - SUBJECTIVE AND OBJECTIVE BOX
Pt seen, no complaints    MEDICATIONS  (STANDING):  acetaminophen  IVPB .. 1000 milliGRAM(s) IV Intermittent once  aspirin enteric coated 81 milliGRAM(s) Oral daily  atorvastatin 80 milliGRAM(s) Oral at bedtime  cefuroxime   Tablet 250 milliGRAM(s) Oral every 12 hours  docusate sodium 100 milliGRAM(s) Oral two times a day  finasteride 5 milliGRAM(s) Oral daily  gabapentin 300 milliGRAM(s) Oral at bedtime  heparin  Injectable 5000 Unit(s) SubCutaneous every 8 hours  metoprolol succinate ER 12.5 milliGRAM(s) Oral daily  pantoprazole    Tablet 40 milliGRAM(s) Oral before breakfast  senna 2 Tablet(s) Oral at bedtime  sodium chloride 0.9%. 1000 milliLiter(s) (50 mL/Hr) IV Continuous <Continuous>    MEDICATIONS  (PRN):  acetaminophen   Tablet .. 650 milliGRAM(s) Oral every 6 hours PRN Temp greater or equal to 38C (100.4F)  guaiFENesin    Syrup 200 milliGRAM(s) Oral every 6 hours PRN Cough  ondansetron Injectable 4 milliGRAM(s) IV Push every 6 hours PRN Nausea and/or Vomiting      ROS  No fever, sweats, chills  No epistaxis, HA, sore throat  No CP, SOB, cough, sputum  No n/v/d, abd pain, melena, hematochezia  No edema  No rash  No anxiety  No back pain, joint pain  No bleeding, bruising  No dysuria, hematuria    Vital Signs Last 24 Hrs  T(C): 36.6 (26 Jun 2019 14:07), Max: 36.9 (25 Jun 2019 21:15)  T(F): 97.9 (26 Jun 2019 14:07), Max: 98.4 (25 Jun 2019 21:15)  HR: 74 (26 Jun 2019 14:07) (70 - 74)  BP: 124/78 (26 Jun 2019 14:07) (122/76 - 137/87)  BP(mean): --  RR: 18 (26 Jun 2019 14:07) (17 - 18)  SpO2: 98% (26 Jun 2019 14:07) (98% - 98%)    PE  NAD  Awake, alert  Anicteric, MMM  RRR  CTAB  Abd soft, NT, ND  No c/c/e  No rash grossly  FROM                          11.1   10.14 )-----------( 778      ( 26 Jun 2019 06:10 )             35.0       06-26    142  |  103  |  9   ----------------------------<  88  4.1   |  29  |  0.88    Ca    9.6      26 Jun 2019 06:10

## 2019-06-26 NOTE — PROGRESS NOTE ADULT - SUBJECTIVE AND OBJECTIVE BOX
Infectious Diseases progress note:    Subjective:  No fever/chills/abd pain/cough.  Pt s/p stress test    ROS:  CONSTITUTIONAL:  No fever, chills, rigors  CARDIOVASCULAR:  No chest pain or palpitations  RESPIRATORY:   No SOB, cough, dyspnea on exertion.  No wheezing  GASTROINTESTINAL:  No abd pain, N/V, diarrhea/constipation  EXTREMITIES:  No swelling or joint pain  GENITOURINARY:  No burning on urination, increased frequency or urgency.  No flank pain  NEUROLOGIC:  No HA, visual disturbances  SKIN: No rashes    Allergies    Levaquin (Rash)    Intolerances        ANTIBIOTICS/RELEVANT:  antimicrobials  cefuroxime   Tablet 250 milliGRAM(s) Oral every 12 hours    immunologic:    OTHER:  acetaminophen   Tablet .. 650 milliGRAM(s) Oral every 6 hours PRN  acetaminophen  IVPB .. 1000 milliGRAM(s) IV Intermittent once  aspirin enteric coated 81 milliGRAM(s) Oral daily  atorvastatin 80 milliGRAM(s) Oral at bedtime  docusate sodium 100 milliGRAM(s) Oral two times a day  finasteride 5 milliGRAM(s) Oral daily  gabapentin 300 milliGRAM(s) Oral at bedtime  guaiFENesin    Syrup 200 milliGRAM(s) Oral every 6 hours PRN  heparin  Injectable 5000 Unit(s) SubCutaneous every 8 hours  metoprolol succinate ER 12.5 milliGRAM(s) Oral daily  ondansetron Injectable 4 milliGRAM(s) IV Push every 6 hours PRN  pantoprazole    Tablet 40 milliGRAM(s) Oral before breakfast  senna 2 Tablet(s) Oral at bedtime  sodium chloride 0.9%. 1000 milliLiter(s) IV Continuous <Continuous>      Objective:  Vital Signs Last 24 Hrs  T(C): 36.6 (26 Jun 2019 14:07), Max: 36.9 (25 Jun 2019 21:15)  T(F): 97.9 (26 Jun 2019 14:07), Max: 98.4 (25 Jun 2019 21:15)  HR: 74 (26 Jun 2019 14:07) (70 - 74)  BP: 124/78 (26 Jun 2019 14:07) (122/76 - 137/87)  BP(mean): --  RR: 18 (26 Jun 2019 14:07) (17 - 18)  SpO2: 98% (26 Jun 2019 14:07) (98% - 98%)    PHYSICAL EXAM:  Constitutional:NAD  Eyes:JOSEE, EOMI  Ear/Nose/Throat: no thrush, mucositis.  Moist mucous membranes	  Neck:no JVD, no lymphadenopathy, supple  Respiratory: CTA tea  Cardiovascular: S1S2 RRR, no murmurs  Gastrointestinal:soft, nontender,  nondistended (+) BS  Extremities:no e/e/c  Skin:  no rashes, open wounds or ulcerations        LABS:                        11.1   10.14 )-----------( 778      ( 26 Jun 2019 06:10 )             35.0     06-26    142  |  103  |  9   ----------------------------<  88  4.1   |  29  |  0.88    Ca    9.6      26 Jun 2019 06:10                              MICROBIOLOGY:    Culture - Blood in AM (06.23.19 @ 07:32)    Culture - Blood:   NO ORGANISMS ISOLATED  NO ORGANISMS ISOLATED AT 72 HRS.    Specimen Source: BLOOD    Culture - Urine (06.21.19 @ 19:16)    -  Ceftazidime: S <=1 ALEX    -  Cefoxitin: S <=4 ALEX    -  Ciprofloxacin: I 2 ALEX    -  Ceftriaxone: S <=1 ALEX    -  Levofloxacin: S <=1 ALEX    -  Imipenem: S <=1 ALEX    -  Ertapenem: S <=0.5 ALEX    -  Gentamicin: R >8 ALEX    -  Tigecycline: S <=1 ALEX    -  Piperacillin/Tazobactam: S <=8 ALEX    -  Meropenem: S <=1 ALEX    -  Nitrofurantoin: S <=32 ALEX    -  Trimethoprim/Sulfamethoxazole: S <=0.5/9.5 ALEX    -  Tobramycin: I 8 ALEX    Culture - Urine:   COLONY COUNT: > = 100,000 CFU/ML    -  Cefepime: S <=2 ALEX    -  Cefazolin: S <=2 ALEX    -  Aztreonam: S <=4 ALEX    -  Ampicillin/Sulbactam: I 16/8 ALEX    -  Ampicillin: R >16 ALEX    -  Amikacin: S <=8 ALEX    Specimen Source: URINE MIDSTREAM    Organism Identification: Escherichia coli    Organism: Escherichia coli    Method Type: NEGATIVE ALEX 43    Culture - Blood (06.21.19 @ 18:56)    Culture - Blood:   EC^Escherichia coli    Culture - Blood:   SEE PREVIOUS CULTURE:[id number] COLLECTED 6/21/19   RECEIVED  6/21/19    FOR ALEX  EC^Escherichia coli    Specimen Source: BLOOD VENOUS    Gram Stain Blood:   ***** CRITICAL RESULT *****  PERSON CALLED / READ-BACK: ROCHELLE ZEE RN. / Y  DATE / TIME CALLED: 06/22/19 0818  CALLED BY: JAIDEN PARRISH^Gram Neg Rods  AFTER: 12 HOURS INCUBATION  BOTTLE: AEROBIC   ANAEROBIC BOTTLES          RADIOLOGY & ADDITIONAL STUDIES:    < from: CT Abdomen and Pelvis w/ IV Cont (06.22.19 @ 14:30) >  IMPRESSION:     Bilateral renal calculi as previously demonstrated    Enlarged prostate.    < end of copied text >

## 2019-06-26 NOTE — PROGRESS NOTE ADULT - SUBJECTIVE AND OBJECTIVE BOX
Patient seen and examined at bedside  abnormal tte results noted, NST scheduled  otherwise pt with no new acute complaints/events noted overnight  Case discussed with medical team    HPI:  This is a 73M with history of CVA (1/2018) with residual R arm/leg weakness and expressive aphasia, B-Cell Lymphoproliferative d/o, HTN, HLD, and nephrolithiasis s/p lithotripsy who presents to the hospital with complaints of worsening lethargy/fatigue with dysuria. Patient lives with his sister and had a 12hr HHA for 7 days a week. Said that he started having dysuria about 1 week ago but had not informed his sister of this. Said that over the next few days he started to have worsening lethargy/fatigue and fevers. Said that he also started having a dry cough over this time. his sister noted that he was more fatigued and the new cough and thought he was having a PNA and brought him to the hospital for evaluation. His sister stated that the patient had a kidney stone earlier this year that was removed via lithotripsy but since then has had a few recurrent UTIs, No other complaints.     On arrival to the ED, his vitals were T 102.6, P 110, /80, RR 20, O2 sat 99% RA. His lab work was significant for leukocytosis (higher than baseline) with neutrophilia and a positive UA. He had a CXR that showed clear lungs and a RVP that was negative. He was given tylenol 650mg PO x1, CTX 1g/AZT 500mg IVPB x1, and LR 1.5L. He was then admitted to medicine for further management. (21 Jun 2019 22:20)      PAST MEDICAL & SURGICAL HISTORY:  Kidney stone: s/p lithotripsy  Urinary tract infection without hematuria, site unspecified  Cerebrovascular accident (CVA), unspecified mechanism: With residual R sided weakness and expressive aphasia  Lymphoproliferative disorder: B-Cell  Hyperlipidemia  Hypertension  History of cataract surgery      Levaquin (Rash)       MEDICATIONS  (STANDING):  acetaminophen  IVPB .. 1000 milliGRAM(s) IV Intermittent once  aspirin enteric coated 81 milliGRAM(s) Oral daily  atorvastatin 80 milliGRAM(s) Oral at bedtime  cefuroxime   Tablet 250 milliGRAM(s) Oral every 12 hours  docusate sodium 100 milliGRAM(s) Oral two times a day  finasteride 5 milliGRAM(s) Oral daily  gabapentin 300 milliGRAM(s) Oral at bedtime  heparin  Injectable 5000 Unit(s) SubCutaneous every 8 hours  pantoprazole    Tablet 40 milliGRAM(s) Oral before breakfast  senna 2 Tablet(s) Oral at bedtime  sodium chloride 0.9%. 1000 milliLiter(s) (50 mL/Hr) IV Continuous <Continuous>    MEDICATIONS  (PRN):  acetaminophen   Tablet .. 650 milliGRAM(s) Oral every 6 hours PRN Temp greater or equal to 38C (100.4F)  guaiFENesin    Syrup 200 milliGRAM(s) Oral every 6 hours PRN Cough  ondansetron Injectable 4 milliGRAM(s) IV Push every 6 hours PRN Nausea and/or Vomiting      REVIEW OF SYSTEMS:  CONSTITUTIONAL: no fevers   EYES: No acute change in vision   ENT:  No tinnitus  NECK: No stiffness  RESPIRATORY: No hemoptysis  CARDIOVASCULAR: No chest pain, palpitations, syncope  GASTROINTESTINAL: No hematemesis, diarrhea, melena, or hematochezia.  GENITOURINARY: No hematuria  NEUROLOGICAL: No headaches  LYMPH Nodes: No enlarged glands  ENDOCRINE: No heat or cold intolerance	    T(C): 36.4 (06-26-19 @ 06:01), Max: 36.9 (06-25-19 @ 14:47)  HR: 73 (06-26-19 @ 06:01) (70 - 76)  BP: 122/76 (06-26-19 @ 06:01) (122/76 - 137/87)  RR: 18 (06-26-19 @ 06:01) (17 - 18)  SpO2: 98% (06-26-19 @ 06:01) (98% - 99%)    PHYSICAL EXAMINATION:   Constitutional: NAD  HEENT: NC, AT  Neck:  Supple  Respiratory:  Adequate airflow b/l. Not using accessory muscles of respiration.  Cardiovascular:  sys murmur, S1 & S2 intact, no R/G, 2+ radial pulses b/l  Gastrointestinal: Soft, NT, ND, normoactive b.s., no organomegaly/RT/rigidity  Extremities: WWP  Neurological:  Alert and awake.  No acute focal motor deficits. Crude sensation intact.     Labs and imaging reviewed    LABS:                        11.1   10.14 )-----------( 778      ( 26 Jun 2019 06:10 )             35.0     06-26    142  |  103  |  9   ----------------------------<  88  4.1   |  29  |  0.88    Ca    9.6      26 Jun 2019 06:10              CAPILLARY BLOOD GLUCOSE                  RADIOLOGY & ADDITIONAL STUDIES:    < from: Transthoracic Echocardiogram (06.25.19 @ 15:53) >  CONCLUSIONS:  Technically difficult study.  1. Mitral annular calcification, otherwise normal mitral  valve. Mild mitral regurgitation.  2. Endocardium not well visualized; grossly mild segmental  left ventricular systolic dysfunction.  Hypokinesis of the  inferolateral wall.  3. The right ventricle is not well visualized; grossly  normal right ventricular systolic function.    < end of copied text >

## 2019-06-26 NOTE — CHART NOTE - NSCHARTNOTEFT_GEN_A_CORE
stress test showing "There is a medium sized, mild to moderate defect in inferior wall that is fixed, suggestive of infarct." spoke with dr wyman patient is cleared for dc from cardiac standpoint will continue with medical management pt is currently on (asa and statin) and patient should follow up with cardiologist in 1 week     pt deny any chest pain     d/w Attending and RN stress test showing "There is a medium sized, mild to moderate defect in inferior wall that is fixed, suggestive of infarct." spoke with dr wyman patient is cleared for dc from cardiac standpoint will continue with medical management pt is currently on (asa and statin) and patient should follow up with cardiologist in 1 week     pt deny any chest pain     lopressor started as per Dr Wyman  recommendation   d/w Attending and RN

## 2019-06-26 NOTE — PROGRESS NOTE ADULT - ASSESSMENT
73y Male with history of B cell lymphoproliferative disorder who presents with fatigue found to have sepsis secondary to UTI.     UTI sepsis/ Pyelonephritis  - on IV Abx as per primary team   - Bilateral renal calculus s/p stents. Renal and Urology following    B cell lymphoproliferative disorder  -CT C/A/P - no lymphadenopathy or splenomegaly  -elevated WBC in setting of sepsis.   -WBC nml today  - No urgent Heme/Onc issues.  Pt's family states that his hematologist Dr Mistry no longer works in [facility]. They would like to f/u with us in office. Info provided to pt and family.    Normocytic Anemia   -Fe, B12, folate adequate    Thrombocytosis - probably reactive, monitor for now    will follow, [phone] 73y Male with history of B cell lymphoproliferative disorder who presents with fatigue found to have sepsis secondary to UTI.     UTI sepsis/ Pyelonephritis  - on IV Abx as per primary team   - Bilateral renal calculus s/p stents. Renal and Urology following    B cell lymphoproliferative disorder  -CT C/A/P - no lymphadenopathy or splenomegaly  -elevated WBC in setting of sepsis.   -WBC nml today  - No urgent Heme/Onc issues.  Pt's family states that his hematologist Dr Mistry no longer works in [facility] and wishes to f/u with us. I confirmed with Dr Mistry who still works here. Pt to f/u with Dr Mistry or us, defer to pt wishes  Normocytic Anemia   -Fe, B12, folate adequate    Thrombocytosis - probably reactive, monitor for now    will follow, [phone]

## 2019-06-26 NOTE — PROGRESS NOTE ADULT - ASSESSMENT
This is a 73M with history of CVA (1/2018) with residual R arm/leg weakness and expressive aphasia, B-Cell Lymphoproliferative d/o, HTN, HLD, and nephrolithiasis s/p lithotripsy who presents to the hospital with complaints of worsening lethargy/fatigue with dysuria. Patient lives with his sister and had a 12hr HHA for 7 days a week. Said that he started having dysuria about 1 week ago but had not informed his sister of this. Said that over the next few days he started to have worsening lethargy/fatigue and fevers. Said that he also started having a dry cough over this time. his sister noted that he was more fatigued and the new cough and thought he was having a PNA and brought him to the hospital for evaluation. His sister stated that the patient had a kidney stone earlier this year that was removed via lithotripsy but since then has had a few recurrent UTIs, No other complaints.     On arrival to the ED, his vitals were T 102.6, P 110, /80, RR 20, O2 sat 99% RA. His lab work was significant for leukocytosis (higher than baseline) with neutrophilia and a positive UA. He had a CXR that showed clear lungs and a RVP that was negative. He was given tylenol 650mg PO x1, CTX 1g/AZT 500mg IVPB x1, and LR 1.5L. He was then admitted to medicine for further management. (21 Jun 2019 22:20)      Sepsis: (fever, tachycardia, leukocytosis, bacteremia)    - due to UTI.  Urine and blood cultures growing E.coli.  Sensitivities noted, can change from zosyn to ceftriaxone.    - f/u repeat blood cultures to ensure clearance - NGTD at 24 hrs.     - ct ap with enlarged prostate  and multiple punctate nonobstructing renal calculi which may serve as nidus for infection.  Cont finasteride.    - IV rocephin changed to ceftin 250mg po bid through July 4th, to treat for upper tract infection.    OK to d/c from ID standpoint.            Ирина Kingsley  [phone]

## 2019-06-27 VITALS
DIASTOLIC BLOOD PRESSURE: 78 MMHG | HEART RATE: 68 BPM | RESPIRATION RATE: 16 BRPM | OXYGEN SATURATION: 100 % | TEMPERATURE: 98 F | SYSTOLIC BLOOD PRESSURE: 125 MMHG

## 2019-06-27 RX ORDER — METOPROLOL TARTRATE 50 MG
12.5 TABLET ORAL
Qty: 0 | Refills: 0 | DISCHARGE
Start: 2019-06-27

## 2019-06-27 RX ADMIN — HEPARIN SODIUM 5000 UNIT(S): 5000 INJECTION INTRAVENOUS; SUBCUTANEOUS at 12:38

## 2019-06-27 RX ADMIN — PANTOPRAZOLE SODIUM 40 MILLIGRAM(S): 20 TABLET, DELAYED RELEASE ORAL at 05:20

## 2019-06-27 RX ADMIN — Medication 250 MILLIGRAM(S): at 05:19

## 2019-06-27 RX ADMIN — FINASTERIDE 5 MILLIGRAM(S): 5 TABLET, FILM COATED ORAL at 12:38

## 2019-06-27 RX ADMIN — Medication 81 MILLIGRAM(S): at 12:38

## 2019-06-27 RX ADMIN — Medication 100 MILLIGRAM(S): at 05:19

## 2019-06-27 RX ADMIN — HEPARIN SODIUM 5000 UNIT(S): 5000 INJECTION INTRAVENOUS; SUBCUTANEOUS at 05:19

## 2019-06-27 RX ADMIN — Medication 12.5 MILLIGRAM(S): at 05:19

## 2019-06-27 NOTE — PROGRESS NOTE ADULT - SUBJECTIVE AND OBJECTIVE BOX
Patient seen and examined at bedside, with sister Nathalia Alegria at bedside per pt's request. All questions and concerns answered and addressed accordingly.   No acute events noted overnight  Case discussed with medical team    HPI:  This is a 73M with history of CVA (1/2018) with residual R arm/leg weakness and expressive aphasia, B-Cell Lymphoproliferative d/o, HTN, HLD, and nephrolithiasis s/p lithotripsy who presents to the hospital with complaints of worsening lethargy/fatigue with dysuria. Patient lives with his sister and had a 12hr HHA for 7 days a week. Said that he started having dysuria about 1 week ago but had not informed his sister of this. Said that over the next few days he started to have worsening lethargy/fatigue and fevers. Said that he also started having a dry cough over this time. his sister noted that he was more fatigued and the new cough and thought he was having a PNA and brought him to the hospital for evaluation. His sister stated that the patient had a kidney stone earlier this year that was removed via lithotripsy but since then has had a few recurrent UTIs, No other complaints.     On arrival to the ED, his vitals were T 102.6, P 110, /80, RR 20, O2 sat 99% RA. His lab work was significant for leukocytosis (higher than baseline) with neutrophilia and a positive UA. He had a CXR that showed clear lungs and a RVP that was negative. He was given tylenol 650mg PO x1, CTX 1g/AZT 500mg IVPB x1, and LR 1.5L. He was then admitted to medicine for further management. (21 Jun 2019 22:20)      PAST MEDICAL & SURGICAL HISTORY:  Kidney stone: s/p lithotripsy  Urinary tract infection without hematuria, site unspecified  Cerebrovascular accident (CVA), unspecified mechanism: With residual R sided weakness and expressive aphasia  Lymphoproliferative disorder: B-Cell  Hyperlipidemia  Hypertension  History of cataract surgery      Levaquin (Rash)       MEDICATIONS  (STANDING):  acetaminophen  IVPB .. 1000 milliGRAM(s) IV Intermittent once  aspirin enteric coated 81 milliGRAM(s) Oral daily  atorvastatin 80 milliGRAM(s) Oral at bedtime  cefuroxime   Tablet 250 milliGRAM(s) Oral every 12 hours  docusate sodium 100 milliGRAM(s) Oral two times a day  finasteride 5 milliGRAM(s) Oral daily  gabapentin 300 milliGRAM(s) Oral at bedtime  heparin  Injectable 5000 Unit(s) SubCutaneous every 8 hours  metoprolol succinate ER 12.5 milliGRAM(s) Oral daily  pantoprazole    Tablet 40 milliGRAM(s) Oral before breakfast  senna 2 Tablet(s) Oral at bedtime  sodium chloride 0.9%. 1000 milliLiter(s) (50 mL/Hr) IV Continuous <Continuous>    MEDICATIONS  (PRN):  acetaminophen   Tablet .. 650 milliGRAM(s) Oral every 6 hours PRN Temp greater or equal to 38C (100.4F)  guaiFENesin    Syrup 200 milliGRAM(s) Oral every 6 hours PRN Cough  ondansetron Injectable 4 milliGRAM(s) IV Push every 6 hours PRN Nausea and/or Vomiting      REVIEW OF SYSTEMS:  CONSTITUTIONAL: (+) malaise.   EYES: No acute change in vision   ENT:  No tinnitus  NECK: No stiffness  RESPIRATORY: No hemoptysis  CARDIOVASCULAR: No chest pain, palpitations, syncope  GASTROINTESTINAL: No hematemesis, diarrhea, melena, or hematochezia.  GENITOURINARY: No hematuria  NEUROLOGICAL: No headaches  LYMPH Nodes: No enlarged glands  ENDOCRINE: No heat or cold intolerance	    T(C): 36.5 (06-27-19 @ 05:16), Max: 36.6 (06-26-19 @ 14:07)  HR: 67 (06-27-19 @ 05:16) (67 - 74)  BP: 123/77 (06-27-19 @ 05:16) (123/77 - 124/78)  RR: 16 (06-27-19 @ 05:16) (16 - 18)  SpO2: 100% (06-27-19 @ 05:16) (98% - 100%)    PHYSICAL EXAMINATION:   Constitutional: WD, NAD  HEENT: NC, AT  Neck:  Supple  Respiratory:  Adequate airflow b/l. Not using accessory muscles of respiration.  Cardiovascular:  S1 & S2 intact, no R/G, 2+ radial pulses b/l  Gastrointestinal: Soft, NT, ND, normoactive b.s., no organomegaly/RT/rigidity  Extremities: WWP  Neurological:  Alert and awake.  No acute focal motor deficits. Crude sensation intact.     Labs and imaging reviewed    LABS:                        11.1   10.14 )-----------( 120      ( 26 Jun 2019 06:10 )             35.0     06-26    142  |  103  |  9   ----------------------------<  88  4.1   |  29  |  0.88    Ca    9.6      26 Jun 2019 06:10              CAPILLARY BLOOD GLUCOSE                  RADIOLOGY & ADDITIONAL STUDIES:

## 2019-06-27 NOTE — PROGRESS NOTE ADULT - SUBJECTIVE AND OBJECTIVE BOX
***********INCOMPLETE NOTE*************************************    CHIEF COMPLAINT: comfortable       MEDICATIONS:  aspirin enteric coated 81 milliGRAM(s) Oral daily  heparin  Injectable 5000 Unit(s) SubCutaneous every 8 hours  metoprolol succinate ER 12.5 milliGRAM(s) Oral daily    cefuroxime   Tablet 250 milliGRAM(s) Oral every 12 hours    guaiFENesin    Syrup 200 milliGRAM(s) Oral every 6 hours PRN    acetaminophen   Tablet .. 650 milliGRAM(s) Oral every 6 hours PRN  acetaminophen  IVPB .. 1000 milliGRAM(s) IV Intermittent once  gabapentin 300 milliGRAM(s) Oral at bedtime  ondansetron Injectable 4 milliGRAM(s) IV Push every 6 hours PRN    docusate sodium 100 milliGRAM(s) Oral two times a day  pantoprazole    Tablet 40 milliGRAM(s) Oral before breakfast  senna 2 Tablet(s) Oral at bedtime    atorvastatin 80 milliGRAM(s) Oral at bedtime  finasteride 5 milliGRAM(s) Oral daily    sodium chloride 0.9%. 1000 milliLiter(s) IV Continuous <Continuous>          Allergies    Levaquin (Rash)    Intolerances    	  ROS:  all negative       PHYSICAL EXAM:  T(C): 36.5 (06-27-19 @ 05:16), Max: 36.6 (06-26-19 @ 14:07)  HR: 67 (06-27-19 @ 05:16) (67 - 74)  BP: 123/77 (06-27-19 @ 05:16) (123/77 - 124/78)  RR: 16 (06-27-19 @ 05:16) (16 - 18)  SpO2: 100% (06-27-19 @ 05:16) (98% - 100%)  Wt(kg): --  I&O's Summary      Appearance: Normal	  Cardiovascular: Normal S1 S2, No JVD, No murmurs, No edema  Respiratory: Lungs clear to auscultation	  Psychiatry: A & O x 3, Mood & affect appropriate  Gastrointestinal:  Soft, Non-tender, + BS	  Skin: No rashes, No ecchymoses, No cyanosis	  Neurologic: Non-focal  Extremities: Normal range of motion, No clubbing, cyanosis or edema      TELEMETRY: 	not on tele      	  LABS:	 Reviewed	    CARDIAC MARKERS:                                  11.1   10.14 )-----------( 778      ( 26 Jun 2019 06:10 )             35.0     06-26    142  |  103  |  9   ----------------------------<  88  4.1   |  29  |  0.88    Ca    9.6      26 Jun 2019 06:10      proBNP:   Lipid Profile:   HgA1c:   TSH: CHIEF COMPLAINT: comfortable       MEDICATIONS:  aspirin enteric coated 81 milliGRAM(s) Oral daily  heparin  Injectable 5000 Unit(s) SubCutaneous every 8 hours  metoprolol succinate ER 12.5 milliGRAM(s) Oral daily    cefuroxime   Tablet 250 milliGRAM(s) Oral every 12 hours    guaiFENesin    Syrup 200 milliGRAM(s) Oral every 6 hours PRN    acetaminophen   Tablet .. 650 milliGRAM(s) Oral every 6 hours PRN  acetaminophen  IVPB .. 1000 milliGRAM(s) IV Intermittent once  gabapentin 300 milliGRAM(s) Oral at bedtime  ondansetron Injectable 4 milliGRAM(s) IV Push every 6 hours PRN    docusate sodium 100 milliGRAM(s) Oral two times a day  pantoprazole    Tablet 40 milliGRAM(s) Oral before breakfast  senna 2 Tablet(s) Oral at bedtime    atorvastatin 80 milliGRAM(s) Oral at bedtime  finasteride 5 milliGRAM(s) Oral daily    sodium chloride 0.9%. 1000 milliLiter(s) IV Continuous <Continuous>          Allergies    Levaquin (Rash)    Intolerances    	  ROS:  all negative       PHYSICAL EXAM:  T(C): 36.5 (06-27-19 @ 05:16), Max: 36.6 (06-26-19 @ 14:07)  HR: 67 (06-27-19 @ 05:16) (67 - 74)  BP: 123/77 (06-27-19 @ 05:16) (123/77 - 124/78)  RR: 16 (06-27-19 @ 05:16) (16 - 18)  SpO2: 100% (06-27-19 @ 05:16) (98% - 100%)  Wt(kg): --  I&O's Summary      Appearance: Normal	  Cardiovascular: Normal S1 S2, No JVD, No murmurs, No edema  Respiratory: Lungs clear to auscultation	  Psychiatry: A & O x 3, Mood & affect appropriate  Gastrointestinal:  Soft, Non-tender, + BS	  Skin: No rashes, No ecchymoses, No cyanosis	  Neurologic: Non-focal  Extremities: Normal range of motion, No clubbing, cyanosis or edema      TELEMETRY: 	not on tele      	  LABS:	 Reviewed	    CARDIAC MARKERS:                                  11.1   10.14 )-----------( 778      ( 26 Jun 2019 06:10 )             35.0     06-26    142  |  103  |  9   ----------------------------<  88  4.1   |  29  |  0.88    Ca    9.6      26 Jun 2019 06:10      proBNP:   Lipid Profile:   HgA1c:   TSH:

## 2019-06-27 NOTE — PROGRESS NOTE ADULT - PROBLEM SELECTOR PROBLEM 5
Lymphoproliferative disorder
Cerebrovascular accident (CVA), unspecified mechanism
Cerebrovascular accident (CVA), unspecified mechanism
Lymphoproliferative disorder

## 2019-06-27 NOTE — PROGRESS NOTE ADULT - PROBLEM SELECTOR PLAN 4
asa, statin
improved repeat bcx  abx as above
asa, statin
improving  abx as above  repeat bcx negative to date

## 2019-06-27 NOTE — PROGRESS NOTE ADULT - PROBLEM SELECTOR PLAN 7
potentially symptomatic with radiating pain 2/2 acute pyelo  however r/o alternative complications vs etiologies  - F/u Hip-Xray
statin
improved
improved
potentially symptomatic with radiating pain 2/2 acute pyelo  however r/o alternative complications vs etiologies  - F/u Hip-Xray
statin

## 2019-06-27 NOTE — PROGRESS NOTE ADULT - PROBLEM SELECTOR PROBLEM 1
Hypokinesis
Hypokinesis
Sepsis due to urinary tract infection

## 2019-06-27 NOTE — PROGRESS NOTE ADULT - ASSESSMENT
Assessment  1. Abnormal stress MPI  2. Cystitis  3. Hypercholesterolemia  4. H/o CVA      Plan  1. Stress MPI findings reviewed and revealed prior infarct to the inferiolateral wall consistent with echo findings. Patient appears stable from a cardiac stand point and at this point no further work up is indicated. Patient will benefit from uptitration of beta blocker therapy.   2. Aspirin and statins      Thank you     Plan was discussed with Dr. Blanca Rowe    [facility]  Blanca Rowe D.O., [facility], MELI Ortiz, MSN, [facility]-[address], [address]  [place] [address]  [phone] Assessment  1. Abnormal stress MPI  2. Cystitis  3. Hypercholesterolemia  4. H/o CVA      Plan  1. Stress MPI findings reviewed and revealed prior infarct to the inferiolateral wall consistent with echo findings. Patient appears stable from a cardiac stand point and at this point no further work up is indicated. Beta blocker therapy was recently initiate.  Continue current dose for now   2. Aspirin and statins      Thank you     Plan was discussed with Dr. Blanca Rowe    [facility]  Blanca Rowe D.O., [facility], MELI Ortiz, IVANA, [facility]-[address], [address]  [place] [address]  [phone]

## 2019-06-27 NOTE — PROGRESS NOTE ADULT - PROBLEM SELECTOR PLAN 2
as above  abx
improved, repeat bcx negative X 48 hrs  po abx through 7/4/19
improved, repeat bcx negative X 48 hrs  po abx through 7/4/19   case management
improving  abx as above
improving  abx as above
as above  abx

## 2019-06-27 NOTE — PROGRESS NOTE ADULT - PROBLEM SELECTOR PROBLEM 7
Hip pain, acute, right
Hyperlipidemia, unspecified hyperlipidemia type
Hip pain, acute, right
Hyperlipidemia, unspecified hyperlipidemia type

## 2019-06-27 NOTE — PROGRESS NOTE ADULT - PROBLEM SELECTOR PLAN 9
dvt/gi ppx
dvt/gi ppx
improved
improved
improved s/p fluid hydration
on admission  improved s/p fluid hydration

## 2019-06-27 NOTE — PROGRESS NOTE ADULT - SUBJECTIVE AND OBJECTIVE BOX
Infectious Diseases progress note:    Subjective: NAD, feels well.  No fever, flank pain, dysuria, abd pain, diarrhea.   No acute o/n events.     ROS:  CONSTITUTIONAL:  No fever, chills, rigors  CARDIOVASCULAR:  No chest pain or palpitations  RESPIRATORY:   No SOB, cough, dyspnea on exertion.  No wheezing  GASTROINTESTINAL:  No abd pain, N/V, diarrhea/constipation  EXTREMITIES:  No swelling or joint pain  GENITOURINARY:  No burning on urination, increased frequency or urgency.  No flank pain  NEUROLOGIC:  No HA, visual disturbances  SKIN: No rashes    Allergies    Levaquin (Rash)    Intolerances        ANTIBIOTICS/RELEVANT:  antimicrobials  cefuroxime   Tablet 250 milliGRAM(s) Oral every 12 hours    immunologic:    OTHER:  acetaminophen   Tablet .. 650 milliGRAM(s) Oral every 6 hours PRN  acetaminophen  IVPB .. 1000 milliGRAM(s) IV Intermittent once  aspirin enteric coated 81 milliGRAM(s) Oral daily  atorvastatin 80 milliGRAM(s) Oral at bedtime  docusate sodium 100 milliGRAM(s) Oral two times a day  finasteride 5 milliGRAM(s) Oral daily  gabapentin 300 milliGRAM(s) Oral at bedtime  guaiFENesin    Syrup 200 milliGRAM(s) Oral every 6 hours PRN  heparin  Injectable 5000 Unit(s) SubCutaneous every 8 hours  metoprolol succinate ER 12.5 milliGRAM(s) Oral daily  ondansetron Injectable 4 milliGRAM(s) IV Push every 6 hours PRN  pantoprazole    Tablet 40 milliGRAM(s) Oral before breakfast  senna 2 Tablet(s) Oral at bedtime  sodium chloride 0.9%. 1000 milliLiter(s) IV Continuous <Continuous>      Objective:  Vital Signs Last 24 Hrs  T(C): 36.7 (27 Jun 2019 12:40), Max: 36.7 (27 Jun 2019 12:40)  T(F): 98 (27 Jun 2019 12:40), Max: 98 (27 Jun 2019 12:40)  HR: 68 (27 Jun 2019 12:40) (67 - 74)  BP: 125/78 (27 Jun 2019 12:40) (123/77 - 125/78)  BP(mean): --  RR: 16 (27 Jun 2019 12:40) (16 - 18)  SpO2: 100% (27 Jun 2019 12:40) (98% - 100%)    PHYSICAL EXAM:  Constitutional:NAD  Eyes:JOSEE, EOMI  Ear/Nose/Throat: no thrush, mucositis.  Moist mucous membranes	  Neck:no JVD, no lymphadenopathy, supple  Respiratory: CTA tea  Cardiovascular: S1S2 RRR, no murmurs  Gastrointestinal:soft, nontender,  nondistended (+) BS  Extremities:no e/e/c  Skin:  no rashes, open wounds or ulcerations        LABS:                        11.1   10.14 )-----------( 778      ( 26 Jun 2019 06:10 )             35.0     06-26    142  |  103  |  9   ----------------------------<  88  4.1   |  29  |  0.88    Ca    9.6      26 Jun 2019 06:10                MICROBIOLOGY:    Culture - Blood in AM (06.23.19 @ 07:32)    Culture - Blood:   NO ORGANISMS ISOLATED  NO ORGANISMS ISOLATED AT 96 HOURS    Specimen Source: BLOOD    Culture - Urine (06.21.19 @ 19:16)    -  Amikacin: S <=8 ALEX    -  Cefazolin: S <=2 ALEX    -  Aztreonam: S <=4 ALEX    -  Ampicillin/Sulbactam: I 16/8 ALEX    -  Ampicillin: R >16 ALEX    -  Cefoxitin: S <=4 ALEX    -  Cefepime: S <=2 ALEX    -  Ceftazidime: S <=1 ALEX    -  Ceftriaxone: S <=1 ALEX    -  Ciprofloxacin: I 2 ALEX    -  Ertapenem: S <=0.5 ALEX    -  Imipenem: S <=1 ALEX    -  Gentamicin: R >8 ALEX    -  Piperacillin/Tazobactam: S <=8 ALEX    -  Levofloxacin: S <=1 ALEX    -  Meropenem: S <=1 ALEX    -  Nitrofurantoin: S <=32 ALEX    -  Tobramycin: I 8 ALEX    -  Tigecycline: S <=1 ALEX    Culture - Urine:   COLONY COUNT: > = 100,000 CFU/ML    -  Trimethoprim/Sulfamethoxazole: S <=0.5/9.5 ALEX    Specimen Source: URINE MIDSTREAM    Organism Identification: Escherichia coli    Organism: Escherichia coli    Method Type: NEGATIVE ALEX 43    Culture - Blood (06.21.19 @ 18:56)    Culture - Blood:   EC^Escherichia coli    Culture - Blood:   SEE PREVIOUS CULTURE:[id number] COLLECTED 6/21/19   RECEIVED  6/21/19    FOR ALEX  EC^Escherichia coli    Specimen Source: BLOOD VENOUS    Gram Stain Blood:   ***** CRITICAL RESULT *****  PERSON CALLED / READ-BACK: ROCHELLE ZEE RN. / Y  DATE / TIME CALLED: 06/22/19 0818  CALLED BY: JAIDEN PARRISH^Gram Neg Rods  AFTER: 12 HOURS INCUBATION  BOTTLE: AEROBIC   ANAEROBIC BOTTLES          RADIOLOGY & ADDITIONAL STUDIES:

## 2019-06-27 NOTE — PROGRESS NOTE ADULT - REASON FOR ADMISSION
UTI, Worsening Fatigue

## 2019-06-27 NOTE — PROGRESS NOTE ADULT - PROBLEM SELECTOR PLAN 5
monitor labs  hem/onc on board
asa, statin
asa, statin
monitor labs  hem/onc on board
monitor labs  hem/onc on board
outpt f/u

## 2019-06-27 NOTE — PROGRESS NOTE ADULT - PROBLEM SELECTOR PROBLEM 9
Need for prophylactic measure
Hyponatremia
Need for prophylactic measure
Hyponatremia

## 2019-06-27 NOTE — PROGRESS NOTE ADULT - PROBLEM SELECTOR PROBLEM 4
Cerebrovascular accident (CVA), unspecified mechanism
Bacteremia
Bacteremia
Cerebrovascular accident (CVA), unspecified mechanism

## 2019-06-27 NOTE — PROGRESS NOTE ADULT - PROBLEM SELECTOR PROBLEM 2
Acute pyelonephritis
Sepsis due to urinary tract infection
Sepsis due to urinary tract infection
Acute pyelonephritis

## 2019-06-27 NOTE — PROGRESS NOTE ADULT - PROVIDER SPECIALTY LIST ADULT
Cardiology
Heme/Onc
Heme/Onc
Infectious Disease
Internal Medicine
Nephrology
Infectious Disease
Infectious Disease
Heme/Onc
Infectious Disease
Internal Medicine

## 2019-06-27 NOTE — PROGRESS NOTE ADULT - ASSESSMENT
This is a 73M with history of CVA (1/2018) with residual R arm/leg weakness and expressive aphasia, B-Cell Lymphoproliferative d/o, HTN, HLD, and nephrolithiasis s/p lithotripsy who presents to the hospital with complaints of worsening lethargy/fatigue with dysuria. Patient lives with his sister and had a 12hr HHA for 7 days a week. Said that he started having dysuria about 1 week ago but had not informed his sister of this. Said that over the next few days he started to have worsening lethargy/fatigue and fevers. Said that he also started having a dry cough over this time. his sister noted that he was more fatigued and the new cough and thought he was having a PNA and brought him to the hospital for evaluation. His sister stated that the patient had a kidney stone earlier this year that was removed via lithotripsy but since then has had a few recurrent UTIs, No other complaints.     On arrival to the ED, his vitals were T 102.6, P 110, /80, RR 20, O2 sat 99% RA. His lab work was significant for leukocytosis (higher than baseline) with neutrophilia and a positive UA. He had a CXR that showed clear lungs and a RVP that was negative. He was given tylenol 650mg PO x1, CTX 1g/AZT 500mg IVPB x1, and LR 1.5L. He was then admitted to medicine for further management. (21 Jun 2019 22:20)      Sepsis: (fever, tachycardia, leukocytosis, bacteremia)    - due to UTI.  Urine and blood cultures growing E.coli.  Sensitivities noted, can change from zosyn to ceftriaxone.    - f/u repeat blood cultures to ensure clearance - NGTD at 24 hrs.     - ct ap with enlarged prostate  and multiple punctate nonobstructing renal calculi which may serve as nidus for infection.  Cont finasteride.  Outpt Urology f/u.    - IV rocephin changed to ceftin 250mg po bid through July 4th, to treat for upper tract infection.    OK to d/c from ID standpoint.            Ирина Rai  [phone]

## 2019-06-27 NOTE — PROGRESS NOTE ADULT - PROBLEM SELECTOR PROBLEM 8
Need for prophylactic measure
Hip pain, acute, right
Hip pain, acute, right
Need for prophylactic measure

## 2019-06-27 NOTE — PROGRESS NOTE ADULT - ASSESSMENT
73y Male with history of B cell lymphoproliferative disorder who presents with fatigue found to have sepsis secondary to UTI.     UTI sepsis/ Pyelonephritis  - s/p IV Abx as per primary team   - Bilateral renal calculus s/p stents. Renal and Urology following    B cell lymphoproliferative disorder  -CT C/A/P - no lymphadenopathy or splenomegaly  -elevated WBC in setting of sepsis.   - No urgent Heme/Onc issues.  Pt's family states that his hematologist Dr Mistry no longer works in [facility] and wishes to f/u with us. I confirmed with Dr Mistry who still works here. Pt to f/u with Dr Mistry or us, defer to pt wishes  Normocytic Anemia   -Fe, B12, folate adequate    Pt to be d/c to rehab today      Trae Portillo MD  Hematology/Oncology  Cell:  [phone]  Office Phone: [phone]  Office Fax:  [phone] [address] [place] [address]

## 2019-06-27 NOTE — PROGRESS NOTE ADULT - PROBLEM SELECTOR PLAN 3
on admission  gram negative rods  iv abx  f/u bcx to sensitivies  repeat bcx til clearance
gram negative rods  iv abx  f/u bcx to sensitivies  repeat bcx til clearance
improving  abx as above
improving  abx as above
improving  abx as above  f/u bcx sensitivies  repeat bcx til clearance
improving  abx as above  repeat bcx negative to date

## 2019-06-27 NOTE — PROGRESS NOTE ADULT - SUBJECTIVE AND OBJECTIVE BOX
S:   no complaints, being wheeled out to rehab    acetaminophen   Tablet .. 650 milliGRAM(s) Oral every 6 hours PRN  acetaminophen  IVPB .. 1000 milliGRAM(s) IV Intermittent once  aspirin enteric coated 81 milliGRAM(s) Oral daily  atorvastatin 80 milliGRAM(s) Oral at bedtime  cefuroxime   Tablet 250 milliGRAM(s) Oral every 12 hours  docusate sodium 100 milliGRAM(s) Oral two times a day  finasteride 5 milliGRAM(s) Oral daily  gabapentin 300 milliGRAM(s) Oral at bedtime  guaiFENesin    Syrup 200 milliGRAM(s) Oral every 6 hours PRN  heparin  Injectable 5000 Unit(s) SubCutaneous every 8 hours  metoprolol succinate ER 12.5 milliGRAM(s) Oral daily  ondansetron Injectable 4 milliGRAM(s) IV Push every 6 hours PRN  pantoprazole    Tablet 40 milliGRAM(s) Oral before breakfast  senna 2 Tablet(s) Oral at bedtime  sodium chloride 0.9%. 1000 milliLiter(s) IV Continuous <Continuous>      Levaquin (Rash)          T(C): 36.7 (06-27-19 @ 12:40), Max: 36.7 (06-27-19 @ 12:40)  HR: 68 (06-27-19 @ 12:40) (67 - 68)  BP: 125/78 (06-27-19 @ 12:40) (123/77 - 125/78)  RR: 16 (06-27-19 @ 12:40) (16 - 17)  SpO2: 100% (06-27-19 @ 12:40) (99% - 100%)    PHYSICAL EXAM    Gen:  laying in bed, nad  H:  anicteric, eomi  Lungs:  CTA b/l  Ext:  no edema    06-26    142  |  103  |  9   ----------------------------<  88  4.1   |  29  |  0.88    Ca    9.6      26 Jun 2019 06:10                            11.1   10.14 )-----------( 778      ( 26 Jun 2019 06:10 )             35.0                         11.0   7.91  )-----------( 602      ( 24 Jun 2019 05:54 )             35.4                         9.9    12.26 )-----------( 522      ( 23 Jun 2019 06:23 )             31.9

## 2019-06-27 NOTE — PROGRESS NOTE ADULT - PROBLEM SELECTOR PLAN 1
abnormal tte (+) hypokinesis of inferolateral wall  - F/u NST  additional management pending NST results (ie: cath, etc.)  if unremarkable then plan for discharge to [facility] on po abx and rx with outpt f/u
improved, repeat bcx negative X 48 hrs  f/u tte, if unremarkable then pt is medically cleared for safe discharge to complete po abx through 7/4/19   case management
improving sepsis 2/2 acute pyelonephritis and bacteremia of gram negative rods  - f/u cultures and adjust abx accordingly  - f/u repeat bcx til clearance   - c/w abx, ivf, monitor labs, monitor vitals  supportive care prn
improving sepsis 2/2 acute pyelonephritis and bacteremia of gram negative rods  - f/u repeat bcx til clearance, - c/w abx, ivf, monitor labs, monitor vitals  - D/c planning 6/25/19  PT, case management, supportive care prn
nst results appreciated, cards recs appreciated  pt is medically cleared for safe discharge today to [facility]
on admission sepsis 2/2 acute pyelonephritis and bacteremia of gram negative rods  - f/u ct    - f/u cultures and adjust abx accordingly  - f/u repeat bcx til clearance   - c/w abx, ivf, monitor labs, monitor vitals  supportive care prn

## 2019-06-27 NOTE — PROGRESS NOTE ADULT - PROBLEM SELECTOR PROBLEM 6
Hyperlipidemia, unspecified hyperlipidemia type
Lymphoproliferative disorder
Hyperlipidemia, unspecified hyperlipidemia type
Lymphoproliferative disorder

## 2019-06-28 LAB — BACTERIA BLD CULT: SIGNIFICANT CHANGE UP

## 2019-07-12 ENCOUNTER — OUTPATIENT (OUTPATIENT)
Dept: OUTPATIENT SERVICES | Facility: HOSPITAL | Age: 74
LOS: 1 days | Discharge: HOME | End: 2019-07-12

## 2019-07-12 DIAGNOSIS — Z98.49 CATARACT EXTRACTION STATUS, UNSPECIFIED EYE: Chronic | ICD-10-CM

## 2019-07-12 DIAGNOSIS — D72.829 ELEVATED WHITE BLOOD CELL COUNT, UNSPECIFIED: ICD-10-CM

## 2019-07-12 PROBLEM — N20.0 CALCULUS OF KIDNEY: Chronic | Status: ACTIVE | Noted: 2019-06-21

## 2019-07-12 PROBLEM — N39.0 URINARY TRACT INFECTION, SITE NOT SPECIFIED: Chronic | Status: ACTIVE | Noted: 2019-06-21

## 2019-07-12 PROBLEM — I63.9 CEREBRAL INFARCTION, UNSPECIFIED: Chronic | Status: ACTIVE | Noted: 2019-06-21

## 2019-07-12 PROBLEM — D47.9 NEOPLASM OF UNCERTAIN BEHAVIOR OF LYMPHOID, HEMATOPOIETIC AND RELATED TISSUE, UNSPECIFIED: Chronic | Status: ACTIVE | Noted: 2019-06-21

## 2019-07-17 ENCOUNTER — APPOINTMENT (OUTPATIENT)
Dept: HEMATOLOGY ONCOLOGY | Facility: CLINIC | Age: 74
End: 2019-07-17

## 2019-10-16 NOTE — ED ADULT NURSE NOTE - NS ED NOTE ABUSE RESPONSE YN
PT MOM [place] CALLING THAT PT MUST HAVE ASAP THYROIDS BLOOD TEST DONE .    ENDOCRINOLOGY DR AT [facility] FAXED ORDER YESTERDAY TO DR Rupert Steward.  DON'T SEE ORDER IN CHART , SO PLEASE LET MOM KNOW IF ORDER CAME IN AND IF YES, CALL MOM ASAP TO MAKE LAB APPT    MOM # [phone] Yes

## 2020-01-01 ENCOUNTER — APPOINTMENT (OUTPATIENT)
Dept: ELECTROPHYSIOLOGY | Facility: CLINIC | Age: 75
End: 2020-01-01
Payer: MEDICARE

## 2020-01-01 ENCOUNTER — INPATIENT (INPATIENT)
Facility: HOSPITAL | Age: 75
LOS: 3 days | Discharge: NURSING, REHAB OR LONG-TERM CARE | End: 2020-10-09
Attending: HOSPITALIST | Admitting: HOSPITALIST
Payer: MEDICARE

## 2020-01-01 ENCOUNTER — TRANSCRIPTION ENCOUNTER (OUTPATIENT)
Age: 75
End: 2020-01-01

## 2020-01-01 ENCOUNTER — APPOINTMENT (OUTPATIENT)
Dept: NEUROLOGY | Facility: CLINIC | Age: 75
End: 2020-01-01

## 2020-01-01 VITALS
DIASTOLIC BLOOD PRESSURE: 84 MMHG | RESPIRATION RATE: 18 BRPM | SYSTOLIC BLOOD PRESSURE: 174 MMHG | HEIGHT: 64 IN | TEMPERATURE: 98 F | OXYGEN SATURATION: 98 % | HEART RATE: 54 BPM

## 2020-01-01 VITALS
HEART RATE: 70 BPM | DIASTOLIC BLOOD PRESSURE: 101 MMHG | RESPIRATION RATE: 18 BRPM | OXYGEN SATURATION: 100 % | TEMPERATURE: 98 F | SYSTOLIC BLOOD PRESSURE: 152 MMHG

## 2020-01-01 DIAGNOSIS — D47.9 NEOPLASM OF UNCERTAIN BEHAVIOR OF LYMPHOID, HEMATOPOIETIC AND RELATED TISSUE, UNSPECIFIED: ICD-10-CM

## 2020-01-01 DIAGNOSIS — I63.9 CEREBRAL INFARCTION, UNSPECIFIED: ICD-10-CM

## 2020-01-01 DIAGNOSIS — Z98.49 CATARACT EXTRACTION STATUS, UNSPECIFIED EYE: Chronic | ICD-10-CM

## 2020-01-01 DIAGNOSIS — N39.0 URINARY TRACT INFECTION, SITE NOT SPECIFIED: ICD-10-CM

## 2020-01-01 DIAGNOSIS — R79.89 OTHER SPECIFIED ABNORMAL FINDINGS OF BLOOD CHEMISTRY: ICD-10-CM

## 2020-01-01 DIAGNOSIS — I10 ESSENTIAL (PRIMARY) HYPERTENSION: ICD-10-CM

## 2020-01-01 DIAGNOSIS — Z02.9 ENCOUNTER FOR ADMINISTRATIVE EXAMINATIONS, UNSPECIFIED: ICD-10-CM

## 2020-01-01 DIAGNOSIS — E78.5 HYPERLIPIDEMIA, UNSPECIFIED: ICD-10-CM

## 2020-01-01 DIAGNOSIS — N30.01 ACUTE CYSTITIS WITH HEMATURIA: ICD-10-CM

## 2020-01-01 DIAGNOSIS — Z29.9 ENCOUNTER FOR PROPHYLACTIC MEASURES, UNSPECIFIED: ICD-10-CM

## 2020-01-01 DIAGNOSIS — I50.22 CHRONIC SYSTOLIC (CONGESTIVE) HEART FAILURE: ICD-10-CM

## 2020-01-01 LAB
-  AMIKACIN: SIGNIFICANT CHANGE UP
-  AMOXICILLIN/CLAVULANIC ACID: SIGNIFICANT CHANGE UP
-  AMPICILLIN/SULBACTAM: SIGNIFICANT CHANGE UP
-  AMPICILLIN: SIGNIFICANT CHANGE UP
-  AZTREONAM: SIGNIFICANT CHANGE UP
-  CEFAZOLIN: SIGNIFICANT CHANGE UP
-  CEFEPIME: SIGNIFICANT CHANGE UP
-  CEFOXITIN: SIGNIFICANT CHANGE UP
-  CEFTRIAXONE: SIGNIFICANT CHANGE UP
-  CIPROFLOXACIN: SIGNIFICANT CHANGE UP
-  ERTAPENEM: SIGNIFICANT CHANGE UP
-  GENTAMICIN: SIGNIFICANT CHANGE UP
-  IMIPENEM: SIGNIFICANT CHANGE UP
-  LEVOFLOXACIN: SIGNIFICANT CHANGE UP
-  MEROPENEM: SIGNIFICANT CHANGE UP
-  NITROFURANTOIN: SIGNIFICANT CHANGE UP
-  PIPERACILLIN/TAZOBACTAM: SIGNIFICANT CHANGE UP
-  TIGECYCLINE: SIGNIFICANT CHANGE UP
-  TOBRAMYCIN: SIGNIFICANT CHANGE UP
-  TRIMETHOPRIM/SULFAMETHOXAZOLE: SIGNIFICANT CHANGE UP
ALBUMIN SERPL ELPH-MCNC: 4.2 G/DL — SIGNIFICANT CHANGE UP (ref 3.3–5)
ALP SERPL-CCNC: 75 U/L — SIGNIFICANT CHANGE UP (ref 40–120)
ALT FLD-CCNC: 14 U/L — SIGNIFICANT CHANGE UP (ref 4–41)
ANION GAP SERPL CALC-SCNC: 10 MMO/L — SIGNIFICANT CHANGE UP (ref 7–14)
ANION GAP SERPL CALC-SCNC: 12 MMO/L — SIGNIFICANT CHANGE UP (ref 7–14)
ANION GAP SERPL CALC-SCNC: 7 MMO/L — SIGNIFICANT CHANGE UP (ref 7–14)
ANION GAP SERPL CALC-SCNC: 8 MMO/L — SIGNIFICANT CHANGE UP (ref 7–14)
ANION GAP SERPL CALC-SCNC: 9 MMO/L — SIGNIFICANT CHANGE UP (ref 7–14)
APPEARANCE UR: CLEAR — SIGNIFICANT CHANGE UP
AST SERPL-CCNC: 20 U/L — SIGNIFICANT CHANGE UP (ref 4–40)
BACTERIA # UR AUTO: HIGH
BASE EXCESS BLDV CALC-SCNC: 5 MMOL/L — SIGNIFICANT CHANGE UP
BASE EXCESS BLDV CALC-SCNC: 7.1 MMOL/L — SIGNIFICANT CHANGE UP
BASOPHILS # BLD AUTO: 0.08 K/UL — SIGNIFICANT CHANGE UP (ref 0–0.2)
BASOPHILS # BLD AUTO: 0.1 K/UL — SIGNIFICANT CHANGE UP (ref 0–0.2)
BASOPHILS NFR BLD AUTO: 0.6 % — SIGNIFICANT CHANGE UP (ref 0–2)
BASOPHILS NFR BLD AUTO: 0.9 % — SIGNIFICANT CHANGE UP (ref 0–2)
BASOPHILS NFR SPEC: 1 % — SIGNIFICANT CHANGE UP (ref 0–2)
BILIRUB SERPL-MCNC: 0.4 MG/DL — SIGNIFICANT CHANGE UP (ref 0.2–1.2)
BILIRUB UR-MCNC: NEGATIVE — SIGNIFICANT CHANGE UP
BLOOD GAS VENOUS - CREATININE: 1.04 MG/DL — SIGNIFICANT CHANGE UP (ref 0.5–1.3)
BLOOD GAS VENOUS - CREATININE: 1.16 MG/DL — SIGNIFICANT CHANGE UP (ref 0.5–1.3)
BLOOD GAS VENOUS - FIO2: 21 — SIGNIFICANT CHANGE UP
BLOOD UR QL VISUAL: NEGATIVE — SIGNIFICANT CHANGE UP
BUN SERPL-MCNC: 10 MG/DL — SIGNIFICANT CHANGE UP (ref 7–23)
BUN SERPL-MCNC: 10 MG/DL — SIGNIFICANT CHANGE UP (ref 7–23)
BUN SERPL-MCNC: 12 MG/DL — SIGNIFICANT CHANGE UP (ref 7–23)
BUN SERPL-MCNC: 13 MG/DL — SIGNIFICANT CHANGE UP (ref 7–23)
BUN SERPL-MCNC: 8 MG/DL — SIGNIFICANT CHANGE UP (ref 7–23)
CALCIUM SERPL-MCNC: 10 MG/DL — SIGNIFICANT CHANGE UP (ref 8.4–10.5)
CALCIUM SERPL-MCNC: 8.9 MG/DL — SIGNIFICANT CHANGE UP (ref 8.4–10.5)
CALCIUM SERPL-MCNC: 9.3 MG/DL — SIGNIFICANT CHANGE UP (ref 8.4–10.5)
CALCIUM SERPL-MCNC: 9.8 MG/DL — SIGNIFICANT CHANGE UP (ref 8.4–10.5)
CALCIUM SERPL-MCNC: 9.8 MG/DL — SIGNIFICANT CHANGE UP (ref 8.4–10.5)
CHLORIDE BLDV-SCNC: 108 MMOL/L — SIGNIFICANT CHANGE UP (ref 96–108)
CHLORIDE BLDV-SCNC: 108 MMOL/L — SIGNIFICANT CHANGE UP (ref 96–108)
CHLORIDE SERPL-SCNC: 101 MMOL/L — SIGNIFICANT CHANGE UP (ref 98–107)
CHLORIDE SERPL-SCNC: 102 MMOL/L — SIGNIFICANT CHANGE UP (ref 98–107)
CHLORIDE SERPL-SCNC: 103 MMOL/L — SIGNIFICANT CHANGE UP (ref 98–107)
CHLORIDE SERPL-SCNC: 104 MMOL/L — SIGNIFICANT CHANGE UP (ref 98–107)
CHLORIDE SERPL-SCNC: 105 MMOL/L — SIGNIFICANT CHANGE UP (ref 98–107)
CHOLEST SERPL-MCNC: 151 MG/DL — SIGNIFICANT CHANGE UP (ref 120–199)
CO2 SERPL-SCNC: 26 MMOL/L — SIGNIFICANT CHANGE UP (ref 22–31)
CO2 SERPL-SCNC: 27 MMOL/L — SIGNIFICANT CHANGE UP (ref 22–31)
CO2 SERPL-SCNC: 27 MMOL/L — SIGNIFICANT CHANGE UP (ref 22–31)
CO2 SERPL-SCNC: 29 MMOL/L — SIGNIFICANT CHANGE UP (ref 22–31)
CO2 SERPL-SCNC: 31 MMOL/L — SIGNIFICANT CHANGE UP (ref 22–31)
COLOR SPEC: SIGNIFICANT CHANGE UP
CREAT SERPL-MCNC: 0.95 MG/DL — SIGNIFICANT CHANGE UP (ref 0.5–1.3)
CREAT SERPL-MCNC: 0.97 MG/DL — SIGNIFICANT CHANGE UP (ref 0.5–1.3)
CREAT SERPL-MCNC: 1 MG/DL — SIGNIFICANT CHANGE UP (ref 0.5–1.3)
CREAT SERPL-MCNC: 1.01 MG/DL — SIGNIFICANT CHANGE UP (ref 0.5–1.3)
CREAT SERPL-MCNC: 1.06 MG/DL — SIGNIFICANT CHANGE UP (ref 0.5–1.3)
CULTURE RESULTS: SIGNIFICANT CHANGE UP
EOSINOPHIL # BLD AUTO: 0.1 K/UL — SIGNIFICANT CHANGE UP (ref 0–0.5)
EOSINOPHIL # BLD AUTO: 0.1 K/UL — SIGNIFICANT CHANGE UP (ref 0–0.5)
EOSINOPHIL NFR BLD AUTO: 0.8 % — SIGNIFICANT CHANGE UP (ref 0–6)
EOSINOPHIL NFR BLD AUTO: 0.9 % — SIGNIFICANT CHANGE UP (ref 0–6)
EOSINOPHIL NFR FLD: 1 % — SIGNIFICANT CHANGE UP (ref 0–6)
GAS PNL BLDV: 136 MMOL/L — SIGNIFICANT CHANGE UP (ref 136–146)
GAS PNL BLDV: 136 MMOL/L — SIGNIFICANT CHANGE UP (ref 136–146)
GLUCOSE BLDV-MCNC: 105 MG/DL — HIGH (ref 70–99)
GLUCOSE BLDV-MCNC: 77 MG/DL — SIGNIFICANT CHANGE UP (ref 70–99)
GLUCOSE SERPL-MCNC: 111 MG/DL — HIGH (ref 70–99)
GLUCOSE SERPL-MCNC: 78 MG/DL — SIGNIFICANT CHANGE UP (ref 70–99)
GLUCOSE SERPL-MCNC: 88 MG/DL — SIGNIFICANT CHANGE UP (ref 70–99)
GLUCOSE SERPL-MCNC: 89 MG/DL — SIGNIFICANT CHANGE UP (ref 70–99)
GLUCOSE SERPL-MCNC: 91 MG/DL — SIGNIFICANT CHANGE UP (ref 70–99)
GLUCOSE UR-MCNC: NEGATIVE — SIGNIFICANT CHANGE UP
HBA1C BLD-MCNC: 5.7 % — HIGH (ref 4–5.6)
HCO3 BLDV-SCNC: 25 MMOL/L — SIGNIFICANT CHANGE UP (ref 20–27)
HCO3 BLDV-SCNC: 28 MMOL/L — HIGH (ref 20–27)
HCT VFR BLD CALC: 42.6 % — SIGNIFICANT CHANGE UP (ref 39–50)
HCT VFR BLD CALC: 43.3 % — SIGNIFICANT CHANGE UP (ref 39–50)
HCT VFR BLD CALC: 44.4 % — SIGNIFICANT CHANGE UP (ref 39–50)
HCT VFR BLD CALC: 44.9 % — SIGNIFICANT CHANGE UP (ref 39–50)
HCT VFR BLD CALC: 45.9 % — SIGNIFICANT CHANGE UP (ref 39–50)
HCT VFR BLDV CALC: 44.1 % — SIGNIFICANT CHANGE UP (ref 39–51)
HCT VFR BLDV CALC: 45.9 % — SIGNIFICANT CHANGE UP (ref 39–51)
HDLC SERPL-MCNC: 46 MG/DL — SIGNIFICANT CHANGE UP (ref 35–55)
HGB BLD-MCNC: 13.6 G/DL — SIGNIFICANT CHANGE UP (ref 13–17)
HGB BLD-MCNC: 13.8 G/DL — SIGNIFICANT CHANGE UP (ref 13–17)
HGB BLD-MCNC: 13.8 G/DL — SIGNIFICANT CHANGE UP (ref 13–17)
HGB BLD-MCNC: 14.1 G/DL — SIGNIFICANT CHANGE UP (ref 13–17)
HGB BLD-MCNC: 14.4 G/DL — SIGNIFICANT CHANGE UP (ref 13–17)
HGB BLDV-MCNC: 14.4 G/DL — SIGNIFICANT CHANGE UP (ref 13–17)
HGB BLDV-MCNC: 15 G/DL — SIGNIFICANT CHANGE UP (ref 13–17)
HYALINE CASTS # UR AUTO: NEGATIVE — SIGNIFICANT CHANGE UP
IMM GRANULOCYTES NFR BLD AUTO: 0.2 % — SIGNIFICANT CHANGE UP (ref 0–1.5)
IMM GRANULOCYTES NFR BLD AUTO: 0.3 % — SIGNIFICANT CHANGE UP (ref 0–1.5)
KETONES UR-MCNC: NEGATIVE — SIGNIFICANT CHANGE UP
LACTATE BLDV-MCNC: 1.8 MMOL/L — SIGNIFICANT CHANGE UP (ref 0.5–2)
LACTATE BLDV-MCNC: 2.2 MMOL/L — HIGH (ref 0.5–2)
LEUKOCYTE ESTERASE UR-ACNC: SIGNIFICANT CHANGE UP
LIPID PNL WITH DIRECT LDL SERPL: 98 MG/DL — SIGNIFICANT CHANGE UP
LYMPHOCYTES # BLD AUTO: 58.5 % — HIGH (ref 13–44)
LYMPHOCYTES # BLD AUTO: 63.2 % — HIGH (ref 13–44)
LYMPHOCYTES # BLD AUTO: 7.36 K/UL — HIGH (ref 1–3.3)
LYMPHOCYTES # BLD AUTO: 7.57 K/UL — HIGH (ref 1–3.3)
LYMPHOCYTES NFR SPEC AUTO: 63 % — HIGH (ref 13–44)
MAGNESIUM SERPL-MCNC: 2.1 MG/DL — SIGNIFICANT CHANGE UP (ref 1.6–2.6)
MAGNESIUM SERPL-MCNC: 2.2 MG/DL — SIGNIFICANT CHANGE UP (ref 1.6–2.6)
MANUAL SMEAR VERIFICATION: SIGNIFICANT CHANGE UP
MCHC RBC-ENTMCNC: 29.4 PG — SIGNIFICANT CHANGE UP (ref 27–34)
MCHC RBC-ENTMCNC: 29.5 PG — SIGNIFICANT CHANGE UP (ref 27–34)
MCHC RBC-ENTMCNC: 29.6 PG — SIGNIFICANT CHANGE UP (ref 27–34)
MCHC RBC-ENTMCNC: 29.7 PG — SIGNIFICANT CHANGE UP (ref 27–34)
MCHC RBC-ENTMCNC: 29.8 PG — SIGNIFICANT CHANGE UP (ref 27–34)
MCHC RBC-ENTMCNC: 31.1 % — LOW (ref 32–36)
MCHC RBC-ENTMCNC: 31.4 % — LOW (ref 32–36)
MCHC RBC-ENTMCNC: 31.4 % — LOW (ref 32–36)
MCHC RBC-ENTMCNC: 31.9 % — LOW (ref 32–36)
MCHC RBC-ENTMCNC: 31.9 % — LOW (ref 32–36)
MCV RBC AUTO: 92.2 FL — SIGNIFICANT CHANGE UP (ref 80–100)
MCV RBC AUTO: 93.3 FL — SIGNIFICANT CHANGE UP (ref 80–100)
MCV RBC AUTO: 94.1 FL — SIGNIFICANT CHANGE UP (ref 80–100)
MCV RBC AUTO: 94.1 FL — SIGNIFICANT CHANGE UP (ref 80–100)
MCV RBC AUTO: 95.9 FL — SIGNIFICANT CHANGE UP (ref 80–100)
METHOD TYPE: SIGNIFICANT CHANGE UP
MONOCYTES # BLD AUTO: 0.32 K/UL — SIGNIFICANT CHANGE UP (ref 0–0.9)
MONOCYTES # BLD AUTO: 0.4 K/UL — SIGNIFICANT CHANGE UP (ref 0–0.9)
MONOCYTES NFR BLD AUTO: 2.7 % — SIGNIFICANT CHANGE UP (ref 2–14)
MONOCYTES NFR BLD AUTO: 3.1 % — SIGNIFICANT CHANGE UP (ref 2–14)
MONOCYTES NFR BLD: 2 % — SIGNIFICANT CHANGE UP (ref 2–9)
MORPHOLOGY BLD-IMP: SIGNIFICANT CHANGE UP
NEUTROPHIL AB SER-ACNC: 32 % — LOW (ref 43–77)
NEUTROPHILS # BLD AUTO: 3.74 K/UL — SIGNIFICANT CHANGE UP (ref 1.8–7.4)
NEUTROPHILS # BLD AUTO: 4.74 K/UL — SIGNIFICANT CHANGE UP (ref 1.8–7.4)
NEUTROPHILS NFR BLD AUTO: 32.1 % — LOW (ref 43–77)
NEUTROPHILS NFR BLD AUTO: 36.7 % — LOW (ref 43–77)
NITRITE UR-MCNC: POSITIVE — HIGH
NRBC # BLD: 0 /100WBC — SIGNIFICANT CHANGE UP
NRBC # FLD: 0 K/UL — SIGNIFICANT CHANGE UP (ref 0–0)
ORGANISM # SPEC MICROSCOPIC CNT: SIGNIFICANT CHANGE UP
ORGANISM # SPEC MICROSCOPIC CNT: SIGNIFICANT CHANGE UP
PCO2 BLDV: 55 MMHG — HIGH (ref 41–51)
PCO2 BLDV: 63 MMHG — HIGH (ref 41–51)
PH BLDV: 7.31 PH — LOW (ref 7.32–7.43)
PH BLDV: 7.39 PH — SIGNIFICANT CHANGE UP (ref 7.32–7.43)
PH UR: 7 — SIGNIFICANT CHANGE UP (ref 5–8)
PHOSPHATE SERPL-MCNC: 3.1 MG/DL — SIGNIFICANT CHANGE UP (ref 2.5–4.5)
PHOSPHATE SERPL-MCNC: 3.2 MG/DL — SIGNIFICANT CHANGE UP (ref 2.5–4.5)
PHOSPHATE SERPL-MCNC: 3.5 MG/DL — SIGNIFICANT CHANGE UP (ref 2.5–4.5)
PHOSPHATE SERPL-MCNC: 3.6 MG/DL — SIGNIFICANT CHANGE UP (ref 2.5–4.5)
PLATELET # BLD AUTO: 281 K/UL — SIGNIFICANT CHANGE UP (ref 150–400)
PLATELET # BLD AUTO: 302 K/UL — SIGNIFICANT CHANGE UP (ref 150–400)
PLATELET # BLD AUTO: 305 K/UL — SIGNIFICANT CHANGE UP (ref 150–400)
PLATELET # BLD AUTO: 310 K/UL — SIGNIFICANT CHANGE UP (ref 150–400)
PLATELET # BLD AUTO: 314 K/UL — SIGNIFICANT CHANGE UP (ref 150–400)
PLATELET COUNT - ESTIMATE: NORMAL — SIGNIFICANT CHANGE UP
PMV BLD: 10.3 FL — SIGNIFICANT CHANGE UP (ref 7–13)
PMV BLD: 10.4 FL — SIGNIFICANT CHANGE UP (ref 7–13)
PMV BLD: 9.5 FL — SIGNIFICANT CHANGE UP (ref 7–13)
PMV BLD: 9.6 FL — SIGNIFICANT CHANGE UP (ref 7–13)
PMV BLD: 9.7 FL — SIGNIFICANT CHANGE UP (ref 7–13)
PO2 BLDV: 18 MMHG — LOW (ref 35–40)
PO2 BLDV: 20 MMHG — LOW (ref 35–40)
POTASSIUM BLDV-SCNC: 3.6 MMOL/L — SIGNIFICANT CHANGE UP (ref 3.4–4.5)
POTASSIUM BLDV-SCNC: 4.1 MMOL/L — SIGNIFICANT CHANGE UP (ref 3.4–4.5)
POTASSIUM SERPL-MCNC: 3.6 MMOL/L — SIGNIFICANT CHANGE UP (ref 3.5–5.3)
POTASSIUM SERPL-MCNC: 3.8 MMOL/L — SIGNIFICANT CHANGE UP (ref 3.5–5.3)
POTASSIUM SERPL-MCNC: 3.9 MMOL/L — SIGNIFICANT CHANGE UP (ref 3.5–5.3)
POTASSIUM SERPL-MCNC: 4.2 MMOL/L — SIGNIFICANT CHANGE UP (ref 3.5–5.3)
POTASSIUM SERPL-MCNC: 4.4 MMOL/L — SIGNIFICANT CHANGE UP (ref 3.5–5.3)
POTASSIUM SERPL-SCNC: 3.6 MMOL/L — SIGNIFICANT CHANGE UP (ref 3.5–5.3)
POTASSIUM SERPL-SCNC: 3.8 MMOL/L — SIGNIFICANT CHANGE UP (ref 3.5–5.3)
POTASSIUM SERPL-SCNC: 3.9 MMOL/L — SIGNIFICANT CHANGE UP (ref 3.5–5.3)
POTASSIUM SERPL-SCNC: 4.2 MMOL/L — SIGNIFICANT CHANGE UP (ref 3.5–5.3)
POTASSIUM SERPL-SCNC: 4.4 MMOL/L — SIGNIFICANT CHANGE UP (ref 3.5–5.3)
PROT SERPL-MCNC: 7.8 G/DL — SIGNIFICANT CHANGE UP (ref 6–8.3)
PROT UR-MCNC: NEGATIVE — SIGNIFICANT CHANGE UP
RBC # BLD: 4.62 M/UL — SIGNIFICANT CHANGE UP (ref 4.2–5.8)
RBC # BLD: 4.63 M/UL — SIGNIFICANT CHANGE UP (ref 4.2–5.8)
RBC # BLD: 4.64 M/UL — SIGNIFICANT CHANGE UP (ref 4.2–5.8)
RBC # BLD: 4.77 M/UL — SIGNIFICANT CHANGE UP (ref 4.2–5.8)
RBC # BLD: 4.88 M/UL — SIGNIFICANT CHANGE UP (ref 4.2–5.8)
RBC # FLD: 14.5 % — SIGNIFICANT CHANGE UP (ref 10.3–14.5)
RBC # FLD: 14.5 % — SIGNIFICANT CHANGE UP (ref 10.3–14.5)
RBC # FLD: 14.7 % — HIGH (ref 10.3–14.5)
RBC # FLD: 14.7 % — HIGH (ref 10.3–14.5)
RBC # FLD: 14.8 % — HIGH (ref 10.3–14.5)
RBC CASTS # UR COMP ASSIST: SIGNIFICANT CHANGE UP (ref 0–?)
REVIEW TO FOLLOW: YES — SIGNIFICANT CHANGE UP
SAO2 % BLDV: 18.2 % — LOW (ref 60–85)
SAO2 % BLDV: 26.3 % — LOW (ref 60–85)
SARS-COV-2 IGG SERPL QL IA: NEGATIVE — SIGNIFICANT CHANGE UP
SARS-COV-2 IGM SERPL IA-ACNC: 5.88 AU/ML — SIGNIFICANT CHANGE UP
SARS-COV-2 RNA SPEC QL NAA+PROBE: SIGNIFICANT CHANGE UP
SARS-COV-2 RNA SPEC QL NAA+PROBE: SIGNIFICANT CHANGE UP
SODIUM SERPL-SCNC: 138 MMOL/L — SIGNIFICANT CHANGE UP (ref 135–145)
SODIUM SERPL-SCNC: 139 MMOL/L — SIGNIFICANT CHANGE UP (ref 135–145)
SODIUM SERPL-SCNC: 140 MMOL/L — SIGNIFICANT CHANGE UP (ref 135–145)
SODIUM SERPL-SCNC: 142 MMOL/L — SIGNIFICANT CHANGE UP (ref 135–145)
SODIUM SERPL-SCNC: 142 MMOL/L — SIGNIFICANT CHANGE UP (ref 135–145)
SP GR SPEC: 1.01 — SIGNIFICANT CHANGE UP (ref 1–1.04)
SPECIMEN SOURCE: SIGNIFICANT CHANGE UP
SQUAMOUS # UR AUTO: SIGNIFICANT CHANGE UP
TRIGL SERPL-MCNC: 95 MG/DL — SIGNIFICANT CHANGE UP (ref 10–149)
TROPONIN T, HIGH SENSITIVITY: 7 NG/L — SIGNIFICANT CHANGE UP (ref ?–14)
TROPONIN T, HIGH SENSITIVITY: 9 NG/L — SIGNIFICANT CHANGE UP (ref ?–14)
TSH SERPL-MCNC: 0.89 UIU/ML — SIGNIFICANT CHANGE UP (ref 0.27–4.2)
UROBILINOGEN FLD QL: NORMAL — SIGNIFICANT CHANGE UP
VARIANT LYMPHS # BLD: 1 % — SIGNIFICANT CHANGE UP
WBC # BLD: 10.81 K/UL — HIGH (ref 3.8–10.5)
WBC # BLD: 11.45 K/UL — HIGH (ref 3.8–10.5)
WBC # BLD: 11.64 K/UL — HIGH (ref 3.8–10.5)
WBC # BLD: 12.93 K/UL — HIGH (ref 3.8–10.5)
WBC # BLD: 9.54 K/UL — SIGNIFICANT CHANGE UP (ref 3.8–10.5)
WBC # FLD AUTO: 10.81 K/UL — HIGH (ref 3.8–10.5)
WBC # FLD AUTO: 11.45 K/UL — HIGH (ref 3.8–10.5)
WBC # FLD AUTO: 11.64 K/UL — HIGH (ref 3.8–10.5)
WBC # FLD AUTO: 12.93 K/UL — HIGH (ref 3.8–10.5)
WBC # FLD AUTO: 9.54 K/UL — SIGNIFICANT CHANGE UP (ref 3.8–10.5)
WBC UR QL: SIGNIFICANT CHANGE UP (ref 0–?)

## 2020-01-01 PROCEDURE — 99232 SBSQ HOSP IP/OBS MODERATE 35: CPT | Mod: 25

## 2020-01-01 PROCEDURE — 70551 MRI BRAIN STEM W/O DYE: CPT | Mod: 26

## 2020-01-01 PROCEDURE — 99233 SBSQ HOSP IP/OBS HIGH 50: CPT

## 2020-01-01 PROCEDURE — 70498 CT ANGIOGRAPHY NECK: CPT | Mod: 26

## 2020-01-01 PROCEDURE — 70496 CT ANGIOGRAPHY HEAD: CPT | Mod: 26

## 2020-01-01 PROCEDURE — 93308 TTE F-UP OR LMTD: CPT | Mod: 26,GC

## 2020-01-01 PROCEDURE — 99232 SBSQ HOSP IP/OBS MODERATE 35: CPT

## 2020-01-01 PROCEDURE — 93321 DOPPLER ECHO F-UP/LMTD STD: CPT | Mod: 26

## 2020-01-01 PROCEDURE — 99223 1ST HOSP IP/OBS HIGH 75: CPT

## 2020-01-01 PROCEDURE — 33285 INSJ SUBQ CAR RHYTHM MNTR: CPT

## 2020-01-01 PROCEDURE — 93325 DOPPLER ECHO COLOR FLOW MAPG: CPT | Mod: 26,GC

## 2020-01-01 PROCEDURE — 99285 EMERGENCY DEPT VISIT HI MDM: CPT

## 2020-01-01 PROCEDURE — 12345: CPT | Mod: NC

## 2020-01-01 PROCEDURE — 93298 REM INTERROG DEV EVAL SCRMS: CPT

## 2020-01-01 PROCEDURE — 71045 X-RAY EXAM CHEST 1 VIEW: CPT | Mod: 26

## 2020-01-01 PROCEDURE — 99222 1ST HOSP IP/OBS MODERATE 55: CPT

## 2020-01-01 PROCEDURE — 93285 PRGRMG DEV EVAL SCRMS IP: CPT

## 2020-01-01 PROCEDURE — 99239 HOSP IP/OBS DSCHRG MGMT >30: CPT

## 2020-01-01 PROCEDURE — 93306 TTE W/DOPPLER COMPLETE: CPT | Mod: 26

## 2020-01-01 PROCEDURE — G2066: CPT

## 2020-01-01 RX ORDER — OMEPRAZOLE 40 MG/1
40 CAPSULE, DELAYED RELEASE ORAL
Refills: 0 | Status: DISCONTINUED | COMMUNITY
End: 2020-01-01

## 2020-01-01 RX ORDER — TAMSULOSIN HYDROCHLORIDE 0.4 MG/1
1 CAPSULE ORAL
Qty: 0 | Refills: 0 | DISCHARGE
Start: 2020-01-01

## 2020-01-01 RX ORDER — LOSARTAN POTASSIUM 100 MG/1
25 TABLET, FILM COATED ORAL DAILY
Refills: 0 | Status: DISCONTINUED | OUTPATIENT
Start: 2020-01-01 | End: 2020-01-01

## 2020-01-01 RX ORDER — HEPARIN SODIUM 5000 [USP'U]/ML
5000 INJECTION INTRAVENOUS; SUBCUTANEOUS EVERY 12 HOURS
Refills: 0 | Status: DISCONTINUED | OUTPATIENT
Start: 2020-01-01 | End: 2020-01-01

## 2020-01-01 RX ORDER — CARVEDILOL PHOSPHATE 80 MG/1
3.12 CAPSULE, EXTENDED RELEASE ORAL
Refills: 0 | Status: DISCONTINUED | OUTPATIENT
Start: 2020-01-01 | End: 2020-01-01

## 2020-01-01 RX ORDER — CEFTRIAXONE 500 MG/1
1000 INJECTION, POWDER, FOR SOLUTION INTRAMUSCULAR; INTRAVENOUS ONCE
Refills: 0 | Status: COMPLETED | OUTPATIENT
Start: 2020-01-01 | End: 2020-01-01

## 2020-01-01 RX ORDER — TAMSULOSIN HYDROCHLORIDE 0.4 MG/1
0.4 CAPSULE ORAL
Refills: 0 | Status: ACTIVE | COMMUNITY

## 2020-01-01 RX ORDER — SODIUM CHLORIDE 9 MG/ML
500 INJECTION INTRAMUSCULAR; INTRAVENOUS; SUBCUTANEOUS
Refills: 0 | Status: DISCONTINUED | OUTPATIENT
Start: 2020-01-01 | End: 2020-01-01

## 2020-01-01 RX ORDER — LOSARTAN POTASSIUM 50 MG/1
50 TABLET, FILM COATED ORAL
Refills: 0 | Status: ACTIVE | COMMUNITY

## 2020-01-01 RX ORDER — FINASTERIDE 5 MG/1
5 TABLET, FILM COATED ORAL
Refills: 0 | Status: ACTIVE | COMMUNITY

## 2020-01-01 RX ORDER — CEFTRIAXONE 500 MG/1
1000 INJECTION, POWDER, FOR SOLUTION INTRAMUSCULAR; INTRAVENOUS EVERY 24 HOURS
Refills: 0 | Status: DISCONTINUED | OUTPATIENT
Start: 2020-01-01 | End: 2020-01-01

## 2020-01-01 RX ORDER — ACETAMINOPHEN 325 MG/1
325 TABLET ORAL
Refills: 0 | Status: DISCONTINUED | COMMUNITY
End: 2020-01-01

## 2020-01-01 RX ORDER — ATORVASTATIN CALCIUM 80 MG/1
80 TABLET, FILM COATED ORAL AT BEDTIME
Refills: 0 | Status: DISCONTINUED | OUTPATIENT
Start: 2020-01-01 | End: 2020-01-01

## 2020-01-01 RX ORDER — POLYETHYLENE GLYCOL 3350 17 G/17G
17 POWDER, FOR SOLUTION ORAL DAILY
Refills: 0 | Status: DISCONTINUED | OUTPATIENT
Start: 2020-01-01 | End: 2020-01-01

## 2020-01-01 RX ORDER — ENOXAPARIN SODIUM 100 MG/ML
40 INJECTION SUBCUTANEOUS AT BEDTIME
Refills: 0 | Status: DISCONTINUED | OUTPATIENT
Start: 2020-01-01 | End: 2020-01-01

## 2020-01-01 RX ORDER — GABAPENTIN 400 MG/1
300 CAPSULE ORAL AT BEDTIME
Refills: 0 | Status: DISCONTINUED | OUTPATIENT
Start: 2020-01-01 | End: 2020-01-01

## 2020-01-01 RX ORDER — SODIUM CHLORIDE 9 MG/ML
3 INJECTION INTRAMUSCULAR; INTRAVENOUS; SUBCUTANEOUS EVERY 8 HOURS
Refills: 0 | Status: DISCONTINUED | OUTPATIENT
Start: 2020-01-01 | End: 2020-01-01

## 2020-01-01 RX ORDER — LOSARTAN POTASSIUM 100 MG/1
50 TABLET, FILM COATED ORAL DAILY
Refills: 0 | Status: DISCONTINUED | OUTPATIENT
Start: 2020-01-01 | End: 2020-01-01

## 2020-01-01 RX ORDER — TAMSULOSIN HYDROCHLORIDE 0.4 MG/1
0.4 CAPSULE ORAL AT BEDTIME
Refills: 0 | Status: DISCONTINUED | OUTPATIENT
Start: 2020-01-01 | End: 2020-01-01

## 2020-01-01 RX ORDER — ENOXAPARIN SODIUM 80 MG/.8ML
80 INJECTION SUBCUTANEOUS
Refills: 0 | Status: DISCONTINUED | COMMUNITY
End: 2020-01-01

## 2020-01-01 RX ORDER — PANTOPRAZOLE SODIUM 20 MG/1
40 TABLET, DELAYED RELEASE ORAL
Refills: 0 | Status: DISCONTINUED | OUTPATIENT
Start: 2020-01-01 | End: 2020-01-01

## 2020-01-01 RX ORDER — AMLODIPINE BESYLATE 5 MG/1
5 TABLET ORAL DAILY
Refills: 0 | Status: DISCONTINUED | COMMUNITY
End: 2020-01-01

## 2020-01-01 RX ORDER — ASPIRIN/CALCIUM CARB/MAGNESIUM 324 MG
81 TABLET ORAL DAILY
Refills: 0 | Status: DISCONTINUED | OUTPATIENT
Start: 2020-01-01 | End: 2020-01-01

## 2020-01-01 RX ORDER — FINASTERIDE 5 MG/1
5 TABLET, FILM COATED ORAL DAILY
Refills: 0 | Status: DISCONTINUED | OUTPATIENT
Start: 2020-01-01 | End: 2020-01-01

## 2020-01-01 RX ORDER — LOSARTAN POTASSIUM 100 MG/1
1 TABLET, FILM COATED ORAL
Qty: 0 | Refills: 0 | DISCHARGE
Start: 2020-01-01

## 2020-01-01 RX ORDER — POLYETHYLENE GLYCOL 3350 17 G/17G
17 POWDER, FOR SOLUTION ORAL
Qty: 0 | Refills: 0 | DISCHARGE
Start: 2020-01-01

## 2020-01-01 RX ADMIN — GABAPENTIN 300 MILLIGRAM(S): 400 CAPSULE ORAL at 23:06

## 2020-01-01 RX ADMIN — SODIUM CHLORIDE 3 MILLILITER(S): 9 INJECTION INTRAMUSCULAR; INTRAVENOUS; SUBCUTANEOUS at 14:10

## 2020-01-01 RX ADMIN — FINASTERIDE 5 MILLIGRAM(S): 5 TABLET, FILM COATED ORAL at 12:15

## 2020-01-01 RX ADMIN — POLYETHYLENE GLYCOL 3350 17 GRAM(S): 17 POWDER, FOR SOLUTION ORAL at 11:38

## 2020-01-01 RX ADMIN — ENOXAPARIN SODIUM 40 MILLIGRAM(S): 100 INJECTION SUBCUTANEOUS at 22:09

## 2020-01-01 RX ADMIN — LOSARTAN POTASSIUM 50 MILLIGRAM(S): 100 TABLET, FILM COATED ORAL at 05:51

## 2020-01-01 RX ADMIN — LOSARTAN POTASSIUM 25 MILLIGRAM(S): 100 TABLET, FILM COATED ORAL at 05:52

## 2020-01-01 RX ADMIN — ENOXAPARIN SODIUM 40 MILLIGRAM(S): 100 INJECTION SUBCUTANEOUS at 23:07

## 2020-01-01 RX ADMIN — FINASTERIDE 5 MILLIGRAM(S): 5 TABLET, FILM COATED ORAL at 12:41

## 2020-01-01 RX ADMIN — ATORVASTATIN CALCIUM 80 MILLIGRAM(S): 80 TABLET, FILM COATED ORAL at 22:08

## 2020-01-01 RX ADMIN — ATORVASTATIN CALCIUM 80 MILLIGRAM(S): 80 TABLET, FILM COATED ORAL at 23:06

## 2020-01-01 RX ADMIN — TAMSULOSIN HYDROCHLORIDE 0.4 MILLIGRAM(S): 0.4 CAPSULE ORAL at 22:08

## 2020-01-01 RX ADMIN — ENOXAPARIN SODIUM 40 MILLIGRAM(S): 100 INJECTION SUBCUTANEOUS at 21:43

## 2020-01-01 RX ADMIN — Medication 81 MILLIGRAM(S): at 12:41

## 2020-01-01 RX ADMIN — SODIUM CHLORIDE 3 MILLILITER(S): 9 INJECTION INTRAMUSCULAR; INTRAVENOUS; SUBCUTANEOUS at 23:11

## 2020-01-01 RX ADMIN — CEFTRIAXONE 100 MILLIGRAM(S): 500 INJECTION, POWDER, FOR SOLUTION INTRAMUSCULAR; INTRAVENOUS at 09:34

## 2020-01-01 RX ADMIN — FINASTERIDE 5 MILLIGRAM(S): 5 TABLET, FILM COATED ORAL at 11:38

## 2020-01-01 RX ADMIN — SODIUM CHLORIDE 3 MILLILITER(S): 9 INJECTION INTRAMUSCULAR; INTRAVENOUS; SUBCUTANEOUS at 05:21

## 2020-01-01 RX ADMIN — SODIUM CHLORIDE 3 MILLILITER(S): 9 INJECTION INTRAMUSCULAR; INTRAVENOUS; SUBCUTANEOUS at 21:57

## 2020-01-01 RX ADMIN — SODIUM CHLORIDE 3 MILLILITER(S): 9 INJECTION INTRAMUSCULAR; INTRAVENOUS; SUBCUTANEOUS at 05:55

## 2020-01-01 RX ADMIN — SODIUM CHLORIDE 3 MILLILITER(S): 9 INJECTION INTRAMUSCULAR; INTRAVENOUS; SUBCUTANEOUS at 13:49

## 2020-01-01 RX ADMIN — SODIUM CHLORIDE 3 MILLILITER(S): 9 INJECTION INTRAMUSCULAR; INTRAVENOUS; SUBCUTANEOUS at 05:52

## 2020-01-01 RX ADMIN — GABAPENTIN 300 MILLIGRAM(S): 400 CAPSULE ORAL at 21:43

## 2020-01-01 RX ADMIN — SODIUM CHLORIDE 3 MILLILITER(S): 9 INJECTION INTRAMUSCULAR; INTRAVENOUS; SUBCUTANEOUS at 15:07

## 2020-01-01 RX ADMIN — PANTOPRAZOLE SODIUM 40 MILLIGRAM(S): 20 TABLET, DELAYED RELEASE ORAL at 05:56

## 2020-01-01 RX ADMIN — PANTOPRAZOLE SODIUM 40 MILLIGRAM(S): 20 TABLET, DELAYED RELEASE ORAL at 05:48

## 2020-01-01 RX ADMIN — Medication 81 MILLIGRAM(S): at 11:38

## 2020-01-01 RX ADMIN — HEPARIN SODIUM 5000 UNIT(S): 5000 INJECTION INTRAVENOUS; SUBCUTANEOUS at 05:37

## 2020-01-01 RX ADMIN — CEFTRIAXONE 100 MILLIGRAM(S): 500 INJECTION, POWDER, FOR SOLUTION INTRAMUSCULAR; INTRAVENOUS at 18:41

## 2020-01-01 RX ADMIN — TAMSULOSIN HYDROCHLORIDE 0.4 MILLIGRAM(S): 0.4 CAPSULE ORAL at 23:06

## 2020-01-01 RX ADMIN — SODIUM CHLORIDE 3 MILLILITER(S): 9 INJECTION INTRAMUSCULAR; INTRAVENOUS; SUBCUTANEOUS at 12:41

## 2020-01-01 RX ADMIN — Medication 81 MILLIGRAM(S): at 13:46

## 2020-01-01 RX ADMIN — SODIUM CHLORIDE 3 MILLILITER(S): 9 INJECTION INTRAMUSCULAR; INTRAVENOUS; SUBCUTANEOUS at 05:57

## 2020-01-01 RX ADMIN — PANTOPRAZOLE SODIUM 40 MILLIGRAM(S): 20 TABLET, DELAYED RELEASE ORAL at 05:52

## 2020-01-01 RX ADMIN — POLYETHYLENE GLYCOL 3350 17 GRAM(S): 17 POWDER, FOR SOLUTION ORAL at 12:41

## 2020-01-01 RX ADMIN — GABAPENTIN 300 MILLIGRAM(S): 400 CAPSULE ORAL at 22:08

## 2020-01-01 RX ADMIN — CEFTRIAXONE 100 MILLIGRAM(S): 500 INJECTION, POWDER, FOR SOLUTION INTRAMUSCULAR; INTRAVENOUS at 17:49

## 2020-01-01 RX ADMIN — FINASTERIDE 5 MILLIGRAM(S): 5 TABLET, FILM COATED ORAL at 13:47

## 2020-01-01 RX ADMIN — PANTOPRAZOLE SODIUM 40 MILLIGRAM(S): 20 TABLET, DELAYED RELEASE ORAL at 05:21

## 2020-01-01 RX ADMIN — CEFTRIAXONE 100 MILLIGRAM(S): 500 INJECTION, POWDER, FOR SOLUTION INTRAMUSCULAR; INTRAVENOUS at 19:35

## 2020-01-01 RX ADMIN — CEFTRIAXONE 100 MILLIGRAM(S): 500 INJECTION, POWDER, FOR SOLUTION INTRAMUSCULAR; INTRAVENOUS at 15:06

## 2020-01-01 RX ADMIN — POLYETHYLENE GLYCOL 3350 17 GRAM(S): 17 POWDER, FOR SOLUTION ORAL at 13:47

## 2020-01-01 RX ADMIN — SODIUM CHLORIDE 75 MILLILITER(S): 9 INJECTION INTRAMUSCULAR; INTRAVENOUS; SUBCUTANEOUS at 05:21

## 2020-01-01 RX ADMIN — SODIUM CHLORIDE 3 MILLILITER(S): 9 INJECTION INTRAMUSCULAR; INTRAVENOUS; SUBCUTANEOUS at 22:11

## 2020-01-01 RX ADMIN — Medication 81 MILLIGRAM(S): at 12:15

## 2020-01-01 RX ADMIN — LOSARTAN POTASSIUM 25 MILLIGRAM(S): 100 TABLET, FILM COATED ORAL at 14:09

## 2020-01-01 RX ADMIN — ATORVASTATIN CALCIUM 80 MILLIGRAM(S): 80 TABLET, FILM COATED ORAL at 21:43

## 2020-01-01 RX ADMIN — POLYETHYLENE GLYCOL 3350 17 GRAM(S): 17 POWDER, FOR SOLUTION ORAL at 12:14

## 2020-01-01 RX ADMIN — CARVEDILOL PHOSPHATE 3.12 MILLIGRAM(S): 80 CAPSULE, EXTENDED RELEASE ORAL at 20:17

## 2020-01-28 NOTE — PATIENT PROFILE ADULT. - BILL OF RIGHTS/ADMISSION INFORMATION PROVIDED TO:
Patient Methotrexate Counseling:  Patient counseled regarding adverse effects of methotrexate including but not limited to nausea, vomiting, abnormalities in liver function tests. Patients may develop mouth sores, rash, diarrhea, and abnormalities in blood counts. The patient understands that monitoring is required including LFT's and blood counts.  There is a rare possibility of scarring of the liver and lung problems that can occur when taking methotrexate. Persistent nausea, loss of appetite, pale stools, dark urine, cough, and shortness of breath should be reported immediately. Patient advised to discontinue methotrexate treatment at least three months before attempting to become pregnant.  I discussed the need for folate supplements while taking methotrexate.  These supplements can decrease side effects during methotrexate treatment. The patient verbalized understanding of the proper use and possible adverse effects of methotrexate.  All of the patient's questions and concerns were addressed.

## 2020-05-04 NOTE — PATIENT PROFILE ADULT - STATED REASON FOR ADMISSION
Assessment/Plan


Assessment/Plan


# Leukopenia on admission - with likely infection, bilateral pulmonary edema 

with increased in brain natriuretic peptide most likely a component of end-

stage renal disease and chronic heart failure with preserved ejection fraction. 


--> hepatitis and hiv neg


--> imaging reviewed, abd us from 3 years ago negative


--> smear is noted


--> meds have been reviewed


--> neupogen sq as needed prn


--> wbc trend 3.7-->6.6-->3.1->3.7->4.6->4.9


# Anemia likely due to End-stage renal disease


--> very mild currently, hgb 12->11.7


--> w/u as needed if lower


# COVID-19, bilateral pneumonia.


--> as per Id, Dr. Laws on case


--> iso, and droplet iso


# History of hypertension, controlled with hydralazine


--> per cards


# Sepsis


--> start vancomycin with azactam pending blood culture


# ESRD (end stage renal disease) on dialysis


--> continue hemodialysis 


--> renally  dosed antibiotics


# DM II (diabetes mellitus, type II), controlled


--> recommend tight glycemic control to keep blood glucose between  [id number]


# Dvt ppx heparin sq





Appreciate consultation and kiel rn





Subjective


Constitutional:  Denies: no symptoms, chills, fever, malaise, weakness, other


HEENT:  Denies: no symptoms, eye pain, blurred vision, tearing, double vision, 

ear pain, ear discharge, nose pain, nose congestion, throat pain, throat 

swelling, mouth pain, mouth swelling, other


Cardiovascular:  Denies: no symptoms, chest pain, edema, irregular heart rate, 

lightheadedness, palpitations, syncope, other


Respiratory:  Denies: no symptoms, cough, shortness of breath, SOB with 

excertion, SOB at rest, sputum, wheezing, other


Gastrointestinal/Abdominal:  Denies: no symptoms, abdomen distended, abdominal 

pain, black stools, tarry stools, blood in stool, constipated, diarrhea, 

difficulty swallowing, nausea, poor appetite, poor fluid intake, rectal bleeding

, vomiting, other


Genitourinary:  Denies: no symptoms, burning, discharge, frequency, flank pain, 

hematuria, incontinence, pain, urgency, other


Neurologic/Psychiatric:  Denies: no symptoms, anxiety, depressed, emotional 

problems, headache, numbness, paresthesia, pre-existing deficit, seizure, 

tingling, tremors, weakness, other


Endocrine:  Denies: no symptoms, excessive sweating, flushing, intolerance to 

cold, intolerance to heat, increased hunger, increased thirst, increased urine, 

unexplained weight gain, unexplained weight loss, other


Allergies:  


Coded Allergies:  


     PENICILLINS (Verified  Allergy, Unknown, 11/30/17)


 hives


 11/30/17: ITCHING WITH ZOSYN


Subjective


4/27 [place]ian speaking, to get hd, labs yesterday looked better, cbc has been 

ordered


4/28 tolerated hd well, no bleeding, labs noted, no night sweats


4/29 to get hd today, no bleeding, meds now, labs reviewed


4/30 no complaints, no bleeding, no night sweats


5/1 labs reviewed, no night sweats, meds reviewed, smear is noted


5/3 labs reviewed, no bleeding or chills, dw rn, no night sweats


5/4 as per renal for hd today, labs are noted, no bleeding





Objective


Objective





Current Medications








 Medications


  (Trade)  Dose


 Ordered  Sig/Weston


 Route


 PRN Reason  Start Time


 Stop Time Status Last Admin


Dose Admin


 


 Acetaminophen


  (Tylenol)  650 mg  Q4H  PRN


 ORAL


 Temp >100.5 / mild pain 1-3  4/22/20 23:00


 5/22/20 22:59  4/26/20 08:17


 


 


 Albuterol Sulfate


  (Proventil MDI)  2 puff  Q4H  PRN


 INH


 Shortness of Breath  4/22/20 23:00


 7/21/20 22:59   


 


 


 Albuterol/


 Ipratropium


  (Combivent


 Respimat)  1 puff  Q6HRT


 INH


   4/23/20 01:00


 5/23/20 00:59  5/3/20 18:25


 


 


 Amlodipine


 Besylate


  (Norvasc)  2.5 mg  DAILY


 ORAL


   4/30/20 09:00


 5/30/20 08:59  5/2/20 09:58


 


 


 Clonidine HCl


  (Catapres Tab)  0.1 mg  Q4H  PRN


 ORAL


 BP over 160 systolic  4/22/20 15:00


 7/21/20 14:59   


 


 


 Guaifenesin/


 Dextromethorphan


  (Robitussin DM


 Syrup)  10 ml  Q4H  PRN


 ORAL


 For Cough  4/22/20 23:00


 7/21/20 22:59  4/26/20 20:42


 


 


 Heparin Sodium


  (Porcine)


  (Heparin 5000


 units/ml)  5,000 units  EVERY 12  HOURS


 SUBQ


   4/22/20 15:45


 6/6/20 15:44  5/3/20 20:53


 


 


 Loperamide HCl


  (Imodium)  2 mg  TIDPRN  PRN


 ORAL


 Diarrhea  4/24/20 12:45


 5/24/20 12:44  4/28/20 14:00


 


 


 Pantoprazole


  (Protonix)  40 mg  BID


 ORAL


   4/22/20 18:00


 5/22/20 17:59  5/4/20 08:14


 


 


 Sevelamer


 Carbonate


  (Renvela)  1,600 mg  THREE TIMES A  DAY


 ORAL


   5/2/20 13:39


 7/31/20 13:38  5/4/20 08:14


 


 


 Vancomycin HCl


  (Vanco rx to


 dose)  1 ea  DAILY  PRN


 MISC


 Per rx protocol  4/23/20 15:15


 5/23/20 15:14   


 


 


 Vancomycin HCl


 500 mg/Dextrose  110 ml @ 


 110 mls/hr  ONCE


 IVPB


   5/4/20 12:00


 5/4/20 21:00   


 











Last 24 Hour Vital Signs








  Date Time  Temp Pulse Resp B/P (MAP) Pulse Ox O2 Delivery O2 Flow Rate FiO2


 


5/4/20 08:07  71  120/57    


 


5/4/20 08:00 97.6 100 18 135/82 (99) 98   


 


5/4/20 04:00 97.7 71 18 120/57 (78) 96   


 


5/3/20 21:00      Nasal Cannula 2.0 


 


5/3/20 20:00 98.1 76 18 118/61 (80) 96   


 


5/3/20 16:00 98.2 82 18 127/65 (85) 96   


 


5/3/20 12:00 97.9 79 17 115/60 (78) 95   


 


5/3/20 09:00      Nasal Cannula 2.0 


 


5/3/20 09:00  77  113/53    


 


5/3/20 08:00 98.0 77 18 113/53 (73) 95   


 


5/3/20 04:00 98.0 78 19 120/65 (83) 99   


 


5/3/20 00:00 98.0 76 19 117/65 (82) 94   


 


5/2/20 21:32      Nasal Cannula 2.0 


 


5/2/20 20:00 98.5 78 19 111/98 (102) 98   


 


5/2/20 16:00 98.0 82 18 137/81 (99) 96   


 


5/2/20 12:00 98.5 81 12 131/63 (85) 97   

















Intake and Output  


 


 5/3/20 5/4/20





 19:00 07:00


 


Intake Total  200 ml


 


Balance  200 ml


 


  


 


Intake Oral  200 ml











Labs








Test


  5/2/20


03:10 5/3/20


04:00 5/4/20


05:00


 


White Blood Count


  4.3 K/UL


(4.8-10.8) 4.6 K/UL


(4.8-10.8) 4.9 K/UL


(4.8-10.8)


 


Red Blood Count


  4.17 M/UL


(4.20-5.40) 4.33 M/UL


(4.20-5.40) 4.31 M/UL


(4.20-5.40)


 


Hemoglobin


  11.3 G/DL


(12.0-16.0) 11.7 G/DL


(12.0-16.0) 11.5 G/DL


(12.0-16.0)


 


Hematocrit


  34.9 %


(37.0-47.0) 36.8 %


(37.0-47.0) 36.4 %


(37.0-47.0)


 


Mean Corpuscular Volume 84 FL (80-99)  85 FL (80-99)  84 FL (80-99) 


 


Mean Corpuscular Hemoglobin


  27.2 PG


(27.0-31.0) 27.2 PG


(27.0-31.0) 26.8 PG


(27.0-31.0)


 


Mean Corpuscular Hemoglobin


Concent 32.5 G/DL


(32.0-36.0) 31.9 G/DL


(32.0-36.0) 31.7 G/DL


(32.0-36.0)


 


Red Cell Distribution Width


  16.6 %


(11.6-14.8) 16.8 %


(11.6-14.8) 16.5 %


(11.6-14.8)


 


Platelet Count


  238 K/UL


(150-450) 250 K/UL


(150-450) 248 K/UL


(150-450)


 


Mean Platelet Volume


  7.3 FL


(6.5-10.1) 7.9 FL


(6.5-10.1) 8.5 FL


(6.5-10.1)


 


Neutrophils (%) (Auto)


  73.8 %


(45.0-75.0) 73.9 %


(45.0-75.0) 74.0 %


(45.0-75.0)


 


Lymphocytes (%) (Auto)


  13.8 %


(20.0-45.0) 13.1 %


(20.0-45.0) 13.2 %


(20.0-45.0)


 


Monocytes (%) (Auto)


  10.2 %


(1.0-10.0) 11.3 %


(1.0-10.0) 11.4 %


(1.0-10.0)


 


Eosinophils (%) (Auto)


  1.7 %


(0.0-3.0) 0.2 %


(0.0-3.0) 0.2 %


(0.0-3.0)


 


Basophils (%) (Auto)


  0.6 %


(0.0-2.0) 1.5 %


(0.0-2.0) 1.3 %


(0.0-2.0)


 


Sodium Level


  137 MMOL/L


(136-145) 


  138 MMOL/L


(136-145)


 


Potassium Level


  4.8 MMOL/L


(3.5-5.1) 


  3.7 MMOL/L


(3.5-5.1)


 


Chloride Level


  94 MMOL/L


() 


  95 MMOL/L


()


 


Carbon Dioxide Level


  29 MMOL/L


(21-32) 


  33 MMOL/L


(21-32)


 


Anion Gap


  14 mmol/L


(5-15) 


  10 mmol/L


(5-15)


 


Blood Urea Nitrogen


  58 mg/dL


(7-18) 


  46 mg/dL


(7-18)


 


Creatinine


  11.7 MG/DL


(0.55-1.30) 


  11.7 MG/DL


(0.55-1.30)


 


Estimat Glomerular Filtration


Rate 3.6 mL/min


(>60) 


  3.6 mL/min


(>60)


 


Glucose Level


  66 MG/DL


() 


  98 MG/DL


()


 


Calcium Level


  9.7 MG/DL


(8.5-10.1) 


  9.5 MG/DL


(8.5-10.1)


 


Phosphorus Level


  5.6 MG/DL


(2.5-4.9) 


  


 


 


Magnesium Level


  2.9 MG/DL


(1.8-2.4) 


  


 


 


Total Bilirubin


  0.5 MG/DL


(0.2-1.0) 


  0.6 MG/DL


(0.2-1.0)


 


Aspartate Amino Transf


(AST/SGOT) 35 U/L (15-37) 


  


  35 U/L (15-37) 


 


 


Alanine Aminotransferase


(ALT/SGPT) 15 U/L (12-78) 


  


  24 U/L (12-78) 


 


 


Alkaline Phosphatase


  103 U/L


() 


  135 U/L


()


 


C-Reactive Protein,


Quantitative 6.9 mg/dL


(0.00-0.90) 


  


 


 


Total Protein


  7.8 G/DL


(6.4-8.2) 


  8.0 G/DL


(6.4-8.2)


 


Albumin


  2.8 G/DL


(3.4-5.0) 


  3.1 G/DL


(3.4-5.0)


 


Globulin 5.0 g/dL   4.9 g/dL 


 


Albumin/Globulin Ratio 0.6 (1.0-2.7)   0.6 (1.0-2.7) 


 


Random Vancomycin Level 23.4 ug/mL   








Height (Feet):  4


Height (Inches):  10.00


Weight (Pounds):  104


Objective


Gen: nad, A+O x4


Puilm: ctab, no cwr


CV: rrr, no mg


Abd: soft, nt, nd


Ext: no cce, right upper ext fistula++











Edy Roach MD May 4, 2020 11:05 UTI/ Sepsis

## 2020-06-20 NOTE — ED ADULT NURSE NOTE - ASSIST WITH
+dental hardware  +head/neck in mildly hyperextended position at baseline  +small cervical osteophytes
standing/walking

## 2020-10-05 NOTE — ED PROVIDER NOTE - CLINICAL SUMMARY MEDICAL DECISION MAKING FREE TEXT BOX
Patient presents to the ed s/p episode of unresponsiveness this am, now responsive in ed, diffusely weak, h/o cva in the past, previously ambulatory with walker. Patient vss-calm, diffusely weak on exam, plan for labs, ua, ct, ekg, eval for stroke/hemorrhage, infections, elec disturbances, organ dysfunction, arrythmias, monitor, and reass. Patient presents to the ed s/p episode of unresponsiveness this am, now responsive in ed, diffusely weak, h/o cva in the past, previously ambulatory with walker. Patient vss-calm, diffusely weak on exam, plan for labs, ua, ct, ekg, eval for stroke/hemorrhage, infections, elec disturbances, organ dysfunction, arrythmias, monitor, and reass. Likely TBA for syncope w/u

## 2020-10-05 NOTE — ED PROVIDER NOTE - PHYSICAL EXAMINATION
GEN - NAD; well appearing; A+O x3   HEAD - NC/AT   EYES- PERRL, EOMI  ENT: Airway patent, mmm, Oral cavity and pharynx normal. No inflammation, swelling, exudate, or lesions.  NECK: Neck supple, non-tender without lymphadenopathy, no masses.  PULMONARY - CTA b/l, symmetric breath sounds.   CARDIAC -s1s2, RRR, no M,G,R  ABDOMEN - +BS, ND, NT, soft, no guarding, no rebound, no masses   BACK - no CVA tenderness, Normal  spine   EXTREMITIES - FROM, symmetric pulses, capillary refill < 2 seconds, no edema   SKIN - no rash or bruising   NEUROLOGIC - alert, strength- 3/5 lue, 4/5 rue, 0/5 lle, 1/5 rle, sensation, left side trace facial droop,   PSYCH -nl mood/affect, nl insight. vital signs: T(C): 36.9 (10-05-20 @ 13:59), Max: 36.9 (10-05-20 @ 13:59)  HR: 54 (10-05-20 @ 13:59) (54 - 54)  BP: 174/84 (10-05-20 @ 13:59) (174/84 - 174/84)  BP(mean): --  RR: 18 (10-05-20 @ 13:59) (18 - 18)  SpO2: 98% (10-05-20 @ 13:59) (98% - 98%)  GEN - NAD; answering questions, mostly yes or no answers.   HEAD - NC/AT   EYES- PERRL, EOMI  ENT: Airway patent, mmm, Oral cavity and pharynx normal. No inflammation, swelling, exudate, or lesions.    NECK: Neck supple, no masses.  PULMONARY - CTA b/l, symmetric breath sounds.   CARDIAC -s1s2, RRR, no M,G,R  ABDOMEN - +BS, ND, NT, soft, no guarding, no rebound, no masses   BACK - no CVA tenderness, Normal  spine   EXTREMITIES - FROM, no edema   SKIN - no rash or bruising   NEUROLOGIC - alert, strength- 3/5 lue, 4/5 rue, 1/5 lle, 1/5 rle, sensation grossly intact, limited exam due to difficulty cooperating with exam  PSYCH - calm

## 2020-10-05 NOTE — ED ADULT TRIAGE NOTE - CHIEF COMPLAINT QUOTE
Arrives from home for witnessed syncopal episode while eating at the table. Did not hit his head, as per EMS was bradycardic, hypotensive, and diaphoretic on arrival. PMH CVA with right side deficits, HLD, HTN. Sister outside [phone]

## 2020-10-05 NOTE — ED PROVIDER NOTE - EMPLOYMENT
Infant assessed. VSS. Breastfeeding well. Parents of infant educated regarding bulb syringe and emergency call light. POC discussed with parents of infant. All questions answered at this time.    N/A

## 2020-10-05 NOTE — ED PROVIDER NOTE - CARE PLAN
Principal Discharge DX:	Acute cystitis with hematuria   Principal Discharge DX:	Cerebrovascular accident (CVA), unspecified mechanism   Principal Discharge DX:	Cerebrovascular accident (CVA), unspecified mechanism  Secondary Diagnosis:	Urinary tract infection without hematuria, site unspecified

## 2020-10-05 NOTE — ED PROVIDER NOTE - PROGRESS NOTE DETAILS
PGY 1 [place]: CT showing acute vs subacute infarct. Case discussed w/ neurology, and they will see he pt. Hospitalist paged for admission

## 2020-10-05 NOTE — ED PROVIDER NOTE - PMH
Cerebrovascular accident (CVA), unspecified mechanism  With residual R sided weakness and expressive aphasia  Hyperlipidemia    Hypertension    Kidney stone  s/p lithotripsy  Lymphoproliferative disorder  B-Cell  Urinary tract infection without hematuria, site unspecified

## 2020-10-05 NOTE — ED ADULT NURSE NOTE - OBJECTIVE STATEMENT
As per triage note, pt had witnessed syncopal episode today while eating at the dinner table. Pt states that he is here because he legs hurt and he is unable to walk. A&Ox2. IVL in place via EMS. Bloods drawn. Safety maintained.

## 2020-10-05 NOTE — ED ADULT NURSE NOTE - NSIMPLEMENTINTERV_GEN_ALL_ED
Implemented All Fall with Harm Risk Interventions:  [place] to call system. Call bell, personal items and telephone within reach. Instruct patient to call for assistance. Room bathroom lighting operational. Non-slip footwear when patient is off stretcher. Physically safe environment: no spills, clutter or unnecessary equipment. Stretcher in lowest position, wheels locked, appropriate side rails in place. Provide visual cue, wrist band, yellow gown, etc. Monitor gait and stability. Monitor for mental status changes and reorient to person, place, and time. Review medications for side effects contributing to fall risk. Reinforce activity limits and safety measures with patient and family. Provide visual clues: red socks.

## 2020-10-05 NOTE — CONSULT NOTE ADULT - SUBJECTIVE AND OBJECTIVE BOX
INCOMPLETE    HPI:      NIHSS:   MRS:       REVIEW OF SYSTEMS    A 10-system ROS was performed and is negative except for those items noted above and/or in the HPI.    PAST MEDICAL & SURGICAL HISTORY:  Kidney stone  s/p lithotripsy    Urinary tract infection without hematuria, site unspecified    Cerebrovascular accident (CVA), unspecified mechanism  With residual R sided weakness and expressive aphasia    Lymphoproliferative disorder  B-Cell    Hyperlipidemia    Hypertension    History of cataract surgery      FAMILY HISTORY:  No pertinent family history in first degree relatives      SOCIAL HISTORY:   T/E/D:   Occupation:   Lives with:     MEDICATIONS (HOME):  Home Medications:  aspirin 81 mg oral delayed release tablet: 1 tab(s) orally once a day (2019 22:17)  atorvastatin 80 mg oral tablet: 1 tab(s) orally once a day (at bedtime) (2019 22:17)  cefuroxime 250 mg oral tablet: 1 tab(s) orally every 12 hours starting 6/26 x 9 days. last day 19 (2019 13:19)  docusate sodium 100 mg oral capsule: 1 cap(s) orally 2 times a day (2019 13:15)  finasteride 5 mg oral tablet: 1 tab(s) orally once a day (at bedtime) (2019 22:17)  gabapentin 300 mg oral capsule: 1 cap(s) orally once a day (at bedtime) (2019 13:15)  metoprolol: 12.5 milligram(s) orally once a day stared in hospital  for 41% ef hold for HR&lt; 60 hold for sbp&lt; 110 (2019 07:57)  senna oral tablet: 2 tab(s) orally once a day (at bedtime) (2019 13:15)    MEDICATIONS  (STANDING):    MEDICATIONS  (PRN):    ALLERGIES/INTOLERANCES:  Allergies  Levaquin (Rash)    Intolerances    VITALS & EXAMINATION:  INCOMPLETE    Vital Signs Last 24 Hrs  T(C): 36.4 (05 Oct 2020 19:55), Max: 36.9 (05 Oct 2020 13:59)  T(F): 97.5 (05 Oct 2020 19:55), Max: 98.4 (05 Oct 2020 13:59)  HR: 57 (05 Oct 2020 19:55) (54 - 57)  BP: 187/84 (05 Oct 2020 19:55) (161/81 - 187/84)  BP(mean): --  RR: 17 (05 Oct 2020 19:55) (17 - 19)  SpO2: 99% (05 Oct 2020 19:55) (98% - 100%)    General:  Constitutional: Obese Male, appears stated age, in no apparent distress including pain  Head: Normocephalic & atraumatic.  ENT: Patent ear canals, intact TM, mucus membranes moist & pink, neck supple, no lymphadenopathy.   Respiratory: Patent airway. All lung fields are clear to auscultation bilaterally.  Extremities: No cyanosis, clubbing, or edema.  Skin: No rashes, bruising, or discoloration.    Cardiovascular (>2): RRR no murmurs. Carotid pulsations symmetric, no bruits. Normal capillary beds refill, 1-2 seconds or less.     Neurological (>12):  MS: Awake, alert, oriented to person, place, situation, time. Normal affect. Follows all commands.    Language: Speech is clear, fluent with good repetition & comprehension (able to name objects___)    CNs: PERRLA (R = 3mm, L = 3mm). VFF. EOMI no nystagmus, no diplopia. V1-3 intact to LT/pinprick, well developed masseter muscles b/l. No facial asymmetry b/l, full eye closure strength b/l. Hearing grossly normal (rubbing fingers) b/l. Symmetric palate elevation in midline. Gag reflex deferred. Head turning & shoulder shrug intact b/l. Tongue midline, normal movements, no atrophy.    Fundoscopic: pale w/ sharp discs margins No vascular changes.      Motor: Normal muscle bulk & tone. No noticeable tremor or seizure. No pronator drift.              Deltoid	Biceps	Triceps	Wrist	Finger ABd	   R	5	5	5	5	5		5 	  L	5	5	5	5	5		5    	H-Flex	H-Ext	H-ABd	H-ADd	K-Flex	K-Ext	D-Flex	P-Flex  R	5	5	5	5	5	5	5	5 	   L	5	5	5	5	5	5	5	5	     Sensation: Intact to LT/PP/Temp/Vibration/Position b/l throughout.     Cortical: Extinction on DSS (neglect): none    Reflexes:              Biceps(C5)       BR(C6)     Triceps(C7)               Patellar(L4)    Achilles(S1)    Plantar Resp  R	2	          2	             2		        2		    2		Down   L	2	          2	             2		        2		    2		Down     Coordination: intact rapid-alt movements. No dysmetria to FTN/HTS    Gait: Normal Romberg. No postural instability. Normal stance and tandem gait.     LABORATORY:  CBC                       13.8   12.93 )-----------( 305      ( 05 Oct 2020 15:03 )             44.4     Chem 10-05    140  |  104  |  10  ----------------------------<  111<H>  4.4   |  27  |  1.00    Ca    10.0      05 Oct 2020 15:03    TPro  7.8  /  Alb  4.2  /  TBili  0.4  /  DBili  x   /  AST  20  /  ALT  14  /  AlkPhos  75  10-05    LFTs LIVER FUNCTIONS - ( 05 Oct 2020 15:03 )  Alb: 4.2 g/dL / Pro: 7.8 g/dL / ALK PHOS: 75 u/L / ALT: 14 u/L / AST: 20 u/L / GGT: x           Coagulopathy   Lipid Panel   A1c   Cardiac enzymes     U/A Urinalysis Basic - ( 05 Oct 2020 17:50 )    Color: LIGHT YELLOW / Appearance: CLEAR / S.010 / pH: 7.0  Gluc: NEGATIVE / Ketone: NEGATIVE  / Bili: NEGATIVE / Urobili: NORMAL   Blood: NEGATIVE / Protein: NEGATIVE / Nitrite: POSITIVE   Leuk Esterase: SMALL / RBC: 0-2 / WBC 3-5   Sq Epi: OCC / Non Sq Epi: x / Bacteria: MANY      CSF  Immunological  Other    STUDIES & IMAGING:  Studies (EKG, EEG, EMG, etc):     Radiology (XR, CT, MR, U/S, TTE/LEATHA):  < from: CTH w/o contrast, CT Angio Head/Neck w/ IV Cont (10.05.20 @ 19:15) >  IMPRESSION:  CT brain: Wedge-shaped area of hypodensity within the right basal ganglia/corona radiata region with mass effect on the right lateral ventricle, likely representing acute or subacute infarct.    CTA neck: No stenosis. No dissection.    CTA brain: Unchanged multifocal narrowing of the M1 segments bilaterally..    COMMENT:  Reference per NASCET criteria for degree of stenosis: Mild: less than 50% stenosis. Moderate: 50-69% stenosis. Severe: 70-94% stenosis. Near occlusion: 95-99% stenosis.    < end of copied text >   HPI: 75 year old left-handed M with a PMH of former tobacco use, HLD, HTN, and ischemic stroke who presented due to difficulty getting out of bed upon awakening. LKN 10 in AM. He noticed right lower extremity weakness at that time. He denies numbness, vision changes, or change in speech. He states that he does not take aspirin. He states that he lives alone. He ambulates with a walker at baseline.     Maritza Creole  name/ID: [id number]      NIHSS: 2 (motor drift RLE, mild left gaze palsy crosses midline)   MRS: 3      REVIEW OF SYSTEMS    A 10-system ROS was performed and is negative except for those items noted above and/or in the HPI.    PAST MEDICAL & SURGICAL HISTORY:  Kidney stone  s/p lithotripsy    Urinary tract infection without hematuria, site unspecified    Cerebrovascular accident (CVA), unspecified mechanism  With residual R sided weakness and expressive aphasia    Lymphoproliferative disorder  B-Cell    Hyperlipidemia    Hypertension    History of cataract surgery      FAMILY HISTORY:  No pertinent family history in first degree relatives      SOCIAL HISTORY:   T/E/D: former tobacco use, former alcohol use  Lives with: Lives alone    MEDICATIONS (HOME):  Home Medications:  aspirin 81 mg oral delayed release tablet: 1 tab(s) orally once a day (2019 22:17)  atorvastatin 80 mg oral tablet: 1 tab(s) orally once a day (at bedtime) (2019 22:17)  cefuroxime 250 mg oral tablet: 1 tab(s) orally every 12 hours starting 6/26 x 9 days. last day 19 (2019 13:19)  docusate sodium 100 mg oral capsule: 1 cap(s) orally 2 times a day (2019 13:15)  finasteride 5 mg oral tablet: 1 tab(s) orally once a day (at bedtime) (2019 22:17)  gabapentin 300 mg oral capsule: 1 cap(s) orally once a day (at bedtime) (2019 13:15)  metoprolol: 12.5 milligram(s) orally once a day stared in hospital  for 41% ef hold for HR&lt; 60 hold for sbp&lt; 110 (2019 07:57)  senna oral tablet: 2 tab(s) orally once a day (at bedtime) (2019 13:15)    MEDICATIONS  (STANDING):    MEDICATIONS  (PRN):    ALLERGIES/INTOLERANCES:  Allergies  Levaquin (Rash)    Intolerances    VITALS & EXAMINATION:  Vital Signs Last 24 Hrs  T(C): 36.4 (05 Oct 2020 19:55), Max: 36.9 (05 Oct 2020 13:59)  T(F): 97.5 (05 Oct 2020 19:55), Max: 98.4 (05 Oct 2020 13:59)  HR: 57 (05 Oct 2020 19:55) (54 - 57)  BP: 187/84 (05 Oct 2020 19:55) (161/81 - 187/84)  BP(mean): --  RR: 17 (05 Oct 2020 19:55) (17 - 19)  SpO2: 99% (05 Oct 2020 19:55) (98% - 100%)    General: Elderly Male, appears stated age, in no apparent distress including pain    Neurological (>12):  MS: Awake, alert, oriented to person, place, situation, time. Normal affect. Follows all commands, including crossed. Possible motor perseveration.    Language: Speech is clear, fluent with good repetition & comprehension (able to name objects)    CNs: PERRL (R = 3mm, L = 3mm). VFF by BTT. EOMI no nystagmus. V1-3 intact to LT. No facial asymmetry b/l. Hearing grossly normal (rubbing fingers) b/l. Tongue midline, normal movements, no atrophy.    Fundoscopic: deferred.      Motor: Normal muscle bulk & tone.   RUE: no motor drift  LUE: no motor drift  RLE: some effort against gravity  LLE: no motor drift	     Sensation: Intact to LT b/l throughout.     Cortical: Extinction on DSS (neglect): none    Reflexes:              Biceps(C5)       BR(C6)     Triceps(C7)               Patellar(L4)    Achilles(S1)    Plantar Resp  R	2	          2	             2		        1		    1		Up (appears tonically up)  L	2	          2	             2		        1		    1		Down     Coordination: No dysmetria to FTN    Gait: deferred    LABORATORY:  CBC                       13.8   12.93 )-----------( 305      ( 05 Oct 2020 15:03 )             44.4     Chem 10-05    140  |  104  |  10  ----------------------------<  111<H>  4.4   |  27  |  1.00    Ca    10.0      05 Oct 2020 15:03    TPro  7.8  /  Alb  4.2  /  TBili  0.4  /  DBili  x   /  AST  20  /  ALT  14  /  AlkPhos  75  10-05    LFTs LIVER FUNCTIONS - ( 05 Oct 2020 15:03 )  Alb: 4.2 g/dL / Pro: 7.8 g/dL / ALK PHOS: 75 u/L / ALT: 14 u/L / AST: 20 u/L / GGT: x           Coagulopathy   Lipid Panel   A1c   Cardiac enzymes     U/A Urinalysis Basic - ( 05 Oct 2020 17:50 )    Color: LIGHT YELLOW / Appearance: CLEAR / S.010 / pH: 7.0  Gluc: NEGATIVE / Ketone: NEGATIVE  / Bili: NEGATIVE / Urobili: NORMAL   Blood: NEGATIVE / Protein: NEGATIVE / Nitrite: POSITIVE   Leuk Esterase: SMALL / RBC: 0-2 / WBC 3-5   Sq Epi: OCC / Non Sq Epi: x / Bacteria: MANY      CSF  Immunological  Other    STUDIES & IMAGING:  Studies (EKG, EEG, EMG, etc):     Radiology (XR, CT, MR, U/S, TTE/LEATHA):  < from: CTH w/o contrast, CT Angio Head/Neck w/ IV Cont (10.05.20 @ 19:15) >  IMPRESSION:  CT brain: Wedge-shaped area of hypodensity within the right basal ganglia/corona radiata region with mass effect on the right lateral ventricle, likely representing acute or subacute infarct.    CTA neck: No stenosis. No dissection.    CTA brain: Unchanged multifocal narrowing of the M1 segments bilaterally..    COMMENT:  Reference per NASCET criteria for degree of stenosis: Mild: less than 50% stenosis. Moderate: 50-69% stenosis. Severe: 70-94% stenosis. Near occlusion: 95-99% stenosis.    < end of copied text >   HPI: 75 year old left-handed M with a PMH of former tobacco use, HLD, HTN, and ischemic stroke who presented due to difficulty getting out of bed upon awakening. LKN 10/4 in AM. He noticed right lower extremity weakness at that time. As per the chart, the patient had lost consciousness while sitting at the table in the AM. He denies numbness, vision changes, or change in speech. He states that he does not take aspirin. He states that he lives alone. He ambulates with a walker at baseline.     Maritza Creole  name/ID: Irwin/[id number]      NIHSS: 2 (motor drift RLE, mild left gaze palsy crosses midline)   MRS: 3      REVIEW OF SYSTEMS    A 10-system ROS was performed and is negative except for those items noted above and/or in the HPI.    PAST MEDICAL & SURGICAL HISTORY:  Kidney stone  s/p lithotripsy    Urinary tract infection without hematuria, site unspecified    Cerebrovascular accident (CVA), unspecified mechanism  With residual R sided weakness and expressive aphasia    Lymphoproliferative disorder  B-Cell    Hyperlipidemia    Hypertension    History of cataract surgery      FAMILY HISTORY:  No pertinent family history in first degree relatives      SOCIAL HISTORY:   T/E/D: former tobacco use, former alcohol use  Lives with: Lives alone    MEDICATIONS (HOME):  Home Medications:  aspirin 81 mg oral delayed release tablet: 1 tab(s) orally once a day (2019 22:17)  atorvastatin 80 mg oral tablet: 1 tab(s) orally once a day (at bedtime) (2019 22:17)  cefuroxime 250 mg oral tablet: 1 tab(s) orally every 12 hours starting 6/26 x 9 days. last day 19 (2019 13:19)  docusate sodium 100 mg oral capsule: 1 cap(s) orally 2 times a day (2019 13:15)  finasteride 5 mg oral tablet: 1 tab(s) orally once a day (at bedtime) (2019 22:17)  gabapentin 300 mg oral capsule: 1 cap(s) orally once a day (at bedtime) (2019 13:15)  metoprolol: 12.5 milligram(s) orally once a day stared in hospital  for 41% ef hold for HR&lt; 60 hold for sbp&lt; 110 (2019 07:57)  senna oral tablet: 2 tab(s) orally once a day (at bedtime) (2019 13:15)    MEDICATIONS  (STANDING):    MEDICATIONS  (PRN):    ALLERGIES/INTOLERANCES:  Allergies  Levaquin (Rash)    Intolerances    VITALS & EXAMINATION:  Vital Signs Last 24 Hrs  T(C): 36.4 (05 Oct 2020 19:55), Max: 36.9 (05 Oct 2020 13:59)  T(F): 97.5 (05 Oct 2020 19:55), Max: 98.4 (05 Oct 2020 13:59)  HR: 57 (05 Oct 2020 19:55) (54 - 57)  BP: 187/84 (05 Oct 2020 19:55) (161/81 - 187/84)  BP(mean): --  RR: 17 (05 Oct 2020 19:55) (17 - 19)  SpO2: 99% (05 Oct 2020 19:55) (98% - 100%)    General: Elderly Male, appears stated age, in no apparent distress including pain    Neurological (>12):  MS: Awake, alert, oriented to person, place, situation, time. Normal affect. Follows all commands, including crossed. Possible motor perseveration.    Language: Speech is clear, fluent with good repetition & comprehension (able to name objects)    CNs: PERRL (R = 3mm, L = 3mm). VFF by BTT. EOMI no nystagmus. V1-3 intact to LT. No facial asymmetry b/l. Hearing grossly normal (rubbing fingers) b/l. Tongue midline, normal movements, no atrophy.    Fundoscopic: deferred.      Motor: Normal muscle bulk & tone.   RUE: no motor drift  LUE: no motor drift  RLE: some effort against gravity  LLE: no motor drift	     Sensation: Intact to LT b/l throughout.     Cortical: Extinction on DSS (neglect): none    Reflexes:              Biceps(C5)       BR(C6)     Triceps(C7)               Patellar(L4)    Achilles(S1)    Plantar Resp  R	2	          2	             2		        1		    1		Up (appears tonically up)  L	2	          2	             2		        1		    1		Down     Coordination: No dysmetria to FTN    Gait: deferred    LABORATORY:  CBC                       13.8   12.93 )-----------( 305      ( 05 Oct 2020 15:03 )             44.4     Chem 10-05    140  |  104  |  10  ----------------------------<  111<H>  4.4   |  27  |  1.00    Ca    10.0      05 Oct 2020 15:03    TPro  7.8  /  Alb  4.2  /  TBili  0.4  /  DBili  x   /  AST  20  /  ALT  14  /  AlkPhos  75  10-05    LFTs LIVER FUNCTIONS - ( 05 Oct 2020 15:03 )  Alb: 4.2 g/dL / Pro: 7.8 g/dL / ALK PHOS: 75 u/L / ALT: 14 u/L / AST: 20 u/L / GGT: x           Coagulopathy   Lipid Panel   A1c   Cardiac enzymes     U/A Urinalysis Basic - ( 05 Oct 2020 17:50 )    Color: LIGHT YELLOW / Appearance: CLEAR / S.010 / pH: 7.0  Gluc: NEGATIVE / Ketone: NEGATIVE  / Bili: NEGATIVE / Urobili: NORMAL   Blood: NEGATIVE / Protein: NEGATIVE / Nitrite: POSITIVE   Leuk Esterase: SMALL / RBC: 0-2 / WBC 3-5   Sq Epi: OCC / Non Sq Epi: x / Bacteria: MANY      CSF  Immunological  Other    STUDIES & IMAGING:  Studies (EKG, EEG, EMG, etc):     Radiology (XR, CT, MR, U/S, TTE/LEATHA):  < from: CTH w/o contrast, CT Angio Head/Neck w/ IV Cont (10.05.20 @ 19:15) >  IMPRESSION:  CT brain: Wedge-shaped area of hypodensity within the right basal ganglia/corona radiata region with mass effect on the right lateral ventricle, likely representing acute or subacute infarct.    CTA neck: No stenosis. No dissection.    CTA brain: Unchanged multifocal narrowing of the M1 segments bilaterally..    COMMENT:  Reference per NASCET criteria for degree of stenosis: Mild: less than 50% stenosis. Moderate: 50-69% stenosis. Severe: 70-94% stenosis. Near occlusion: 95-99% stenosis.    < end of copied text >

## 2020-10-05 NOTE — ED PROVIDER NOTE - NS ED ROS FT
ROS:  GENERAL: No fever, no chills  EYES: no change in vision  HEENT: no trouble swallowing, no trouble speaking  CARDIAC: no chest pain  PULMONARY: no cough, no shortness of breath  GI: no abdominal pain, no nausea, no vomiting, no diarrhea, no constipation  : No dysuria, no frequency, no change in appearance, or odor of urine  SKIN: no rashes  NEURO: no headache, no weakness  MSK: No joint pain ROS:  GENERAL: No fever, no chills  EYES: no change in vision  HEENT: no trouble swallowing, no trouble speaking  CARDIAC: no chest pain  PULMONARY: no cough, no shortness of breath  GI: no abdominal pain, no nausea, no vomiting, no diarrhea, no constipation  : No dysuria, no frequency, no change in appearance, or odor of urine  SKIN: no rashes  NEURO: no headache  MSK: No joint pain

## 2020-10-05 NOTE — CONSULT NOTE ADULT - ASSESSMENT
INCOMPLETE    Assessment: 75 year old   CTH w/o significant for acute to subacute right BG/CR ischemic infarct and chronic left NURA territory/BG infarcts. CTA H/N w/ significant for bilateral multifocal M1 stenosis.     Impression: Left hemiparesis secondary to right basal ganglia/corona radiata acute ischemic infarct possibly secondary to small vessel disease () versus symptomatic intracranial M1 stenosis (DARCI). Risk factors: age, HTN, HLD, prior ischemic stroke      Plan:  Imaging:  MRI brain w/o contrast  TTE     Medications:  Aspirin 81 mg daily for now  Atorvastatin 80 mg nightly (titrate for LDL<70)    Labs:  Hemoglobin A1c, lipid panel    Other:  Clarify degree of right M1 stenosis with radiology. If stenosis is greater than 70%, then patient should be treated according to [facility] protocol.  Permissive HTN for 24 hours (BP not to exceed 220/120), then gradual normotension  Telemetry monitoring   Neurochecks q4h  PT/OT  Dysphagia screen, if fails then speech and swallow evaluation  Blood glucose goal 120-180  LDL goal <70    Case to be discussed with stroke attending, Dr. Robertson   Assessment: 75 year old left-handed M with a PMH of former tobacco use, HLD, HTN, and ischemic stroke who presented due to difficulty getting out of bed upon awakening. LKN 10/4 in AM. Initial vital signs significant for BP of 174/84 and HR of 54. Initial labs significant for WBC of 12.93 and a positive urinalysis. CTH w/o significant for acute to subacute right BG/CR ischemic infarct and chronic left NURA territory/BG infarcts. CTA H/N w/ significant for bilateral multifocal M1 stenosis.     Impression: Left hemiparesis secondary to right basal ganglia/corona radiata acute ischemic infarct possibly secondary to small vessel disease () versus symptomatic intracranial M1 stenosis (DARCI). Risk factors: age, HTN, HLD, prior ischemic stroke      Plan:  Imaging:  MRI brain w/o contrast  TTE     Medications:  Aspirin 81 mg daily for now  Atorvastatin 80 mg nightly (titrate for LDL<70)    Labs:  Hemoglobin A1c, lipid panel    Other:  Clarify degree of right M1 stenosis with radiology. If right M1 stenosis is greater than 70%, then patient should be treated according to [facility] protocol (aspirin daily indefinitely, plavix load of 300 mg once, then plavix 75 mg daily for three months only, aggressive vascular risk factor control).  Permissive HTN for 24 hours (BP not to exceed 220/120), then gradual normotension  Treatment of UTI as per primary team  Telemetry monitoring   Neurochecks q4h  PT/OT  Dysphagia screen, if fails then speech and swallow evaluation  Blood glucose goal 120-180  LDL goal <70    Case to be discussed with stroke attending, Dr. Robertson   Assessment: 75 year old left-handed M with a PMH of former tobacco use, HLD, HTN, and ischemic stroke who presented due to difficulty getting out of bed upon awakening. LKN 10/4 in AM. Initial vital signs significant for BP of 174/84 and HR of 54. Physical exam significant for RLE weakness.  Initial labs significant for WBC of 12.93 and a positive urinalysis. CTH w/o significant for acute to subacute right BG/CR ischemic infarct and chronic left NURA territory/BG infarcts. CTA H/N w/ significant for bilateral multifocal M1 stenosis.     Impression: 1) Worsening right lower extremity weakness likely secondary to recrudescence (prior left NURA territory infarct) in setting of UTI.   2) Mild left gaze palsy probably secondary to new right basal ganglia/corona radiata acute ischemic infarct which is possibly secondary to small vessel disease () versus intracranial M1 stenosis (DARCI).   Risk factors: age, HTN, HLD, prior ischemic stroke      Plan:  Imaging:  MRI brain w/o contrast  TTE     Medications:  Aspirin 81 mg  Atorvastatin 80 mg nightly (titrate for LDL<70)    Labs:  Hemoglobin A1c, lipid panel    Other:  Clarify degree of right M1 stenosis with radiology. If right M1 stenosis is greater than 70%, then patient should be treated according to [facility] protocol (aspirin daily indefinitely, plavix load of 300 mg once, then plavix 75 mg daily for three months only, aggressive vascular risk factor control).  Permissive HTN for 24 hours (BP not to exceed 220/120), then gradual normotension  Treatment of UTI as per primary team  Telemetry monitoring   Neurochecks q4h  PT/OT  Dysphagia screen, if fails then speech and swallow evaluation  Blood glucose goal 120-180  LDL goal <70    Case to be discussed with stroke attending, Dr. Robertson   Assessment: 75 year old left-handed M with a PMH of former tobacco use, HLD, HTN, and ischemic stroke who presented due to difficulty getting out of bed upon awakening. LKN 10/4 in AM. Initial vital signs significant for BP of 174/84 and HR of 54. Physical exam significant for RLE weakness.  Initial labs significant for WBC of 12.93 and a positive urinalysis. CTH w/o significant for acute to subacute right BG/CR ischemic infarct and chronic left NURA territory/BG infarcts. CTA H/N w/ significant for bilateral multifocal M1 stenosis.     Impression: 1) Worsening right lower extremity weakness likely secondary to recrudescence (prior left NURA territory infarct) in setting of UTI.   2) Mild left gaze palsy probably secondary to new right basal ganglia/corona radiata acute ischemic infarct which is possibly secondary to small vessel disease () versus intracranial M1 stenosis (DARCI).   Risk factors: age, HTN, HLD, prior ischemic stroke      Plan:  Imaging:  MRI brain w/o contrast  TTE     Medications:  Aspirin 81 mg  Atorvastatin 80 mg nightly (titrate for LDL<70)    Labs:  Hemoglobin A1c, lipid panel    Other:  Clarify degree of right M1 stenosis with radiology. If right M1 stenosis is greater than 70%, then patient should be treated according to [facility] protocol (aspirin daily indefinitely, plavix load of 300 mg once, then plavix 75 mg daily for three months only, aggressive vascular risk factor control).  Permissive HTN for 24 hours (BP not to exceed 220/120), then gradual normotension  Treatment of UTI and syncope workup as per primary team  Telemetry monitoring   Neurochecks q4h  PT/OT  Dysphagia screen, if fails then speech and swallow evaluation  Blood glucose goal 120-180  LDL goal <70    Case to be discussed with stroke attending, Dr. Robertson

## 2020-10-05 NOTE — ED ADULT NURSE NOTE - CHIEF COMPLAINT QUOTE
Arrives from home for witnessed syncopal episode while eating at the table. Did not hit his head, as per EMS was bradycardic, hypotensive, and diaphoretic on arrival. PMH CVA with right side deficits, HLD, HTN. Sister outside [phone] Warm

## 2020-10-06 NOTE — OCCUPATIONAL THERAPY INITIAL EVALUATION ADULT - PERTINENT HX OF CURRENT PROBLEM, REHAB EVAL
Pt is a 75 year old male with hx of CVA (2018, Residual right sided weakness), HTN, HLD, & B-cell lymphoproliferative disorder, who presented to [facility] on 10/5/2020 Right sided weakness. CT Brain Scan on 10/5/2020 displayed wedge-shaped area of hypodensity within the right basal ganglia/corona radiata region with mass effect on the right lateral ventricle, likely representing acute or subacute infarct.

## 2020-10-06 NOTE — SWALLOW BEDSIDE ASSESSMENT ADULT - COMMENTS
H&P: 74 y/o M with PMH of CVA (2018, Residual right sided weakness), HTN, HLD, B cell lymphoproliferative disorder presents to the ED with Right sided weakness. R/O Stroke. and UTI. Discussed with Dr. Richardson.    CXR 10/5/20: clear lungs  CTH 10/5/20: Wedge-shaped hypodensity within the right basal ganglia/corona radiata region with mass effect on the right lateral ventricle, likely representing acute or subacute infarct.     Clinical bedside swallow evaluation completed during previous admission on 1/27/2018 recommended mechanical soft solids and nectar thick liquids (see note for details).     Patient was seen upright at bedside. [employer]  utilized for [facility] Interpretation (ID#[id number]). Patient was in alert/awake state and inconsistently responding to simple questions (i.e. able to state name, not birth date). Patient inconsistently able to follow simple directions during oral mechanism examination.    Of Note, patient in abnormal oral movements prior to PO trials (i.e. opening and closing mouth/ sticking out tongue). Upon questioning, patient denies actions and denies discomfort

## 2020-10-06 NOTE — SWALLOW BEDSIDE ASSESSMENT ADULT - SWALLOW EVAL: DIAGNOSIS
Patient presents with Mild to Moderate Oropharyngeal Dysphagia. The Oral Phase was marked by adequate stripping of bolus from utensil, adequate oral containment, extended mastication for regular solids with slow bolus manipulation, suspected posterior loss for thin liquids. Reduced oral clearance for solids though liquid wash provides adequate clearance. The Pharyngeal Phase was marked by laryngeal elevation upon digital palpation with suspected delay in initiation of pharyngeal swallow for thin liquids. There was evidence of cough response subsequent deglutition of thin liquids suggestive of laryngeal penetration/aspiration. There was no overt s/s of laryngeal penetration/aspiration for puree consistency, mechanical soft solids, regular solids, honey thick liquids and nectar thick liquids.

## 2020-10-06 NOTE — PROGRESS NOTE ADULT - PROBLEM SELECTOR PLAN 7
Lovenox ppx  PT/OP, suspect will need LIBERTAD however sister doesn't think she wants it due to COVID, she has a private hire - OP Heme follow-up (Dr. Davis is OP doctor), 3 months

## 2020-10-06 NOTE — OCCUPATIONAL THERAPY INITIAL EVALUATION ADULT - LEVEL OF INDEPENDENCE: SIT/SUPINE, REHAB EVAL
Not assessed. Pt left sitting in chair near bed. No acute distress. Call bell in reach. All lines intact and precautions maintained. RNJavy made aware.

## 2020-10-06 NOTE — OCCUPATIONAL THERAPY INITIAL EVALUATION ADULT - DIAGNOSIS, OT EVAL
r/o CVA; Expressive aphasia; Decreased ability to follow commands; Decreased Right UE Active ROM; Decreased standing balance; Decreased functional mobility; Decreased ADL management

## 2020-10-06 NOTE — PROGRESS NOTE ADULT - PROBLEM SELECTOR PLAN 3
s/p 500 cc NS; 2.2 to 1.8 am of 10/6  - resolved EF 41% presumed 2/2 prior MI, had stress test 2019 prior infarct to the inferiolateral wall   - on metoprolol tartate, unclear if can swallow whole succinate, can consider changing to coreg  - will need initiation of ace/arb  - f/u repeat LEATHA  - c/w asa and statin

## 2020-10-06 NOTE — CONSULT NOTE ADULT - SUBJECTIVE AND OBJECTIVE BOX
Patient is a 75y old  Male who presents with a chief complaint of CVA, UTI (06 Oct 2020 11:38)      HPI:    PAST MEDICAL & SURGICAL HISTORY:  Kidney stone  s/p lithotripsy    Urinary tract infection without hematuria, site unspecified    Cerebrovascular accident (CVA), unspecified mechanism  With residual R sided weakness and expressive aphasia    Lymphoproliferative disorder  B-Cell    Hyperlipidemia    Hypertension    History of cataract surgery        FAMILY HISTORY:  FH: hypertension        Home Medications:  aspirin 81 mg oral delayed release tablet: 1 tab(s) orally once a day (21 Jun 2019 22:17)  atorvastatin 80 mg oral tablet: 1 tab(s) orally once a day (at bedtime) (21 Jun 2019 22:17)  docusate sodium 100 mg oral capsule: 1 cap(s) orally 2 times a day (25 Jun 2019 13:15)  finasteride 5 mg oral tablet: 1 tab(s) orally once a day (at bedtime) (21 Jun 2019 22:17)  gabapentin 300 mg oral capsule: 1 cap(s) orally once a day (at bedtime) (25 Jun 2019 13:15)  metoprolol: 12.5 milligram(s) orally once a day stared in hospital 6/26 for 41% ef hold for HR&lt; 60 hold for sbp&lt; 110 (27 Jun 2019 07:57)      Medications:  aspirin enteric coated 81 milliGRAM(s) Oral daily  atorvastatin 80 milliGRAM(s) Oral at bedtime  carvedilol 3.125 milliGRAM(s) Oral <User Schedule>  cefTRIAXone   IVPB 1000 milliGRAM(s) IV Intermittent every 24 hours  enoxaparin Injectable 40 milliGRAM(s) SubCutaneous at bedtime  finasteride 5 milliGRAM(s) Oral daily  gabapentin 300 milliGRAM(s) Oral at bedtime  pantoprazole    Tablet 40 milliGRAM(s) Oral before breakfast  polyethylene glycol 3350 17 Gram(s) Oral daily  sodium chloride 0.9% lock flush 3 milliLiter(s) IV Push every 8 hours      Review of systems:  10 point review of systems completed and are negative unless noted in HPI    Vitals:  T(C): 36.6 (10-06-20 @ 13:46), Max: 36.6 (10-06-20 @ 13:46)  HR: 79 (10-06-20 @ 13:46) (57 - 79)  BP: 153/88 (10-06-20 @ 13:46) (151/79 - 187/84)  BP(mean): --  RR: 18 (10-06-20 @ 13:46) (17 - 18)  SpO2: 100% (10-06-20 @ 13:46) (99% - 100%)    Daily Height in cm: 170.18 (06 Oct 2020 02:00)    Daily     Weight (kg): 65.8 (10-06 @ 02:00)    I&O's Summary      Physical Exam:  Appearance: No Acute Distress  Cardiovascular: Normal S1 S2, No murmurs/rubs/gallops  Respiratory: Clear to auscultation bilaterally  Gastrointestinal: Soft, Non-tender	  Skin: No cyanosis	  Neurologic: Non-focal  Extremities: No LE edema  Psychiatry: A & O x 2 Mood & affect appropriate    Labs:                        13.6   11.64 )-----------( 314      ( 06 Oct 2020 06:05 )             42.6     10-06    142  |  101  |  10  ----------------------------<  78  3.9   |  29  |  1.06    Ca    9.8      06 Oct 2020 06:05  Phos  3.6     10-06  Mg     2.1     10-06    TPro  7.8  /  Alb  4.2  /  TBili  0.4  /  DBili  x   /  AST  20  /  ALT  14  /  AlkPhos  75  10-05              TELEMETRY:    Echocardiogram:    EKG: Patient is a 75y old  Male who presents with a chief complaint of CVA, UTI (06 Oct 2020 11:38)      HPI:  76 y/o Male with PMH of CVA (2018, Residual right sided weakness), HTN, HLD, B cell lymphoproliferative disorder. Patient presented to the ED with right sided ARM weakness/ left side leg weakness and difficulty ambulating this morning (witnessed by sister); bradycardic/hypotensive and confused upon arrival. History was obtained from Sister Nathalia ([phone]). She states that patient was eating lunch when some of the food starting coming out of his mouth, patient became unresponsive while sitting in chair.   Head CT AND MRI report above.  EP consult request for ILR.  Procedure reviewed/discussed with patient sister, all questions answered, consent obtained.       PAST MEDICAL & SURGICAL HISTORY:  Kidney stone  s/p lithotripsy    Urinary tract infection without hematuria, site unspecified    Cerebrovascular accident (CVA), unspecified mechanism  With residual R sided weakness and expressive aphasia    Lymphoproliferative disorder  B-Cell    Hyperlipidemia    Hypertension    History of cataract surgery        FAMILY HISTORY:  FH: hypertension        Home Medications:  aspirin 81 mg oral delayed release tablet: 1 tab(s) orally once a day (21 Jun 2019 22:17)  atorvastatin 80 mg oral tablet: 1 tab(s) orally once a day (at bedtime) (21 Jun 2019 22:17)  docusate sodium 100 mg oral capsule: 1 cap(s) orally 2 times a day (25 Jun 2019 13:15)  finasteride 5 mg oral tablet: 1 tab(s) orally once a day (at bedtime) (21 Jun 2019 22:17)  gabapentin 300 mg oral capsule: 1 cap(s) orally once a day (at bedtime) (25 Jun 2019 13:15)  metoprolol: 12.5 milligram(s) orally once a day stared in hospital 6/26 for 41% ef hold for HR&lt; 60 hold for sbp&lt; 110 (27 Jun 2019 07:57)      Medications:  aspirin enteric coated 81 milliGRAM(s) Oral daily  atorvastatin 80 milliGRAM(s) Oral at bedtime  carvedilol 3.125 milliGRAM(s) Oral <User Schedule>  cefTRIAXone   IVPB 1000 milliGRAM(s) IV Intermittent every 24 hours  enoxaparin Injectable 40 milliGRAM(s) SubCutaneous at bedtime  finasteride 5 milliGRAM(s) Oral daily  gabapentin 300 milliGRAM(s) Oral at bedtime  pantoprazole    Tablet 40 milliGRAM(s) Oral before breakfast  polyethylene glycol 3350 17 Gram(s) Oral daily  sodium chloride 0.9% lock flush 3 milliLiter(s) IV Push every 8 hours      Review of systems:  10 point review of systems completed and are negative unless noted in HPI    Vitals:  T(C): 36.6 (10-06-20 @ 13:46), Max: 36.6 (10-06-20 @ 13:46)  HR: 79 (10-06-20 @ 13:46) (57 - 79)  BP: 153/88 (10-06-20 @ 13:46) (151/79 - 187/84)  BP(mean): --  RR: 18 (10-06-20 @ 13:46) (17 - 18)  SpO2: 100% (10-06-20 @ 13:46) (99% - 100%)    Daily Height in cm: 170.18 (06 Oct 2020 02:00)    Daily     Weight (kg): 65.8 (10-06 @ 02:00)    I&O's Summary      Physical Exam:  Appearance: No Acute Distress  Cardiovascular: Normal S1 S2, No murmurs/rubs/gallops  Respiratory: Clear to auscultation bilaterally  Gastrointestinal: Soft, Non-tender	  Skin: No cyanosis	  Neurologic: Non-focal  Extremities: No LE edema  Psychiatry: A & O x 2 Mood & affect appropriate    Labs:                        13.6   11.64 )-----------( 314      ( 06 Oct 2020 06:05 )             42.6     10-06    142  |  101  |  10  ----------------------------<  78  3.9   |  29  |  1.06    Ca    9.8      06 Oct 2020 06:05  Phos  3.6     10-06  Mg     2.1     10-06    TPro  7.8  /  Alb  4.2  /  TBili  0.4  /  DBili  x   /  AST  20  /  ALT  14  /  AlkPhos  75  10-05      TELEMETRY: Sinus Rhythm    Echocardiogram:  Completed/Results pending    EKG:  < from: 12 Lead ECG (10.05.20 @ 14:21) >  Ventricular Rate 53 BPM    Atrial Rate 53 BPM    P-R Interval 148 ms    QRS Duration 68 ms    Q-T Interval 448 ms    QTC Calculation(Bazett) 420 ms    P Axis 69 degrees    R Axis 28 degrees    T Axis 68 degrees    Diagnosis Line Sinus bradycardia  Nonspecific T wave abnormality  Abnormal ECG    < from: CT Angio Neck w/ IV Cont (10.05.20 @ 19:15) >  FINDINGS:    CT brain:  Wedge-shaped hypodensity within the right basal ganglia/corona radiata region with mass effect on the right lateral ventricle, likely representing acute or subacute infarct.    Chronic left anterior cerebral artery territory and left basal ganglia infarcts.    There is no hydrocephalus,midline shift, or acute intracranial hemorrhage. No extra-axial collection.    The visualized paranasal sinuses and mastoid air cells are clear.    The calvarium is intact.    Status post bilateral lens replacement    CTA neck:  The visualized aorta and great vessels demonstrate no significant stenosis.  The common carotid arteries demonstrate no significant stenosis.  The internal carotid arteries and external carotid arteries demonstrate no significant stenosis.  The vertebral arteries demonstrate no significant stenosis.    All measurements are performed using standard NASCET criteria.    CTA brain:  The distal internal carotid arteries are patent.  The [place] is normal in appearance. Hypoplastic right P1 segment.  Areas of multifocal narrowing alongthe course of the M1 segments of the middle cerebral arteries bilaterally., right greater than left. The vertebrobasilar junction and basilar artery are normal.    IMPRESSION:  CT brain: Wedge-shaped area of hypodensity within the right basal ganglia/corona radiata region with mass effect on the right lateral ventricle, likely representing acute or subacute infarct.    CTA neck: No stenosis. No dissection.    CTA brain: Unchanged multifocal narrowing of the M1 segments bilaterally..

## 2020-10-06 NOTE — PROGRESS NOTE ADULT - PROBLEM SELECTOR PLAN 4
Per Neuro Permissive HTN UP to 220/110 for first 24 hours  -  this am, can likely resume BP meds later today s/p 500 cc NS; 2.2 to 1.8 am of 10/6  - resolved

## 2020-10-06 NOTE — SWALLOW BEDSIDE ASSESSMENT ADULT - ADDITIONAL RECOMMENDATIONS
1. Monitor for PO tolerance/intake  2. Monitor for any change in neurological status that may impact oral intake   3. Reconsult this service as overall medical status improves to re-assess for possible diet advacement

## 2020-10-06 NOTE — SWALLOW BEDSIDE ASSESSMENT ADULT - SWALLOW EVAL: RECOMMENDED FEEDING/EATING TECHNIQUES
alternate food with liquid/small sips/bites/maintain upright posture during/after eating for 30 mins/oral hygiene/position upright (90 degrees)/no straws

## 2020-10-06 NOTE — OCCUPATIONAL THERAPY INITIAL EVALUATION ADULT - LIVES WITH, PROFILE
Pt. reports he lives with his sister in a house with 2 steps to enter. Once inside, pt. reports bedroom and bathroom are located on the main level. Per pt., he has a bathtub in his bathroom with grab bar and shower chair available.

## 2020-10-06 NOTE — PROGRESS NOTE ADULT - SUBJECTIVE AND OBJECTIVE BOX
Patient is a 75y old  Male who presents with a chief complaint of R/O stroke, UTI    SUBJECTIVE / OVERNIGHT EVENTS:    Feels well, no CP, SOB, f/c/n/v  Per RN assisted patient to bathroom and required full assistance, very weak  Seen by S&S  Pt reports "2 people in the room" not refering to myself, PA or RN    MEDICATIONS  (STANDING):  aspirin enteric coated 81 milliGRAM(s) Oral daily  atorvastatin 80 milliGRAM(s) Oral at bedtime  cefTRIAXone   IVPB 1000 milliGRAM(s) IV Intermittent every 24 hours  enoxaparin Injectable 40 milliGRAM(s) SubCutaneous at bedtime  finasteride 5 milliGRAM(s) Oral daily  gabapentin 300 milliGRAM(s) Oral at bedtime  pantoprazole    Tablet 40 milliGRAM(s) Oral before breakfast  polyethylene glycol 3350 17 Gram(s) Oral daily  sodium chloride 0.9% lock flush 3 milliLiter(s) IV Push every 8 hours    T(C): 36.2 (10-06-20 @ 02:00), Max: 36.9 (10-05-20 @ 13:59)  HR: 62 (10-06-20 @ 02:00) (54 - 62)  BP: 151/79 (10-06-20 @ 02:00) (151/79 - 187/84)  RR: 18 (10-06-20 @ 02:00) (17 - 19)  SpO2: 100% (10-06-20 @ 02:00) (98% - 100%)    PHYSICAL EXAM:  GENERAL: NAD  HEAD:  Atraumatic, Normocephalic  EYES: EOMI, PERRLA, conjunctiva and sclera clear  NECK: Supple, No JVD  CHEST/LUNG: Clear to auscultation bilaterally; No wheeze  HEART: Regular rate and rhythm; No murmurs, rubs, or gallops  ABDOMEN: Soft, Nontender, Nondistended; Bowel sounds present  EXTREMITIES:   warm and well perfused, No clubbing, cyanosis, or edema  PSYCH: AAOx3  NEUROLOGY: non-focal  SKIN: No rashes or lesions    LABS:                        13.6   11.64 )-----------( 314      ( 06 Oct 2020 06:05 )             42.6     10-    142  |  101  |  10  ----------------------------<  78  3.9   |  29  |  1.06    Ca    9.8      06 Oct 2020 06:05  Phos  3.6     10-06  Mg     2.1     10-06    TPro  7.8  /  Alb  4.2  /  TBili  0.4  /  DBili  x   /  AST  20  /  ALT  14  /  AlkPhos  75  10-05    Urinalysis Basic - ( 05 Oct 2020 17:50 )  Color: LIGHT YELLOW / Appearance: CLEAR / S.010 / pH: 7.0  Gluc: NEGATIVE / Ketone: NEGATIVE  / Bili: NEGATIVE / Urobili: NORMAL   Blood: NEGATIVE / Protein: NEGATIVE / Nitrite: POSITIVE   Leuk Esterase: SMALL / RBC: 0-2 / WBC 3-5   Sq Epi: OCC / Non Sq Epi: x / Bacteria: MANY    Consultant(s) Notes Reviewed: neurology   Care Discussed with Consultants/Other Providers:   Patient is a 75y old  Male who presents with a chief complaint of R/O stroke, UTI    SUBJECTIVE / OVERNIGHT EVENTS:    Feels well, no CP, SOB, f/c/n/v  Per RN assisted patient to bathroom and required full assistance, very weak  Seen by S&S  Pt reports "2 people in the room" not referring to myself, PA or RN    Per sister walked to lunch table 30 feet, had trouble with L leg  Had half of food and then she saw the food fall out of his mouth and he was not answering, not answering  Reports EMS came and check vitals and OK but "sugar low"--79  Never happened, no shaking during this episode, was not moving   Just her taking care of him, no BEASLEY  PCP James Salgado; no PPM, no metal in body     MEDICATIONS  (STANDING):  aspirin enteric coated 81 milliGRAM(s) Oral daily  atorvastatin 80 milliGRAM(s) Oral at bedtime  cefTRIAXone   IVPB 1000 milliGRAM(s) IV Intermittent every 24 hours  enoxaparin Injectable 40 milliGRAM(s) SubCutaneous at bedtime  finasteride 5 milliGRAM(s) Oral daily  gabapentin 300 milliGRAM(s) Oral at bedtime  pantoprazole Tablet 40 milliGRAM(s) Oral before breakfast  polyethylene glycol 3350 17 Gram(s) Oral daily  sodium chloride 0.9% lock flush 3 milliLiter(s) IV Push every 8 hours    T(C): 36.2 (10-06-20 @ 02:00), Max: 36.9 (10-05-20 @ 13:59)  HR: 62 (10-06-20 @ 02:00) (54 - 62)  BP: 151/79 (10-06-20 @ 02:00) (151/79 - 187/84)  RR: 18 (10-06-20 @ 02:00) (17 - 19)  SpO2: 100% (10-06-20 @ 02:00) (98% - 100%)    PHYSICAL EXAM:  GENERAL: NAD  HEAD:  Atraumatic, Normocephalic, odd tongue movements   CHEST/LUNG: Clear to auscultation bilaterally; No wheeze  HEART: Regular rate and rhythm; No murmurs, rubs, or gallops  ABDOMEN: Soft, Nontender, Nondistended; Bowel sounds present  EXTREMITIES:   warm and well perfused, No clubbing, cyanosis, or edema  PSYCH: AAOx3 (able to state name, MARTA, Oct 2020)  NEUROLOGY: R sided weaker than L, able to raise both RUE RLE to gravity but easier to break on resistance on R  SKIN: No rashes or lesions    LABS:                        13.6   11.64 )-----------( 314      ( 06 Oct 2020 06:05 )             42.6     10-06    142  |  101  |  10  ----------------------------<  78  3.9   |  29  |  1.06    Ca    9.8      06 Oct 2020 06:05  Phos  3.6     10-06  Mg     2.1     10-06    TPro  7.8  /  Alb  4.2  /  TBili  0.4  /  DBili  x   /  AST  20  /  ALT  14  /  AlkPhos  75  10-05    CXR reviewed, clear     CTH:     Wedge-shaped hypodensity within the right basal ganglia/corona radiata region with mass effect on the right lateral ventricle, likely representing acute or subacute infarct. Chronic left anterior cerebral artery territory and left basal ganglia infarcts.  There is no hydrocephalus,midline shift, or acute intracranial hemorrhage. No extra-axial collection.  The visualized paranasal sinuses and mastoid air cells are clear.  The calvarium is intact.  Status post bilateral lens replacement    CTA:   CTA neck:  The visualized aorta and great vessels demonstrate no significant stenosis.  The common carotid arteries demonstrate no significant stenosis.  The internal carotid arteries and external carotid arteries demonstrate no significant stenosis.  The vertebral arteries demonstrate no significant stenosis.    All measurements are performed using standard NASCET criteria.    CTA brain:  The distal internal carotid arteries are patent.  The [place] is normal in appearance. Hypoplastic right P1 segment.  Areas of multifocal narrowing along the course of the M1 segments of the middle cerebral arteries bilaterally., right greater than left. The vertebrobasilar junction and basilar artery are normal.    Consultant(s) Notes Reviewed: neurology   Care Discussed with Consultants/Other Providers:

## 2020-10-06 NOTE — H&P ADULT - HISTORY OF PRESENT ILLNESS
74 y/o M with PMH of CVA (2018, Residual right sided weakness), HTN, HLD, B cell lymphoproliferative disorder presents to the ED with Right sided weakness. History obtained using [facility]  ID# [id number]. Patient states that he had difficulty ambulating this morning. Patient states that he does not remember much about what happened today but states that he has been having Right lower extremity weakness since Friday. Rest of history was obtained from Sister Nathalia ([phone]). She states that patient was eating lunch when some of the food starting coming out of his mouth. Sister reports that patient became unresponsive while sitting in chair. Patient did not respond to his name being called and EMS was then called. Sister reports patient became responsive in ambulance. Per ED note patient was found bradycardic and hypotensive on scene. Sister says she noticed that patient was having difficulty moving his left leg. Sister denies facial droop.  Patient reports weakness is worse than usual. Patient denies chest pain, shortness of breath, abdominal pain, fever, chills, sick contacts.       In ED patient was seen by neurology. CT Head done concerning for Wedge-shaped area of hypodensity within the right basal ganglia/corona radiata region with mass effect on the right lateral ventricle, likely representing acute or subacute infarct. Patient was also found to have UTI on UA and given 1g of ceftriaxone.     74 y/o M with PMH of CVA (2018, Residual right sided weakness), HTN, HLD, B cell lymphoproliferative disorder presents to the ED with Right sided weakness. History obtained using [facility]  ID# [id number]. Patient states that he had difficulty ambulating this morning. Patient states that he does not remember much about what happened today but states that he has been having Right lower extremity weakness since Friday. Rest of history was obtained from Sister Nathalia ([phone]). She states that patient was eating lunch when some of the food starting coming out of his mouth. Sister reports that patient became unresponsive while sitting in chair. Patient did not respond to his name being called and EMS was then called. Sister reports patient became responsive in ambulance. Per ED note patient was found bradycardic and hypotensive on scene. Sister says she noticed that patient was having difficulty moving his left leg. Sister denies facial droop.  Patient reports weakness is worse than usual. Patient denies chest pain, shortness of breath, abdominal pain, fever, chills, sick contacts.       In ED patient was seen by neurology who were concerned that right lower extremity weakness were likely secondary recrudescence in setting of UTI. CT Head done concerning for Wedge-shaped area of hypodensity within the right basal ganglia/corona radiata region with mass effect on the right lateral ventricle, likely representing acute or subacute infarct. Patient was also found to have UTI on UA and given 1g of ceftriaxone.     76 y/o M with PMH of CVA (2018, Residual right sided weakness), HTN, HLD, B cell lymphoproliferative disorder presents to the ED with Right sided weakness. History obtained using [facility]  ID# [id number]. Patient states that he had difficulty ambulating this morning. Patient states that he does not remember much about what happened today but states that he has been having Right lower extremity weakness since Friday. Rest of history was obtained from Sister Nathalia ([phone]). She states that patient was eating lunch when some of the food starting coming out of his mouth. Sister reports that patient became unresponsive while sitting in chair. Patient did not respond to his name being called and EMS was then called. Sister reports patient became responsive in ambulance. Per ED note patient was found bradycardic and hypotensive on scene. Sister says she noticed that patient was having difficulty moving his left leg. Sister denies facial droop.  Patient reports weakness is worse than usual. Patient denies chest pain, shortness of breath, abdominal pain, fever, chills, sick contacts. On last admission patient was started on Metoprolol 12.5 due to EF of 41%.       In ED patient was seen by neurology who were concerned that right lower extremity weakness were likely secondary recrudescence in setting of UTI. CT Head done concerning for Wedge-shaped area of hypodensity within the right basal ganglia/corona radiata region with mass effect on the right lateral ventricle, likely representing acute or subacute infarct. Patient was also found to have UTI on UA and given 1g of ceftriaxone.

## 2020-10-06 NOTE — PROGRESS NOTE ADULT - PROBLEM SELECTOR PLAN 8
Transitions of Care Status:  1.  Name of PCP: Dr. James Grimes  2.  PCP Contacted on Admission: [ ] Y    [X] N    3.  PCP contacted at Discharge: [ ] Y    [ ] N    [ ] N/A  4.  Post-Discharge Appointment Date and Location:  5.  Summary of Handoff given to PCP: Lovenox ppx  PT/OP, suspect will need LIBERT[employer] however sister doesn't think she wants it due to COVID, she has a private hire

## 2020-10-06 NOTE — OCCUPATIONAL THERAPY INITIAL EVALUATION ADULT - PLANNED THERAPY INTERVENTIONS, OT EVAL
strengthening/neuromuscular re-education/ADL retraining/balance training/bed mobility training/fine motor coordination training/transfer training/ROM

## 2020-10-06 NOTE — H&P ADULT - PROBLEM SELECTOR PLAN 8
Transitions of Care Status:  1.  Name of PCP: Dr. James Grimes.  2.  PCP Contacted on Admission: [ ] Y    [X] N    3.  PCP contacted at Discharge: [ ] Y    [ ] N    [ ] N/A  4.  Post-Discharge Appointment Date and Location:  5.  Summary of Handoff given to PCP:

## 2020-10-06 NOTE — OCCUPATIONAL THERAPY INITIAL EVALUATION ADULT - MD ORDER
Occupational Therapy (OT) to evaluate and treat. Occupational Therapy (OT) to evaluate and treat. Out of Bed with assistance. Per LOREN Palafox, pt is okay to participate in OT evaluation and perform activity as tolerated.

## 2020-10-06 NOTE — H&P ADULT - PROBLEM SELECTOR PROBLEM 1
R/O Cerebrovascular accident (CVA), unspecified mechanism Cerebrovascular accident (CVA), unspecified mechanism

## 2020-10-06 NOTE — PROGRESS NOTE ADULT - PROBLEM SELECTOR PLAN 1
CTH: Wedge-shaped hypodensity within the right basal ganglia/corona radiata region with mass effect on the right lateral ventricle, likely representing acute or subacute infarct.  Chronic left anterior cerebral artery territory and left basal ganglia infarcts.  CTA: stenosis with Right M1 with Radiology Resident who states that stenosis is unchanged from MRI from January 2018. At time Stenosis was mild with correlates to <50% stenosis based on criteria.  - c/w asa and statin, permissive HTN   - c/w tele  - f/u MRI  - f/u TTE  - S&S 10/6 rec mechanical soft with nectar thick liquid   - f/u PT and OT recs  - c/w neurochecks

## 2020-10-06 NOTE — OCCUPATIONAL THERAPY INITIAL EVALUATION ADULT - GENERAL OBSERVATIONS, REHAB EVAL
Pt. received sitting in chair next to bed. No acute distress. Patient agreed to evaluation from Occupational Therapist. +[place], +Tele.

## 2020-10-06 NOTE — H&P ADULT - PROBLEM SELECTOR PLAN 4
Per Neuro Permissive HTN UP to 220/110 for first 24 hours, followed by gradual normotension. DASH/TLC dysphagia diet.

## 2020-10-06 NOTE — H&P ADULT - NSICDXFAMILYHX_GEN_ALL_CORE_FT
FAMILY HISTORY:  No pertinent family history in first degree relatives     FAMILY HISTORY:  FH: hypertension

## 2020-10-06 NOTE — CONSULT NOTE ADULT - ASSESSMENT
76 y/o Male with PMH of CVA (2018, Residual right sided weakness), HTN, HLD, B cell lymphoproliferative disorder. Patient presented to the ED with right sided ARM weakness/ left side leg weakness and difficulty ambulating this morning (witnessed by sister); bradycardic/hypotensive and confused upon arrival. History was obtained from Sister Nathalia ([phone]). She states that patient was eating lunch when some of the food starting coming out of his mouth, patient became unresponsive while sitting in chair.   Head CT report above.  EP consult request for ILR.  Procedure reviewed/discussed with patient sister, all questions answered, consent obtained.

## 2020-10-06 NOTE — H&P ADULT - PROBLEM SELECTOR PLAN 2
UA positive for Nitrites. Patient s/p 1g Ceftriaxone. Will continue ceftriaxone 1 gm. Follow up urine cultures. UA positive for Nitrites. Patient s/p 1g Ceftriaxone. Will continue ceftriaxone 1 gm. Follow up urine culture.

## 2020-10-06 NOTE — H&P ADULT - ASSESSMENT
74 y/o M with PMH of CVA (2018, Residual right sided weakness), HTN, HLD, B cell lymphoproliferative disorder presents to the ED with Right sided weakness. R/O Stroke. 74 y/o M with PMH of CVA (2018, Residual right sided weakness), HTN, HLD, B cell lymphoproliferative disorder presents to the ED with Right sided weakness. R/O Stroke. and UTI. 74 y/o M with PMH of CVA (2018, Residual right sided weakness), HTN, HLD, B cell lymphoproliferative disorder presents to the ED with Right sided weakness. R/O Stroke. and UTI. Discussed with Dr. Richardson.

## 2020-10-06 NOTE — PROGRESS NOTE ADULT - PROBLEM SELECTOR PLAN 9
Transitions of Care Status:  1.  Name of PCP: Dr. James Grimes  2.  PCP Contacted on Admission: [ ] Y    [X] N    3.  PCP contacted at Discharge: [ ] Y    [ ] N    [ ] N/A  4.  Post-Discharge Appointment Date and Location:  5.  Summary of Handoff given to PCP:

## 2020-10-06 NOTE — H&P ADULT - PROBLEM SELECTOR PLAN 1
Admit to tele, continue monitoring, Neuro recs appreciated.  MR Brain w/o contrast  Echo ordered.  Dysphagia screen passed  Continue Aspirin 81mg and Atorvastatin 80 mg  Hemoglobin A1c and lipid panel in AM.  Permissive HTN for 24 hours (BP not to exceed 220/120), then gradual normotension.  Neurochecks q4h  PT and OT ordered  LDL goal <70  Blood glucose goal 120-180 Admit to tele, continue monitoring, Neuro recs appreciated.  MR Brain w/o contrast  Echo ordered.  Dysphagia screen passed  Continue Aspirin 81mg and Atorvastatin 80 mg  Hemoglobin A1c and lipid panel in AM.  Permissive HTN for 24 hours (BP not to exceed 220/120), then gradual normotension.  Neurochecks q4h  PT and OT ordered  LDL goal <70  Blood glucose goal 120-180  Discussed percentage of stenosis with Right M1 with Radiology Resident who states that stenosis is unchanged from MRI from January 2018. At time Stenosis was mild with correlates to <50% stenosis based on criteria.

## 2020-10-06 NOTE — PROGRESS NOTE ADULT - PROBLEM SELECTOR PLAN 5
LDL 98  - c/w atorvastatin 80 Per Neuro Permissive HTN UP to 220/110 for first 24 hours  -  this am, can likely resume BP meds later today

## 2020-10-06 NOTE — PROGRESS NOTE ADULT - ASSESSMENT
76 yo M with history of CVA (2018, residual right sided weakness), HTN, HLD, B cell lymphoproliferative disorder presents to the ED with R sided weakness CTH concerning for acute/subacute CVA. 74 yo M with history of HTN, HLD, CVA 2018 with residual R-sided weakness, chronic systolic HF (Ef 41% June 2020),  B cell lymphoproliferative disorder presents to the ED with R sided weakness CTH concerning for acute/subacute CVA.

## 2020-10-06 NOTE — H&P ADULT - NSHPLABSRESULTS_GEN_ALL_CORE
Vital Signs Last 24 Hrs  T(C): 36.2 (06 Oct 2020 02:00), Max: 36.9 (05 Oct 2020 13:59)  T(F): 97.2 (06 Oct 2020 02:00), Max: 98.4 (05 Oct 2020 13:59)  HR: 62 (06 Oct 2020 02:00) (54 - 62)  BP: 151/79 (06 Oct 2020 02:00) (151/79 - 187/84)  BP(mean): --  RR: 18 (06 Oct 2020 02:00) (17 - 19)  SpO2: 100% (06 Oct 2020 02:00) (98% - 100%)                          13.8   12.93 )-----------( 305      ( 05 Oct 2020 15:03 )             44.4   10-05    140  |  104  |  10  ----------------------------<  111<H>  4.4   |  27  |  1.00    Ca    10.0      05 Oct 2020 15:03    TPro  7.8  /  Alb  4.2  /  TBili  0.4  /  DBili  x   /  AST  20  /  ALT  14  /  AlkPhos  75  10-05     Troponin 9-->7  EKG: Sinus bradycardia at 53 bpm. QT/QTc 448/420.    15:03 - VBG - pH: 7.31  | pCO2: 63    | pO2: 18    | Lactate: 2.2      Urinalysis Basic - ( 05 Oct 2020 17:50 )    Color: LIGHT YELLOW / Appearance: CLEAR / S.010 / pH: 7.0  Gluc: NEGATIVE / Ketone: NEGATIVE  / Bili: NEGATIVE / Urobili: NORMAL   Blood: NEGATIVE / Protein: NEGATIVE / Nitrite: POSITIVE   Leuk Esterase: SMALL / RBC: 0-2 / WBC 3-5   Sq Epi: OCC / Non Sq Epi: x / Bacteria: MANY    COVID-19 PCR: Negative    CTA Head and Neck:  CTA neck:  The visualized aorta and great vessels demonstrate no significant stenosis.  The common carotid arteries demonstrate no significant stenosis.  The internal carotid arteries and external carotid arteries demonstrate no significant stenosis.  The vertebral arteries demonstrate no significant stenosis.    All measurements are performed using standard NASCET criteria.    CTA brain:  The distal internal carotid arteries are patent.  The [place] is normal in appearance. Hypoplastic right P1 segment.  Areas of multifocal narrowing along the course of the M1 segments of the middle cerebral arteries bilaterally., right greater than left. The vertebrobasilar junction and basilar artery are normal.    IMPRESSION:  CT brain: Wedge-shaped area of hypodensity within the right basal ganglia/corona radiata region with mass effect on the right lateral ventricle, likely representing acute or subacute infarct.    CTA neck: No stenosis. No dissection.    CTA brain: Unchanged multifocal narrowing of the M1 segments bilaterally..    COMMENT:  Reference per NASCET criteria for degree of stenosis: Mild: less than 50% stenosis. Moderate: 50-69% stenosis. Severe: 70-94% stenosis. Near occlusion: 95-99% stenosis.    CXR: Clear lungs.

## 2020-10-07 NOTE — CHART NOTE - NSCHARTNOTEFT_GEN_A_CORE
ELECTROPHYSIOLOGY      Patient seen. No complaints.   Telemetry reviewed.  No evidence of atrial or ventricular arrhythmia.  Consented for ILR and is scheduled for later today. ELECTROPHYSIOLOGY      Patient seen. No complaints.   Telemetry reviewed.  No evidence of atrial or ventricular arrhythmia or heart block.   Patient scheduled for ILR later today.  Sister (HCP) aware and has consented.

## 2020-10-07 NOTE — PROGRESS NOTE ADULT - ASSESSMENT
74 yo M with history of HTN, HLD, CVA 2018 with residual R-sided weakness, chronic systolic HF (Ef 41% June 2020),  B cell lymphoproliferative disorder presents to the ED with R sided weakness CTH concerning for acute/subacute CVA.

## 2020-10-07 NOTE — PROGRESS NOTE ADULT - PROBLEM SELECTOR PLAN 4
Blood pressure medications resumed   - Given carvedilol and losartan Blood pressure medications resumed   - Given carvedilol and losartan, however hold carvedilol for now due to bradycardia

## 2020-10-07 NOTE — PROGRESS NOTE ADULT - SUBJECTIVE AND OBJECTIVE BOX
Patient is a 75y old  Male who presents with a chief complaint of R/O stroke, UTI    SUBJECTIVE / OVERNIGHT EVENTS:    Tele:    MEDICATIONS  (STANDING):  aspirin enteric coated 81 milliGRAM(s) Oral daily  atorvastatin 80 milliGRAM(s) Oral at bedtime  carvedilol 3.125 milliGRAM(s) Oral <User Schedule>  cefTRIAXone   IVPB 1000 milliGRAM(s) IV Intermittent every 24 hours  enoxaparin Injectable 40 milliGRAM(s) SubCutaneous at bedtime  finasteride 5 milliGRAM(s) Oral daily  gabapentin 300 milliGRAM(s) Oral at bedtime  losartan 25 milliGRAM(s) Oral daily  pantoprazole    Tablet 40 milliGRAM(s) Oral before breakfast  polyethylene glycol 3350 17 Gram(s) Oral daily  sodium chloride 0.9% lock flush 3 milliLiter(s) IV Push every 8 hours    T(C): 36.8 (10-07-20 @ 09:09), Max: 36.9 (10-06-20 @ 18:36)  HR: 55 (10-07-20 @ 09:09) (55 - 83)  BP: 158/88 (10-07-20 @ 09:09) (147/86 - 179/88)  RR: 18 (10-07-20 @ 09:09) (18 - 18)  SpO2: 96% (10-07-20 @ 09:09) (96% - 100%)    PHYSICAL EXAM:  GENERAL: NAD  HEAD:  Atraumatic, Normocephalic, odd tongue movements   CHEST/LUNG: Clear to auscultation bilaterally; No wheeze  HEART: Regular rate and rhythm; No murmurs, rubs, or gallops  ABDOMEN: Soft, Nontender, Nondistended; Bowel sounds present  EXTREMITIES:   warm and well perfused, No clubbing, cyanosis, or edema  PSYCH: AAOx3 (able to state name, [place], Oct 2020)  NEUROLOGY: R sided weaker than L, able to raise both RUE RLE to gravity but easier to break on resistance on R  SKIN: No rashes or lesions    LABS:                        14.4   9.54  )-----------( 310      ( 07 Oct 2020 07:54 )             45.9     10-07    142  |  103  |  8   ----------------------------<  88  4.2   |  31  |  1.01    Ca    9.8      07 Oct 2020 07:54  Phos  3.2     10-07  Mg     2.2     10-07    TPro  7.8  /  Alb  4.2  /  TBili  0.4  /  DBili  x   /  AST  20  /  ALT  14  /  AlkPhos  75  10-05    Microbiology: Culture Results:   >100,000 CFU/ml Escherichia coli (10-06 @ 00:32)    TTE (10/6)  CONCLUSIONS:  1. Mitral annular calcification, otherwise normal mitral  valve. Mild mitral regurgitation.  2. Normal left ventricular internal dimensions and wall  thicknesses.  3. Normal left ventricular systolic function. No segmental  wall motion abnormalities.  4. Mild diastolic dysfunction (Stage I).  5. Normal right ventricular size and function.    Consultant(s) Notes Reviewed:  EP   Care Discussed with Consultants/Other Providers: EP    Patient is a 75y old  Male who presents with a chief complaint of R/O stroke, UTI    SUBJECTIVE / OVERNIGHT EVENTS:    No complaints today  No CP, SOB, f/c/n/v    Tele: SB    MEDICATIONS  (STANDING):  aspirin enteric coated 81 milliGRAM(s) Oral daily  atorvastatin 80 milliGRAM(s) Oral at bedtime  cefTRIAXone   IVPB 1000 milliGRAM(s) IV Intermittent every 24 hours  enoxaparin Injectable 40 milliGRAM(s) SubCutaneous at bedtime  finasteride 5 milliGRAM(s) Oral daily  gabapentin 300 milliGRAM(s) Oral at bedtime  losartan 25 milliGRAM(s) Oral daily  pantoprazole    Tablet 40 milliGRAM(s) Oral before breakfast  polyethylene glycol 3350 17 Gram(s) Oral daily  sodium chloride 0.9% lock flush 3 milliLiter(s) IV Push every 8 hours    T(C): 36.8 (10-07-20 @ 09:09), Max: 36.9 (10-06-20 @ 18:36)  HR: 55 (10-07-20 @ 09:09) (55 - 83)  BP: 158/88 (10-07-20 @ 09:09) (147/86 - 179/88)  RR: 18 (10-07-20 @ 09:09) (18 - 18)  SpO2: 96% (10-07-20 @ 09:09) (96% - 100%)    PHYSICAL EXAM:  GENERAL: NAD  HEAD:  Atraumatic, Normocephalic, odd tongue movements   CHEST/LUNG: Clear to auscultation bilaterally; No wheeze  HEART: Regular rate and rhythm; No murmurs, rubs, or gallops  ABDOMEN: Soft, Nontender, Nondistended; Bowel sounds present  EXTREMITIES:   warm and well perfused, No clubbing, cyanosis, or edema  PSYCH: AAOx3 (able to state name, [place], Oct 2020 (took long to get 2020))  NEUROLOGY: R sided weaker than L, able to raise both RUE RLE to gravity but easier to break on resistance on R  SKIN: No rashes or lesions    LABS:                        14.4   9.54  )-----------( 310      ( 07 Oct 2020 07:54 )             45.9     10-07    142  |  103  |  8   ----------------------------<  88  4.2   |  31  |  1.01    Ca    9.8      07 Oct 2020 07:54  Phos  3.2     10-07  Mg     2.2     10-07    TPro  7.8  /  Alb  4.2  /  TBili  0.4  /  DBili  x   /  AST  20  /  ALT  14  /  AlkPhos  75  10-05    Microbiology: Culture Results:   >100,000 CFU/ml Escherichia coli (10-06 @ 00:32)    TTE (10/6)  CONCLUSIONS:  1. Mitral annular calcification, otherwise normal mitral  valve. Mild mitral regurgitation.  2. Normal left ventricular internal dimensions and wall  thicknesses.  3. Normal left ventricular systolic function. No segmental  wall motion abnormalities.  4. Mild diastolic dysfunction (Stage I).  5. Normal right ventricular size and function.    Consultant(s) Notes Reviewed:  EP   Care Discussed with Consultants/Other Providers: EP

## 2020-10-07 NOTE — PROGRESS NOTE ADULT - PROBLEM SELECTOR PLAN 7
Lovenox 40mg SC daily   Fall precautions  PT/OT - patient's sister does not want Rehab due to Covid, hired someone privately to come to the home

## 2020-10-07 NOTE — PROGRESS NOTE ADULT - PROBLEM SELECTOR PLAN 2
- c/w ceftriaxone, D3  - urine culture: >100 CFU E coli, f/u sensitivies - c/w ceftriaxone, D3/5  - urine culture: >100 CFU E coli, f/u sensitivies

## 2020-10-07 NOTE — PROGRESS NOTE ADULT - PROBLEM SELECTOR PLAN 3
EF 41% presumed 2/2 prior MI, had stress test 2019 prior infarct to the inferiolateral wall June 2019, TTE now with normal EF and stage 1 diastolic dysfxn  - dc metoprolol, c/w coreg 3.125 mg BID  - c/w losartan 25 mg daily  - c/w asa and statin EF 41% presumed 2/2 prior MI, had stress test 2019 prior infarct to the inferiolateral wall June 2019, TTE now with normal EF and stage 1 diastolic dysfxn  - dc metoprolol, dc coreg 3.125 mg BID due to SB  - c/w losartan 25 mg daily, increase prn  - c/w asa and statin

## 2020-10-07 NOTE — PROGRESS NOTE ADULT - PROBLEM SELECTOR PLAN 1
CT head demonstrated wedge-shaped hypodensity within the right basal ganglia/corona radiata region with mass effect on the right lateral ventricle, likely representing acute or subacute infarct. Chronic left anterior cerebral artery territory and left basal ganglia infarcts.    CTA: stenosis with Right M1 with Radiology Resident who states that stenosis is unchanged from MRI from January 2018. At time Stenosis was mild with correlates to <50% stenosis based on criteria.  TTE: Mitral annular calcification, otherwise normal mitral valve. Mild mitral regurgitation. Normal left ventricular internal dimensions and wall thicknesses. Normal left ventricular systolic function. No segmental wall motion abnormalities. Mild diastolic dysfunction (Stage I). Normal right ventricular size and function. EF 63%.  - Continue with ASA and atorvastatin   - Tele monitor   - MRI ordered - f/u results  - Continue frequent neuro checks   - Speech and swallow recommended soft food diet with nectar thick liquid  - PT/OT consulted, f/u with recommendation CT head demonstrated wedge-shaped hypodensity within the right basal ganglia/corona radiata region with mass effect on the right lateral ventricle, likely representing acute or subacute infarct. Chronic left anterior cerebral artery territory and left basal ganglia infarcts.    CTA: stenosis with Right M1 with Radiology Resident who states that stenosis is unchanged from MRI from January 2018. At time Stenosis was mild with correlates to <50% stenosis based on criteria.  TTE: Mitral annular calcification, otherwise normal mitral valve. Mild mitral regurgitation. Normal left ventricular internal dimensions and wall thicknesses. Normal left ventricular systolic function. No segmental wall motion abnormalities. Mild diastolic dysfunction (Stage I). Normal right ventricular size and function. EF 63%.  - Continue with ASA and atorvastatin   - Tele monitor   - MRI ordered - f/u results  - Bubble study with echo needed to r/o PFO   - Continue frequent neuro checks   - Speech and swallow recommended soft food diet with nectar thick liquid  - PT/OT consulted, f/u with recommendation

## 2020-10-07 NOTE — PROGRESS NOTE ADULT - PROBLEM SELECTOR PLAN 4
Per Neuro Permissive HTN UP to 220/110 for first 24 hours  -  c/w coreg and losartan, titrate to normal BP now Per Neuro Permissive HTN UP to 220/110 for first 24 hours  -  c/w losartan, titrate to normal BP

## 2020-10-07 NOTE — PROGRESS NOTE ADULT - PROBLEM SELECTOR PLAN 6
- OP Heme follow-up (Dr. Davis is OP doctor), per sister f/u every 3 months; reportedly eveything is stable and no medication/treatments given - OP Heme follow-up (Dr. Davis is OP doctor), per sister f/u every 3 months; reportedly everything is stable and no medication/treatments given

## 2020-10-07 NOTE — PROGRESS NOTE ADULT - PROBLEM SELECTOR PLAN 1
CTH: Wedge-shaped hypodensity within the right basal ganglia/corona radiata region with mass effect on the right lateral ventricle, likely representing acute or subacute infarct.  Chronic left anterior cerebral artery territory and left basal ganglia infarcts.  CTA: stenosis with Right M1 with Radiology Resident who states that stenosis is unchanged from MRI from January 2018. At time Stenosis was mild with correlates to <50% stenosis based on criteria.  - c/w asa 81 mg and astorvastatin 80 mg qhs, per neurology no plavix, asa indefinitely  - MRI pending   - TTE: EF now 61%, no mention of PFO  - S&S 10/6 rec mechanical soft with nectar thick liquid   - f/u PT and OT recs--rec [facility]ERT[facility]  - c/w neurochecks CTH: Wedge-shaped hypodensity within the right basal ganglia/corona radiata region with mass effect on the right lateral ventricle, likely representing acute or subacute infarct.  Chronic left anterior cerebral artery territory and left basal ganglia infarcts.  CTA: stenosis with Right M1 with Radiology Resident who states that stenosis is unchanged from MRI from January 2018. At time Stenosis was mild with correlates to <50% stenosis based on criteria.  - c/w asa 81 mg and atorvastatin 80 mg qhs, per neurology no Plavix, asa indefinitely  - MRI pending   - TTE: EF now 61%, bubble study not done, ordered limited to assess  - S&S 10/6 rec mechanical soft with nectar thick liquid   - f/u PT and OT recs--rec [facility]ERT[facility], SW will need to d/w sister  - c/w neurochecks  - pending ILR today with EP

## 2020-10-07 NOTE — CHART NOTE - NSCHARTNOTEFT_GEN_A_CORE
ELECTROPHYSIOLOGY    Dr. Junior would prefer to do ILR after completion of antibiotics. This is day 3of 5.   Will reschedule ILR for Friday 10/9.   Patient and sister Nathalia aware.

## 2020-10-07 NOTE — PROGRESS NOTE ADULT - ASSESSMENT
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 CT head showed likely acute/subacute infarct. Patient also diagnosed with UTI.

## 2020-10-07 NOTE — PROGRESS NOTE ADULT - PROBLEM SELECTOR PLAN 3
TTE on 10/06/2020 showed Mitral annular calcification, otherwise normal mitral  valve. Mild mitral regurgitation. Normal left ventricular internal dimensions and wall  thicknesses. Normal left ventricular systolic function. No segmental  wall motion abnormalities. Mild diastolic dysfunction (Stage I). Normal right ventricular size and function.  - EF: 63%  - Metoprolol discontinued, started on carvedilol   - Started on losartan   - Continue with ASA and atorvastatin TTE on 10/06/2020 showed Mitral annular calcification, otherwise normal mitral  valve. Mild mitral regurgitation. Normal left ventricular internal dimensions and wall  thicknesses. Normal left ventricular systolic function. No segmental  wall motion abnormalities. Mild diastolic dysfunction (Stage I). Normal right ventricular size and function.  - EF: 63%  - Metoprolol discontinued, started on carvedilol, however hold carvedilol for now due to bradycardia   - Started on losartan  - Continue with ASA and atorvastatin

## 2020-10-07 NOTE — PROGRESS NOTE ADULT - PROBLEM SELECTOR PLAN 2
Urine culture showed >100,000 CFU/ml Escherichia coli  - Continue with ceftriaxone day 2/7 Urine culture showed >100,000 CFU/ml Escherichia coli  - Continue with ceftriaxone day 3/5

## 2020-10-07 NOTE — PROGRESS NOTE ADULT - SUBJECTIVE AND OBJECTIVE BOX
Patient is a 75y old  Male who presents with a chief complaint of R/O stroke, UTI (06 Oct 2020 17:54)    SUBJECTIVE / OVERNIGHT EVENTS:  Patient stays he is doing "good" this morning. Endorses he still has right sided weakness. Patient is oriented only to self - when asked where he was he said "at my house" and when asked what year it was he stated "". Denies any chest pain, SOB, N/V, dysuria.  Patient was mild mannered and alert, able to respond to questioning.   Last BM was yesterday - as per RN patient required full assistance and was very weak.   Patient still reports he "sees other people" in the room aside from myself, but could not elaborate further.     Patient was seen by speech and swallow and PT yesterday (10/6/2020).   EP obtained consent from sister for ILR yesterday (10/6/2020).     Per sister, patient walked about 30 feet to the  lunch table and had trouble with his left leg. Had half of his food and then she saw the food fall out of his mouth and he was not answering her. Reports EMS came and check vitals and they were OK but "sugar low"--79. Denies shaking or moving during the episode.   Patient lives with his sister and she takes care of him.   PCP - Dr. James Salgado.   Patient has no metal in body, no pacemaker as per sister    MEDICATIONS  (STANDING):  aspirin enteric coated 81 milliGRAM(s) Oral daily  atorvastatin 80 milliGRAM(s) Oral at bedtime  carvedilol 3.125 milliGRAM(s) Oral <User Schedule>  cefTRIAXone   IVPB 1000 milliGRAM(s) IV Intermittent every 24 hours  enoxaparin Injectable 40 milliGRAM(s) SubCutaneous at bedtime  finasteride 5 milliGRAM(s) Oral daily  gabapentin 300 milliGRAM(s) Oral at bedtime  losartan 25 milliGRAM(s) Oral daily  pantoprazole    Tablet 40 milliGRAM(s) Oral before breakfast  polyethylene glycol 3350 17 Gram(s) Oral daily  sodium chloride 0.9% lock flush 3 milliLiter(s) IV Push every 8 hours    PHYSICAL EXAM:  Vital Signs Last 24 Hrs  T(C): 36.6 (07 Oct 2020 05:49), Max: 36.9 (06 Oct 2020 18:36)  T(F): 97.9 (07 Oct 2020 05:49), Max: 98.4 (06 Oct 2020 18:36)  HR: 80 (07 Oct 2020 05:49) (76 - 83)  BP: 147/86 (07 Oct 2020 05:49) (147/86 - 179/88)  BP(mean): --  RR: 18 (07 Oct 2020 05:49) (18 - 18)  SpO2: 100% (07 Oct 2020 05:49) (100% - 100%)  CONSTITUTIONAL: NAD, well-developed, well-groomed; mild mannered and cooperative, responds to questioning   ENMT: Moist oral mucosa; normal dentition; no nasal discharge  RESPIRATORY: Normal respiratory effort; lungs are clear to auscultation bilaterally  CARDIOVASCULAR: Regular rate and rhythm, normal S1 and S2, no murmur/rub/gallop; No lower extremity edema; Peripheral pulses are 2+ bilaterally  ABDOMEN: Nontender to palpation, normoactive bowel sounds, no rebound/guarding; No hepatosplenomegaly  PSYCH: A+O to person, when asked where he was patient stated "at my house" and when asked the year patient stated ""; affect appropriate  NEUROLOGY: Right sided weakness greater than left side, able to lift both right upper extremity and right lower extremity and grasp/squeeze fingers, however easy to break resistance on right.   SKIN: No rashes; no palpable lesions    LABS:                        13.6   11.64 )-----------( 314      ( 06 Oct 2020 06:05 )             42.6     10-06    142  |  101  |  10  ----------------------------<  78  3.9   |  29  |  1.06    Ca    9.8      06 Oct 2020 06:05  Phos  3.6     10-06  Mg     2.1     10-06    TPro  7.8  /  Alb  4.2  /  TBili  0.4  /  DBili  x   /  AST  20  /  ALT  14  /  AlkPhos  75  10-05      Urinalysis Basic - ( 05 Oct 2020 17:50 )    Color: LIGHT YELLOW / Appearance: CLEAR / S.010 / pH: 7.0  Gluc: NEGATIVE / Ketone: NEGATIVE  / Bili: NEGATIVE / Urobili: NORMAL   Blood: NEGATIVE / Protein: NEGATIVE / Nitrite: POSITIVE   Leuk Esterase: SMALL / RBC: 0-2 / WBC 3-5   Sq Epi: OCC / Non Sq Epi: x / Bacteria: MANY    Culture - Urine (collected 06 Oct 2020 00:32)  Source: .Urine Catheterized  Preliminary Report (07 Oct 2020 05:44):    >100,000 CFU/ml Escherichia coli    COVID-19 PCR: NotDetec (05 Oct 2020 16:35)    RADIOLOGY & ADDITIONAL TESTS:  CT Head: Wedge-shaped hypodensity within the right basal ganglia/corona radiata region with mass effect on the right lateral ventricle, likely representing acute or subacute infarct. Chronic left anterior cerebral artery territory and left basal ganglia infarcts.  There is no hydrocephalus,midline shift, or acute intracranial hemorrhage. No extra-axial collection.  The visualized paranasal sinuses and mastoid air cells are clear.  The calvarium is intact.  Status post bilateral lens replacement    CTA neck: No stenosis. No dissection.    CTA brain: Unchanged multifocal narrowing of the M1 segments bilaterally.    TTE:   1. Mitral annular calcification, otherwise normal mitral  valve. Mild mitral regurgitation.  2. Normal left ventricular internal dimensions and wall  thicknesses.  3. Normal left ventricular systolic function. No segmental  wall motion abnormalities.  4. Mild diastolic dysfunction (Stage I).  5. Normal right ventricular size and function.      Care Discussed with Consultants/Other Providers: neurologu   Patient is a 75y old  Male who presents with a chief complaint of R/O stroke, UTI (06 Oct 2020 17:54)    SUBJECTIVE / OVERNIGHT EVENTS:  Patient stays he is doing "good" this morning. Endorses he still has right sided weakness. Patient is A&Ox3 - oriented to self, place, and time. Denies any chest pain, SOB, N/V, dysuria.  Patient was mild mannered and alert, able to respond to questioning.   Last BM was yesterday - as per RN patient required full assistance and was very weak.     Patient was seen by speech and swallow and PT yesterday (10/6/2020).   EP obtained consent from sister for ILR yesterday (10/6/2020).     Per sister, patient walked about 30 feet to the  lunch table and had trouble with his left leg. Had half of his food and then she saw the food fall out of his mouth and he was not answering her. Reports EMS came and check vitals and they were OK but "sugar low"--79. Denies shaking or moving during the episode.   Patient lives with his sister and she takes care of him.   PCP - Dr. James Salgado.   Patient has no metal in body, no pacemaker as per sister    MEDICATIONS  (STANDING):  aspirin enteric coated 81 milliGRAM(s) Oral daily  atorvastatin 80 milliGRAM(s) Oral at bedtime  carvedilol 3.125 milliGRAM(s) Oral <User Schedule>  cefTRIAXone   IVPB 1000 milliGRAM(s) IV Intermittent every 24 hours  enoxaparin Injectable 40 milliGRAM(s) SubCutaneous at bedtime  finasteride 5 milliGRAM(s) Oral daily  gabapentin 300 milliGRAM(s) Oral at bedtime  losartan 25 milliGRAM(s) Oral daily  pantoprazole    Tablet 40 milliGRAM(s) Oral before breakfast  polyethylene glycol 3350 17 Gram(s) Oral daily  sodium chloride 0.9% lock flush 3 milliLiter(s) IV Push every 8 hours    PHYSICAL EXAM:  Vital Signs Last 24 Hrs  T(C): 36.6 (07 Oct 2020 05:49), Max: 36.9 (06 Oct 2020 18:36)  T(F): 97.9 (07 Oct 2020 05:49), Max: 98.4 (06 Oct 2020 18:36)  HR: 80 (07 Oct 2020 05:49) (76 - 83)  BP: 147/86 (07 Oct 2020 05:49) (147/86 - 179/88)  BP(mean): --  RR: 18 (07 Oct 2020 05:49) (18 - 18)  SpO2: 100% (07 Oct 2020 05:49) (100% - 100%)  CONSTITUTIONAL: NAD, well-developed, well-groomed; mild mannered and cooperative, responds to questioning   ENMT: Moist oral mucosa; normal dentition; no nasal discharge  RESPIRATORY: Normal respiratory effort; lungs are clear to auscultation bilaterally  CARDIOVASCULAR: Regular rate and rhythm, normal S1 and S2, no murmur/rub/gallop; No lower extremity edema; Peripheral pulses are 2+ bilaterally  ABDOMEN: Nontender to palpation, normoactive bowel sounds, no rebound/guarding; No hepatosplenomegaly  PSYCH: A+O to person, place and time; affect appropriate  NEUROLOGY: Right sided weakness greater than left side, able to lift both right upper extremity and right lower extremity and grasp/squeeze fingers, however easy to break resistance on right.   SKIN: No rashes; no palpable lesions    LABS:                        13.6   11.64 )-----------( 314      ( 06 Oct 2020 06:05 )             42.6     10-06    142  |  101  |  10  ----------------------------<  78  3.9   |  29  |  1.06    Ca    9.8      06 Oct 2020 06:05  Phos  3.6     10-06  Mg     2.1     10-06    TPro  7.8  /  Alb  4.2  /  TBili  0.4  /  DBili  x   /  AST  20  /  ALT  14  /  AlkPhos  75  10-05      Urinalysis Basic - ( 05 Oct 2020 17:50 )    Color: LIGHT YELLOW / Appearance: CLEAR / S.010 / pH: 7.0  Gluc: NEGATIVE / Ketone: NEGATIVE  / Bili: NEGATIVE / Urobili: NORMAL   Blood: NEGATIVE / Protein: NEGATIVE / Nitrite: POSITIVE   Leuk Esterase: SMALL / RBC: 0-2 / WBC 3-5   Sq Epi: OCC / Non Sq Epi: x / Bacteria: MANY    Culture - Urine (collected 06 Oct 2020 00:32)  Source: .Urine Catheterized  Preliminary Report (07 Oct 2020 05:44):    >100,000 CFU/ml Escherichia coli    COVID-19 PCR: Felisha (05 Oct 2020 16:35)    RADIOLOGY & ADDITIONAL TESTS:  CT Head: Wedge-shaped hypodensity within the right basal ganglia/corona radiata region with mass effect on the right lateral ventricle, likely representing acute or subacute infarct. Chronic left anterior cerebral artery territory and left basal ganglia infarcts.  There is no hydrocephalus,midline shift, or acute intracranial hemorrhage. No extra-axial collection.  The visualized paranasal sinuses and mastoid air cells are clear.  The calvarium is intact.  Status post bilateral lens replacement    CTA neck: No stenosis. No dissection.    CTA brain: Unchanged multifocal narrowing of the M1 segments bilaterally.    TTE:   1. Mitral annular calcification, otherwise normal mitral  valve. Mild mitral regurgitation.  2. Normal left ventricular internal dimensions and wall  thicknesses.  3. Normal left ventricular systolic function. No segmental  wall motion abnormalities.  4. Mild diastolic dysfunction (Stage I).  5. Normal right ventricular size and function.      Care Discussed with Consultants/Other Providers: neurology

## 2020-10-07 NOTE — PROGRESS NOTE ADULT - PROBLEM SELECTOR PLAN 7
Lovenox ppx  PT/OP rec [facility], will need to sister doesn't think she wants it due to COVID, she has a private hire aide, did mention one [facility] she would consider

## 2020-10-08 NOTE — CHART NOTE - NSCHARTNOTEFT_GEN_A_CORE
10//7/20- MRI Brain - Acute completed right gangliocapsular infarction with local mass effect compressing the right lateral ventricle with 2 mm leftward shift of the anterior septum pellucidum. Chronic left NURA territory infarction. -   Above findings were d/w Neurology on call  - continue to monitor  Above events / findings / recs d/w Dr Cristiano Cadena hospitalist on call  Patient is A & O x 4 hemodynamically stable will continue to monitor    Vital Signs Last 24 Hrs  T(C): 36.6 (07 Oct 2020 21:06), Max: 37.1 (07 Oct 2020 17:45)  T(F): 97.8 (07 Oct 2020 21:06), Max: 98.8 (07 Oct 2020 17:45)  HR: 64 (07 Oct 2020 21:06) (55 - 80)  BP: 172/87 (07 Oct 2020 21:06) (147/86 - 172/87)  RR: 18 (07 Oct 2020 21:06) (18 - 18)  SpO2: 96% (07 Oct 2020 21:06) (96% - 100%)

## 2020-10-08 NOTE — CONSULT NOTE ADULT - ATTENDING COMMENTS
74 y/o Male with PMH of CVA (2018, Residual right sided weakness), HTN, HLD, B cell lymphoproliferative disorder. Patient presented to the ED with right sided ARM weakness/ left side leg weakness and difficulty ambulating this morning (witnessed by sister); bradycardic/hypotensive and confused upon arrival. History was obtained from Sister Nathalia ([phone]). She states that patient was eating lunch when some of the food starting coming out of his mouth, patient became unresponsive while sitting in chair. No arrhythmias or heart block on tele. Plan for ILR.
patient improving with bedside therapy, took 10 sidesteps mod assist today with PT  discussed rehab options with patient's sister Nathalia, who is agreeable to acute rehab with plan to take patient home after rehab stay.  Recommended for acute inpatient rehab when medically cleared.
VASCULAR NEUROLOGY ATTENDING  The patient is seen and examined the history and imaging are reviewed. I agree with the resident note unless otherwise noted. Recurrent small vessel vs. embolic appearing infarct. Agree with ASA statin. Will need long term telemetry monitoring to r/o AF. TTE. PT/OT/SS evals.

## 2020-10-08 NOTE — PROGRESS NOTE ADULT - SUBJECTIVE AND OBJECTIVE BOX
Interval History: MRI consistent with known acute R gangliocapsular infarct (seen on CT). PAtient is pending ILR on 10/9 after finishing abx tomorrow. Per team has some worsening in known R sided weakness today    MEDICATIONS  (STANDING):  aspirin enteric coated 81 milliGRAM(s) Oral daily  atorvastatin 80 milliGRAM(s) Oral at bedtime  cefTRIAXone   IVPB 1000 milliGRAM(s) IV Intermittent every 24 hours  enoxaparin Injectable 40 milliGRAM(s) SubCutaneous at bedtime  finasteride 5 milliGRAM(s) Oral daily  gabapentin 300 milliGRAM(s) Oral at bedtime  losartan 50 milliGRAM(s) Oral daily  pantoprazole    Tablet 40 milliGRAM(s) Oral before breakfast  polyethylene glycol 3350 17 Gram(s) Oral daily  sodium chloride 0.9% lock flush 3 milliLiter(s) IV Push every 8 hours  tamsulosin 0.4 milliGRAM(s) Oral at bedtime    Vital Signs Last 24 Hrs  T(C): 36.8 (08 Oct 2020 05:46), Max: 37.1 (07 Oct 2020 17:45)  T(F): 98.2 (08 Oct 2020 05:46), Max: 98.8 (07 Oct 2020 17:45)  HR: 86 (08 Oct 2020 10:55) (62 - 86)  BP: 138/91 (08 Oct 2020 10:55) (132/87 - 172/87)  BP(mean): --  RR: 18 (08 Oct 2020 10:55) (18 - 18)  SpO2: 97% (08 Oct 2020 10:55) (96% - 98%)    NEUROLOGICAL EXAM: PENDED EXAM  MS: AAOX3, fluent, attends b/l; recent and remote memory intact; normal attention, language and fund of knowledge.   CN: VFF, EOMI, PERRL, no ARA, no APD,  V1-3 intact, no facial asymmetry, t/p midline, SCM/trap intact.  Eyes-Fundi: no papilledema.  Motor: Strength: 5/5 4x. Tone: normal. Bulk: normal. DTR 2+ symm.  Plantar flex b/l. Sensation: intact to LT/PP/Vibration/Position/Temperature 4x.   Coordination: intact 4x.   Gait:  Romberg negative, pull test negative; walks with narrow base, pivots in 2 steps.    Labs:   cbc                      13.8   11.45 )-----------( 302      ( 08 Oct 2020 06:00 )             43.3     [id number]  138  |  102  |  13  ----------------------------<  89  3.6   |  26  |  0.95    Ca    9.3      08 Oct 2020 06:00  Phos  3.5     10-08  Mg     2.1     10-08 Interval History: MRI consistent with known acute R gangliocapsular infarct (seen on CT). PAtient is pending ILR on 10/9 after finishing abx tomorrow. Per team has some worsening in known R sided weakness today. Patient evaluated at bedside, alert and answering questions but still unclear if this RLE weakness is his baseline.     MEDICATIONS  (STANDING):  aspirin enteric coated 81 milliGRAM(s) Oral daily  atorvastatin 80 milliGRAM(s) Oral at bedtime  cefTRIAXone   IVPB 1000 milliGRAM(s) IV Intermittent every 24 hours  enoxaparin Injectable 40 milliGRAM(s) SubCutaneous at bedtime  finasteride 5 milliGRAM(s) Oral daily  gabapentin 300 milliGRAM(s) Oral at bedtime  losartan 50 milliGRAM(s) Oral daily  pantoprazole    Tablet 40 milliGRAM(s) Oral before breakfast  polyethylene glycol 3350 17 Gram(s) Oral daily  sodium chloride 0.9% lock flush 3 milliLiter(s) IV Push every 8 hours  tamsulosin 0.4 milliGRAM(s) Oral at bedtime    Vital Signs Last 24 Hrs  T(C): 36.8 (08 Oct 2020 05:46), Max: 37.1 (07 Oct 2020 17:45)  T(F): 98.2 (08 Oct 2020 05:46), Max: 98.8 (07 Oct 2020 17:45)  HR: 86 (08 Oct 2020 10:55) (62 - 86)  BP: 138/91 (08 Oct 2020 10:55) (132/87 - 172/87)  BP(mean): --  RR: 18 (08 Oct 2020 10:55) (18 - 18)  SpO2: 97% (08 Oct 2020 10:55) (96% - 98%)    NEUROLOGICAL EXAM:   MS: AAOX1 (at least to self) answers "yes" to most questions,   CN: VFF, EOMI, no facial asymmetry,   Motor: RUE and LUE: no drift, strength intact. LLE strength intact, no drift. RLE: can bend knee/flex knee slightly, however unable to keep leg raised at the hip.  Gait:  deferred    Labs:   cbc                      13.8   11.45 )-----------( 302      ( 08 Oct 2020 06:00 )             43.3     [id number]  138  |  102  |  13  ----------------------------<  89  3.6   |  26  |  0.95    Ca    9.3      08 Oct 2020 06:00  Phos  3.5     10-08  Mg     2.1     10-08 Interval History: MRI consistent with known acute R gangliocapsular infarct (seen on CT). PAtient is pending ILR on 10/9 after finishing abx tomorrow. Per team has some worsening in known R sided weakness today. Patient evaluated at bedside, alert and answering questions but still unclear if this RLE weakness is his baseline.     MEDICATIONS  (STANDING):  aspirin enteric coated 81 milliGRAM(s) Oral daily  atorvastatin 80 milliGRAM(s) Oral at bedtime  cefTRIAXone   IVPB 1000 milliGRAM(s) IV Intermittent every 24 hours  enoxaparin Injectable 40 milliGRAM(s) SubCutaneous at bedtime  finasteride 5 milliGRAM(s) Oral daily  gabapentin 300 milliGRAM(s) Oral at bedtime  losartan 50 milliGRAM(s) Oral daily  pantoprazole    Tablet 40 milliGRAM(s) Oral before breakfast  polyethylene glycol 3350 17 Gram(s) Oral daily  sodium chloride 0.9% lock flush 3 milliLiter(s) IV Push every 8 hours  tamsulosin 0.4 milliGRAM(s) Oral at bedtime    Vital Signs Last 24 Hrs  T(C): 36.8 (08 Oct 2020 05:46), Max: 37.1 (07 Oct 2020 17:45)  T(F): 98.2 (08 Oct 2020 05:46), Max: 98.8 (07 Oct 2020 17:45)  HR: 86 (08 Oct 2020 10:55) (62 - 86)  BP: 138/91 (08 Oct 2020 10:55) (132/87 - 172/87)  BP(mean): --  RR: 18 (08 Oct 2020 10:55) (18 - 18)  SpO2: 97% (08 Oct 2020 10:55) (96% - 98%)    NEUROLOGICAL EXAM:   MS: AAOX3(at least to self) answers "yes" to most questions,   CN: VFF, EOMI, no facial asymmetry,   Motor: RUE and LUE: no drift, strength intact. LLE strength intact, no drift. RLE: can bend knee/flex knee slightly, however unable to keep leg raised at the hip (no effort vs gravity).  Gait:  deferred    Labs:   cbc                      13.8   11.45 )-----------( 302      ( 08 Oct 2020 06:00 )             43.3     [id number]  138  |  102  |  13  ----------------------------<  89  3.6   |  26  |  0.95    Ca    9.3      08 Oct 2020 06:00  Phos  3.5     10-08  Mg     2.1     10-08

## 2020-10-08 NOTE — PROGRESS NOTE ADULT - ASSESSMENT
74 yo M with history of HTN, HLD, CVA 2018 with residual R-sided weakness, chronic systolic HF (Ef 41% June 2020),  B cell lymphoproliferative disorder presents to the ED with weakness (chronic R and new L sided) diagnosed with acute CVA.

## 2020-10-08 NOTE — PROGRESS NOTE ADULT - PROBLEM SELECTOR PLAN 6
- OP Heme follow-up (Dr. Davis is OP doctor), per sister f/u every 3 months; reportedly everything is stable and no medication/treatments given

## 2020-10-08 NOTE — PROGRESS NOTE ADULT - ASSESSMENT
76 y/o male with PMHx of HTN, HLD, B cell lymphoproliferative disorder (stable) and CVA in 2018 with residual right sided weakness presents to the [facility] ED with worsening right sided weakness and concern for CVA. MR brain confirms CVA. Patient also diagnosed with UTI.    74 y/o male with PMHx of HTN, HLD, B cell lymphoproliferative disorder (stable) and CVA in 2018 with residual right sided weakness presents to the [facility] ED with left sided weakness and concern for CVA. MR brain confirms CVA. Patient also diagnosed with UTI.

## 2020-10-08 NOTE — PROGRESS NOTE ADULT - SUBJECTIVE AND OBJECTIVE BOX
Interval History:  Patient resting comfortably in bed   Denies CP/SOB/palpitations/dizziness.  No acute events overnight.    Medications:  aspirin enteric coated 81 milliGRAM(s) Oral daily  atorvastatin 80 milliGRAM(s) Oral at bedtime  cefTRIAXone   IVPB 1000 milliGRAM(s) IV Intermittent every 24 hours  enoxaparin Injectable 40 milliGRAM(s) SubCutaneous at bedtime  finasteride 5 milliGRAM(s) Oral daily  gabapentin 300 milliGRAM(s) Oral at bedtime  losartan 50 milliGRAM(s) Oral daily  pantoprazole    Tablet 40 milliGRAM(s) Oral before breakfast  polyethylene glycol 3350 17 Gram(s) Oral daily  sodium chloride 0.9% lock flush 3 milliLiter(s) IV Push every 8 hours  tamsulosin 0.4 milliGRAM(s) Oral at bedtime      Vitals:  T(C): 36.8 (10-08-20 @ 05:46), Max: 37.1 (10-07-20 @ 17:45)  HR: 86 (10-08-20 @ 10:55) (62 - 86)  BP: 138/91 (10-08-20 @ 10:55) (132/87 - 172/87)  BP(mean): --  RR: 18 (10-08-20 @ 10:55) (18 - 18)  SpO2: 97% (10-08-20 @ 10:55) (96% - 98%)      I&O's Summary    07 Oct 2020 07:01  -  08 Oct 2020 07:00  --------------------------------------------------------  IN: 0 mL / OUT: 550 mL / NET: -550 mL    08 Oct 2020 07:01  -  08 Oct 2020 13:40  --------------------------------------------------------  IN: 0 mL / OUT: 800 mL / NET: -800 mL        Physical Exam:  Appearance: No Acute Distress  HEENT: PERRL  Cardiovascular: Normal S1 S2  Respiratory: Clear to auscultation bilaterally  Gastrointestinal: Soft, Non-tender	  Skin: No cyanosis	  Neurologic: Non-focal  Extremities: No LE edema  Psychiatry: A & O x 2     Labs:                        13.8   11.45 )-----------( 302      ( 08 Oct 2020 06:00 )             43.3     10-08    138  |  102  |  13  ----------------------------<  89  3.6   |  26  |  0.95    Ca    9.3      08 Oct 2020 06:00  Phos  3.5     10-08  Mg     2.1     10-08      TELEMETRY: Sinus Rhythm

## 2020-10-08 NOTE — PROGRESS NOTE ADULT - PROBLEM SELECTOR PLAN 1
MR brain: Acute completed right gangliocapsular infarction with local mass effect compressing the right lateral ventricle with 2 mm leftward shift of the anterior septum pellucidum. Chronic left NURA territory infarction.  - MRI confirms CVA  - Continue with ASA and atorvastatin 80mg  - Tele monitor   - Limited TTE: no obvious evidence of a patent foramen ovale  - Continue frequent neuro checks   - As per EP, ILR on Friday following completion of abx for UTI  - Speech and swallow recommended soft food diet with nectar thick liquid  - PT/OT consulted, f/u with recommendation  - Social work consult for sister to discuss rehabilitation MR brain: Acute completed right gangliocapsular infarction with local mass effect compressing the right lateral ventricle with 2 mm leftward shift of the anterior septum pellucidum. Chronic left NURA territory infarction.  - MRI confirms CVA  - Continue with ASA and atorvastatin 80mg  - Tele monitor   - Limited TTE: no obvious evidence of a patent foramen ovale  - Continue frequent neuro checks   - As per EP, ILR on Friday following completion of abx for UTI  - Neurology consulted for follow up note   - Speech and swallow recommended soft food diet with nectar thick liquid  - PT/OT consulted, f/u with recommendation  - Social work consult for sister to discuss rehabilitation

## 2020-10-08 NOTE — PROGRESS NOTE ADULT - PROBLEM SELECTOR PLAN 2
- c/w ceftriaxone, D4/5  - urine culture: >100 CFU E coli, f/u sensitivies - c/w ceftriaxone, D4/5  - urine culture: >100 CFU E coli; pansensitive

## 2020-10-08 NOTE — PROGRESS NOTE ADULT - PROBLEM SELECTOR PLAN 3
TTE on 10/06/2020 showed Mitral annular calcification, otherwise normal mitral  valve. Mild mitral regurgitation. Normal left ventricular internal dimensions and wall  thicknesses. Normal left ventricular systolic function. No segmental  wall motion abnormalities. Mild diastolic dysfunction (Stage I). Normal right ventricular size and function.  - EF: 63%  - Metoprolol discontinued, carvedilol discontinued due to bradycardia on tele monitor   - Continue with losartan 50mg  - Continue with ASA and atorvastatin 80mg

## 2020-10-08 NOTE — PROGRESS NOTE ADULT - PROBLEM SELECTOR PLAN 3
EF 41% presumed 2/2 prior MI, had stress test 2019 prior infarct to the inferiolateral wall June 2019, TTE now with normal EF and stage 1 diastolic dysfxn  - dc metoprolol, dc coreg 3.125 mg BID due to samuel events on tele  - c/w losartan 50 mg daily (new med)  - c/w asa and statin

## 2020-10-08 NOTE — PROGRESS NOTE ADULT - SUBJECTIVE AND OBJECTIVE BOX
Patient is a 75y old  Male who presents with a chief complaint of CVA, UTI    SUBJECTIVE / OVERNIGHT EVENTS:    MEDICATIONS  (STANDING):  aspirin enteric coated 81 milliGRAM(s) Oral daily  atorvastatin 80 milliGRAM(s) Oral at bedtime  cefTRIAXone   IVPB 1000 milliGRAM(s) IV Intermittent every 24 hours  enoxaparin Injectable 40 milliGRAM(s) SubCutaneous at bedtime  finasteride 5 milliGRAM(s) Oral daily  gabapentin 300 milliGRAM(s) Oral at bedtime  losartan 50 milliGRAM(s) Oral daily  pantoprazole    Tablet 40 milliGRAM(s) Oral before breakfast  polyethylene glycol 3350 17 Gram(s) Oral daily  sodium chloride 0.9% lock flush 3 milliLiter(s) IV Push every 8 hours  tamsulosin 0.4 milliGRAM(s) Oral at bedtime    T(C): 36.8 (10-08-20 @ 05:46), Max: 37.1 (10-07-20 @ 17:45)  HR: 79 (10-08-20 @ 05:46) (55 - 79)  BP: 132/87 (10-08-20 @ 05:46) (132/87 - 172/87)  RR: 18 (10-08-20 @ 05:46) (18 - 18)  SpO2: 96% (10-08-20 @ 05:46) (96% - 98%)    I&O's Summary    07 Oct 2020 07:01  -  08 Oct 2020 07:00  --------------------------------------------------------  IN: 0 mL / OUT: 550 mL / NET: -550 mL    PHYSICAL EXAM:  GENERAL: NAD  HEAD:  Atraumatic, Normocephalic, odd tongue movements   CHEST/LUNG: Clear to auscultation bilaterally; No wheeze  HEART: Regular rate and rhythm; No murmurs, rubs, or gallops  ABDOMEN: Soft, Nontender, Nondistended; Bowel sounds present  EXTREMITIES:   warm and well perfused, No clubbing, cyanosis, or edema  PSYCH: AAOx3 (able to state name, MARTA, Oct 2020 (took long to get 2020))  NEUROLOGY: R sided weaker than L, able to raise both RUE RLE to gravity but easier to break on resistance on R  SKIN: No rashes or lesions    LABS:                        13.8   11.45 )-----------( 302      ( 08 Oct 2020 06:00 )             43.3     10-07    142  |  103  |  8   ----------------------------<  88  4.2   |  31  |  1.01    Ca    9.8      07 Oct 2020 07:54  Phos  3.2     10-07  Mg     2.2     10-07    Microbiology: Culture Results:   >100,000 CFU/ml Escherichia coli (10-06 @ 00:32)    MRI  Confluent region of restricted diffusion in the right gangliocapsular region extending into the corona radiata due to acute infarction. Associated T2 hyperintense signal indicates completion. Local mass effect with sulcal effacement and compression of the right lateral ventricle produces 2 mm leftward shift of the anterior septum pellucidum. No hemorrhagic conversion.    Chronic left anterior cerebral artery (NURA) territory infarction centered in the left superior frontal gyrus with associated gliosis produces ex vacuo dilatation of the left lateral ventricle.    Multiple moderate central volume loss is noted. Scattered T2/FLAIR hyperintense foci in the cerebral white matter due to mild to moderate chronic microvascular ischemic changes. No evidence of hydrocephalus. Basal cisterns are clear.    Flow-voids are present in the major intracranial vessels, indicating patency.    The paranasal sinuses and mastoid air cells are clear. Bilateral lens replacements.    IMPRESSION:  Acute completed right gangliocapsular infarction with local mass effect compressing the right lateral ventricle with 2 mm leftward shift of the anterior septum pellucidum.  Chronic left NURA territory infarction.    Consultant(s) Notes Reviewed:    Care Discussed with Consultants/Other Providers:   Patient is a 75y old  Male who presents with a chief complaint of CVA, UTI    SUBJECTIVE / OVERNIGHT EVENTS:    No complaints  No CP, SOB, f/c/n/v    MEDICATIONS  (STANDING):  aspirin enteric coated 81 milliGRAM(s) Oral daily  atorvastatin 80 milliGRAM(s) Oral at bedtime  cefTRIAXone   IVPB 1000 milliGRAM(s) IV Intermittent every 24 hours  enoxaparin Injectable 40 milliGRAM(s) SubCutaneous at bedtime  finasteride 5 milliGRAM(s) Oral daily  gabapentin 300 milliGRAM(s) Oral at bedtime  losartan 50 milliGRAM(s) Oral daily  pantoprazole    Tablet 40 milliGRAM(s) Oral before breakfast  polyethylene glycol 3350 17 Gram(s) Oral daily  sodium chloride 0.9% lock flush 3 milliLiter(s) IV Push every 8 hours  tamsulosin 0.4 milliGRAM(s) Oral at bedtime    T(C): 36.8 (10-08-20 @ 05:46), Max: 37.1 (10-07-20 @ 17:45)  HR: 79 (10-08-20 @ 05:46) (55 - 79)  BP: 132/87 (10-08-20 @ 05:46) (132/87 - 172/87)  RR: 18 (10-08-20 @ 05:46) (18 - 18)  SpO2: 96% (10-08-20 @ 05:46) (96% - 98%)    I&O's Summary    07 Oct 2020 07:01  -  08 Oct 2020 07:00  --------------------------------------------------------  IN: 0 mL / OUT: 550 mL / NET: -550 mL    PHYSICAL EXAM:  GENERAL: NAD  HEAD:  Atraumatic, Normocephalic, odd tongue movements   CHEST/LUNG: Clear to auscultation bilaterally; No wheeze  HEART: Regular rate and rhythm; No murmurs, rubs, or gallops  ABDOMEN: Soft, Nontender, Nondistended; Bowel sounds present  EXTREMITIES:   warm and well perfused, No clubbing, cyanosis, or edema  PSYCH: A&Ox3 but remains slow to respond  NEUROLOGY: R sided weaker than L, able to raise both RUE RLE to gravity but easier to break on resistance on R  SKIN: No rashes or lesions    LABS:                        13.8   11.45 )-----------( 302      ( 08 Oct 2020 06:00 )             43.3     10-07    142  |  103  |  8   ----------------------------<  88  4.2   |  31  |  1.01    Ca    9.8      07 Oct 2020 07:54  Phos  3.2     10-07  Mg     2.2     10-07    Microbiology: Culture Results:   >100,000 CFU/ml Escherichia coli (10-06 @ 00:32)    MRI  Confluent region of restricted diffusion in the right gangliocapsular region extending into the corona radiata due to acute infarction. Associated T2 hyperintense signal indicates completion. Local mass effect with sulcal effacement and compression of the right lateral ventricle produces 2 mm leftward shift of the anterior septum pellucidum. No hemorrhagic conversion.  Chronic left anterior cerebral artery (NURA) territory infarction centered in the left superior frontal gyrus with associated gliosis produces ex vacuo dilatation of the left lateral ventricle.  Multiple moderate central volume loss is noted. Scattered T2/FLAIR hyperintense foci in the cerebral white matter due to mild to moderate chronic microvascular ischemic changes. No evidence of hydrocephalus. Basal cisterns are clear.  Flow-voids are present in the major intracranial vessels, indicating patency.  The paranasal sinuses and mastoid air cells are clear. Bilateral lens replacements.    IMPRESSION:  Acute completed right gangliocapsular infarction with local mass effect compressing the right lateral ventricle with 2 mm leftward shift of the anterior septum pellucidum.  Chronic left NURA territory infarction.    Consultant(s) Notes Reviewed:  neurology   Care Discussed with Consultants/Other Providers: neurology resident

## 2020-10-08 NOTE — PROGRESS NOTE ADULT - PROBLEM SELECTOR PLAN 4
Blood pressure medications resumed   - Continue with losartan 50mg Blood pressure medications resumed   - Continue with losartan 50mg, monitor BP

## 2020-10-08 NOTE — PROGRESS NOTE ADULT - PROBLEM SELECTOR PLAN 1
CTH: Wedge-shaped hypodensity within the right basal ganglia/corona radiata region with mass effect on the right lateral ventricle, likely representing acute or subacute infarct.  Chronic left anterior cerebral artery territory and left basal ganglia infarcts.  CTA: stenosis with Right M1 with Radiology Resident who states that stenosis is unchanged from MRI from January 2018. At time Stenosis was mild with correlates to <50% stenosis based on criteria.  - c/w asa 81 mg and atorvastatin 80 mg qhs, per neurology no Plavix, asa indefinitely  - MRI confirms CVA (complete acute gangliocapsular infarction)  - TTE: EF now 61%, bubble study neg for PFO  - S&S 10/6 rec mechanical soft with nectar thick liquid   - f/u PT and OT recs--rec [facility], SW will need to d/w sister  - c/w neurochecks  - ILR on hold per EP, pt can undergo ILR despite UTI as tx ends tomorrow CTH: Wedge-shaped hypodensity within the right basal ganglia/corona radiata region with mass effect on the right lateral ventricle, likely representing acute or subacute infarct.  Chronic left anterior cerebral artery territory and left basal ganglia infarcts.  CTA: stenosis with Right M1 with Radiology Resident who states that stenosis is unchanged from MRI from January 2018. At time Stenosis was mild with correlates to <50% stenosis based on criteria.  - c/w asa 81 mg and atorvastatin 80 mg qhs, per neurology no Plavix, asa indefinitely  - MRI confirms CVA (complete acute gangliocapsular infarction)  - TTE: EF now 61%, bubble study neg for PFO  - S&S 10/6 rec mechanical soft with nectar thick liquid   - f/u PT and OT recs--rec [facility], PMR rec acute rehab   - c/w neurochecks  - ILR on hold per EP, pt can undergo ILR despite UTI as tx ends tomorrow

## 2020-10-08 NOTE — PROGRESS NOTE ADULT - PROBLEM SELECTOR PLAN 7
Lovenox 40mg SC daily   Fall precautions  PT/OT - patient's sister does not want Rehab due to Covid, hired someone privately to come to the home. Lovenox 40mg SC daily   Fall precautions  PT/OT - patient's sister does not want Rehab due to Covid, hired someone privately to come to the home; sister is now considering rehabilitation as an option

## 2020-10-08 NOTE — CONSULT NOTE ADULT - SUBJECTIVE AND OBJECTIVE BOX
75yM was admitted on 10-05    HPI:  74 y/o M with PMH of CVA (2018, Residual right sided weakness), HTN, HLD, B cell lymphoproliferative disorder presents to the ED with Right sided weakness. Patient had difficulty ambulating morning of admission due to left sided weakness for a few days and progressive worse than baseline. Sister noticed dysphagia and dysarthria, followed by unresponsive episode while sitting in chair. She called EMS and patient became responsive in ambulance.  In ED patient found to be bradycardic and hypotensive on arrival.  Patient was seen by neurology for stroke rule out. CT Head done concerning for Wedge-shaped area of hypodensity within the right basal ganglia/corona radiata region with mass effect on the right lateral ventricle, likely representing acute or subacute infarct. Patient was also found to have UTI and treated with Rocephin. (06 Oct 2020 02:58)    MRI performed, confirmed acute right gangliocapsular infarct with local mass effect compressing right lateral ventricle with 2mm leftward shift.  TTE performed without evidence of PFO and EF improved from prior to 61%. CTA Head and Neck revealed stenosis with Right M1, per chart, uncharged from prior MRI in Jan 2018 correlating with <50% stenosis.  ASA and Statin continued. Neurology recommended against Plavix.  SLP assessment recommended mechanical soft + nectar.  ILR pending per EP.  Losartan started for HTN. Coreg d/c'd due to samuel events on telemetry.     PM&R consulted for rehab needs.     -----------------------------------------------------------------------  REVIEW OF SYSTEMS  Constitutional - No fever, No weight loss, No fatigue  HEENT - No visual disturbances, No difficulty hearing, No vertigo  Respiratory - No cough, No wheezing, No shortness of breath  Cardiovascular - No chest pain, No palpitations  Gastrointestinal - No abdominal pain, No nausea, No vomiting, No diarrhea, No constipation  Genitourinary - No dysuria, No frequency, No hematuria, No incontinence  Neurological - No headaches, No memory loss, No loss of strength, No numbness, No tremors  Skin - No itching, No rashes, No lesions   Musculoskeletal - No joint pain, No joint swelling, No muscle pain  Psychiatric - No depression, No anxiety  -----------------------------------------------------------------------  PAST MEDICAL & SURGICAL HISTORY  Kidney stone  Urinary tract infection without hematuria, site unspecified  Cerebrovascular accident (CVA), unspecified mechanism  Lymphoproliferative disorder  Hyperlipidemia  Hypertension    History of cataract surgery    FAMILY HISTORY   FH: hypertension      SOCIAL HISTORY  Smoking - Denied  EtOH - Denied   Drugs - Denied    FUNCTIONAL HISTORY  Pt. reports he lives with his sister in a house with 2 steps to enter.   Once inside, bedroom and bathroom are located on the main level.   He has a bathtub in his bathroom with grab bar and shower chair available.  Independent with prior ADLs but needs a RW for assistance.    CURRENT FUNCTIONAL STATUS    Assessment on 10/7  Progress Summary: Pt. was seen for follow up treatment. Pt. received semisupine in bed, no apparent distress, +tele. Pt. demonstrated moderate assistance of 1 with bed mobility activities and with sit<>stand transfers with use of rolling walker. Pt. unable to ambulate at this time secondary to weakness. Pt. was left semisupine in bed, no apparent distress, all lines intact. LOREN Palafox made aware of pt. participation in PT.     Bed Mobility  Bed Mobility Training Rehab Potential: good, to achieve stated therapy goals  Bed Mobility Training Sit-to-Supine: moderate assist (50% patient effort);  1 person assist;  nonverbal cues (demo/gestures);  verbal cues  Bed Mobility Training Supine-to-Sit: moderate assist (50% patient effort);  1 person assist;  nonverbal cues (demo/gestures);  verbal cues  Bed Mobility Training Limitations: decreased strength;  impaired balance;  impaired postural control    Sit-Stand Transfer Training  Sit-to-Stand Transfer Training Rehab Potential: good, to achieve stated therapy goals  Transfer Training Sit-to-Stand Transfer: moderate assist (50% patient effort);  1 person assist;  nonverbal cues (demo/gestures);  verbal cues;  weight-bearing as tolerated   rolling walker  Transfer Training Stand-to-Sit Transfer: moderate assist (50% patient effort);  1 person assist;  nonverbal cues (demo/gestures);  verbal cues;  weight-bearing as tolerated   rolling walker  Sit-to-Stand Transfer Training Transfer Safety Analysis: impaired balance;  decreased strength;  rolling walker    Overall Progress Summary    Progress Summary: OT evaluation completed. Patient presently requires assistance with ADL management and functional mobility. Pt performed sit<>stand transfers with Minimum Assistance x2 & lower body dressing task with Maximum Assistance. Noted Right UE weakness. At this time, pt. will benefit from rehab post-discharge. Will continue to follow pt while inpatient. Pt. left sitting in chair next to bed as received. Call bell in reach. NAD. All lines intact and all precautions maintained. RN aware.     Assessment on 10/6    Gait Skills:     · Level of [place]	moderate assist (50% patients effort)  · Physical Assist/Nonphysical Assist	1 person assist  · Weight-Bearing Restrictions	weight-bearing as tolerated  · Assistive Device	rolling walker  · Gait Distance	1 step        ----------------------------------------------------------------------------------------  VITALS  T(C): 36.8 (10-08-20 @ 05:46), Max: 37.1 (10-07-20 @ 17:45)  HR: 79 (10-08-20 @ 05:46) (55 - 79)  BP: 132/87 (10-08-20 @ 05:46) (132/87 - 172/87)  RR: 18 (10-08-20 @ 05:46) (18 - 18)  SpO2: 96% (10-08-20 @ 05:46) (96% - 98%)  Wt(kg): --    PHYSICAL EXAM    Constitutional - NAD, Comfortable  HEENT - NCAT, EOMI  Neck - Supple, No limited ROM  Chest - Breathing comfortably, No Respiratory distress  Cardiovascular - RRR  Abdomen - Soft   Extremities - No C/C/E, No calf tenderness   Neurologic Exam -                    Cognitive - Awake, Alert, AAO to self, place, date, year, situation     Communication - Fluent, No dysarthria     Cranial Nerves - No facial asymmetry, facial sensation intact, EOMI, tongue midline, shoulder shrug intact     Motor - No focal deficits                    LEFT    UE - ShAB 5/5, EF 5/5, EE 5/5, WE 5/5,  5/5                    RIGHT UE - ShAB 5/5, EF 5/5, EE 5/5, WE 5/5,  5/5                    LEFT    LE - HF 5/5, KE 5/5, DF 5/5, PF 5/5                    RIGHT LE - HF 5/5, KE 5/5, DF 5/5, PF 5/5        Sensory - Intact to LT     Reflexes - neg babinski b/l     Coordination - FTN intact     OculoVestibular - No nystagmus     Balance - WNL Static  Psychiatric - Mood stable, Affect WNL  ----------------------------------------------------------------------------------------    RECENT LABS  CBC Full  -  ( 08 Oct 2020 06:00 )  WBC Count : 11.45 K/uL  RBC Count : 4.64 M/uL  Hemoglobin : 13.8 g/dL  Hematocrit : 43.3 %  Platelet Count - Automated : 302 K/uL  Mean Cell Volume : 93.3 fL  Mean Cell Hemoglobin : 29.7 pg  Mean Cell Hemoglobin Concentration : 31.9 %  Auto Neutrophil # : x  Auto Lymphocyte # : x  Auto Monocyte # : x  Auto Eosinophil # : x  Auto Basophil # : x  Auto Neutrophil % : x  Auto Lymphocyte % : x  Auto Monocyte % : x  Auto Eosinophil % : x  Auto Basophil % : x    10-08    138  |  102  |  13  ----------------------------<  89  3.6   |  26  |  0.95    Ca    9.3      08 Oct 2020 06:00  Phos  3.5     10-08  Mg     2.1     10-08          IMAGING      ALLERGIES  Levaquin (Rash)      MEDICATIONS   aspirin enteric coated 81 milliGRAM(s) Oral daily  atorvastatin 80 milliGRAM(s) Oral at bedtime  cefTRIAXone   IVPB 1000 milliGRAM(s) IV Intermittent every 24 hours  enoxaparin Injectable 40 milliGRAM(s) SubCutaneous at bedtime  finasteride 5 milliGRAM(s) Oral daily  gabapentin 300 milliGRAM(s) Oral at bedtime  losartan 50 milliGRAM(s) Oral daily  pantoprazole    Tablet 40 milliGRAM(s) Oral before breakfast  polyethylene glycol 3350 17 Gram(s) Oral daily  sodium chloride 0.9% lock flush 3 milliLiter(s) IV Push every 8 hours  tamsulosin 0.4 milliGRAM(s) Oral at bedtime      -----------------------------------------------------------------------  ASSESSMENT/PLAN  74 yo M with hx of previous CVA (residual R sided deficits), systolic HF, B-cell lymphoproliferative disorder presented to [facility] with nee left hemiparesis found to have acute right basal ganglia/corona radiata ischemic CVA. Course complicated by UTI.    CVA - MRI confirmed CVA. TTE performed, no PFO. ILR pending per EP. ASA and statin. No Plavix per neurology. PT/OT assessment.  Dysphagia - SLP rec mechanical soft with nectar  E.coi UTI - s/p course of Rocephin.  Urinary retention - Flomax, Proscar  Systolic HF - EF 41% prior. TTE now with EF 61% and diastolic dysfunction. metoprolol, Coreg d/c'd due to bradycardia events.   HTN - losartan started  Lymphoproliferative disorder - stable. Heme follow up outpatient.    Precautions: Aspirations, Falls  Diet: soft + nectar, Protonix  Pain: Tylenol, Gabapentin  Skin: q2hr turns, skin injury prevention  DVT PPX: Lovenox    Rehab - Will continue to follow for ongoing rehab needs and recommendations.      75yM was admitted on 10-05    HPI:  74 y/o M with PMH of CVA (2018, Residual right sided weakness), HTN, HLD, B cell lymphoproliferative disorder presents to the ED with Right sided weakness. Patient had difficulty ambulating morning of admission due to left sided weakness for a few days and progressive worse than baseline. Sister noticed dysphagia and dysarthria, followed by unresponsive episode while sitting in chair. She called EMS and patient became responsive in ambulance.  In ED patient found to be bradycardic and hypotensive on arrival.  Patient was seen by neurology for stroke rule out. CT Head done concerning for Wedge-shaped area of hypodensity within the right basal ganglia/corona radiata region with mass effect on the right lateral ventricle, likely representing acute or subacute infarct. Patient was also found to have UTI and treated with Rocephin. (06 Oct 2020 02:58)    MRI performed, confirmed acute right gangliocapsular infarct with local mass effect compressing right lateral ventricle with 2mm leftward shift.  TTE performed without evidence of PFO and EF improved from prior to 61%. CTA Head and Neck revealed stenosis with Right M1, per chart, uncharged from prior MRI in Jan 2018 correlating with <50% stenosis.  ASA and Statin continued. Neurology recommended against Plavix.  SLP assessment recommended mechanical soft + nectar.  ILR pending per EP.  Losartan started for HTN. Coreg d/c'd due to samuel events on telemetry.     PM&R consulted for rehab needs.     -----------------------------------------------------------------------  REVIEW OF SYSTEMS  Constitutional - No fever, No weight loss, No fatigue  HEENT - No visual disturbances, No difficulty hearing, No vertigo  Respiratory - No cough, No wheezing, No shortness of breath  Cardiovascular - No chest pain, No palpitations  Gastrointestinal - No abdominal pain, No nausea, No vomiting, No diarrhea, No constipation  Genitourinary - No dysuria, No frequency, No hematuria, No incontinence  Neurological - No headaches, No memory loss, No loss of strength, No numbness, No tremors  Skin - No itching, No rashes, No lesions   Musculoskeletal - No joint pain, No joint swelling, No muscle pain  Psychiatric - No depression, No anxiety  -----------------------------------------------------------------------  PAST MEDICAL & SURGICAL HISTORY  Kidney stone  Urinary tract infection without hematuria, site unspecified  Cerebrovascular accident (CVA), unspecified mechanism  Lymphoproliferative disorder  Hyperlipidemia  Hypertension    History of cataract surgery    FAMILY HISTORY   FH: hypertension      SOCIAL HISTORY  Smoking - Denied  EtOH - Denied   Drugs - Denied    FUNCTIONAL HISTORY  Pt. reports he lives with his sister in a house with 2 steps to enter.   Once inside, bedroom and bathroom are located on the main level.   He has a bathtub in his bathroom with grab bar and shower chair available.  Independent with prior ADLs but needs a RW for assistance.  he patient lives in a private house with Nathalia, he has a  rolling walker but needs a wheelchair. The patient is with [facility]  12 hours/ week, Nathalia is the HHA.    CURRENT FUNCTIONAL STATUS    Assessment on 10/7  Progress Summary: Pt. was seen for follow up treatment. Pt. received semisupine in bed, no apparent distress, +tele. Pt. demonstrated moderate assistance of 1 with bed mobility activities and with sit<>stand transfers with use of rolling walker. Pt. unable to ambulate at this time secondary to weakness. Pt. was left semisupine in bed, no apparent distress, all lines intact. LOREN Palafox made aware of pt. participation in PT.     Bed Mobility  Bed Mobility Training Rehab Potential: good, to achieve stated therapy goals  Bed Mobility Training Sit-to-Supine: moderate assist (50% patient effort);  1 person assist;  nonverbal cues (demo/gestures);  verbal cues  Bed Mobility Training Supine-to-Sit: moderate assist (50% patient effort);  1 person assist;  nonverbal cues (demo/gestures);  verbal cues  Bed Mobility Training Limitations: decreased strength;  impaired balance;  impaired postural control    Sit-Stand Transfer Training  Sit-to-Stand Transfer Training Rehab Potential: good, to achieve stated therapy goals  Transfer Training Sit-to-Stand Transfer: moderate assist (50% patient effort);  1 person assist;  nonverbal cues (demo/gestures);  verbal cues;  weight-bearing as tolerated   rolling walker  Transfer Training Stand-to-Sit Transfer: moderate assist (50% patient effort);  1 person assist;  nonverbal cues (demo/gestures);  verbal cues;  weight-bearing as tolerated   rolling walker  Sit-to-Stand Transfer Training Transfer Safety Analysis: impaired balance;  decreased strength;  rolling walker    Overall Progress Summary    Progress Summary: OT evaluation completed. Patient presently requires assistance with ADL management and functional mobility. Pt performed sit<>stand transfers with Minimum Assistance x2 & lower body dressing task with Maximum Assistance. Noted Right UE weakness. At this time, pt. will benefit from rehab post-discharge. Will continue to follow pt while inpatient. Pt. left sitting in chair next to bed as received. Call bell in reach. NAD. All lines intact and all precautions maintained. RN aware.     Assessment on 10/6    Gait Skills:     · Level of [place]	moderate assist (50% patients effort)  · Physical Assist/Nonphysical Assist	1 person assist  · Weight-Bearing Restrictions	weight-bearing as tolerated  · Assistive Device	rolling walker  · Gait Distance	1 step        ----------------------------------------------------------------------------------------  VITALS  T(C): 36.8 (10-08-20 @ 05:46), Max: 37.1 (10-07-20 @ 17:45)  HR: 79 (10-08-20 @ 05:46) (55 - 79)  BP: 132/87 (10-08-20 @ 05:46) (132/87 - 172/87)  RR: 18 (10-08-20 @ 05:46) (18 - 18)  SpO2: 96% (10-08-20 @ 05:46) (96% - 98%)  Wt(kg): --    PHYSICAL EXAM    Constitutional - NAD, Comfortable  HEENT - NCAT, EOMI  Neck - Supple, No limited ROM  Chest - Breathing comfortably, No Respiratory distress  Cardiovascular - RRR  Abdomen - Soft   Extremities - No C/C/E, No calf tenderness   Neurologic Exam -                    Cognitive - Awake, Alert, AAO to self, place, date, year, situation     Communication - Fluent, No dysarthria     Cranial Nerves - No facial asymmetry, facial sensation intact, EOMI, tongue midline, shoulder shrug intact     Motor - No focal deficits                    LEFT    UE - ShAB 5/5, EF 5/5, EE 5/5, WE 5/5,  5/5                    RIGHT UE - ShAB 5/5, EF 5/5, EE 5/5, WE 5/5,  5/5                    LEFT    LE - HF 5/5, KE 5/5, DF 5/5, PF 5/5                    RIGHT LE - HF 5/5, KE 5/5, DF 5/5, PF 5/5        Sensory - Intact to LT     Reflexes - neg babinski b/l     Coordination - FTN intact     OculoVestibular - No nystagmus     Balance - WNL Static  Psychiatric - Mood stable, Affect WNL  ----------------------------------------------------------------------------------------    RECENT LABS  CBC Full  -  ( 08 Oct 2020 06:00 )  WBC Count : 11.45 K/uL  RBC Count : 4.64 M/uL  Hemoglobin : 13.8 g/dL  Hematocrit : 43.3 %  Platelet Count - Automated : 302 K/uL  Mean Cell Volume : 93.3 fL  Mean Cell Hemoglobin : 29.7 pg  Mean Cell Hemoglobin Concentration : 31.9 %  Auto Neutrophil # : x  Auto Lymphocyte # : x  Auto Monocyte # : x  Auto Eosinophil # : x  Auto Basophil # : x  Auto Neutrophil % : x  Auto Lymphocyte % : x  Auto Monocyte % : x  Auto Eosinophil % : x  Auto Basophil % : x    10-08    138  |  102  |  13  ----------------------------<  89  3.6   |  26  |  0.95    Ca    9.3      08 Oct 2020 06:00  Phos  3.5     10-08  Mg     2.1     10-08          IMAGING      ALLERGIES  Levaquin (Rash)      MEDICATIONS   aspirin enteric coated 81 milliGRAM(s) Oral daily  atorvastatin 80 milliGRAM(s) Oral at bedtime  cefTRIAXone   IVPB 1000 milliGRAM(s) IV Intermittent every 24 hours  enoxaparin Injectable 40 milliGRAM(s) SubCutaneous at bedtime  finasteride 5 milliGRAM(s) Oral daily  gabapentin 300 milliGRAM(s) Oral at bedtime  losartan 50 milliGRAM(s) Oral daily  pantoprazole    Tablet 40 milliGRAM(s) Oral before breakfast  polyethylene glycol 3350 17 Gram(s) Oral daily  sodium chloride 0.9% lock flush 3 milliLiter(s) IV Push every 8 hours  tamsulosin 0.4 milliGRAM(s) Oral at bedtime      -----------------------------------------------------------------------  ASSESSMENT/PLAN  74 yo M with hx of previous CVA (residual R sided deficits), systolic HF, B-cell lymphoproliferative disorder presented to [facility] with nee left hemiparesis found to have acute right basal ganglia/corona radiata ischemic CVA. Course complicated by UTI.    CVA - MRI confirmed CVA. TTE performed, no PFO. ILR pending per EP. ASA and statin. No Plavix per neurology. PT/OT assessment.  Dysphagia - SLP rec mechanical soft with nectar  E.coi UTI - s/p course of Rocephin.  Urinary retention - Flomax, Proscar  Systolic HF - EF 41% prior. TTE now with EF 61% and diastolic dysfunction. metoprolol, Coreg d/c'd due to bradycardia events.   HTN - losartan started  Lymphoproliferative disorder - stable. Heme follow up outpatient.    Precautions: Aspirations, Falls  Diet: soft + nectar, Protonix  Pain: Tylenol, Gabapentin  Skin: q2hr turns, skin injury prevention  DVT PPX: Lovenox    Rehab - Will continue to follow for ongoing rehab needs and recommendations.      75yM was admitted on 10-05    HPI:  76 y/o M with PMH of CVA (2018, Residual right sided weakness), HTN, HLD, B cell lymphoproliferative disorder presents to the ED with Right sided weakness. Patient had difficulty ambulating morning of admission due to left sided weakness for a few days and progressive worse than baseline. Sister noticed dysphagia and dysarthria, followed by unresponsive episode while sitting in chair. She called EMS and patient became responsive in ambulance.  In ED patient found to be bradycardic and hypotensive on arrival.  Patient was seen by neurology for stroke rule out. CT Head done concerning for Wedge-shaped area of hypodensity within the right basal ganglia/corona radiata region with mass effect on the right lateral ventricle, likely representing acute or subacute infarct. Patient was also found to have UTI and treated with Rocephin. (06 Oct 2020 02:58)    MRI performed, confirmed acute right gangliocapsular infarct with local mass effect compressing right lateral ventricle with 2mm leftward shift.  TTE performed without evidence of PFO and EF improved from prior to 61%. CTA Head and Neck revealed stenosis with Right M1, per chart, uncharged from prior MRI in Jan 2018 correlating with <50% stenosis.  ASA and Statin continued. Neurology recommended against Plavix.  SLP assessment recommended mechanical soft + nectar.  ILR pending per EP.  Losartan started for HTN. Coreg d/c'd due to samuel events on telemetry.     PM&R consulted for rehab needs.     -----------------------------------------------------------------------  REVIEW OF SYSTEMS  Constitutional - No fever, No weight loss, No fatigue  HEENT - No visual disturbances, No difficulty hearing, No vertigo  Respiratory - No cough, No wheezing, No shortness of breath  Cardiovascular - No chest pain, No palpitations  Gastrointestinal - No abdominal pain, No nausea, No vomiting, No diarrhea, No constipation  Genitourinary - No dysuria, No frequency, No hematuria, No incontinence  Neurological - No headaches, No memory loss, No loss of strength, No numbness, No tremors  Skin - No itching, No rashes, No lesions   Musculoskeletal - No joint pain, No joint swelling, No muscle pain  Psychiatric - No depression, No anxiety  -----------------------------------------------------------------------  PAST MEDICAL & SURGICAL HISTORY  Kidney stone  Urinary tract infection without hematuria, site unspecified  Cerebrovascular accident (CVA), unspecified mechanism  Lymphoproliferative disorder  Hyperlipidemia  Hypertension    History of cataract surgery    FAMILY HISTORY   FH: hypertension      SOCIAL HISTORY  Smoking - Denied  EtOH - Denied   Drugs - Denied    FUNCTIONAL HISTORY  Pt. reports he lives with his sister in a house with 2 steps to enter.   Once inside, bedroom and bathroom are located on the main level.   He has a bathtub in his bathroom with grab bar and shower chair available.  Independent with prior ADLs but needs a RW for assistance.  he patient lives in a private house with Nathalia, he has a  rolling walker but needs a wheelchair. The patient is with [facility]  12 hours/ week, Nathalia is the HHA.    CURRENT FUNCTIONAL STATUS    10/8 PT  Bed Mobility  Bed Mobility Training Rehab Potential: good, to achieve stated therapy goals  Bed Mobility Training Symptoms Noted During/After Treatment: none  Bed Mobility Training Sit-to-Supine: moderate assist (50% patient effort);  1 person + 1 person to manage equipment;  verbal cues;  set-up required;  bed rails  Bed Mobility Training Supine-to-Sit: moderate assist (50% patient effort);  1 person + 1 person to manage equipment;  verbal cues;  set-up required;  bed rails  Bed Mobility Training Limitations: decreased strength;  decreased ability to use legs for bridging/pushing;  decreased ability to use arms for pushing/pulling;  impaired ability to control trunk for mobility;  impaired balance    Sit-Stand Transfer Training  Sit-to-Stand Transfer Training Rehab Potential: good, to achieve stated therapy goals  Sit-to-Stand Transfer Training Symptoms Noted During/After Treatment: none  Transfer Training Sit-to-Stand Transfer: moderate assist (50% patient effort);  1 person + 1 person to manage equipment;  verbal cues;  set-up required;  rolling walker  Transfer Training Stand-to-Sit Transfer: moderate assist (50% patient effort);  1 person + 1 person to manage equipment;  verbal cues;  set-up required;  rolling walker  Sit-to-Stand Transfer Training Transfer Safety Analysis: decreased balance;  decreased sequencing ability;  impaired balance;  decreased strength;  impaired coordination;  impaired motor control;  rolling walker    Gait Training  Gait Training Rehab Potential: good, to achieve stated therapy goals  Gait Training Symptoms Noted During/After Treatment: none  Gait Training: moderate assist (50% patient effort);  minimum assist (75% patient effort);  2 person assist;  verbal cues;  set-up required;  rolling walker;  10 side steps at bedside   Gait Analysis: 3-point gait   decreased kj;  decreased velocity of limb motion;  shuffling;  decreased step length;  decreased toe clearance;  decreased strength;  impaired balance;  impaired coordination;  impaired motor control;  impaired postural control;  R side weakness with difficulty to move R leg ;  10 side steps ;  rolling walker    ----------------------------------------------------------------------------------------  VITALS  T(C): 36.8 (10-08-20 @ 05:46), Max: 37.1 (10-07-20 @ 17:45)  HR: 79 (10-08-20 @ 05:46) (55 - 79)  BP: 132/87 (10-08-20 @ 05:46) (132/87 - 172/87)  RR: 18 (10-08-20 @ 05:46) (18 - 18)  SpO2: 96% (10-08-20 @ 05:46) (96% - 98%)  Wt(kg): --    PHYSICAL EXAM    Constitutional - NAD, Comfortable  HEENT - NCAT, EOMI  Neck - Supple, No limited ROM  Chest - Breathing comfortably, No Respiratory distress  Cardiovascular - RRR  Abdomen - Soft   Extremities - No C/C/E, No calf tenderness   Neurologic Exam -                    Cognitive - Awake, Alert, AAO to self, place, date, year, situation     Communication - Fluent, No dysarthria     Cranial Nerves - No facial asymmetry, facial sensation intact, EOMI, tongue midline, shoulder shrug intact     Motor - No focal deficits                    LEFT    UE - ShAB 5/5, EF 5/5, EE 5/5, WE 5/5,  5/5                    RIGHT UE - ShAB 5/5, EF 5/5, EE 5/5, WE 5/5,  5/5                    LEFT    LE - HF 5/5, KE 5/5, DF 5/5, PF 5/5                    RIGHT LE - HF 5/5, KE 5/5, DF 5/5, PF 5/5        Sensory - Intact to LT     Reflexes - neg babinski b/l     Coordination - FTN intact     OculoVestibular - No nystagmus     Balance - WNL Static  Psychiatric - Mood stable, Affect WNL  ----------------------------------------------------------------------------------------    RECENT LABS  CBC Full  -  ( 08 Oct 2020 06:00 )  WBC Count : 11.45 K/uL  RBC Count : 4.64 M/uL  Hemoglobin : 13.8 g/dL  Hematocrit : 43.3 %  Platelet Count - Automated : 302 K/uL  Mean Cell Volume : 93.3 fL  Mean Cell Hemoglobin : 29.7 pg  Mean Cell Hemoglobin Concentration : 31.9 %  Auto Neutrophil # : x  Auto Lymphocyte # : x  Auto Monocyte # : x  Auto Eosinophil # : x  Auto Basophil # : x  Auto Neutrophil % : x  Auto Lymphocyte % : x  Auto Monocyte % : x  Auto Eosinophil % : x  Auto Basophil % : x    10-08    138  |  102  |  13  ----------------------------<  89  3.6   |  26  |  0.95    Ca    9.3      08 Oct 2020 06:00  Phos  3.5     10-08  Mg     2.1     10-08          IMAGING      ALLERGIES  Levaquin (Rash)      MEDICATIONS   aspirin enteric coated 81 milliGRAM(s) Oral daily  atorvastatin 80 milliGRAM(s) Oral at bedtime  cefTRIAXone   IVPB 1000 milliGRAM(s) IV Intermittent every 24 hours  enoxaparin Injectable 40 milliGRAM(s) SubCutaneous at bedtime  finasteride 5 milliGRAM(s) Oral daily  gabapentin 300 milliGRAM(s) Oral at bedtime  losartan 50 milliGRAM(s) Oral daily  pantoprazole    Tablet 40 milliGRAM(s) Oral before breakfast  polyethylene glycol 3350 17 Gram(s) Oral daily  sodium chloride 0.9% lock flush 3 milliLiter(s) IV Push every 8 hours  tamsulosin 0.4 milliGRAM(s) Oral at bedtime      -----------------------------------------------------------------------  ASSESSMENT/PLAN  76 yo M with hx of previous CVA (residual R sided deficits), systolic HF, B-cell lymphoproliferative disorder presented to [facility] with nee left hemiparesis found to have acute right basal ganglia/corona radiata ischemic CVA. Course complicated by UTI.    CVA - MRI confirmed CVA. TTE performed, no PFO. ILR pending per EP. ASA and statin. No Plavix per neurology. PT/OT assessment.  Dysphagia - SLP rec mechanical soft with nectar  E.coi UTI - s/p course of Rocephin.  Urinary retention - Flomax, Proscar  Systolic HF - EF 41% prior. TTE now with EF 61% and diastolic dysfunction. metoprolol, Coreg d/c'd due to bradycardia events.   HTN - losartan started  Lymphoproliferative disorder - stable. Heme follow up outpatient.    Precautions: Aspirations, Falls  Diet: soft + nectar, Protonix  Pain: Tylenol, Gabapentin  Skin: q2hr turns, skin injury prevention  DVT PPX: Lovenox    Rehab - recommend acute inpatient rehab when medically cleared  patient can tolerate 3hrs/day of therapy, with medical supervision

## 2020-10-08 NOTE — PROGRESS NOTE ADULT - ASSESSMENT
74 y/o Male with PMH of CVA (2018, Residual right sided weakness), HTN, HLD, B cell lymphoproliferative disorder. Patient presented to the ED with right sided ARM weakness/ left side leg weakness and difficulty ambulating this morning (witnessed by sister); bradycardic/hypotensive and confused upon arrival. History was obtained from Sister Nathalia ([phone]). She states that patient was eating lunch when some of the food starting coming out of his mouth, patient became unresponsive while sitting in chair.   Head CT report above.  EP consult request for ILR.  Procedure reviewed/discussed with patient sister, all questions answered, consent obtained. ILR scheduled for tomorrow 10/9 (completion of Ceftriaxone for UTI).

## 2020-10-08 NOTE — PROGRESS NOTE ADULT - ASSESSMENT
INCOMPLETE NOTE      75 year old left-handed M with a PMH of former tobacco use, HLD, HTN, and ischemic stroke who presented due to difficulty getting out of bed upon awakening. LKN 10/4 in AM. Initial vital signs significant for BP of 174/84 and HR of 54. Physical exam significant for RLE weakness.  Initial labs significant for WBC of 12.93 and a positive urinalysis.   Impression: Worsening RLE weakness likely secondary to recrudescence (prior L NURA territory infarct) in setting of UTI. 2) Mild L gaze palsy (Resolved) probably secondary to R gangiocapsular acute ischemic infarct, secondary to  vs cardioembolism    Imaging:  [x] CTA H&N: multifocal M1 stenosis (mild - d/w radiology)  [x] CT head: acute R BG/CR ischemic infarct, chronic L NURA territory infarct  [x] TTE : EF 63%, unremarkable  [x] MRI brain: Acute R gangliocapsular infarct with local mass effect, 2mm L shift of ant spetum pellucidum  [] ILR pending, to be done on 10/9    Medications:  [x] continue Aspirin 81 mg  [x] continue DVT ppx  [x] continue Atorvastatin 80 mg nightly  [x] abx for UTI per primary team    Labs:  [x] HgbA1c 5.7, ldl 98    Other:  [x] PT/OT/SS 75 year old left-handed M with a PMH of former tobacco use, HLD, HTN, and ischemic stroke who presented due to difficulty getting out of bed upon awakening. LKN 10/4 in AM. Initial vital signs significant for BP of 174/84 and HR of 54. Physical exam significant for RLE weakness.  Initial labs significant for WBC of 12.93 and a positive urinalysis.   Impression: Worsening RLE weakness likely secondary to recrudescence (prior L NURA territory infarct) in setting of UTI vs new infarct on same side as RLE weakness (uncommon but might occur due to intracranial compensatory mechanism). 2) Mild L gaze palsy (Resolved) probably secondary to R gangiocapsular acute ischemic infarct, secondary to  vs branch atheromatous disease with to mild stenosis in R M1. Etiology of L NURA territory infarct (old) likely due to ESUS    Imaging:  [x] CTA H&N: multifocal and R M1 stenosis (mild - d/w radiology)  [x] CT head: acute R BG/CR ischemic infarct, chronic L NURA territory infarct  [x] TTE : EF 63%, unremarkable  [x] MRI brain: Acute R gangliocapsular infarct with local mass effect, 2mm L shift of ant spetum pellucidum  [] ILR pending, to be done on 10/9  [] recommend repeat MRI brain non con due to significant weakness in R leg.     Medications:  [x] continue Aspirin 81 mg  [x] continue DVT ppx  [x] continue Atorvastatin 80 mg nightly  [x] abx for UTI per primary team  [] keep SBP to upper limit of normotensive, do not drop blood pressure.     Labs:  [x] HgbA1c 5.7, ldl 98    Other:  [x] PT/OT/SS 75 year old left-handed M with a PMH of former tobacco use, HLD, HTN, and ischemic stroke who presented due to difficulty getting out of bed upon awakening. LKN 10/4 in AM. Initial vital signs significant for BP of 174/84 and HR of 54. Physical exam significant for RLE weakness.  Initial labs significant for WBC of 12.93 and a positive urinalysis.   Impression: Worsening RLE weakness likely secondary to recrudescence (prior L NURA territory infarct) in setting of UTI vs new infarct on same side as RLE weakness (uncommon but might occur due to intracranial compensatory mechanism). 2) Mild L gaze palsy (Resolved) probably secondary to R gangiocapsular acute ischemic infarct, secondary to  vs branch atheromatous disease with mild stenosis in R M1. Etiology of L NURA territory infarct (old) likely due to ESUS    Imaging:  [x] CTA H&N: multifocal and R M1 stenosis (mild - d/w radiology)  [x] CT head: acute R BG/CR ischemic infarct, chronic L NURA territory infarct  [x] TTE : EF 63%, unremarkable  [x] MRI brain: Acute R gangliocapsular infarct with local mass effect, 2mm L shift of ant spetum pellucidum  [] ILR pending, to be done on 10/9  [] recommend repeat MRI brain non con due to significant weakness in R leg not clearly explained by imaged lesions.     Medications:  [x] continue Aspirin 81 mg  [x] continue DVT ppx  [x] continue Atorvastatin 80 mg nightly  [x] abx for UTI per primary team  [] maintain permissive hypertension for now, up to 180/100, do not drop blood pressure.     Labs:  [x] HgbA1c 5.7, ldl 98    Other:  [x] PT/OT/SS

## 2020-10-08 NOTE — PROGRESS NOTE ADULT - SUBJECTIVE AND OBJECTIVE BOX
Patient is a 75y old  Male who presents with a chief complaint of CVA, UTI (08 Oct 2020 08:30)    SUBJECTIVE / OVERNIGHT EVENTS:  Patient states he is "doing very good" this morning. A&Ox4. Endorses he still has right sided weakness. Denies chest pain, SOB, abdominal pain, N/V, or loss of appetite.   As per EP note, ILR rescheduled for Friday after completion of antibiotics - patient and his sister made aware.   No tele events overnight. Patient HR averages from 50s-60s.     MEDICATIONS  (STANDING):  aspirin enteric coated 81 milliGRAM(s) Oral daily  atorvastatin 80 milliGRAM(s) Oral at bedtime  cefTRIAXone   IVPB 1000 milliGRAM(s) IV Intermittent every 24 hours  enoxaparin Injectable 40 milliGRAM(s) SubCutaneous at bedtime  finasteride 5 milliGRAM(s) Oral daily  gabapentin 300 milliGRAM(s) Oral at bedtime  losartan 50 milliGRAM(s) Oral daily  pantoprazole    Tablet 40 milliGRAM(s) Oral before breakfast  polyethylene glycol 3350 17 Gram(s) Oral daily  sodium chloride 0.9% lock flush 3 milliLiter(s) IV Push every 8 hours  tamsulosin 0.4 milliGRAM(s) Oral at bedtime    I&O's Summary    07 Oct 2020 07:01  -  08 Oct 2020 07:00  --------------------------------------------------------  IN: 0 mL / OUT: 550 mL / NET: -550 mL    PHYSICAL EXAM:  Vital Signs Last 24 Hrs  T(C): 36.8 (08 Oct 2020 05:46), Max: 37.1 (07 Oct 2020 17:45)  T(F): 98.2 (08 Oct 2020 05:46), Max: 98.8 (07 Oct 2020 17:45)  HR: 79 (08 Oct 2020 05:46) (55 - 79)  BP: 132/87 (08 Oct 2020 05:46) (132/87 - 172/87)  BP(mean): --  RR: 18 (08 Oct 2020 05:46) (18 - 18)  SpO2: 96% (08 Oct 2020 05:46) (96% - 98%)  CONSTITUTIONAL: NAD, well-developed, well-groomed; mild mannered and cooperative, responds to questioning   ENMT: Moist oral mucosa; normal dentition; no nasal discharge  RESPIRATORY: Normal respiratory effort; lungs are clear to auscultation bilaterally  CARDIOVASCULAR: Regular rate and rhythm, normal S1 and S2, no murmur/rub/gallop; No lower extremity edema; Peripheral pulses are 2+ bilaterally  ABDOMEN: Nontender to palpation, normoactive bowel sounds, no rebound/guarding; No hepatosplenomegaly  PSYCH: A+O to person, place and time; affect appropriate  NEUROLOGY: Right sided weakness greater than left side, able to lift both right upper extremity and right lower extremity and grasp/squeeze fingers, however easy to break resistance on right.   SKIN: No rashes; no palpable lesions    LABS:                        13.8   11.45 )-----------( 302      ( 08 Oct 2020 06:00 )             43.3     10-08    138  |  102  |  13  ----------------------------<  89  3.6   |  26  |  0.95    Ca    9.3      08 Oct 2020 06:00  Phos  3.5     10-08  Mg     2.1     10-08    Culture - Urine (collected 06 Oct 2020 00:32)  Source: .Urine Catheterized  Preliminary Report (07 Oct 2020 05:44):    >100,000 CFU/ml Escherichia coli      COVID-19 PCR: NotDetec (05 Oct 2020 16:35)      RADIOLOGY & ADDITIONAL TESTS:  MR brain:   Acute completed right gangliocapsular infarction with local mass effect compressing the right lateral ventricle with 2 mm leftward shift of the anterior septum pellucidum.  Chronic left NURA territory infarction.    CT Head: Wedge-shaped hypodensity within the right basal ganglia/corona radiata region with mass effect on the right lateral ventricle, likely representing acute or subacute infarct. Chronic left anterior cerebral artery territory and left basal ganglia infarcts.  There is no hydrocephalus,midline shift, or acute intracranial hemorrhage. No extra-axial collection.  The visualized paranasal sinuses and mastoid air cells are clear.  The calvarium is intact.  Status post bilateral lens replacement    CTA neck: No stenosis. No dissection.    CTA brain: Unchanged multifocal narrowing of the M1 segments bilaterally.    TTE:   1. Mitral annular calcification, otherwise normal mitral  valve. Mild mitral regurgitation.  2. Normal left ventricular internal dimensions and wall  thicknesses.  3. Normal left ventricular systolic function. No segmental  wall motion abnormalities.  4. Mild diastolic dysfunction (Stage I).  5. Normal right ventricular size and function.    Limited TTE: Agitated saline injection demonstrates no obvious evidence of a patent foramen ovale.    Care Discussed with Consultants/Other Providers: neurology    Patient is a 75y old  Male who presents with a chief complaint of CVA, UTI (08 Oct 2020 08:30)    SUBJECTIVE / OVERNIGHT EVENTS:  Patient states he is "doing very good" this morning. A&Ox4. Endorses he still has right sided weakness. Denies chest pain, SOB, abdominal pain, N/V, or loss of appetite.   As per EP note, ILR rescheduled for Friday after completion of antibiotics - patient and his sister made aware.   No tele events overnight. Patient HR averages from 50s-60s.     MEDICATIONS  (STANDING):  aspirin enteric coated 81 milliGRAM(s) Oral daily  atorvastatin 80 milliGRAM(s) Oral at bedtime  cefTRIAXone   IVPB 1000 milliGRAM(s) IV Intermittent every 24 hours  enoxaparin Injectable 40 milliGRAM(s) SubCutaneous at bedtime  finasteride 5 milliGRAM(s) Oral daily  gabapentin 300 milliGRAM(s) Oral at bedtime  losartan 50 milliGRAM(s) Oral daily  pantoprazole    Tablet 40 milliGRAM(s) Oral before breakfast  polyethylene glycol 3350 17 Gram(s) Oral daily  sodium chloride 0.9% lock flush 3 milliLiter(s) IV Push every 8 hours  tamsulosin 0.4 milliGRAM(s) Oral at bedtime    I&O's Summary    07 Oct 2020 07:01  -  08 Oct 2020 07:00  --------------------------------------------------------  IN: 0 mL / OUT: 550 mL / NET: -550 mL    PHYSICAL EXAM:  Vital Signs Last 24 Hrs  T(C): 36.8 (08 Oct 2020 05:46), Max: 37.1 (07 Oct 2020 17:45)  T(F): 98.2 (08 Oct 2020 05:46), Max: 98.8 (07 Oct 2020 17:45)  HR: 79 (08 Oct 2020 05:46) (55 - 79)  BP: 132/87 (08 Oct 2020 05:46) (132/87 - 172/87)  BP(mean): --  RR: 18 (08 Oct 2020 05:46) (18 - 18)  SpO2: 96% (08 Oct 2020 05:46) (96% - 98%)  CONSTITUTIONAL: NAD, well-developed, well-groomed; mild mannered and cooperative, responds to questioning   ENMT: Moist oral mucosa; normal dentition; no nasal discharge  RESPIRATORY: Normal respiratory effort; lungs are clear to auscultation bilaterally  CARDIOVASCULAR: Regular rate and rhythm, normal S1 and S2, no murmur/rub/gallop; No lower extremity edema; Peripheral pulses are 2+ bilaterally  ABDOMEN: Nontender to palpation, normoactive bowel sounds, no rebound/guarding; No hepatosplenomegaly  PSYCH: A+O to person, place and time; affect appropriate  NEUROLOGY: Right sided weakness greater than left side, able to lift both right upper extremity and right lower extremity and grasp/squeeze fingers, however easy to break resistance on right. RLE weaker today than on previous examination.   SKIN: No rashes; no palpable lesions    LABS:                        13.8   11.45 )-----------( 302      ( 08 Oct 2020 06:00 )             43.3     10-08    138  |  102  |  13  ----------------------------<  89  3.6   |  26  |  0.95    Ca    9.3      08 Oct 2020 06:00  Phos  3.5     10-08  Mg     2.1     10-08    Culture - Urine (collected 06 Oct 2020 00:32)  Source: .Urine Catheterized  Preliminary Report (07 Oct 2020 05:44):    >100,000 CFU/ml Escherichia coli      COVID-19 PCR: NotDetec (05 Oct 2020 16:35)      RADIOLOGY & ADDITIONAL TESTS:  MR brain:   Acute completed right gangliocapsular infarction with local mass effect compressing the right lateral ventricle with 2 mm leftward shift of the anterior septum pellucidum.  Chronic left NURA territory infarction.    CT Head: Wedge-shaped hypodensity within the right basal ganglia/corona radiata region with mass effect on the right lateral ventricle, likely representing acute or subacute infarct. Chronic left anterior cerebral artery territory and left basal ganglia infarcts.  There is no hydrocephalus,midline shift, or acute intracranial hemorrhage. No extra-axial collection.  The visualized paranasal sinuses and mastoid air cells are clear.  The calvarium is intact.  Status post bilateral lens replacement    CTA neck: No stenosis. No dissection.    CTA brain: Unchanged multifocal narrowing of the M1 segments bilaterally.    TTE:   1. Mitral annular calcification, otherwise normal mitral  valve. Mild mitral regurgitation.  2. Normal left ventricular internal dimensions and wall  thicknesses.  3. Normal left ventricular systolic function. No segmental  wall motion abnormalities.  4. Mild diastolic dysfunction (Stage I).  5. Normal right ventricular size and function.    Limited TTE: Agitated saline injection demonstrates no obvious evidence of a patent foramen ovale.    Care Discussed with Consultants/Other Providers: neurology

## 2020-10-08 NOTE — PROGRESS NOTE ADULT - PROBLEM SELECTOR PLAN 7
Lovenox ppx  PT/OP rec LIBERTAD, will need to sister doesn't think she wants it due to COVID, she has a private hire aide, did mention one LIBERT[facility] she would consider Lovenox ppx  PT/OP/PMR ARl will need COVID swab  dispo pending ILR tomorrow

## 2020-10-09 NOTE — DISCHARGE NOTE PROVIDER - NSDCFUSCHEDAPPT_GEN_ALL_CORE_FT
NAOMI LOMBARDI ; 10/22/2020 ; [facility] Cardio Electro [phone] NAOMI LOMBARDI ; 10/22/2020 ; [facility] Cardio Electro [phone]  NAOMI LOMBARDI ; 11/09/2020 ; [facility]PP Neuro [address] [facility]

## 2020-10-09 NOTE — PROGRESS NOTE ADULT - SUBJECTIVE AND OBJECTIVE BOX
Interval History: MRI brain unchanged from prior. no acute events overnight.     MEDICATIONS  (STANDING):  aspirin enteric coated 81 milliGRAM(s) Oral daily  atorvastatin 80 milliGRAM(s) Oral at bedtime  enoxaparin Injectable 40 milliGRAM(s) SubCutaneous at bedtime  finasteride 5 milliGRAM(s) Oral daily  gabapentin 300 milliGRAM(s) Oral at bedtime  losartan 25 milliGRAM(s) Oral daily  pantoprazole    Tablet 40 milliGRAM(s) Oral before breakfast  polyethylene glycol 3350 17 Gram(s) Oral daily  sodium chloride 0.9% lock flush 3 milliLiter(s) IV Push every 8 hours  tamsulosin 0.4 milliGRAM(s) Oral at bedtime    Vital Signs Last 24 Hrs  T(C): 36.8 (09 Oct 2020 11:37), Max: 36.9 (09 Oct 2020 09:30)  T(F): 98.2 (09 Oct 2020 11:37), Max: 98.5 (09 Oct 2020 09:30)  HR: 78 (09 Oct 2020 11:37) (78 - 83)  BP: 137/83 (09 Oct 2020 11:37) (137/83 - 143/83)  BP(mean): --  RR: 18 (09 Oct 2020 11:37) (18 - 18)  SpO2: 97% (09 Oct 2020 11:37) (97% - 98%)    NEUROLOGICAL EXAM:  MS: AAOX3, answers "yes" to most questions,   CN: VFF, EOMI, no facial asymmetry, moderately hypophonic (likely due to old L NURA infarct). very mild L gaze palsy  Motor: RUE and LUE: no drift, strength intact. LLE strength intact, no drift. RLE: has slight effort against gravity  Gait:  deferred    Labs:   cbc                      14.1   10.81 )-----------( 281      ( 09 Oct 2020 06:55 )             44.9     [id number]    139  |  105  |  12  ----------------------------<  91  3.8   |  27  |  0.97    Ca    8.9      09 Oct 2020 06:55  Phos  3.1     10-09  Mg     2.1     10-09 Interval History: MRI brain unchanged from prior. no acute events overnight.     MEDICATIONS  (STANDING):  aspirin enteric coated 81 milliGRAM(s) Oral daily  atorvastatin 80 milliGRAM(s) Oral at bedtime  enoxaparin Injectable 40 milliGRAM(s) SubCutaneous at bedtime  finasteride 5 milliGRAM(s) Oral daily  gabapentin 300 milliGRAM(s) Oral at bedtime  losartan 25 milliGRAM(s) Oral daily  pantoprazole    Tablet 40 milliGRAM(s) Oral before breakfast  polyethylene glycol 3350 17 Gram(s) Oral daily  sodium chloride 0.9% lock flush 3 milliLiter(s) IV Push every 8 hours  tamsulosin 0.4 milliGRAM(s) Oral at bedtime      Vital Signs Last 24 Hrs  T(C): 36.8 (09 Oct 2020 11:37), Max: 36.9 (09 Oct 2020 09:30)  T(F): 98.2 (09 Oct 2020 11:37), Max: 98.5 (09 Oct 2020 09:30)  HR: 78 (09 Oct 2020 11:37) (78 - 83)  BP: 137/83 (09 Oct 2020 11:37) (137/83 - 143/83)  BP(mean): --  RR: 18 (09 Oct 2020 11:37) (18 - 18)  SpO2: 97% (09 Oct 2020 11:37) (97% - 98%)    NEUROLOGICAL EXAM:  MS: AAOX3, answers "yes" to most questions,   CN: VFF, EOMI, no facial asymmetry, moderately hypophonic (likely due to old L NURA infarct). very mild L gaze palsy  Motor: RUE and LUE: no drift, strength intact. LLE strength intact, no drift. RLE: has slight effort against gravity (able to maintain leg elevation, initially to about 10 degrees, for at least 5 seconds with a "drift")  Gait:  deferred    Labs:   cbc                      14.1   10.81 )-----------( 281      ( 09 Oct 2020 06:55 )             44.9     [id number]    139  |  105  |  12  ----------------------------<  91  3.8   |  27  |  0.97    Ca    8.9      09 Oct 2020 06:55  Phos  3.1     10-09  Mg     2.1     10-09

## 2020-10-09 NOTE — PROGRESS NOTE ADULT - SUBJECTIVE AND OBJECTIVE BOX
Patient is a 75y old  Male who presents with a chief complaint of R/O stroke, UTI    SUBJECTIVE / OVERNIGHT EVENTS:    Seen by neuro rec repeat MRI, rec avoiding stringent BP control, losartan dc   Pending ILR    MEDICATIONS  (STANDING):  aspirin enteric coated 81 milliGRAM(s) Oral daily  atorvastatin 80 milliGRAM(s) Oral at bedtime  cefTRIAXone   IVPB 1000 milliGRAM(s) IV Intermittent once  enoxaparin Injectable 40 milliGRAM(s) SubCutaneous at bedtime  finasteride 5 milliGRAM(s) Oral daily  gabapentin 300 milliGRAM(s) Oral at bedtime  pantoprazole    Tablet 40 milliGRAM(s) Oral before breakfast  polyethylene glycol 3350 17 Gram(s) Oral daily  sodium chloride 0.9% lock flush 3 milliLiter(s) IV Push every 8 hours  tamsulosin 0.4 milliGRAM(s) Oral at bedtime    T(C): 36.8 (10-09-20 @ 05:55), Max: 36.8 (10-08-20 @ 10:55)  HR: 81 (10-09-20 @ 05:55) (80 - 86)  BP: 143/82 (10-09-20 @ 05:55) (137/97 - 143/83)  RR: 18 (10-09-20 @ 05:55) (18 - 18)  SpO2: 98% (10-09-20 @ 05:55) (97% - 98%)    I&O's Summary    08 Oct 2020 07:01  -  09 Oct 2020 07:00  --------------------------------------------------------  IN: 0 mL / OUT: 800 mL / NET: -800 mL    PHYSICAL EXAM:  PHYSICAL EXAM:  GENERAL: NAD  HEAD:  Atraumatic, Normocephalic, odd tongue movements   CHEST/LUNG: Clear to auscultation bilaterally; No wheeze  HEART: Regular rate and rhythm; No murmurs, rubs, or gallops  ABDOMEN: Soft, Nontender, Nondistended; Bowel sounds present  EXTREMITIES:   warm and well perfused, No clubbing, cyanosis, or edema  PSYCH: A&Ox3 but remains slow to respond  NEUROLOGY: R sided weaker than L, able to raise both RUE RLE to gravity but easier to break on resistance on R  SKIN: No rashes or lesions    LABS:  labs pending              Microbiology: Culture Results:   >100,000 CFU/ml Escherichia coli (10-05 @ 21:52)    Consultant(s) Notes Reviewed:  neuro, EP  Care Discussed with Consultants/Other Providers:   Patient is a 75y old  Male who presents with a chief complaint of R/O stroke, UTI    SUBJECTIVE / OVERNIGHT EVENTS:    No new complaints, no CP, SOB, f/c/n/v    Seen by neuro rec repeat MRI, rec avoiding stringent BP control, losartan dc   Pending ILR    MEDICATIONS  (STANDING):  aspirin enteric coated 81 milliGRAM(s) Oral daily  atorvastatin 80 milliGRAM(s) Oral at bedtime  cefTRIAXone   IVPB 1000 milliGRAM(s) IV Intermittent once  enoxaparin Injectable 40 milliGRAM(s) SubCutaneous at bedtime  finasteride 5 milliGRAM(s) Oral daily  gabapentin 300 milliGRAM(s) Oral at bedtime  pantoprazole    Tablet 40 milliGRAM(s) Oral before breakfast  polyethylene glycol 3350 17 Gram(s) Oral daily  sodium chloride 0.9% lock flush 3 milliLiter(s) IV Push every 8 hours  tamsulosin 0.4 milliGRAM(s) Oral at bedtime    T(C): 36.8 (10-09-20 @ 05:55), Max: 36.8 (10-08-20 @ 10:55)  HR: 81 (10-09-20 @ 05:55) (80 - 86)  BP: 143/82 (10-09-20 @ 05:55) (137/97 - 143/83)  RR: 18 (10-09-20 @ 05:55) (18 - 18)  SpO2: 98% (10-09-20 @ 05:55) (97% - 98%)    I&O's Summary    08 Oct 2020 07:01  -  09 Oct 2020 07:00  --------------------------------------------------------  IN: 0 mL / OUT: 800 mL / NET: -800 mL    PHYSICAL EXAM:  GENERAL: NAD  HEAD:  Atraumatic, Normocephalic, odd tongue movements   CHEST/LUNG: Clear to auscultation bilaterally; No wheeze  HEART: Regular rate and rhythm; No murmurs, rubs, or gallops  ABDOMEN: Soft, Nontender, Nondistended; Bowel sounds present  EXTREMITIES:   warm and well perfused, No clubbing, cyanosis, or edema  PSYCH: A&Ox3 but remains slow to respond  NEUROLOGY: R sided weaker than L, able to raise both RUE RLE to gravity but easier to break on resistance on R  SKIN: No rashes or lesions    LABS:  labs pending              Microbiology: Culture Results:   >100,000 CFU/ml Escherichia coli (10-05 @ 21:52)    Consultant(s) Notes Reviewed:  neuro, EP  Care Discussed with Consultants/Other Providers:

## 2020-10-09 NOTE — DISCHARGE NOTE PROVIDER - NSDCCPCAREPLAN_GEN_ALL_CORE_FT
PRINCIPAL DISCHARGE DIAGNOSIS  Diagnosis: Cerebrovascular accident (CVA), unspecified mechanism  Assessment and Plan of Treatment: You were found to have a stroke and treated accordingly. Please continue with prescribed blood thinning agents and participate in any recommended physical/occupational therapy to optimize your recovery. Continue with low salt/fat diet and follow-up with your neurologist/primary provider as outpatient within 2 weeks for repeat labs and continued care. Ask your provider about available support groups for stroke survivors for emotional support.      SECONDARY DISCHARGE DIAGNOSES  Diagnosis: Acute cystitis with hematuria  Assessment and Plan of Treatment: You were started on antibiotics for a urinary infection. To help prevent future infections maintain healthy hygiene and avoid taking long baths. Empty your bladder frequently by keeping yourself properly hydrated. Monitor for signs/symptoms of infection, such as, fever/chills, burning/pain with urination, urinary frequency/hesitancy, cloudy urine, or blood in urine and follow-up with your primary care provider as outpatient for further care.    Diagnosis: Chronic systolic heart failure  Assessment and Plan of Treatment: Your beta-blocker was discontinued as your heart rate was slow during admission. Monitor for signs/symptoms of fluid overload and electrolyte abnormalities, such as, shortness of breath, cough, swelling, chest discomfort, changes in heart rate, dizziness, fainting, or changes in mental status. Keep track of your weight and call your outpatient physician if there are abrupt changes in weight. Follow-up with your outpatient provider after you've been discharged from the hospital for further care/recommendations.    Diagnosis: Hyperlipidemia  Assessment and Plan of Treatment: Continue Lipitor and outpatient lipid testing.    Diagnosis: Hypertension  Assessment and Plan of Treatment: LOSARTAN _____ ***   Monitor for any visual changes, headaches or dizziness.  Monitor blood pressure regularly.  Follow up with your primary care provider for further management for high blood pressure.    Diagnosis: Lymphoproliferative disorder  Assessment and Plan of Treatment: Continue to follow-up with Dr. Davis as outpatient.     PRINCIPAL DISCHARGE DIAGNOSIS  Diagnosis: Cerebrovascular accident (CVA), unspecified mechanism  Assessment and Plan of Treatment: You were found to have a stroke and treated accordingly. Please continue with prescribed blood thinning agents and participate in any recommended physical/occupational therapy to optimize your recovery. Continue with low salt/fat diet and follow-up with your neurologist/primary provider as outpatient within 2 weeks for repeat labs and continued care. Ask your provider about available support groups for stroke survivors for emotional support.      SECONDARY DISCHARGE DIAGNOSES  Diagnosis: Acute cystitis with hematuria  Assessment and Plan of Treatment: You were started on antibiotics for a urinary infection. To help prevent future infections maintain healthy hygiene and avoid taking long baths. Empty your bladder frequently by keeping yourself properly hydrated. Monitor for signs/symptoms of infection, such as, fever/chills, burning/pain with urination, urinary frequency/hesitancy, cloudy urine, or blood in urine and follow-up with your primary care provider as outpatient for further care.    Diagnosis: Chronic systolic heart failure  Assessment and Plan of Treatment: Your beta-blocker was discontinued as your heart rate was slow during admission. Monitor for signs/symptoms of fluid overload and electrolyte abnormalities, such as, shortness of breath, cough, swelling, chest discomfort, changes in heart rate, dizziness, fainting, or changes in mental status. Keep track of your weight and call your outpatient physician if there are abrupt changes in weight. Follow-up with your outpatient provider after you've been discharged from the hospital for further care/recommendations.    Diagnosis: Hyperlipidemia  Assessment and Plan of Treatment: Continue Lipitor and outpatient lipid testing.    Diagnosis: Hypertension  Assessment and Plan of Treatment: Monitor for any visual changes, headaches or dizziness.  Monitor blood pressure regularly.  Follow up with your primary care provider for further management for high blood pressure.    Diagnosis: Lymphoproliferative disorder  Assessment and Plan of Treatment: Continue to follow-up with Dr. Davis as outpatient.

## 2020-10-09 NOTE — PROGRESS NOTE ADULT - PROBLEM SELECTOR PLAN 3
2019 EF 41% presumed 2/2 prior MI, had stress test 2019 prior infarct to the inferiolateral wall June 2019, TTE now with normal EF and stage 1 diastolic dysfxn  - dc metoprolol, dc coreg 3.125 mg BID due to samuel events on tele  - started on losartan 50 mg with adequate MODESTO control however held to allow for permissive HTN  - c/w asa and statin 2019 EF 41% presumed 2/2 prior MI, had stress test 2019 prior infarct to the inferiolateral wall June 2019, TTE now with normal EF and stage 1 diastolic dysfxn  - losartan 25 mg   - c/w asa and statin

## 2020-10-09 NOTE — DISCHARGE NOTE PROVIDER - HOSPITAL COURSE
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  - H/o CVA 2018 w/ R residual weakness p/w worsening R-sided weakness  - CTH reveals acute vs subacute infarct in basal ganglia and chronic left anterior cerebral artery territory and left basal ganglia infarcts  - CTA w/ stenosis R M1 unchanged from MRI from 2018  - MRI brain w/ acute R gangliocapsular infarction w/ local mass effect compressing R lateral ventricle w/ 2 mm leftward shift of the anterior septum pellucidum  - ECHO w/ bubble no PFO   - Neurology and EP consulted; Planned for ILR 10/9  - S/S w/ mech soft with nectar; PT/OT- rehab  - MRI repeated for worsening RLE; Mild L gaze palsy (Resolved) likely 2/2 acute ischemic infarct 2/2  vs branch atheromatous disease with mild stenosis in R M1  - C/w ASA/stain; No Plavix   - Permissive HTN up to 180; Losartan on hold; BP managed     UTI  - UA positive for Nitrites culture w/ E Coli s/p course of CTX thru 10/9    CHF  - EF 41% presumed 2/2 prior MI; Stress test  prior infarct to the inferiolateral wall 2019  - TTE this admission w/ normal EF and stage 1 diastolic dysfxn  - Beta-blocker discontinued as w/ episodes of bradycardia on telemetry   - Started Losartan, however, being held at this time     Dispo - Rehab 76 yo M with history of  HTN, HLD, CVA  2018 with( residual R sided deficits (at baseline ambulates short distances with walker) , B cell lymphoproliferative disorder presents with L gaze palsy and L sided weakness found w/ acute R gangliocapsular infarction    # Acute CVA  - CTH reveals acute vs subacute infarct in basal ganglia and chronic left anterior cerebral artery territory and left basal ganglia infarcts  - CTA w/ stenosis R M1 unchanged from MRI from January 2018 (mild)  - MRI brain w/ acute R gangliocapsular infarction w/ local mass effect compressing R lateral ventricle w/ 2 mm leftward shift of the anterior septum pellucidum  - ECHO w/ bubble: EF wnl and no PFO   - Seen by EP and s/p ILR 10/9  - S/S w/ mech soft with nectar; PT/OT- rehab, PMR rec AR  - MRI repeated for worsening RLE weakness; mild L gaze palsy (Resolved) likely 2/2 acute ischemic infarct   - C/w ASA/stain; No Plavix      # UTI: pansensitive E coi   - completed 5d ceftriaxone on 10/9    # Chronic Systolic HF: 2019 EF 41% presumed 2/2 prior MI; Stress test 2019 prior infarct to the inferiolateral wall June 2019  - TTE this admission w/ normal EF and stage 1 diastolic dysfxn  - Beta-blocker discontinued as w/ episodes of bradycardia on telemetry   - started on losartan for BP    Prior to admission living with sister Nathalia    Dispo - Acute Rehab

## 2020-10-09 NOTE — PROGRESS NOTE ADULT - PROBLEM SELECTOR PROBLEM 1
Cerebrovascular accident (CVA), unspecified mechanism

## 2020-10-09 NOTE — PROGRESS NOTE ADULT - SUBJECTIVE AND OBJECTIVE BOX
Interval History:  Patient resting comfortably in bed  Denies CP/SOB/palpitations/dizziness.  No acute events overnight.    Medications:  aspirin enteric coated 81 milliGRAM(s) Oral daily  atorvastatin 80 milliGRAM(s) Oral at bedtime  enoxaparin Injectable 40 milliGRAM(s) SubCutaneous at bedtime  finasteride 5 milliGRAM(s) Oral daily  gabapentin 300 milliGRAM(s) Oral at bedtime  pantoprazole    Tablet 40 milliGRAM(s) Oral before breakfast  polyethylene glycol 3350 17 Gram(s) Oral daily  sodium chloride 0.9% lock flush 3 milliLiter(s) IV Push every 8 hours  tamsulosin 0.4 milliGRAM(s) Oral at bedtime      Vitals:  T(C): 36.8 (10-09-20 @ 11:37), Max: 36.9 (10-09-20 @ 09:30)  HR: 78 (10-09-20 @ 11:37) (78 - 83)  BP: 137/83 (10-09-20 @ 11:37) (137/83 - 143/83)  BP(mean): --  RR: 18 (10-09-20 @ 11:37) (18 - 18)  SpO2: 97% (10-09-20 @ 11:37) (97% - 98%)    I&O's Summary    08 Oct 2020 07:01  -  09 Oct 2020 07:00  --------------------------------------------------------  IN: 0 mL / OUT: 800 mL / NET: -800 mL        Physical Exam:  Appearance: No Acute Distress  HEENT: PERRL  Cardiovascular: Normal S1 S2, No murmurs/rubs/gallops  Respiratory: Clear to auscultation bilaterally  Gastrointestinal: Soft, Non-tender	  Skin: No cyanosis	  Neurologic: Non-focal  Extremities: No LE edema    Labs:                        14.1   10.81 )-----------( 281      ( 09 Oct 2020 06:55 )             44.9     10-09    139  |  105  |  12  ----------------------------<  91  3.8   |  27  |  0.97    Ca    8.9      09 Oct 2020 06:55  Phos  3.1     10-09  Mg     2.1     10-09      TELEMETRY: Sinus Rhythm

## 2020-10-09 NOTE — PROGRESS NOTE ADULT - REASON FOR ADMISSION
CVA, UTI
R/O stroke, UTI
CVA and UTI
CVA, UTI
CVA, UTI

## 2020-10-09 NOTE — PROGRESS NOTE ADULT - PROBLEM SELECTOR PLAN 8
Transitions of Care Status:  1.  Name of PCP: Dr. James Grimes  2.  PCP Contacted on Admission: [ ] Y    [X] N    3.  PCP contacted at Discharge: [ ] Y    [ ] N    [ ] N/A  4.  Post-Discharge Appointment Date and Location:  5.  Summary of Handoff given to PCP: Transitions of Care Status:  1.  Name of PCP: Dr. Zeb Grimes  2.  PCP Contacted on Admission: [ ] Y    [X] N    3.  PCP contacted at Discharge: Y office updated re: CVA and plan for AR

## 2020-10-09 NOTE — PROGRESS NOTE ADULT - PROBLEM SELECTOR PLAN 1
CTH: Wedge-shaped hypodensity within the right basal ganglia/corona radiata region with mass effect on the right lateral ventricle, likely representing acute or subacute infarct.  Chronic left anterior cerebral artery territory and left basal ganglia infarcts.  CTA: stenosis with Right M1 with Radiology Resident who states that stenosis is unchanged from MRI from January 2018. At time Stenosis was mild with correlates to <50% stenosis based on criteria.  - c/w asa 81 mg and atorvastatin 80 mg qhs, per neurology no Plavix, asa indefinitely  - MRI confirms CVA (complete acute gangliocapsular infarction)  - TTE: EF now 61%, bubble study neg for PFO  - S&S 10/6 rec mechanical soft with nectar thick liquid   - f/u PT and OT recs--rec [facility], PMR rec acute rehab   - c/w neurochecks  - ILR by EP today  - repeat MRI for RLE worsening weakness CTH: Wedge-shaped hypodensity within the right basal ganglia/corona radiata region with mass effect on the right lateral ventricle, likely representing acute or subacute infarct.  Chronic left anterior cerebral artery territory and left basal ganglia infarcts.  CTA: stenosis with Right M1 with Radiology Resident who states that stenosis is unchanged from MRI from January 2018. At time Stenosis was mild with correlates to <50% stenosis based on criteria.  - c/w asa 81 mg and atorvastatin 80 mg qhs, per neurology no Plavix, asa indefinitely  - MRI confirms CVA (complete acute gangliocapsular infarction); repeat MRI stable/no changes  - TTE: EF now 61%, bubble study neg for PFO  - S&S 10/6 rec mechanical soft with nectar thick liquid   - f/u PT and OT recs--rec [facility], PMR rec acute rehab   - c/w neurochecks  - ILR by CHRISTIANO  10/9

## 2020-10-09 NOTE — PROGRESS NOTE ADULT - PROBLEM SELECTOR PLAN 2
- c/w ceftriaxone, D5/5  - urine culture: >100 CFU E coli; pansensitive  - BPH: c/w finasteride and flomax - c/w ceftriaxone, D5/5  - urine culture: >100 CFU E coli; pansensitive  - BPH: c/w finasteride and Flomax

## 2020-10-09 NOTE — PROGRESS NOTE ADULT - SUBJECTIVE AND OBJECTIVE BOX
Patient is a 75y old  Male who presents with a chief complaint of CVA and UTI (09 Oct 2020 08:05)    SUBJECTIVE / OVERNIGHT EVENTS:  Patient states he is "not too bad" this morning. States "everything is OK". Denies chest pain, SOB, N/V, abdominal pain, dysuria or loss of appetite. A&Ox3.   As per neuro, recommend repeat MRI brain due to worsening right-sided weakness. Maintain permissive /100 for now --> losartan 50mg discontinued.  Pending ILR placement today.     MEDICATIONS  (STANDING):  aspirin enteric coated 81 milliGRAM(s) Oral daily  atorvastatin 80 milliGRAM(s) Oral at bedtime  cefTRIAXone   IVPB 1000 milliGRAM(s) IV Intermittent once  enoxaparin Injectable 40 milliGRAM(s) SubCutaneous at bedtime  finasteride 5 milliGRAM(s) Oral daily  gabapentin 300 milliGRAM(s) Oral at bedtime  pantoprazole    Tablet 40 milliGRAM(s) Oral before breakfast  polyethylene glycol 3350 17 Gram(s) Oral daily  sodium chloride 0.9% lock flush 3 milliLiter(s) IV Push every 8 hours  tamsulosin 0.4 milliGRAM(s) Oral at bedtime    I&O's Summary    08 Oct 2020 07:01  -  09 Oct 2020 07:00  --------------------------------------------------------  IN: 0 mL / OUT: 800 mL / NET: -800 mL    PHYSICAL EXAM:  Vital Signs Last 24 Hrs  T(C): 36.8 (09 Oct 2020 05:55), Max: 36.8 (08 Oct 2020 10:55)  T(F): 98.2 (09 Oct 2020 05:55), Max: 98.3 (08 Oct 2020 10:55)  HR: 81 (09 Oct 2020 05:55) (80 - 86)  BP: 143/82 (09 Oct 2020 05:55) (137/97 - 143/83)  BP(mean): --  RR: 18 (09 Oct 2020 05:55) (18 - 18)  SpO2: 98% (09 Oct 2020 05:55) (97% - 98%)  CONSTITUTIONAL: NAD, well-developed, well-groomed; mild-mannered and cooperative   ENMT: Moist oral mucosa, no pharyngeal injection or exudates; normal dentition; no nasal discharge  RESPIRATORY: Normal respiratory effort; lungs are clear to auscultation bilaterally  CARDIOVASCULAR: Regular rate and rhythm, normal S1 and S2, no murmur/rub/gallop; No lower extremity edema; Peripheral pulses are 2+ bilaterally  ABDOMEN: Nontender to palpation, normoactive bowel sounds, no rebound/guarding; No hepatosplenomegaly  PSYCH: A&Ox3, patient is slow to respond  NEUROLOGY:  Right sided weakness greater than left side, able to lift both right upper extremity and right lower extremity and grasp/squeeze fingers, however easier to break resistance on right.   SKIN: No rashes; no palpable lesions    LABS:                14.1   10.81 )-----------( 281      ( 09 Oct 2020 06:55 )             44.9     10-08    138  |  102  |  13  ----------------------------<  89  3.6   |  26  |  0.95    Ca    9.3      08 Oct 2020 06:00  Phos  3.5     10-08  Mg     2.1     10-08    COVID-19 PCR: NotDetec (09 Oct 2020 05:34)    RADIOLOGY & ADDITIONAL TESTS:  MR brain:   Acute completed right gangliocapsular infarction with local mass effect compressing the right lateral ventricle with 2 mm leftward shift of the anterior septum pellucidum.  Chronic left NURA territory infarction.    CT Head: Wedge-shaped hypodensity within the right basal ganglia/corona radiata region with mass effect on the right lateral ventricle, likely representing acute or subacute infarct. Chronic left anterior cerebral artery territory and left basal ganglia infarcts.  There is no hydrocephalus,midline shift, or acute intracranial hemorrhage. No extra-axial collection.  The visualized paranasal sinuses and mastoid air cells are clear.  The calvarium is intact.  Status post bilateral lens replacement    CTA neck: No stenosis. No dissection.    CTA brain: Unchanged multifocal narrowing of the M1 segments bilaterally.    TTE:   1. Mitral annular calcification, otherwise normal mitral  valve. Mild mitral regurgitation.  2. Normal left ventricular internal dimensions and wall  thicknesses.  3. Normal left ventricular systolic function. No segmental  wall motion abnormalities.  4. Mild diastolic dysfunction (Stage I).  5. Normal right ventricular size and function.    Limited TTE: Agitated saline injection demonstrates no obvious evidence of a patent foramen ovale.    Care Discussed with Consultants/Other Providers: neurology, EP   Patient is a 75y old  Male who presents with a chief complaint of CVA and UTI (09 Oct 2020 08:05)    SUBJECTIVE / OVERNIGHT EVENTS:  Patient states he is "not too bad" this morning. States "everything is OK". Denies chest pain, SOB, N/V, abdominal pain, dysuria or loss of appetite. A&Ox3.   As per neuro, recommend repeat MRI brain due to worsening right-sided weakness. Maintain permissive /100 for now --> losartan 50mg discontinued.  Pending ILR placement today.   Spoke with PCP Dr. Zeb Salgado @[phone] to update the office on the details of the hospitalization.     MEDICATIONS  (STANDING):  aspirin enteric coated 81 milliGRAM(s) Oral daily  atorvastatin 80 milliGRAM(s) Oral at bedtime  cefTRIAXone   IVPB 1000 milliGRAM(s) IV Intermittent once  enoxaparin Injectable 40 milliGRAM(s) SubCutaneous at bedtime  finasteride 5 milliGRAM(s) Oral daily  gabapentin 300 milliGRAM(s) Oral at bedtime  pantoprazole    Tablet 40 milliGRAM(s) Oral before breakfast  polyethylene glycol 3350 17 Gram(s) Oral daily  sodium chloride 0.9% lock flush 3 milliLiter(s) IV Push every 8 hours  tamsulosin 0.4 milliGRAM(s) Oral at bedtime    I&O's Summary    08 Oct 2020 07:01  -  09 Oct 2020 07:00  --------------------------------------------------------  IN: 0 mL / OUT: 800 mL / NET: -800 mL    PHYSICAL EXAM:  Vital Signs Last 24 Hrs  T(C): 36.8 (09 Oct 2020 05:55), Max: 36.8 (08 Oct 2020 10:55)  T(F): 98.2 (09 Oct 2020 05:55), Max: 98.3 (08 Oct 2020 10:55)  HR: 81 (09 Oct 2020 05:55) (80 - 86)  BP: 143/82 (09 Oct 2020 05:55) (137/97 - 143/83)  BP(mean): --  RR: 18 (09 Oct 2020 05:55) (18 - 18)  SpO2: 98% (09 Oct 2020 05:55) (97% - 98%)  CONSTITUTIONAL: NAD, well-developed, well-groomed; mild-mannered and cooperative   ENMT: Moist oral mucosa, no pharyngeal injection or exudates; normal dentition; no nasal discharge  RESPIRATORY: Normal respiratory effort; lungs are clear to auscultation bilaterally  CARDIOVASCULAR: Regular rate and rhythm, normal S1 and S2, no murmur/rub/gallop; No lower extremity edema; Peripheral pulses are 2+ bilaterally  ABDOMEN: Nontender to palpation, normoactive bowel sounds, no rebound/guarding; No hepatosplenomegaly  PSYCH: A&Ox3, patient is slow to respond  NEUROLOGY:  Right sided weakness greater than left side, able to lift both right upper extremity and right lower extremity and grasp/squeeze fingers, however easier to break resistance on right.   SKIN: No rashes; no palpable lesions    LABS:                14.1   10.81 )-----------( 281      ( 09 Oct 2020 06:55 )             44.9     10-08    138  |  102  |  13  ----------------------------<  89  3.6   |  26  |  0.95    Ca    9.3      08 Oct 2020 06:00  Phos  3.5     10-08  Mg     2.1     10-08    COVID-19 PCR: NotDetec (09 Oct 2020 05:34)    RADIOLOGY & ADDITIONAL TESTS:  MR brain:   Acute completed right gangliocapsular infarction with local mass effect compressing the right lateral ventricle with 2 mm leftward shift of the anterior septum pellucidum.  Chronic left NURA territory infarction.    CT Head: Wedge-shaped hypodensity within the right basal ganglia/corona radiata region with mass effect on the right lateral ventricle, likely representing acute or subacute infarct. Chronic left anterior cerebral artery territory and left basal ganglia infarcts.  There is no hydrocephalus,midline shift, or acute intracranial hemorrhage. No extra-axial collection.  The visualized paranasal sinuses and mastoid air cells are clear.  The calvarium is intact.  Status post bilateral lens replacement    CTA neck: No stenosis. No dissection.    CTA brain: Unchanged multifocal narrowing of the M1 segments bilaterally.    TTE:   1. Mitral annular calcification, otherwise normal mitral  valve. Mild mitral regurgitation.  2. Normal left ventricular internal dimensions and wall  thicknesses.  3. Normal left ventricular systolic function. No segmental  wall motion abnormalities.  4. Mild diastolic dysfunction (Stage I).  5. Normal right ventricular size and function.    Limited TTE: Agitated saline injection demonstrates no obvious evidence of a patent foramen ovale.    Care Discussed with Consultants/Other Providers: neurology, EP

## 2020-10-09 NOTE — PROGRESS NOTE ADULT - PROBLEM SELECTOR PLAN 6
Stated to be stable and currently on no medications.   - Recommend outpatient Hematology follow up with patient's doctor, Dr. Davis.

## 2020-10-09 NOTE — PROGRESS NOTE ADULT - PROBLEM SELECTOR PLAN 7
Lovenox ppx  PT/OP/PMR : acute rehab, COVID neg  dispo pending ILR and MRI read Lovenox ppx  PT/OP/PMR : acute rehab, COVID neg  dispo AR today, dispo time 35 min

## 2020-10-09 NOTE — DISCHARGE NOTE NURSING/CASE MANAGEMENT/SOCIAL WORK - PATIENT PORTAL LINK FT
You can access the FollowMyHealth Patient Portal offered by [facility] by registering at the following website: [email or web address]. By joining [employer]’s FollowMyHealth portal, you will also be able to view your health information using other applications (apps) compatible with our system.

## 2020-10-09 NOTE — PROGRESS NOTE ADULT - ASSESSMENT
74 y/o male with PMHx of HTN, HLD, B cell lymphoproliferative disorder (stable) and CVA in 2018 with residual right sided weakness presents to the [facility] ED with new left sided weakness and concern for CVA. MR brain confirms CVA (right gangliocapsular infarction). Patient also diagnosed with UTI, given ceftriaxone.

## 2020-10-09 NOTE — PROGRESS NOTE ADULT - PROBLEM SELECTOR PLAN 3
TTE on 10/06/2020 showed Mitral annular calcification, otherwise normal mitral  valve. Mild mitral regurgitation. Normal left ventricular internal dimensions and wall  thicknesses. Normal left ventricular systolic function. No segmental  wall motion abnormalities. Mild diastolic dysfunction (Stage I). Normal right ventricular size and function.  - EF: 63%  - Metoprolol discontinued, carvedilol discontinued due to bradycardia on tele monitor   - Continue with ASA and atorvastatin 80mg.  - Losartan 50mg discontinued for now

## 2020-10-09 NOTE — DISCHARGE NOTE PROVIDER - NSDCMRMEDTOKEN_GEN_ALL_CORE_FT
aspirin 81 mg oral delayed release tablet: 1 tab(s) orally once a day  atorvastatin 80 mg oral tablet: 1 tab(s) orally once a day (at bedtime)  docusate sodium 100 mg oral capsule: 1 cap(s) orally 2 times a day  finasteride 5 mg oral tablet: 1 tab(s) orally once a day (at bedtime)  gabapentin 300 mg oral capsule: 1 cap(s) orally once a day (at bedtime)  metoprolol: 12.5 milligram(s) orally once a day stared in hospital 6/26 for 41% ef hold for HR&lt; 60 hold for sbp&lt; 110  pantoprazole 40 mg oral delayed release tablet: 1 tab(s) orally once a day    aspirin 81 mg oral delayed release tablet: 1 tab(s) orally once a day  atorvastatin 80 mg oral tablet: 1 tab(s) orally once a day (at bedtime)  finasteride 5 mg oral tablet: 1 tab(s) orally once a day (at bedtime)  gabapentin 300 mg oral capsule: 1 cap(s) orally once a day (at bedtime)  pantoprazole 40 mg oral delayed release tablet: 1 tab(s) orally once a day   polyethylene glycol 3350 oral powder for reconstitution: 17 gram(s) orally once a day  tamsulosin 0.4 mg oral capsule: 1 cap(s) orally once a day (at bedtime)   aspirin 81 mg oral delayed release tablet: 1 tab(s) orally once a day  atorvastatin 80 mg oral tablet: 1 tab(s) orally once a day (at bedtime)  finasteride 5 mg oral tablet: 1 tab(s) orally once a day (at bedtime)  gabapentin 300 mg oral capsule: 1 cap(s) orally once a day (at bedtime)  losartan 25 mg oral tablet: 1 tab(s) orally once a day  pantoprazole 40 mg oral delayed release tablet: 1 tab(s) orally once a day   polyethylene glycol 3350 oral powder for reconstitution: 17 gram(s) orally once a day  tamsulosin 0.4 mg oral capsule: 1 cap(s) orally once a day (at bedtime)

## 2020-10-09 NOTE — PROGRESS NOTE ADULT - ASSESSMENT
75 year old left-handed M with a PMH of former tobacco use, HLD, HTN, and ischemic stroke who presented due to difficulty getting out of bed upon awakening. LKN 10/4 in AM. Initial vital signs significant for BP of 174/84 and HR of 54. Physical exam significant for RLE weakness.  Initial labs significant for WBC of 12.93 and a positive urinalysis. He is doing well today, able to lift R leg up more than the day prior.     Impression: Worsening RLE weakness likely secondary to recrudescence (prior L NURA territory infarct) in setting of UTI vs new infarct on same side as RLE weakness (uncommon but might occur due to intracranial compensatory mechanism). 2) Mild L gaze palsy probably secondary to R gangliocapsular acute ischemic infarct, secondary to  vs branch atheromatous disease with mild stenosis in R M1. Etiology of L NURA territory infarct (old) likely due to ESUS    Imaging:  [x] CTA H&N: multifocal and R M1 stenosis (mild - d/w radiology)  [x] CT head: acute R BG/CR ischemic infarct, chronic L NURA territory infarct  [x] TTE : EF 63%, unremarkable  [x] MRI brain: Acute R gangliocapsular infarct with local mass effect, 2mm L shift of ant septum pellucidum  [x] s/p ILR  [x] repeat MRI 10/8 unchanged    Medications:  [x] continue Aspirin 81 mg  [x] continue DVT ppx  [x] continue Atorvastatin 80 mg nightly  [x] s/p abx for UTI    Labs:  [x] HgbA1c 5.7, ldl 98    Other:  [x] PT/OT/SS: acute Rehab   75 year old left-handed M with a PMH of former tobacco use, HLD, HTN, and ischemic stroke who presented due to difficulty getting out of bed upon awakening. LKN 10/4 in AM. Initial vital signs significant for BP of 174/84 and HR of 54. Physical exam significant for RLE weakness.  Initial labs significant for WBC of 12.93 and a positive urinalysis. He is doing well today, able to lift R leg up more than the day prior.     Impression: Worsening RLE weakness possibly secondary to recrudescence (prior L NURA territory infarct) in setting of UTI vs new infarct on same side as RLE weakness (uncommon but might occur due to cerebral plasticity with recruitment of ipsilateral hemisphere after his prior LACA infarct). 2) Mild L gaze palsy probably secondary to R gangliocapsular acute ischemic infarct, secondary to  vs branch atheromatous disease with mild stenosis in R M1. Etiology of L NURA territory infarct (old) likely due to ESUS    Imaging:  [x] CTA H&N: multifocal and R M1 stenosis (mild - d/w radiology)  [x] CT head: acute R BG/CR ischemic infarct, chronic L NURA territory infarct  [x] TTE : EF 63%, unremarkable  [x] MRI brain: Acute R gangliocapsular infarct with local mass effect, 2mm L shift of ant septum pellucidum  [x] s/p ILR  [x] repeat MRI 10/8 unchanged    Medications:  [x] continue Aspirin 81 mg  [x] continue DVT ppx  [x] continue Atorvastatin 80 mg nightly  [x] s/p abx for UTI    Labs:  [x] HgbA1c 5.7, ldl 98    Other:  [x] PT/OT/SS: acute Rehab  [] From neurovascular standpoint, cleared for discharge

## 2020-10-09 NOTE — DISCHARGE NOTE PROVIDER - PROVIDER TOKENS
FREE:[LAST:[Ryan],FIRST:[],PHONE:[(   )    -],FAX:[(   )    -],ADDRESS:[Hematologist]] FREE:[LAST:[Ryan],FIRST:[],PHONE:[(   )    -],FAX:[(   )    -],ADDRESS:[Hematologist]],PROVIDER:[TOKEN:[[id number]]]

## 2020-10-09 NOTE — PROGRESS NOTE ADULT - PROBLEM SELECTOR PLAN 2
Urine culture showed >100,000 CFU/ml Escherichia coli  - Continue with ceftriaxone day 5/5.  - Continue with finasteride and tamsulosin for BPH.

## 2020-10-09 NOTE — PROGRESS NOTE ADULT - PROBLEM SELECTOR PLAN 1
MR brain: Acute completed right gangliocapsular infarction with local mass effect compressing the right lateral ventricle with 2 mm leftward shift of the anterior septum pellucidum. Chronic left NURA territory infarction.  - Repeat MRI brain for worsening right sided weakness as per neuro recommendation  - Permissive /100 for now; losartan 50mg discontinued for now  - Continue with ASA and atorvastatin 80mg  - Tele monitor   - TTE: EF 63%, no obvious evidence of a patent foramen ovale  - Continue frequent neuro checks   - Pending ILR placement today (10/9/2020) with completion of abx for UTI  - Speech and swallow recommended soft food diet with nectar thick liquid  - PT/OT consulted - recommend acute rehab   - Social work consulted

## 2020-10-09 NOTE — DISCHARGE NOTE PROVIDER - NSDCHOSPICE_GEN_A_CORE
pt reports hx of prior stroke and states he thinks he is having a stroke again because he has left side weakness and difficulty speaking x 1 hour
No

## 2020-10-09 NOTE — PROGRESS NOTE ADULT - PROBLEM SELECTOR PLAN 7
Lovenox 40mg SC daily   Fall precautions  PT/OT - acute rehab  Discharge pending following ILR placement and repeat MRI results  Covid negative Lovenox 40mg SC daily   Fall precautions  PT/OT - acute rehab  PCP - Dr. Zeb Salgado   Discharge pending following ILR placement and repeat MRI results  Covid negative

## 2020-10-09 NOTE — PROGRESS NOTE ADULT - ASSESSMENT
74 y/o Male with PMH of CVA (2018, Residual right sided weakness), HTN, HLD, B cell lymphoproliferative disorder. Patient presented to the ED with right sided ARM weakness/ left side leg weakness and difficulty ambulating this morning (witnessed by sister); bradycardic/hypotensive and confused upon arrival. History was obtained from Sister Nathalia ([phone]). She states that patient was eating lunch when some of the food starting coming out of his mouth, patient became unresponsive while sitting in chair.   Head CT report above.  EP consult request for ILR.  Procedure reviewed/discussed with patient sister, all questions answered, consent obtained. ILR scheduled for tomorrow 10/9 (completion of Ceftriaxone for UTI).     ILR implanted today, no complications, tolerated procedure well. Chest dressing site CDI, negative bleed/hematoma.   Device and discharge teaching completed, with patient's sister verbalized understanding  Follow up appointment with Dr. Junior on 10/22 at 2:15pm

## 2020-10-09 NOTE — PROGRESS NOTE ADULT - ASSESSMENT
76 yo M with history of HTN, HLD, CVA 2018 with residual R-sided weakness, chronic systolic HF (Ef 41% June 2020),  B cell lymphoproliferative disorder presents to the ED with weakness (chronic R and new L sided) diagnosed with acute CVA.

## 2020-10-09 NOTE — DISCHARGE NOTE NURSING/CASE MANAGEMENT/SOCIAL WORK - NSDCPEPTSTRK_GEN_ALL_CORE
Risk factors for stroke/Stroke support groups for patients, families, and friends/Signs and symptoms of stroke/Call 911 for stroke/Stroke education booklet/Need for follow up after discharge/Prescribed medications/Stroke warning signs and symptoms

## 2020-10-09 NOTE — PROGRESS NOTE ADULT - PROBLEM SELECTOR PROBLEM 2
Urinary tract infection without hematuria, site unspecified

## 2020-10-09 NOTE — DISCHARGE NOTE PROVIDER - CARE PROVIDER_API CALL
Dr. Ryan  Hematologist  Phone: (   )    -  Fax: (   )    -  Follow Up Time:    Dr. Ryan  Hematologist  Phone: (   )    -  Fax: (   )    -  Follow Up Time:     AALIYAH BETANCOURT  Internal Medicine  [address] [facility], [place] [address]  Phone: [phone]  Fax: [phone]  Follow Up Time:

## 2020-10-19 NOTE — DISCHARGE NOTE ADULT - REASON FOR REFUSAL
follow up pcp [facility]:  I discussed with the patient the risks of golimumab including but not limited to myelosuppression, immunosuppression, autoimmune hepatitis, demyelinating diseases, lymphoma, and serious infections.  The patient understands that monitoring is required including a PPD at baseline and must alert us or the primary physician if symptoms of infection or other concerning signs are noted.

## 2021-01-01 ENCOUNTER — APPOINTMENT (OUTPATIENT)
Dept: ELECTROPHYSIOLOGY | Facility: CLINIC | Age: 76
End: 2021-01-01
Payer: MEDICARE

## 2021-01-01 ENCOUNTER — EMERGENCY (EMERGENCY)
Facility: HOSPITAL | Age: 76
LOS: 0 days | End: 2021-10-01
Attending: STUDENT IN AN ORGANIZED HEALTH CARE EDUCATION/TRAINING PROGRAM
Payer: MEDICARE

## 2021-01-01 ENCOUNTER — APPOINTMENT (OUTPATIENT)
Dept: CARDIOLOGY | Facility: CLINIC | Age: 76
End: 2021-01-01

## 2021-01-01 ENCOUNTER — NON-APPOINTMENT (OUTPATIENT)
Age: 76
End: 2021-01-01

## 2021-01-01 ENCOUNTER — APPOINTMENT (OUTPATIENT)
Dept: PHYSICAL MEDICINE AND REHAB | Facility: CLINIC | Age: 76
End: 2021-01-01

## 2021-01-01 ENCOUNTER — APPOINTMENT (OUTPATIENT)
Dept: PHYSICAL MEDICINE AND REHAB | Facility: CLINIC | Age: 76
End: 2021-01-01
Payer: MEDICARE

## 2021-01-01 ENCOUNTER — APPOINTMENT (OUTPATIENT)
Dept: CARDIOLOGY | Facility: CLINIC | Age: 76
End: 2021-01-01
Payer: MEDICARE

## 2021-01-01 VITALS
DIASTOLIC BLOOD PRESSURE: 80 MMHG | SYSTOLIC BLOOD PRESSURE: 140 MMHG | BODY MASS INDEX: 25.61 KG/M2 | HEART RATE: 74 BPM | OXYGEN SATURATION: 97 % | TEMPERATURE: 97.5 F | WEIGHT: 150 LBS | HEIGHT: 64 IN

## 2021-01-01 VITALS
WEIGHT: 150 LBS | SYSTOLIC BLOOD PRESSURE: 154 MMHG | DIASTOLIC BLOOD PRESSURE: 101 MMHG | OXYGEN SATURATION: 96 % | BODY MASS INDEX: 25.61 KG/M2 | TEMPERATURE: 97.2 F | HEIGHT: 64 IN | HEART RATE: 66 BPM

## 2021-01-01 VITALS
SYSTOLIC BLOOD PRESSURE: 146 MMHG | OXYGEN SATURATION: 99 % | HEART RATE: 71 BPM | DIASTOLIC BLOOD PRESSURE: 85 MMHG | TEMPERATURE: 97.2 F

## 2021-01-01 VITALS — OXYGEN SATURATION: 100 % | RESPIRATION RATE: 21 BRPM

## 2021-01-01 DIAGNOSIS — E78.5 HYPERLIPIDEMIA, UNSPECIFIED: ICD-10-CM

## 2021-01-01 DIAGNOSIS — I46.9 CARDIAC ARREST, CAUSE UNSPECIFIED: ICD-10-CM

## 2021-01-01 DIAGNOSIS — R40.2432 GLASGOW COMA SCALE SCORE 3-8, AT ARRIVAL TO EMERGENCY DEPARTMENT: ICD-10-CM

## 2021-01-01 DIAGNOSIS — G81.91 HEMIPLEGIA, UNSPECIFIED AFFECTING RIGHT DOMINANT SIDE: ICD-10-CM

## 2021-01-01 DIAGNOSIS — I10 ESSENTIAL (PRIMARY) HYPERTENSION: ICD-10-CM

## 2021-01-01 DIAGNOSIS — Z98.49 CATARACT EXTRACTION STATUS, UNSPECIFIED EYE: Chronic | ICD-10-CM

## 2021-01-01 DIAGNOSIS — I69.351 HEMIPLEGIA AND HEMIPARESIS FOLLOWING CEREBRAL INFARCTION AFFECTING RIGHT DOMINANT SIDE: ICD-10-CM

## 2021-01-01 DIAGNOSIS — Z20.822 CONTACT WITH AND (SUSPECTED) EXPOSURE TO COVID-19: ICD-10-CM

## 2021-01-01 DIAGNOSIS — G56.03 CARPAL TUNNEL SYNDROM,BILATERAL UPPER LIMBS: ICD-10-CM

## 2021-01-01 DIAGNOSIS — I63.522 CEREBRAL INFARCTION DUE TO UNSPECIFIED OCCLUSION OR STENOSIS OF LEFT ANTERIOR CEREBRAL ARTERY: ICD-10-CM

## 2021-01-01 DIAGNOSIS — Z88.1 ALLERGY STATUS TO OTHER ANTIBIOTIC AGENTS STATUS: ICD-10-CM

## 2021-01-01 LAB
FLUAV AG NPH QL: SIGNIFICANT CHANGE UP
FLUBV AG NPH QL: SIGNIFICANT CHANGE UP
GLUCOSE BLDC GLUCOMTR-MCNC: 129 MG/DL — HIGH (ref 70–99)
SARS-COV-2 RNA SPEC QL NAA+PROBE: SIGNIFICANT CHANGE UP

## 2021-01-01 PROCEDURE — 93000 ELECTROCARDIOGRAM COMPLETE: CPT

## 2021-01-01 PROCEDURE — 99213 OFFICE O/P EST LOW 20 MIN: CPT | Mod: 25

## 2021-01-01 PROCEDURE — G2066: CPT

## 2021-01-01 PROCEDURE — 93298 REM INTERROG DEV EVAL SCRMS: CPT

## 2021-01-01 PROCEDURE — 99072 ADDL SUPL MATRL&STAF TM PHE: CPT

## 2021-01-01 PROCEDURE — 99214 OFFICE O/P EST MOD 30 MIN: CPT

## 2021-01-01 PROCEDURE — 95911 NRV CNDJ TEST 9-10 STUDIES: CPT

## 2021-01-01 PROCEDURE — 99285 EMERGENCY DEPT VISIT HI MDM: CPT

## 2021-01-01 PROCEDURE — 99204 OFFICE O/P NEW MOD 45 MIN: CPT

## 2021-03-23 PROBLEM — I10 BENIGN ESSENTIAL HYPERTENSION: Status: ACTIVE | Noted: 2018-09-24

## 2021-03-23 PROBLEM — E78.5 HYPERLIPIDEMIA, UNSPECIFIED HYPERLIPIDEMIA TYPE: Status: ACTIVE | Noted: 2018-02-12

## 2021-03-23 NOTE — ASSESSMENT
[FreeTextEntry1] : 1.  Status post CVA x2 with residual weakness of the lower extremities inability to walk now even with a walker.\par \par 2.  Essential hypertension now well controlled\par \par 3.  Hyperlipidemia on high-dose statin\par \par 4.  ECG normal\par \par The patient has no cardiac symptoms has a normal physical exam except for the neurologic abnormalities.  He is hemodynamically stable.  He has had no recurrence of A. fib

## 2021-03-23 NOTE — HISTORY OF PRESENT ILLNESS
[FreeTextEntry1] : In brief Audrey Hollis 75 years old history of hypertension and hyperlipidemia non-smoker otherwise healthy presented over 2 years ago with a CVA with significant weakness positive MRI for subacute CVA.  A loop recorder was inserted at that time but the patient has remained in normal sinus rhythm without evidence of atrial fibrillation.  He is on medication for his hypertension losartan 50 and high-dose statin 80 mg of atorvastatin\par \par In November 2020 he presented again with a small CVA and now has inability to lift his right leg he is pretty much wheelchair-bound.  His monitor continues to reveal normal sinus rhythm\par \par The patient denies chest pain chest pressure shortness of breath.  His wife takes meticulous care of him\par \par He has not received the COVID-19 vaccine as they are against vaccines in general\par He denies palpitations dizziness or syncope.  He denies and his wife denies edema orthopnea PND\par \par EKG today reveals normal sinus rhythm and is within normal limits

## 2021-03-23 NOTE — PHYSICAL EXAM
[General Appearance - Well Developed] : well developed [General Appearance - Well Nourished] : well nourished [General Appearance - In No Acute Distress] : no acute distress [Normal Conjunctiva] : the conjunctiva exhibited no abnormalities [No Jugular Venous Russ A Waves] : no jugular venous russ A waves [Heart Sounds] : normal S1 and S2 [Murmurs] : no murmurs present [Respiration, Rhythm And Depth] : normal respiratory rhythm and effort [Auscultation Breath Sounds / Voice Sounds] : lungs were clear to auscultation bilaterally [Abdomen Soft] : soft [Abdomen Tenderness] : non-tender [Cyanosis, Localized] : no localized cyanosis [FreeTextEntry1] : No edema pulses 2+ dorsalis pedis patient has extreme weakness of the right lower extremity and left lower extremity unable to lift the right lower extremity Normal for race

## 2021-03-23 NOTE — REASON FOR VISIT
[FreeTextEntry1] : Audrey Hollis 75 years old previously seen in my old office history of hypertension status post CVA x2\par \par Primary physician Dr. Zeb Salgado

## 2021-03-23 NOTE — DISCUSSION/SUMMARY
[FreeTextEntry1] : 1.  At this point the loop can probably be removed at his is over 2 years without evidence of atrial fibrillation\par \par 2.  I urged him to get the vaccine both the wife and the patient\par \par 3.  Continue Cozaar 50 and high-dose statin\par \par 4.  Obtain blood work from Dr. Zeb Centeno\par \par 5.  Review echocardiograms performed at [facility]\par \par Routine follow with me again in 3 months

## 2021-05-20 PROBLEM — G81.91 RIGHT HEMIPARESIS: Status: ACTIVE | Noted: 2018-06-06

## 2021-05-20 PROBLEM — G56.03 BILATERAL CARPAL TUNNEL SYNDROME: Status: ACTIVE | Noted: 2021-01-01

## 2021-05-20 PROBLEM — I63.522: Status: ACTIVE | Noted: 2018-02-14

## 2021-05-20 NOTE — HISTORY OF PRESENT ILLNESS
[FreeTextEntry1] : 75 year old man presents with wife for follow up, Patient continues to feel pain in arms, hands, b/l, no new weakness. Has been fit for AFO, doing PT at home, working on transfers, ambulation with  AD.

## 2021-05-20 NOTE — REVIEW OF SYSTEMS
[Fever] : no fever [Chills] : no chills [Chest Pain] : no chest pain [Palpitations] : no palpitations [Shortness Of Breath] : no shortness of breath [Wheezing] : no wheezing [Cough] : no cough [Joint Pain] : no joint pain [Joint Stiffness] : no joint stiffness [Muscle Pain] : no muscle pain [Headache] : no headaches [Dizziness] : no dizziness [Anxiety] : no anxiety

## 2021-05-20 NOTE — PROCEDURE
[Consent] : Prior to procedure, consent was given by patient or guardian [Explanation of Risk/Benefits] : Explanation of risk/benefits provided [Nerve Conduction Velocity] : Nerve Conduction Velocity conducted. The following nerves were tested:

## 2021-05-20 NOTE — ASSESSMENT
[FreeTextEntry1] : 75 year old man with h/o CVA, right side weakness\par pain in arms, hands, EMG not consistent with carpal tunnel, radiculopathy\par patient to continue with PT, focus on transfers, ambulation, awaiting AFO\par would also benefit from speech, OT, but not able to do in home\par follow up as needed

## 2021-05-26 NOTE — ADDENDUM
[FreeTextEntry1] : Upon consultation with orthotist, patient has medical necessity for a right custom AFO with padded varus flange as he presents with right lower extremity weakness and foot drop with inversion at the ankle. The custom AFO will reduce the risk of falling by controlling patient's weakness and foot drop as it will allow the foot to hold in a neutral position during swing phase and permit proper heel contact at the initial phase of stance; it will additionally manage the patient's inversion. Patient requires stabilization of the foot and ankle (because patient's foot drops and scuffs the ground during ambulation) and the patient has the ability to benefit functionally in that he will be able to ambulate with increased safety and decreased risk for falls. Patient is unable to use prefabricated braces as there are no prefabricated AFOs designed to hold an inverted foot in a neutral position and because patient will require use of the orthosis for longer than six months. Patient remains at risk for fall without the custom AFO.

## 2021-05-26 NOTE — PHYSICAL EXAM
[Normal] : Alert and in no acute distress [de-identified] : Good respiratory effort, no SOB  [de-identified] : warm, well perfused  [de-identified] : sensation intact to light touch [de-identified] : right leg extension 3/5, DF/PF 1/5, right shoulder 4/5, biceps 4/5, wrist 4/5,  5/5, pain with PROM right shoulder, +carpal compression sign b/l wrists  [de-identified] : decreased verbal output, follows commands

## 2021-05-26 NOTE — HISTORY OF PRESENT ILLNESS
[FreeTextEntry1] : Patient is a 75 year old man, presents for evaluation with his wife. He had a CVA in Oct 2020, with residual right sided weakness, patient has been receiving home PT, has difficulty standing, also with right shoulder, arm pain, numbness in hands. He is primarily using a wheelchair for mobility. Wife denies difficulty with swallow, reports decreased appetite at times. Uses salonpas patch as needed for right shoulder pain.

## 2021-05-26 NOTE — REVIEW OF SYSTEMS
[Fever] : no fever [Chills] : no chills [Chest Pain] : no chest pain [Palpitations] : no palpitations [Shortness Of Breath] : no shortness of breath [Wheezing] : no wheezing [Cough] : no cough [Joint Pain] : no joint pain [Joint Stiffness] : no joint stiffness [Headache] : no headaches [Dizziness] : no dizziness [Anxiety] : no anxiety

## 2021-05-26 NOTE — ASSESSMENT
[FreeTextEntry1] : 75 year old man s/p CVA, right side weakness, complains of numbness in hands\par will continue PT, add OT at home, for right shoulder pain, ROM\par EMG to evaluate for carpal tunnel syndrome, vs cervical radiculopathy\par refer to orthotist for AFO\par no medications were prescribed\par follow up in one month

## 2021-10-01 NOTE — ED PROVIDER NOTE - CLINICAL SUMMARY MEDICAL DECISION MAKING FREE TEXT BOX
ACLS continued in ED, multiple rounds of epinephrine, bicarb, calcium adminsitered. asystolic throughout. ETCo2 downtrending with good waveform. etco2 7, indicative of abysmal prognosis. pronounced dead

## 2021-10-01 NOTE — ED ADULT NURSE NOTE - OBJECTIVE STATEMENT
77 y/o male pt presented to the ED in active cardiac arrest with ACLS protocols in progress. Pt was intubated in the field with  ETT attached to BVM with 100% O2 in place/confirmed placement when arrived. ACLS protocols continued from 11:01 when pt arrived with several rounds of EPI, Sodium Bicarb, Calcium and chest compression done with the ERMA.  At 11:27am  pt was pronounced by Dr. Priest who also stated pt was down in the field 10 minutes prior to arriving. Post-Mortem care provided and pt family (awaiting to come at the time of the note) to come to the bedside. Pt was a witnessed arrest with EMS. Defibrillator showed Asystole  during pulse check, and ultrasound showed cardiac stand still/ confirming pulselessness.

## 2021-10-01 NOTE — ED PROVIDER NOTE - OBJECTIVE STATEMENT
76m HTN, CVA, R deficit presents in cardiac arrest. intubated by EMS in field. patient was witnessed ot go unresponsive during eating. no witnessed choking prior to going unresponsive. ACLS initiated by EMS upon arrival as patient pulseless. asystolic throughout.

## 2021-10-01 NOTE — ED ADULT TRIAGE NOTE - CHIEF COMPLAINT QUOTE
75 y/o male with PMH of HTN, CVA, R deficit. BIBA in cardiac arrest. pt was at home eating food then suddenly began chaoking, ems on scene pt alert but nonverbal with HR in low 40's. pt given 2 epis, with pacing. Intubated in field. 7.0 & 24.

## 2021-11-02 ENCOUNTER — FORM ENCOUNTER (OUTPATIENT)
Age: 76
End: 2021-11-02

## 2021-11-12 ENCOUNTER — APPOINTMENT (OUTPATIENT)
Dept: ELECTROPHYSIOLOGY | Facility: CLINIC | Age: 76
End: 2021-11-12

## 2022-01-04 NOTE — HISTORY OF PRESENT ILLNESS
Dr. Ricci at bedside talking with pt and dgt.  
[FreeTextEntry1] : Mr. Hollis is a Greek 74 yo R handed man with vascular risk factors of age, HTN, HLD who presented on Jan 26, 2018 complaining of R foot/ankle weakness.\par Upon evaluation at [facility] Brain MRI revealed acute ischemic infarction at the left NURA territory. \par Head MRA show bilateral MCA areas of stenosis at M2 branches otherwise no major intracranial or extracranial areas of stenosis or occlusion.\par TTE with no significant valvular disease neither evident source of intracardiac thrombus. \par Hospital course complicated with PE recommended Xarelto for 6mo.\par Today sister who accompanies him states has been stable still needs assistance with ADL's. Neurological exam pertinent for language dysfunction - mixed aphasia, but noticed improvement as compare to previous visit. Right hemiparesis lower extremity >>upper extremity. Stable as compare to previous evaluation. MRS =3 \par \par \par I

## 2022-01-08 NOTE — PATIENT PROFILE ADULT - NSASFALLNEEDSASSISTWITH_GEN_A_NUR
pt w/ likely mechanical fall down several steps of stairs w/ possible loss of consciousness. No preceding symptoms, does not appear to be a syncopal episode.   CT head and maxiollofacial w/o acute trauma, noted hematomas as below  - PT eval in am  - pain control pt w/ likely mechanical fall down several steps of stairs w/ possible loss of consciousness. No preceding symptoms, does not appear to be a syncopal episode.   CT head and maxiollofacial w/o acute trauma, noted hematomas as below  - PT eval in am  - pain control- tylenol prn mild pain, tramadol prn moderate pain   - obtain CK walking

## 2022-04-26 NOTE — ED PROVIDER NOTE - GASTROINTESTINAL, MLM
Head,  normocephalic,  atraumatic,  Face,  Face within normal limits,  Ears,  External ears within normal limits,  Nose/Nasopharynx,  External nose  normal appearance,  nares patent,  no nasal discharge
Abdomen soft, non-tender, no guarding.

## 2022-09-25 NOTE — PHYSICAL THERAPY INITIAL EVALUATION ADULT - MANUAL MUSCLE TESTING RESULTS, REHAB EVAL
09/25/22 0830   Vital Signs   Temp 97.7 °F (36.5 °C)   Temp src Axillary   Pulse 103   Heart Rate Source Monitor   Resp (!) 51   SpO2 95 %   Pulse Oximetry Type Continuous   SpO2 Alarm Limit Low 92   Oximetry Probe Site Assessed;Changed   Flow (L/min) 4   Oxygen Concentration (%) 30   O2 Device (Oxygen Therapy) High Flow nasal Cannula   BP (!) 94/53   MAP (mmHg) 68   BP Location Right leg   BP Method cNIBP   Patient Position Lying   Art Line   Arterial Line /52   Arterial Line MAP (mmHg) 75 mmHg   Invasive Hemodynamic Monitoring   CVP (mean) 14 mmHg   Kasandra-ox 70/30   Right sided hemiparesis noted from previous CVA

## 2023-05-02 NOTE — ED PROVIDER NOTE - MEDICAL DECISION MAKING DETAILS
[Nasal Congestion] : nasal congestion [Problem Snoring] : problem snoring [Noisy Breathing] : noisy breathing [Snoring With Pauses] : snoring with pauses [Snoring] : snoring [Hoarseness] : hoarseness [Throat Clearing] : throat clearing [Negative] : Heme/Lymph 72M presenting with 1 day of weakness, 3 falls at home. +head trauma, no LOC. Exam with diffuse right sided motor weakness. No joint pain. Concern for stroke. Outside of window for tPa intervention. Will obtain CT head, CXR, Pelvis xray, labs, EKG. Currently stable, no acute distress. Will continue to follow up and re-assess. Case discussed with Attending: Varinder Day MD, PGY1

## 2023-05-26 NOTE — DISCHARGE NOTE NURSING/CASE MANAGEMENT/SOCIAL WORK - NSDPDISTO_GEN_ALL_CORE
Patient has been followed by the Care Transitions ED MSSP Program after ED visit on 5/12/23. Chart reviewed. No recent ED visits or hospital readmissions within 14 days.    Case due to close at this time per protocol, but writer would be happy to work with the patient if needs arise.     Isatu Santiago RN  Care Transitions Program: ED-MSSP & ACO Reach  [facility]  Phone: [phone], Mon-Fri 8:00 AM - 4:30 PM  (Out of office every other Wednesday)  Working remotely   Home

## 2023-06-16 NOTE — H&P ADULT - PROBLEM SELECTOR PROBLEM 3
[FreeTextEntry1] : 64 yearM here for assessment for Type 2 diabetes mellitus\par \par last A1c: 11.9%-->9.1%\par \par Patient with past medical history as below, remarkable for HTN, HLD, CABG\par  \par reports exercises, lifts weight usually, recently limited by wrist injury\par \par \par \par Screening \par Ophthalmology: follows\par Podiatry: follows\par LDL:  149 on lipitor 80mg po daily \par EGFR: 95\par \par \par Current diabetic medication regimen (verified with patient): \par Lantus 20 units Qhs\par novolog 6-6-6 tid-ac\par farxiga 10mg po daily \par \par \par Ambulatory glucose profile (AGP):  \par \par Device: Abbott Freestyle Martinez 2\par \par Glucose Management Indicator (GMI): -% , reports intermittent sensor issues. Has appt with CDE on monday \par Average Bmg/dl \par \par TIR:  \par TIR >250 mg/dl: 12% \par TIR >180mg/dl: 29% \par TIR 70 to 180mg/dl: 59% \par TIR <70mg/dl: 0% \par TIR <54mg/dl: 0% \par \par Coefficient of variation: 27.6% \par \par Time (%) CGM active: 26\par \par AGP on several days noted to have PP excursions, fasting sugars tend to trend above target\par \par 
Hyperlipidemia, unspecified hyperlipidemia type

## 2024-05-23 NOTE — H&P ADULT - NEGATIVE RESPIRATORY AND THORAX SYMPTOMS
OUTPATIENT PROGRESS NOTE    Subjective   Chief Complaint Office Visit (- discuss Ozempic / Abdomen issue  / Podiatrist requested for toe nail )     The patient is here for follow-up on chronic medical problems  59-year-old woman reports tingling in hands and feet, though not as severe as before. She has been diagnosed with a UTI and has been prescribed an antibiotic, which she has yet to . She has a   CGM device for monitoring blood sugar, which often wakes her up in the middle of the night due to low blood sugar levels. She is currently on Trulicity for her diabetes  and also on insulin along with Janumet. She has been seeing a podiatrist for foot care, but has had a negative experience at a nail shop where her skin was clipped and caused pain.                Medications  Medications were reviewed and updated today.    Histories  I have personally reviewed and updated the patient's past medical, past surgical, family and social histories during today's visit.    Review of Systems   Constitutional: Negative.    Respiratory: Negative.     Cardiovascular: Negative.    Genitourinary:  Positive for dysuria and frequency.   Neurological: Negative.        Objective   Visit Vitals  /78   Pulse 78   Ht 5' 4\" (1.626 m)   Wt 83.5 kg (184 lb)   SpO2 98%   BMI 31.58 kg/m²     Physical Exam  Cardiovascular:      Rate and Rhythm: Normal rate and regular rhythm.      Pulses: Normal pulses.      Heart sounds: Normal heart sounds.   Pulmonary:      Effort: Pulmonary effort is normal.      Breath sounds: Normal breath sounds.   Musculoskeletal:      Comments: Foot exam normal  Good pedal pulses  Monofilament test negative     Neurological:      General: No focal deficit present.      Mental Status: She is alert and oriented to person, place, and time.             Assessment & Plan   Diagnoses and associated orders for this visit:  1. Essential hypertension  Assessment & Plan:   05/23/2024   The patient is on  lisinopril 5 mg daily.  Continue same regimen.  Recheck labs and see us back in 4 months  02/07/2024   The blood pressure numbers are good.  The patient is on lisinopril 5 mg daily.  Continue same regimen.  Recheck labs and see us back in 3 months  11/01/2023  Blood pressure numbers are good.  The patient is on lisinopril 5 mg daily.  Denies any dizziness or lightheadedness or cough.  Continue same regimen.  Recheck 3 months  2. Type 2 diabetes mellitus with diabetic polyneuropathy, with long-term current use of insulin  (CMD)  Assessment & Plan:   05/23/2024   Refills given.  Discontinue Janumet.  Start plain metformin.  New list provided for medications  Follow-up 4 months with labs  02/07/2024   Monitor: The problem is improving with treatment.  Evaluation: Labs/tests ordered, see encounter summary.  Assessment/Treatment:  We will increase the Trulicity dose to 3 mg for week.  Compliance was stressed.  Follow-up here in 3 months with lab work  11/01/2023   The patient is on long-acting insulin 35 units daily.  She is on short-acting insulin 24 units 3 times a day.  She is on Trulicity 1.5 mg weekly.  She is also on Janumet 50/1000 daily.  Will increase her long-acting insulin to 40 units daily.  Advised to continue to monitor blood sugar at home.  Will check lab work today and see her back in 3 months     Patient has Type 2 diabetes mellitus and would benefit from the use of CGM. She requires daily basal and prandial injections. We will assess the usage of SMBG test strips at her visits. Patient will share data with managing healthcare professionals for the purposes of clinical decision making. She has seen a diabetes  at least once in the past year and CGM is being requested by expert. She has completed a comprehensive diabetes education program. Patient agrees to wear the CGM as directed.   Orders:  -     Insulin Pen Needle  3. Hypercholesteremia  Assessment & Plan:   05/23/2024   Doing well on  Lipitor.  Continue same regimen.  Recheck labs and see us back in 4 months  02/07/2024   The patient is doing well on Lipitor.  Continue same regimen  11/01/2023   The patient is on Lipitor 40 mg daily.  We will check CMP and lipids today.  Continue same regimen.  Follow-up 3 months     Assessment  - UTI: Prescribed antibiotic, yet to be picked up  - Diabetes: Currently on Trulicity, Blood sugar levels often low at night.   - Tingling in hands and feet: Less severe than before  - Foot care: Negative experience at nail shop, skin clipped causing pain .  Planning on seeing a podiatrist  - Eye issues: Running and chapped eyes, no diabetic-related issues found in last visit in January    Plan  -  prescribed antibiotic for UTI  -  we will discontinue Janumet because the patient being on a GLP 1 agonist.  We will ask her to take metformin plain XR 1000 mg every morning  - Continue monitoring blood sugar levels, especially at night  - Seek foot care from podiatrist, avoid nail shops  - Follow up appointment with eye doctor for running and chapped eyes  - Repeat blood work before next appointment in four months   no cough/no dyspnea

## 2024-06-05 NOTE — PROGRESS NOTE ADULT - ATTENDING COMMENTS
Occupational Therapy    Patient not seen in therapy.     On hold due to medical condition    Pt admitted today for distal femur fracture, rib fractures. Awaiting hinged knee brace. RN aware regarding how to procure. Will re-attempt later today vs tomorrow, 6/6.      OBJECTIVE                          Therapy procedure time and total treatment time can be found documented on the Time Entry flowsheet  
74 y/o Male with PMH of CVA (2018, Residual right sided weakness), HTN, HLD, B cell lymphoproliferative disorder. Patient presented to the ED with right sided ARM weakness/ left side leg weakness and difficulty ambulating this morning (witnessed by sister); bradycardic/hypotensive and confused upon arrival. History was obtained from Sister Nathalia ([phone]). She states that patient was eating lunch when some of the food starting coming out of his mouth, patient became unresponsive while sitting in chair. ILR today.
76 y/o Male with PMH of CVA (2018, Residual right sided weakness), HTN, HLD, B cell lymphoproliferative disorder. Patient presented to the ED with right sided ARM weakness/ left side leg weakness and difficulty ambulating this morning (witnessed by sister); bradycardic/hypotensive and confused upon arrival. History was obtained from Sister Nathalia ([phone]). She states that patient was eating lunch when some of the food starting coming out of his mouth, patient became unresponsive while sitting in chair.   Head CT report above.  EP consult request for ILR.  Will proceed after IV abx.
agree with above; ROS otherwise negative
agree with above; ROS otherwise negative

## 2024-09-25 NOTE — PATIENT PROFILE ADULT - PATIENT REPRESENTATIVE: ( YOU CAN CHOOSE ANY PERSON THAT CAN ASSIST YOU WITH YOUR HEALTH CARE PREFERENCES, DOES NOT HAVE TO BE A SPOUSE, IMMEDIATE FAMILY OR SIGNIFICANT OTHER/PARTNER)
declines
Detail Level: Zone
Photo Preface (Leave Blank If You Do Not Want): Photographs were obtained today

## 2024-10-25 NOTE — PATIENT PROFILE ADULT. - NS PRO OT REFERRAL QUES 2 YN
Left message on voice mail to remind patient of pharmacological stress test appointment on October 29, 2024 at 0930. Instructions for test, and COVID-19 preprocedure information left on voice mail. Asked patient to call with any questions or if unable to keep appointment.   yes
